# Patient Record
Sex: FEMALE | Race: WHITE | NOT HISPANIC OR LATINO | Employment: OTHER | URBAN - METROPOLITAN AREA
[De-identification: names, ages, dates, MRNs, and addresses within clinical notes are randomized per-mention and may not be internally consistent; named-entity substitution may affect disease eponyms.]

---

## 2023-05-16 ENCOUNTER — HOSPITAL ENCOUNTER (OUTPATIENT)
Dept: DATA CONVERSION | Facility: HOSPITAL | Age: 63
End: 2023-05-16
Attending: STUDENT IN AN ORGANIZED HEALTH CARE EDUCATION/TRAINING PROGRAM | Admitting: STUDENT IN AN ORGANIZED HEALTH CARE EDUCATION/TRAINING PROGRAM

## 2023-05-16 DIAGNOSIS — C50.919 MALIGNANT NEOPLASM OF UNSPECIFIED SITE OF UNSPECIFIED FEMALE BREAST (MULTI): ICD-10-CM

## 2023-05-16 DIAGNOSIS — I48.91 UNSPECIFIED ATRIAL FIBRILLATION (MULTI): ICD-10-CM

## 2023-05-16 DIAGNOSIS — C79.51 SECONDARY MALIGNANT NEOPLASM OF BONE (MULTI): ICD-10-CM

## 2023-05-17 LAB
ATRIAL RATE: 78 BPM
P AXIS: 42 DEGREES
P OFFSET: 200 MS
P ONSET: 143 MS
PR INTERVAL: 142 MS
Q ONSET: 214 MS
QRS COUNT: 13 BEATS
QRS DURATION: 96 MS
QT INTERVAL: 380 MS
QTC CALCULATION(BAZETT): 433 MS
QTC FREDERICIA: 414 MS
R AXIS: -25 DEGREES
T AXIS: 5 DEGREES
T OFFSET: 404 MS
VENTRICULAR RATE: 78 BPM

## 2023-09-05 VITALS — WEIGHT: 136.91 LBS | HEIGHT: 61 IN | BODY MASS INDEX: 25.85 KG/M2

## 2023-09-05 DIAGNOSIS — C41.9 MALIGNANT NEOPLASM OF BONE WITH METASTASES (MULTI): ICD-10-CM

## 2023-09-05 DIAGNOSIS — C50.911 CARCINOMA OF RIGHT BREAST METASTATIC TO BONE (MULTI): Primary | ICD-10-CM

## 2023-09-05 DIAGNOSIS — C79.51 CARCINOMA OF RIGHT BREAST METASTATIC TO BONE (MULTI): Primary | ICD-10-CM

## 2023-09-05 PROBLEM — M89.9 LESION OF BONE OF THORACIC SPINE: Status: ACTIVE | Noted: 2023-09-05

## 2023-09-05 PROBLEM — G95.20 CORD COMPRESSION (MULTI): Status: ACTIVE | Noted: 2023-09-05

## 2023-09-05 PROBLEM — Z85.3 HISTORY OF BREAST CANCER IN FEMALE: Status: ACTIVE | Noted: 2023-09-05

## 2023-09-05 PROBLEM — G89.3 NEOPLASM RELATED PAIN: Status: ACTIVE | Noted: 2023-09-05

## 2023-09-05 PROBLEM — G89.3 PAIN DUE TO MALIGNANT NEOPLASM METASTATIC TO BONE (MULTI): Status: ACTIVE | Noted: 2023-09-05

## 2023-09-05 RX ORDER — METHYLPREDNISOLONE SODIUM SUCCINATE 40 MG/ML
40 INJECTION INTRAMUSCULAR; INTRAVENOUS AS NEEDED
Status: CANCELLED | OUTPATIENT
Start: 2023-10-02

## 2023-09-05 RX ORDER — EPINEPHRINE 0.3 MG/.3ML
0.3 INJECTION SUBCUTANEOUS EVERY 5 MIN PRN
Status: CANCELLED | OUTPATIENT
Start: 2023-10-02

## 2023-09-05 RX ORDER — EPINEPHRINE 0.3 MG/.3ML
0.3 INJECTION SUBCUTANEOUS EVERY 5 MIN PRN
Status: CANCELLED | OUTPATIENT
Start: 2023-10-09

## 2023-09-05 RX ORDER — METHYLPREDNISOLONE SODIUM SUCCINATE 40 MG/ML
40 INJECTION INTRAMUSCULAR; INTRAVENOUS AS NEEDED
Status: CANCELLED | OUTPATIENT
Start: 2023-10-09

## 2023-09-05 RX ORDER — ALBUTEROL SULFATE 0.83 MG/ML
3 SOLUTION RESPIRATORY (INHALATION) AS NEEDED
Status: CANCELLED | OUTPATIENT
Start: 2023-10-09

## 2023-09-05 RX ORDER — ONDANSETRON HYDROCHLORIDE 2 MG/ML
8 INJECTION, SOLUTION INTRAVENOUS ONCE
Status: CANCELLED | OUTPATIENT
Start: 2023-10-09

## 2023-09-05 RX ORDER — HEPARIN SODIUM,PORCINE/PF 10 UNIT/ML
50 SYRINGE (ML) INTRAVENOUS AS NEEDED
Status: CANCELLED | OUTPATIENT
Start: 2023-09-05

## 2023-09-05 RX ORDER — FAMOTIDINE 10 MG/ML
20 INJECTION INTRAVENOUS ONCE AS NEEDED
Status: CANCELLED | OUTPATIENT
Start: 2023-10-09

## 2023-09-05 RX ORDER — FAMOTIDINE 10 MG/ML
20 INJECTION INTRAVENOUS ONCE AS NEEDED
Status: CANCELLED | OUTPATIENT
Start: 2023-10-02

## 2023-09-05 RX ORDER — HEPARIN 100 UNIT/ML
500 SYRINGE INTRAVENOUS AS NEEDED
Status: CANCELLED | OUTPATIENT
Start: 2023-09-05

## 2023-09-05 RX ORDER — DIPHENHYDRAMINE HYDROCHLORIDE 50 MG/ML
50 INJECTION INTRAMUSCULAR; INTRAVENOUS AS NEEDED
Status: CANCELLED | OUTPATIENT
Start: 2023-10-09

## 2023-09-05 RX ORDER — DIPHENHYDRAMINE HYDROCHLORIDE 50 MG/ML
50 INJECTION INTRAMUSCULAR; INTRAVENOUS AS NEEDED
Status: CANCELLED | OUTPATIENT
Start: 2023-10-02

## 2023-09-05 RX ORDER — ALBUTEROL SULFATE 0.83 MG/ML
3 SOLUTION RESPIRATORY (INHALATION) AS NEEDED
Status: CANCELLED | OUTPATIENT
Start: 2023-10-02

## 2023-09-05 RX ORDER — ONDANSETRON HYDROCHLORIDE 2 MG/ML
8 INJECTION, SOLUTION INTRAVENOUS ONCE
Status: CANCELLED | OUTPATIENT
Start: 2023-10-02

## 2023-09-26 PROBLEM — K59.03 CONSTIPATION DUE TO OPIOID THERAPY: Status: ACTIVE | Noted: 2023-09-26

## 2023-09-26 PROBLEM — M79.2 NEUROPATHIC PAIN: Status: ACTIVE | Noted: 2023-09-26

## 2023-09-26 PROBLEM — T40.2X5A CONSTIPATION DUE TO OPIOID THERAPY: Status: ACTIVE | Noted: 2023-09-26

## 2023-09-26 PROBLEM — C79.51 METASTATIC CANCER TO SPINE (MULTI): Status: ACTIVE | Noted: 2023-09-26

## 2023-09-26 PROBLEM — E78.5 HYPERLIPIDEMIA: Status: ACTIVE | Noted: 2023-09-26

## 2023-09-26 PROBLEM — S22.000A THORACIC COMPRESSION FRACTURE (MULTI): Status: ACTIVE | Noted: 2023-09-26

## 2023-09-26 PROBLEM — I48.91 TRANSIENT ATRIAL FIBRILLATION (MULTI): Status: ACTIVE | Noted: 2023-09-26

## 2023-09-26 RX ORDER — ONDANSETRON 4 MG/1
4 TABLET, ORALLY DISINTEGRATING ORAL EVERY 8 HOURS PRN
COMMUNITY
Start: 2023-05-09

## 2023-09-26 RX ORDER — RIZATRIPTAN BENZOATE 5 MG/1
TABLET, ORALLY DISINTEGRATING ORAL
COMMUNITY
Start: 2023-03-12

## 2023-09-26 RX ORDER — DEXAMETHASONE 1 MG/1
2 TABLET ORAL DAILY
COMMUNITY
Start: 2023-05-04 | End: 2024-01-31 | Stop reason: WASHOUT

## 2023-09-26 RX ORDER — LIDOCAINE AND PRILOCAINE 25; 25 MG/G; MG/G
CREAM TOPICAL
COMMUNITY
Start: 2023-07-19

## 2023-09-26 RX ORDER — MORPHINE SULFATE 15 MG/1
15 TABLET, FILM COATED, EXTENDED RELEASE ORAL 2 TIMES DAILY
COMMUNITY
Start: 2023-08-27 | End: 2023-10-12 | Stop reason: SDUPTHER

## 2023-09-26 RX ORDER — MELOXICAM 15 MG/1
15 TABLET ORAL DAILY
COMMUNITY
Start: 2023-03-27 | End: 2024-01-31 | Stop reason: WASHOUT

## 2023-09-26 RX ORDER — GABAPENTIN 300 MG/1
300 CAPSULE ORAL 2 TIMES DAILY
COMMUNITY
Start: 2023-08-23 | End: 2023-12-06 | Stop reason: SDUPTHER

## 2023-09-26 RX ORDER — ATORVASTATIN CALCIUM 10 MG/1
10 TABLET, FILM COATED ORAL DAILY
COMMUNITY
Start: 2023-08-20

## 2023-09-26 RX ORDER — PROPRANOLOL HYDROCHLORIDE 40 MG/1
40 TABLET ORAL DAILY
COMMUNITY
Start: 2023-08-20

## 2023-09-26 RX ORDER — CYCLOBENZAPRINE HCL 10 MG
10 TABLET ORAL NIGHTLY
COMMUNITY
Start: 2023-03-20 | End: 2024-01-31 | Stop reason: WASHOUT

## 2023-09-26 RX ORDER — OXYCODONE HYDROCHLORIDE 10 MG/1
10 TABLET ORAL EVERY 4 HOURS PRN
COMMUNITY
Start: 2023-06-09

## 2023-09-30 NOTE — H&P
History & Physical Reviewed:   I have reviewed the History and Physical dated:  09-May-2023   History and Physical reviewed and relevant findings noted. Patient examined to review pertinent physical  findings.: No significant changes   Home Medications Reviewed: no changes noted   Allergies Reviewed: no changes noted       Airway/Sedation Assessment:  ·  Emotional Status calm   ·  Neurologic alert & oriented x 3   ·  Respiratory clear to auscultation   ·  Cardiovascular rhythm & rate regular   ·  GI/ soft, nontender     · Pulses present: Pedal Left, Pedal Right, Radial Left, Radial Right     ·  Mouth Opening OK yes   ·  Neck Flexibility OK yes   ·  Loose Teeth no   ·  Oropharyngeal Classification Class III   ·  ASA PS Classification ASA III   ·  Sedation Plan moderate sedation       ERAS (Enhanced Recovery After Surgery):  ·  ERAS Patient: no     Consent:   COVID-19 Consent:  ·  COVID-19 Risk Consent Surgeon has reviewed key risks related to the risk of nikita COVID-19 and if they contract COVID-19 what the risks are.     Assessment/Plan:   ·  Assessment and Plan    62 year old female with Breast Ca presents for Mediport with Dr Jimenez      Electronic Signatures:  Summer Pacheco (PAC)  (Signed 16-May-2023 14:13)   Authored: History & Physical Reviewed, Airway/Sedation,  ERAS, Consent, Assessment/Plan, Note Completion      Last Updated: 16-May-2023 14:13 by Summer Pacheco (PAC)

## 2023-10-02 ENCOUNTER — INFUSION (OUTPATIENT)
Dept: HEMATOLOGY/ONCOLOGY | Facility: CLINIC | Age: 63
End: 2023-10-02
Payer: COMMERCIAL

## 2023-10-02 VITALS
OXYGEN SATURATION: 96 % | SYSTOLIC BLOOD PRESSURE: 114 MMHG | TEMPERATURE: 98.2 F | HEIGHT: 62 IN | RESPIRATION RATE: 16 BRPM | BODY MASS INDEX: 24.83 KG/M2 | DIASTOLIC BLOOD PRESSURE: 75 MMHG | HEART RATE: 61 BPM | WEIGHT: 134.92 LBS

## 2023-10-02 DIAGNOSIS — C41.9 MALIGNANT NEOPLASM OF BONE WITH METASTASES (MULTI): ICD-10-CM

## 2023-10-02 DIAGNOSIS — C79.51 CARCINOMA OF RIGHT BREAST METASTATIC TO BONE (MULTI): ICD-10-CM

## 2023-10-02 DIAGNOSIS — C50.911 CARCINOMA OF RIGHT BREAST METASTATIC TO BONE (MULTI): ICD-10-CM

## 2023-10-02 LAB
ALBUMIN SERPL BCP-MCNC: 3.9 G/DL (ref 3.4–5)
ALP SERPL-CCNC: 44 U/L (ref 33–136)
ALT SERPL W P-5'-P-CCNC: 9 U/L (ref 7–45)
ANION GAP SERPL CALC-SCNC: 11 MMOL/L (ref 10–20)
AST SERPL W P-5'-P-CCNC: 13 U/L (ref 9–39)
BASOPHILS # BLD AUTO: 0.03 X10*3/UL (ref 0–0.1)
BASOPHILS NFR BLD AUTO: 0.7 %
BILIRUB SERPL-MCNC: 0.5 MG/DL (ref 0–1.2)
BUN SERPL-MCNC: 15 MG/DL (ref 6–23)
CALCIUM SERPL-MCNC: 8.3 MG/DL (ref 8.6–10.3)
CHLORIDE SERPL-SCNC: 107 MMOL/L (ref 98–107)
CO2 SERPL-SCNC: 26 MMOL/L (ref 21–32)
CREAT SERPL-MCNC: 0.52 MG/DL (ref 0.5–1.05)
EOSINOPHIL # BLD AUTO: 0.11 X10*3/UL (ref 0–0.7)
EOSINOPHIL NFR BLD AUTO: 2.5 %
ERYTHROCYTE [DISTWIDTH] IN BLOOD BY AUTOMATED COUNT: 14.5 % (ref 11.5–14.5)
GFR SERPL CREATININE-BSD FRML MDRD: >90 ML/MIN/1.73M*2
GLUCOSE SERPL-MCNC: 93 MG/DL (ref 74–99)
HCT VFR BLD AUTO: 37.7 % (ref 36–46)
HGB BLD-MCNC: 12.8 G/DL (ref 12–16)
IMM GRANULOCYTES # BLD AUTO: 0.01 X10*3/UL (ref 0–0.7)
IMM GRANULOCYTES NFR BLD AUTO: 0.2 % (ref 0–0.9)
LYMPHOCYTES # BLD AUTO: 1.52 X10*3/UL (ref 1.2–4.8)
LYMPHOCYTES NFR BLD AUTO: 34 %
MCH RBC QN AUTO: 30.4 PG (ref 26–34)
MCHC RBC AUTO-ENTMCNC: 34 G/DL (ref 32–36)
MCV RBC AUTO: 90 FL (ref 80–100)
MONOCYTES # BLD AUTO: 0.49 X10*3/UL (ref 0.1–1)
MONOCYTES NFR BLD AUTO: 11 %
NEUTROPHILS # BLD AUTO: 2.31 X10*3/UL (ref 1.2–7.7)
NEUTROPHILS NFR BLD AUTO: 51.6 %
NRBC BLD-RTO: NORMAL /100{WBCS}
PLATELET # BLD AUTO: 201 X10*3/UL (ref 150–450)
PMV BLD AUTO: 11.5 FL (ref 7.5–11.5)
POTASSIUM SERPL-SCNC: 3.7 MMOL/L (ref 3.5–5.3)
PROT SERPL-MCNC: 5.7 G/DL (ref 6.4–8.2)
RBC # BLD AUTO: 4.21 X10*6/UL (ref 4–5.2)
SODIUM SERPL-SCNC: 140 MMOL/L (ref 136–145)
WBC # BLD AUTO: 4.5 X10*3/UL (ref 4.4–11.3)

## 2023-10-02 PROCEDURE — 80053 COMPREHEN METABOLIC PANEL: CPT

## 2023-10-02 PROCEDURE — 2500000004 HC RX 250 GENERAL PHARMACY W/ HCPCS (ALT 636 FOR OP/ED): Mod: JW | Performed by: INTERNAL MEDICINE

## 2023-10-02 PROCEDURE — 96375 TX/PRO/DX INJ NEW DRUG ADDON: CPT

## 2023-10-02 PROCEDURE — 85025 COMPLETE CBC W/AUTO DIFF WBC: CPT

## 2023-10-02 PROCEDURE — 96413 CHEMO IV INFUSION 1 HR: CPT

## 2023-10-02 PROCEDURE — 96523 IRRIG DRUG DELIVERY DEVICE: CPT | Mod: CCI

## 2023-10-02 PROCEDURE — 36415 COLL VENOUS BLD VENIPUNCTURE: CPT

## 2023-10-02 RX ORDER — ONDANSETRON HYDROCHLORIDE 2 MG/ML
8 INJECTION, SOLUTION INTRAVENOUS ONCE
Status: COMPLETED | OUTPATIENT
Start: 2023-10-02 | End: 2023-10-02

## 2023-10-02 RX ORDER — ALBUTEROL SULFATE 0.83 MG/ML
3 SOLUTION RESPIRATORY (INHALATION) AS NEEDED
Status: DISCONTINUED | OUTPATIENT
Start: 2023-10-02 | End: 2023-10-02 | Stop reason: HOSPADM

## 2023-10-02 RX ORDER — EPINEPHRINE 0.3 MG/.3ML
0.3 INJECTION SUBCUTANEOUS EVERY 5 MIN PRN
Status: DISCONTINUED | OUTPATIENT
Start: 2023-10-02 | End: 2023-10-02 | Stop reason: HOSPADM

## 2023-10-02 RX ORDER — HEPARIN 100 UNIT/ML
SYRINGE INTRAVENOUS
Status: DISPENSED
Start: 2023-10-02 | End: 2023-10-03

## 2023-10-02 RX ORDER — DIPHENHYDRAMINE HYDROCHLORIDE 50 MG/ML
50 INJECTION INTRAMUSCULAR; INTRAVENOUS AS NEEDED
Status: DISCONTINUED | OUTPATIENT
Start: 2023-10-02 | End: 2023-10-02 | Stop reason: HOSPADM

## 2023-10-02 RX ORDER — FAMOTIDINE 10 MG/ML
20 INJECTION INTRAVENOUS ONCE AS NEEDED
Status: DISCONTINUED | OUTPATIENT
Start: 2023-10-02 | End: 2023-10-02 | Stop reason: HOSPADM

## 2023-10-02 RX ADMIN — PACLITAXEL 131.2 MG: 100 INJECTION, POWDER, LYOPHILIZED, FOR SUSPENSION INTRAVENOUS at 12:28

## 2023-10-02 RX ADMIN — ONDANSETRON 8 MG: 2 INJECTION, SOLUTION INTRAMUSCULAR; INTRAVENOUS at 10:30

## 2023-10-02 ASSESSMENT — PAIN SCALES - GENERAL: PAINLEVEL: 3

## 2023-10-02 NOTE — PROGRESS NOTES
Infusion tolerated without incident. Cooling cap pre/ during and post treatment was tolerated as well. Schedule reviewed then Dc'd via independent / ambulatory

## 2023-10-02 NOTE — PROGRESS NOTES
Patient ID: Trang Jones is a 63 y.o. female.  Referring Physician: No referring provider defined for this encounter.  Primary Care Provider: Karl Granger DO    CANCER HISTORY:    Chief Complaint    62 yo woman with newly dx Tneg breast ca to bone, cord compression  2010 - L mtx - ER+ stage II breast ca   AC x 4  Keith x 7 yrs    3/23 - back pain - MRI with cord compression treated with radiation 5/4/23 5/22/23 - Taxol (weekly) - had reaction - held last week and only partial this week  Changing to abraxane  Not planning pembrolizumab as PDL1 neg per pt     Now on abraxane and tolerating well         History of Present IllnessConsulted by: Dr. Tolbert  For: breast cancer    Chief complaints and symptoms:  Seen in supp oncology  1. Pain - on ms contin, nsaids, gabapentin. THC twice/week  mostly in back and now in ribs. Improved overall though some pain from walking this last weekend  Now worse since [place] trip and walking - pelvic pain, L hip and pain down L leg    2. Fatigue - related to chemotherapy - improved overall. Had abraxane 2 days ago - stable but tired from weekend trip - same now    3. Appetite suppressed - improved off chemo last week  Poor taste - improved off chemo last week  Doing well still today    4. Neuropathy - mild developing tingling in fingers - numbness and tingling briefly in fingertips - brief numbness in fingers  Improved    mild ha    Anxiety about results    Integrative History:  Diet: mostly fruits (taste good), protein smoothies (Vital proteins, collagen powder, almond milk)  Lean proteins, chicken/salmon, o/w plant based    PA: walking some, hiking some now, continued  Doing some Pilates    Sleep: well improved with THC - 8-9 hrs/night    Stress: overwhelmed by dx.  Management: Meditating daily almost, various times, has some experience - Intermittent  Family support  Reiki helping  Focusing more on spirituality and Religion, Women's groups.    Natural Products:  Medical  cannabis - one gummy/night  magnesium  Red yeast rice - for cholesterol  Vit D  Flax seed oil   American Ginseng  Host Defense STAMETS 7  Zinc    ROS:  no ha, visual symptoms, hearing loss  no sob, chest pain, palp  ROS o/w non contributory, please see HPI    Objective    BSA: There is no height or weight on file to calculate BSA.  There were no vitals taken for this visit.    PHYSICAL EXAM:  NAD, awake/alert  HEENT, NCAT, OP clear, no oral lesions  CTA bilat  RRR no mgr  Abd soft/nt/nd+bs  No c/c/e/ttp  Motor/sensory intact, CN 2-12 intact     RESULTS:  Lab Results   Component Value Date    WBC 4.5 10/02/2023    HGB 12.8 10/02/2023    HCT 37.7 10/02/2023     10/02/2023    CREATININE 0.52 10/02/2023    AST 13 10/02/2023       Assessment/Plan   Cancer Staging   Malignant neoplasm of bone with metastases (CMS/HCC)  Staging form: Bone - Appendicular Skeleton, Trunk, Skull, and Facial Bones, AJCC 8th Edition  - Clinical stage from 9/5/2023: pM1, G3 - Signed by Bebeto Tolbert MD on 9/5/2023    Provider Impressions    62 yo woman with recurrent breast ca, Tneg now with bone mets and s/p cord compression  S/p radiation  5/22/23 started taxol (weekly) - now changed to abraxane  No neuropathy now    Breast cancer:   7/17/23 CT c/a/p - will review with Dr. Tolbert:  sclerotic L 3rd rib, increase osseous mets R iliac, 1.1 cm liver hypodensities, can discuss further with Dr. Tolbert  focusing plant based is fine  limit sugar including in smoothies, consider Orgain (using Vital protein)  Would avoid coffee on taxol  Zinc for taste  Flax is fine  THC is good as well  Vitamin D3 5000 IU 3 days/week is good  Consider omega 3   Stop b12, also coq10 - done  on Host Defense STAMETS 7    Try to walk 15-30 min/day    Pain - taking motrin prn, steroid weaning  MS contin and gabapentin  Acupuncture will start in Symptom Management Clinic - has started  Feels better when having AM pain meds and supplements  Some  improvements  Gentle Yoga with assistance, same with Pilates    Fatigue: Definitely improved  Acupuncture, consider massage  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 Uses to manage scoliosis.  Would sugg pt see Dr. Cantu (rehab) and/or PT prior to engaging in yoga    Follow up in Symptom Management Clinic       Integrative Oncology Symptom Management:    The integrative oncology symptom management clinic offers supervised care of cancer patients using Integrative Modalities billed to insurance using NCCN and SIO/ASCO guideline-driven practices.  ESAS is obtained prior to and after each treatment by practitioner    Symptoms Managed:  Pain - Increased in L leg and hip and pelvis since coming back from trip    Fatigue - still there but improved, same, travelled this last weekend with lots of walking - same  Poor Appetite - doing well  Neuropathy - improved now, one brief episode of numbness in fingers - none now    Natural Products utilized:  Medical cannabis - one gummy/night  magnesium  Red yeast rice - for cholesterol  B12  COq10  Vit D  Flax seed oil     Integrative Treatment: Acupuncture    Session #: 11  Frequency: Weekly    Referrals: Supportive Oncology (Has seen)    Recommendations: I would avoid coffee, soy, green tea while on Taxol (can reduce efficacy)  Weekly acupuncture  Has obtained med THC card as well   Ok to take calcium, mildly low and albumin wnl  seen by Dr. Cantu and going to PT    Follow Up:  Symptom Management: weekly  Integrative Oncology: 3 months - could see 9/23    I have personally seen the patient and supervised the treatment by the integrative practitioner during this visit.  Pt had symptoms discussed and I was present for the patient's 45 minutes of direct patient care.     Bo Sandoval MD

## 2023-10-04 ENCOUNTER — ALLIED HEALTH (OUTPATIENT)
Dept: INTEGRATIVE MEDICINE | Facility: CLINIC | Age: 63
End: 2023-10-04
Payer: COMMERCIAL

## 2023-10-04 ENCOUNTER — OFFICE VISIT (OUTPATIENT)
Dept: HEMATOLOGY/ONCOLOGY | Facility: CLINIC | Age: 63
End: 2023-10-04
Payer: COMMERCIAL

## 2023-10-04 VITALS
HEART RATE: 59 BPM | WEIGHT: 134.04 LBS | TEMPERATURE: 98.1 F | BODY MASS INDEX: 24.82 KG/M2 | SYSTOLIC BLOOD PRESSURE: 107 MMHG | DIASTOLIC BLOOD PRESSURE: 66 MMHG

## 2023-10-04 DIAGNOSIS — C79.51 CARCINOMA OF RIGHT BREAST METASTATIC TO BONE (MULTI): Primary | ICD-10-CM

## 2023-10-04 DIAGNOSIS — Z85.3 HISTORY OF BREAST CANCER IN FEMALE: ICD-10-CM

## 2023-10-04 DIAGNOSIS — G89.3 NEOPLASM RELATED PAIN: ICD-10-CM

## 2023-10-04 DIAGNOSIS — C41.9 MALIGNANT NEOPLASM OF BONE WITH METASTASES (MULTI): ICD-10-CM

## 2023-10-04 DIAGNOSIS — C79.51 METASTATIC CANCER TO SPINE (MULTI): ICD-10-CM

## 2023-10-04 DIAGNOSIS — C50.911 CARCINOMA OF RIGHT BREAST METASTATIC TO BONE (MULTI): Primary | ICD-10-CM

## 2023-10-04 PROCEDURE — 99215 OFFICE O/P EST HI 40 MIN: CPT | Performed by: INTERNAL MEDICINE

## 2023-10-04 RX ORDER — METHYLPREDNISOLONE SODIUM SUCCINATE 40 MG/ML
40 INJECTION INTRAMUSCULAR; INTRAVENOUS AS NEEDED
Status: CANCELLED | OUTPATIENT
Start: 2023-11-14

## 2023-10-04 RX ORDER — ALBUTEROL SULFATE 0.83 MG/ML
3 SOLUTION RESPIRATORY (INHALATION) AS NEEDED
Status: CANCELLED | OUTPATIENT
Start: 2023-10-23

## 2023-10-04 RX ORDER — ALBUTEROL SULFATE 0.83 MG/ML
3 SOLUTION RESPIRATORY (INHALATION) AS NEEDED
Status: CANCELLED | OUTPATIENT
Start: 2023-11-20

## 2023-10-04 RX ORDER — FAMOTIDINE 10 MG/ML
20 INJECTION INTRAVENOUS ONCE AS NEEDED
Status: CANCELLED | OUTPATIENT
Start: 2023-10-23

## 2023-10-04 RX ORDER — ONDANSETRON HYDROCHLORIDE 2 MG/ML
8 INJECTION, SOLUTION INTRAVENOUS ONCE
Status: CANCELLED | OUTPATIENT
Start: 2023-11-20

## 2023-10-04 RX ORDER — METHYLPREDNISOLONE SODIUM SUCCINATE 40 MG/ML
40 INJECTION INTRAMUSCULAR; INTRAVENOUS AS NEEDED
Status: CANCELLED | OUTPATIENT
Start: 2023-11-13

## 2023-10-04 RX ORDER — METHYLPREDNISOLONE SODIUM SUCCINATE 40 MG/ML
40 INJECTION INTRAMUSCULAR; INTRAVENOUS AS NEEDED
Status: CANCELLED | OUTPATIENT
Start: 2023-10-30

## 2023-10-04 RX ORDER — DIPHENHYDRAMINE HYDROCHLORIDE 50 MG/ML
50 INJECTION INTRAMUSCULAR; INTRAVENOUS AS NEEDED
Status: CANCELLED | OUTPATIENT
Start: 2023-10-23

## 2023-10-04 RX ORDER — FAMOTIDINE 10 MG/ML
20 INJECTION INTRAVENOUS ONCE AS NEEDED
Status: CANCELLED | OUTPATIENT
Start: 2023-11-20

## 2023-10-04 RX ORDER — ONDANSETRON HYDROCHLORIDE 2 MG/ML
8 INJECTION, SOLUTION INTRAVENOUS ONCE
Status: CANCELLED | OUTPATIENT
Start: 2023-10-30

## 2023-10-04 RX ORDER — DIPHENHYDRAMINE HYDROCHLORIDE 50 MG/ML
50 INJECTION INTRAMUSCULAR; INTRAVENOUS AS NEEDED
Status: CANCELLED | OUTPATIENT
Start: 2023-10-30

## 2023-10-04 RX ORDER — EPINEPHRINE 0.3 MG/.3ML
0.3 INJECTION SUBCUTANEOUS EVERY 5 MIN PRN
Status: CANCELLED | OUTPATIENT
Start: 2023-10-30

## 2023-10-04 RX ORDER — FAMOTIDINE 10 MG/ML
20 INJECTION INTRAVENOUS ONCE AS NEEDED
Status: CANCELLED | OUTPATIENT
Start: 2023-11-14

## 2023-10-04 RX ORDER — ONDANSETRON HYDROCHLORIDE 2 MG/ML
8 INJECTION, SOLUTION INTRAVENOUS ONCE
Status: CANCELLED | OUTPATIENT
Start: 2023-10-23

## 2023-10-04 RX ORDER — DIPHENHYDRAMINE HYDROCHLORIDE 50 MG/ML
50 INJECTION INTRAMUSCULAR; INTRAVENOUS AS NEEDED
Status: CANCELLED | OUTPATIENT
Start: 2023-11-20

## 2023-10-04 RX ORDER — ONDANSETRON HYDROCHLORIDE 2 MG/ML
8 INJECTION, SOLUTION INTRAVENOUS ONCE
Status: CANCELLED | OUTPATIENT
Start: 2023-11-13

## 2023-10-04 RX ORDER — FAMOTIDINE 10 MG/ML
20 INJECTION INTRAVENOUS ONCE AS NEEDED
Status: CANCELLED | OUTPATIENT
Start: 2023-11-13

## 2023-10-04 RX ORDER — ALBUTEROL SULFATE 0.83 MG/ML
3 SOLUTION RESPIRATORY (INHALATION) AS NEEDED
Status: CANCELLED | OUTPATIENT
Start: 2023-11-13

## 2023-10-04 RX ORDER — METHYLPREDNISOLONE SODIUM SUCCINATE 40 MG/ML
40 INJECTION INTRAMUSCULAR; INTRAVENOUS AS NEEDED
Status: CANCELLED | OUTPATIENT
Start: 2023-11-20

## 2023-10-04 RX ORDER — ZOLEDRONIC ACID 0.04 MG/ML
4 INJECTION, SOLUTION INTRAVENOUS ONCE
Status: CANCELLED | OUTPATIENT
Start: 2023-11-14

## 2023-10-04 RX ORDER — EPINEPHRINE 0.3 MG/.3ML
0.3 INJECTION SUBCUTANEOUS EVERY 5 MIN PRN
Status: CANCELLED | OUTPATIENT
Start: 2023-11-13

## 2023-10-04 RX ORDER — DIPHENHYDRAMINE HYDROCHLORIDE 50 MG/ML
50 INJECTION INTRAMUSCULAR; INTRAVENOUS AS NEEDED
Status: CANCELLED | OUTPATIENT
Start: 2023-11-13

## 2023-10-04 RX ORDER — EPINEPHRINE 0.3 MG/.3ML
0.3 INJECTION SUBCUTANEOUS EVERY 5 MIN PRN
Status: CANCELLED | OUTPATIENT
Start: 2023-10-23

## 2023-10-04 RX ORDER — FAMOTIDINE 10 MG/ML
20 INJECTION INTRAVENOUS ONCE AS NEEDED
Status: CANCELLED | OUTPATIENT
Start: 2023-10-30

## 2023-10-04 RX ORDER — EPINEPHRINE 0.3 MG/.3ML
0.3 INJECTION SUBCUTANEOUS EVERY 5 MIN PRN
Status: CANCELLED | OUTPATIENT
Start: 2023-11-20

## 2023-10-04 RX ORDER — DIPHENHYDRAMINE HYDROCHLORIDE 50 MG/ML
50 INJECTION INTRAMUSCULAR; INTRAVENOUS AS NEEDED
Status: CANCELLED | OUTPATIENT
Start: 2023-11-14

## 2023-10-04 RX ORDER — METHYLPREDNISOLONE SODIUM SUCCINATE 40 MG/ML
40 INJECTION INTRAMUSCULAR; INTRAVENOUS AS NEEDED
Status: CANCELLED | OUTPATIENT
Start: 2023-10-23

## 2023-10-04 RX ORDER — ALBUTEROL SULFATE 0.83 MG/ML
3 SOLUTION RESPIRATORY (INHALATION) AS NEEDED
Status: CANCELLED | OUTPATIENT
Start: 2023-10-30

## 2023-10-04 RX ORDER — ALBUTEROL SULFATE 0.83 MG/ML
3 SOLUTION RESPIRATORY (INHALATION) AS NEEDED
Status: CANCELLED | OUTPATIENT
Start: 2023-11-14

## 2023-10-04 RX ORDER — EPINEPHRINE 0.3 MG/.3ML
0.3 INJECTION SUBCUTANEOUS EVERY 5 MIN PRN
Status: CANCELLED | OUTPATIENT
Start: 2023-11-14

## 2023-10-04 ASSESSMENT — ENCOUNTER SYMPTOMS
CHEST TIGHTNESS: 0
CONFUSION: 0
BLOOD IN STOOL: 0
EYES NEGATIVE: 1
SHORTNESS OF BREATH: 0
HEADACHES: 0
FATIGUE: 1
NAUSEA: 0
BRUISES/BLEEDS EASILY: 0
DIZZINESS: 0
WHEEZING: 0
NUMBNESS: 0
ARTHRALGIAS: 1
BACK PAIN: 0
DYSURIA: 0
EXTREMITY WEAKNESS: 0
CHILLS: 0
DIFFICULTY URINATING: 0
CARDIOVASCULAR NEGATIVE: 1
SEIZURES: 0
HEMOPTYSIS: 0
CONSTIPATION: 0
APPETITE CHANGE: 0
DIARRHEA: 0
DEPRESSION: 0
EYE PROBLEMS: 0
LEG SWELLING: 0
FEVER: 0
RESPIRATORY NEGATIVE: 1
SLEEP DISTURBANCE: 0
HEMATURIA: 0
DIAPHORESIS: 0
HOT FLASHES: 0
ABDOMINAL PAIN: 0
COUGH: 0
NERVOUS/ANXIOUS: 0
ADENOPATHY: 0
FREQUENCY: 0
MYALGIAS: 0
PALPITATIONS: 0
SCLERAL ICTERUS: 0
ABDOMINAL DISTENTION: 0

## 2023-10-04 ASSESSMENT — PAIN SCALES - GENERAL: PAINLEVEL: 0-NO PAIN

## 2023-10-04 NOTE — PROGRESS NOTES
Bo Sandoval MD  Physician  Internal Medicine          Sign when Signing Visit       Expand All Collapse All    Patient ID: Trang Jones is a 63 y.o. female.  Referring Physician: No referring provider defined for this encounter.  Primary Care Provider: Karl Granger DO     CANCER HISTORY:    Chief Complaint     62 yo woman with newly dx Tneg breast ca to bone, cord compression  2010 - L mtx - ER+ stage II breast ca   AC x 4  Keith x 7 yrs     3/23 - back pain - MRI with cord compression treated with radiation 5/4/23 5/22/23 - Taxol (weekly) - had reaction - held last week and only partial this week  Changing to abraxane  Not planning pembrolizumab as PDL1 neg per pt      Now on abraxane and tolerating well         History of Present IllnessConsulted by: Dr. Tolbert  For: breast cancer     Chief complaints and symptoms:  Seen in supp oncology  1. Pain - on ms contin, nsaids, gabapentin. THC twice/week  mostly in back and now in ribs. Improved overall though some pain from walking this last weekend  Now worse since [place] trip and walking - pelvic pain, L hip and pain down L leg     2. Fatigue - related to chemotherapy - improved overall. Worse on chemo weeks    3. Appetite suppressed  Poor taste - improved  Doing well still today     4. Neuropathy - mild developing tingling in fingers - numbness and tingling briefly in fingertips - brief numbness in fingers  Two more in fingertips, tingling/numbness     mild ha     Anxiety about results     Integrative History:  Diet: mostly fruits (taste good), protein smoothies (Vital proteins, collagen powder, almond milk)  Lean proteins, chicken/salmon, o/w plant based     PA: walking some, hiking some now, continued  Doing some Pilates     Sleep: well improved with THC - 8-9 hrs/night     Stress: overwhelmed by dx.  Management: Meditating daily almost, various times, has some experience - Intermittent  Family support  Reiki helping  Focusing more on  spirituality and Yazidi, Women's groups.     Natural Products:  Medical cannabis - one gummy/night  magnesium  Red yeast rice - for cholesterol  Vit D  Flax seed oil   American Ginseng  Host Defense STAMETS 7  Zinc     ROS:  no ha, visual symptoms, hearing loss  no sob, chest pain, palp  ROS o/w non contributory, please see HPI        Objective   BSA: There is no height or weight on file to calculate BSA.  There were no vitals taken for this visit.     PHYSICAL EXAM:  NAD, awake/alert  HEENT, NCAT, OP clear, no oral lesions  CTA bilat  RRR no mgr  Abd soft/nt/nd+bs  No c/c/e/ttp  Motor/sensory intact, CN 2-12 intact      RESULTS:        Lab Results   Component Value Date     WBC 4.5 10/02/2023     HGB 12.8 10/02/2023     HCT 37.7 10/02/2023      10/02/2023     CREATININE 0.52 10/02/2023     AST 13 10/02/2023               Assessment/Plan   Cancer Staging   Malignant neoplasm of bone with metastases (CMS/HCC)  Staging form: Bone - Appendicular Skeleton, Trunk, Skull, and Facial Bones, AJCC 8th Edition  - Clinical stage from 9/5/2023: pM1, G3 - Signed by Bebeto Tolbert MD on 9/5/2023     Provider Impressions     64 yo woman with recurrent breast ca, Tneg now with bone mets and s/p cord compression  S/p radiation  5/22/23 started taxol (weekly) - now changed to abraxane  No neuropathy now     Breast cancer:   7/17/23 CT c/a/p - will review with Dr. Tolbert:  sclerotic L 3rd rib, increase osseous mets R iliac, 1.1 cm liver hypodensities, can discuss further with Dr. Tolbert  focusing plant based is fine  limit sugar including in smoothies, consider Orgain (using Vital protein)  Would avoid coffee on taxol  Zinc for taste  Flax is fine  THC is good as well  Vitamin D3 5000 IU 3 days/week is good  Consider omega 3   Stop b12, also coq10 - done  on Host Defense STAMETS 7     Try to walk 15-30 min/day     Pain - taking motrin prn, steroid weaning  MS contin and gabapentin  Acupuncture will start in Symptom  Management Clinic - has started  Feels better when having AM pain meds and supplements  Some improvements  Gentle Yoga with assistance, same with Pilates     Fatigue: Definitely improved  Acupuncture, consider massage  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 Uses to manage scoliosis.  Would sugg pt see Dr. Cantu (rehab) and/or PT prior to engaging in yoga     Follow up in Symptom Management Clinic      Integrative Oncology Symptom Management:     The integrative oncology symptom management clinic offers supervised care of cancer patients using Integrative Modalities billed to insurance using NCCN and SIO/ASCO guideline-driven practices.  ESAS is obtained prior to and after each treatment by practitioner     Symptoms Managed:  Pain - Increased in L leg and hip and pelvis since coming back from trip - subsided now     Fatigue - still there but improved, same, travelled this last weekend with lots of walking - same overall during chemo weeks  Poor Appetite - doing well  Neuropathy - improved now, one brief episode of numbness in fingers - none now  Gabapentin increased to 300 am and 600 pm     Natural Products utilized:  Medical cannabis - one gummy/night  magnesium  Red yeast rice - for cholesterol  B12  COq10  Vit D  Flax seed oil      Integrative Treatment: Acupuncture     Session #: 12  Frequency: Weekly     Referrals: Supportive Oncology (Has seen)     Recommendations: I would avoid coffee, soy, green tea while on Taxol (can reduce efficacy)  Weekly acupuncture  Has obtained med THC card as well   Ok to take calcium, mildly low and albumin wnl  seen by Dr. Cantu and going to PT - working with  on core strength     Follow Up:  Symptom Management: weekly  Integrative Oncology: 3 months - could see 9/23     I have  personally seen the patient and supervised the treatment by the integrative practitioner during this visit.  Pt had symptoms discussed and I was present for the patient's 45 minutes of direct patient care.      Bo Sandoval MD

## 2023-10-04 NOTE — PROGRESS NOTES
Acupuncture Visit:     Subjective   Patient ID: Trang Jones is a 63 y.o. female who presents for No chief complaint on file.  HPI    Inceased pain left leg, from hip. Not sure if it is from her scoliosis   Has felt more limited overall  Energy lower  Sleep is good  No neurpathy.         initial visit:  Fatigue, back pain  had recent break from chemo  fatigue is better since holding  typically wiped out after chemo and radiation for 2-3 days   low appetite and difficulty staying hydrated  denies xerostomia    sleep is good- uses cbd product, has [employer] card, but has not pursued yet    pain this week mostly from scoliosis  lumbar into thoracic, has since her 30s, stiffness, constant pain   had has some radiation down legs. foraminal stenosis  no neuropathy symptoms, hands or feet  denies other pain areas.   was having severe throacic pain, from tumor compression, now off of most pain meds except ER morphine.     planned 2 more rounds of chemo then a scan to evaluate next steps     BM- once a day, no blood mucus undigested food,        Diet: no alcohol or sugars. consistently tries to eat healthy  Dr: water, dislikes cold water, drinks warm lemon water, bone broth, hot teas.    General: denies- fever, chills, night sweats  Skin: denies- rash, abnormal lesions  Eyes: occ floaters, last exam 1 year, ,   HENT: denies- headache, sore throat, ear pain, tinnitus, congestion, runny nose,   Cardio: denies- chest pain, palps  Resp: denies- SOB, cough, wheezing  GI: some reflux- every few days,   Uro: occasional urgency, no pain or burning.   Neuro: see HPI  Musc: see HPI                     Procedure  Points: Scalp thoracic, BFA 1-3, LI10 LI4, Si3, HT7, SP6, KI7, KI3, UB62, LR3            Review of Systems         Provider reviewed plan for the acupuncture session, precautions and contraindications. Patient/guardian/hospital staff has given consent to treat with full understanding of what to expect during the session.  Before acupuncture began, provider explained to the patient to communicate at any time if the procedure was causing discomfort past their tolerance level. Patient agreed to advise acupuncturist. The acupuncturist counseled the patient on the risks of acupuncture treatment including pain, infection, bleeding, and no relief of pain. The patient was positioned comfortably. There was no evidence of infection at the site of needle insertions.    Objective   Physical Exam                             Assessment/Plan

## 2023-10-04 NOTE — PROGRESS NOTES
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     HISTORY OF PRESENT ILLNESS    Trang Jones is a 63 y.o. woman who presents today for metastatic breast cancer treatment follow-up. Still no neuropathy. Tolerating chemo well. No alopecia-- cold cap is working well. Back pain is improved. Actually has little or  no back pain. Left hip pain improving after spending a weekend in [place] for daughter's wedding and walked at least 18-20K steps per day. CT scans and bone scan show essentially stable disease.          Review of Systems   Constitutional:  Positive for fatigue. Negative for appetite change, chills, diaphoresis and fever.   HENT:  Negative.  Negative for hearing loss and lump/mass.    Eyes: Negative.  Negative for eye problems and icterus.   Respiratory: Negative.  Negative for chest tightness, cough, hemoptysis, shortness of breath and wheezing.    Cardiovascular: Negative.  Negative for chest pain, leg swelling and palpitations.   Gastrointestinal:  Negative for abdominal distention, abdominal pain, blood in stool, constipation, diarrhea and nausea.   Endocrine: Negative for hot flashes.   Genitourinary:  Negative for bladder incontinence, difficulty urinating, dyspareunia, dysuria, frequency and hematuria.    Musculoskeletal:  Positive for arthralgias. Negative for back pain, gait problem and myalgias.        Left hip pain improving   Neurological:  Negative for dizziness, extremity weakness, gait problem, headaches, numbness and seizures.   Hematological:  Negative for adenopathy. Does not bruise/bleed easily.    Psychiatric/Behavioral:  Negative for confusion, depression and sleep disturbance. The patient is not nervous/anxious.    All other systems reviewed and are negative.        Past Medical History:  has no past medical history on file.  Surgical History:   has a past surgical history that includes CT guided percutaneous biopsy bone deep (4/13/2023).  Social History:   reports that she quit smoking about 20 years ago. Her smoking use included cigarettes. She does not have any smokeless tobacco history on file. She reports that she does not drink alcohol.  Family History:  No family history on file.  Family Oncology History:  Cancer-related family history is not on file.      OBJECTIVE    VS / Pain:  /66 (BP Location: Right arm, Patient Position: Sitting, BP Cuff Size: Adult)   Pulse 59   Temp 36.7 °C (98.1 °F)   Wt 60.8 kg (134 lb 0.6 oz)   BMI 24.82 kg/m²   BSA: 1.63 meters squared   Pain Scale: 3    Performance Status:  The ECOG performance scale today is Symptomatic; in bed <50% of the day    Physical Exam  Constitutional:       General: She is not in acute distress.     Appearance: She is not ill-appearing, toxic-appearing or diaphoretic.   HENT:      Nose: No congestion or rhinorrhea.      Mouth/Throat:      Pharynx: No posterior oropharyngeal erythema.   Cardiovascular:      Rate and Rhythm: Normal rate and regular rhythm.      Pulses: Normal pulses.      Heart sounds: Normal heart sounds. No murmur heard.     No gallop.   Pulmonary:      Breath sounds: Normal breath sounds.   Abdominal:      General: There is no distension.      Palpations: There is no mass.      Tenderness: There is no abdominal tenderness.   Musculoskeletal:         General: No swelling.      Cervical back: No rigidity.      Right lower leg: No edema.      Left lower leg: No edema.   Lymphadenopathy:      Cervical: No cervical adenopathy.   Skin:     General: Skin is warm.      Coloration: Skin is not cyanotic.      Findings: No  bruising, ecchymosis or erythema.   Neurological:      General: No focal deficit present.      Mental Status: She is oriented to person, place, and time.      Cranial Nerves: Cranial nerves 2-12 are intact.      Motor: Motor function is intact.   Psychiatric:         Attention and Perception: Attention and perception normal.         Mood and Affect: Mood and affect normal.         Behavior: Behavior normal.         Thought Content: Thought content normal.         Judgment: Judgment normal.           Diagnostic Results   Bone scan 9/2023  IMPRESSION:   1. Diffuse osseous metastatic disease of the axial and appendicular   skeleton as described above including the right humerus, sternum,   thoracic spine, pelvis, and the left femur as described above.   2. Increased radiotracer activity of the left 9th rib and right 2nd   and 3rd ribs compatible with a traumatic etiology.     CT 9/2023  IMPRESSION:   CHEST ABDOMEN AND PELVIS:     Slight increased size and/or density of multiple sclerotic osseous   lesions which could reflect worsening or healing osseous metastatic   disease.     Decreased size of 1 of several ill-defined possibly metastatic liver   lesions since 07/17/2023. The remaining lesions are similar to the   prior study.     Small amount of free fluid in the pelvis.     Additional findings as above similar to the previous exam.     We discussed the clinical significance of diagnosis, goals of care and treatment plan in detail.     LABORATORY DATA  Lab Results   Component Value Date    WBC 4.5 10/02/2023    HGB 12.8 10/02/2023    HCT 37.7 10/02/2023     10/02/2023    ALT 9 10/02/2023    AST 13 10/02/2023     10/02/2023    K 3.7 10/02/2023     10/02/2023    CREATININE 0.52 10/02/2023    BUN 15 10/02/2023    CO2 26 10/02/2023    TSH 1.68 05/03/2023    INR 0.9 05/03/2023    HGBA1C 5.5 04/29/2022     Component  Ref Range & Units 3 wk ago  (9/12/23) 1 mo ago  (8/14/23) 2 mo ago  (7/24/23) 3 mo  ago  (7/3/23) 3 mo ago  (6/12/23) 5 mo ago  (5/3/23)   CANCER AG 27-29 (U/ML) IN SER/PLAS  0.0 - 38.6 U/mL 88.5 High  85.8 High  CM 97.4 High  .7 High  .5 High  .1 High  CM         IMPRESSION/PLAN    1. 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   Due to reaction to paclitaxel on 6/12/23 was transitioned to Abraxane 80 mg/m2. Restaging scans stable. Tumor markers downward trend and pain greatly improved. RTC in 6 wks and repeat CT c/a/p in 3 months     continue nab-paclitaxel weekly days 1 and 8     Continue Q3 month zometa (next due 11/14/23)     Due to mutation found on tempus testing, potential future treatment with transition her to trastuzumab deruxtecan (TDxD) which has shown activity in cancers with HER2 kinase domain mutations.      I personally spent over half of a total 45 minutes face to face with the patient in counseling and discussion and/or coordination of care as described above.          -------------------------------------------------------------------------------------------------------------------------------  Bebeto Tolbert MD  Director of Breast Cancer Medical Oncology Research Program   [facility]  Professor of Medicine  [address], [address]  [place] [address]  Phone: [phone]  [email or web address]

## 2023-10-05 ENCOUNTER — EVALUATION (OUTPATIENT)
Dept: PHYSICAL THERAPY | Facility: CLINIC | Age: 63
End: 2023-10-05
Payer: COMMERCIAL

## 2023-10-05 DIAGNOSIS — C50.919 MALIGNANT NEOPLASM OF UNSPECIFIED SITE OF UNSPECIFIED FEMALE BREAST (MULTI): ICD-10-CM

## 2023-10-05 DIAGNOSIS — C79.51 SECONDARY MALIGNANT NEOPLASM OF BONE (MULTI): ICD-10-CM

## 2023-10-05 DIAGNOSIS — C79.51 METASTATIC CANCER TO SPINE (MULTI): Primary | ICD-10-CM

## 2023-10-05 DIAGNOSIS — S22.000A: ICD-10-CM

## 2023-10-05 PROCEDURE — 97161 PT EVAL LOW COMPLEX 20 MIN: CPT | Mod: GP | Performed by: PHYSICAL THERAPIST

## 2023-10-05 PROCEDURE — 97110 THERAPEUTIC EXERCISES: CPT | Mod: GP | Performed by: PHYSICAL THERAPIST

## 2023-10-05 ASSESSMENT — ENCOUNTER SYMPTOMS
OCCASIONAL FEELINGS OF UNSTEADINESS: 0
DEPRESSION: 0
LOSS OF SENSATION IN FEET: 0

## 2023-10-05 ASSESSMENT — PAIN - FUNCTIONAL ASSESSMENT: PAIN_FUNCTIONAL_ASSESSMENT: 0-10

## 2023-10-05 ASSESSMENT — PAIN SCALES - GENERAL: PAINLEVEL_OUTOF10: 3

## 2023-10-05 NOTE — Clinical Note
October 5, 2023     Patient: Trang Jones   YOB: 1960   Date of Visit: 10/5/2023       To Whom It May Concern:    It is my medical opinion that Trang Jones {Work release (duty restriction):18849}.    If you have any questions or concerns, please don't hesitate to call.         Sincerely,        Donna Douglass, PT    CC: No Recipients

## 2023-10-05 NOTE — Clinical Note
October 5, 2023     Patient: Trang Jones   YOB: 1960   Date of Visit: 10/5/2023       To Whom it May Concern:    Trang Jones was seen in my clinic on 10/5/2023. She {Return to school/sport:13614}.    If you have any questions or concerns, please don't hesitate to call.         Sincerely,          Donna Douglass, PT        CC: No Recipients

## 2023-10-05 NOTE — PROGRESS NOTES
Physical Therapy    Physical Therapy Evaluation    Patient Name: Trang Jones  MRN: [id number]  Today's Date: 10/5/2023  Time Calculation  Start Time: 1245  Stop Time: 1325  Time Calculation (min): 40 min    Assessment   Patient is a 63 year old who presents to Physical therapy secondary to back pain associated with metastatic breast cancer. Upon PT evaluation the patient is presenting with the following deficits: weakness in core and B LE, abnormal posture and malalignment of hips associated with scoliosis, and abnormal gait.   The above deficits are limiting patient's ability to hike and be able to stand up form floor when playing with her grandkids.  Secondary to the above deficits the patient will benefit from skilled PT intervention to allow the patient to progress to the goal of being able to hike x 3 miles and bend down to floor to play with grandkids.  PT will monitor progress towards goals and adjust intervention as appropriate.       Plan  Treatment/Interventions: Gait training, Therapeutic activities, Therapeutic exercises  PT Plan: Skilled PT  PT Frequency: 2 times per week  Duration: 3-4 weeks        Subjective   General:  General  Reason for Referral: Metastatic breast CA to spine and thoacic compression fracture  Referred By: Dr. CantuPatient reported she has a history of scoliosis which she managed with exercise and chiropractor.  She reported she has a leg length discrepancy.   Had not exercised in a few months.  Patient found compression fracture in May/June and currently is also receiving chemo.  Patient would like to get on a good exercise program to improve LE strength.   Patient reported she is currently limited to walking on flat surfaces and would like to return to hiking on uneven ground.  Patient reported aching in lower back and when she stands she has pain in L hip/groin.   Patient no longer has nerve pain secondary to gabapentin.   Patient also reported rib fractures.      Precautions:  Precautions  STEADI Fall Risk Score (The score of 4 or more indicates an increased risk of falling): 0Per physicians order: please work on core strenthening, rom, scoliosis  spine precautions as well as avoid high impact exercise     Pain:  Pain Assessment: 0-10  Pain Score: 3  Pain Type: Acute pain  Pain Location: Back  Pain Orientation: Lower  Pain Frequency: Constant/continuous     Prior Function Per Pt/Caregiver Report:  Level of [place]: Independent with ADLs and functional transfers (Patient has always been very active)  Vocational: On disability    Objective        Gait:  Gait Comment: Patient ambulated on even surfaces with uneven pelvic aliagnment and hips shifted L    Stairs:  Stairs Comment: Reciprical with UE support           Lumbar AROM  Lumbar AROM WFL: yes  Hip AROM  Hip AROM WFL: yes        Hip Palpation/Joint Mobility Assessment  Palpation / Joint Mobility Comment: Supine: L ASIS inferior to R, L malleoli distal to R.  Lumbar AROM WFL unless documented below  Lumbar AROM WFL: yes  Hip AROM WFL unless documented below  Hip AROM WFL: yes  Hip PROM WFL unless documented below     Specific Lower Extremity MMT WFL unless documented below  R Iliopsoas: (5/5): 3+/5  L Iliopsoas: (5/5): 3+/5  R Gluteals (prone): (5/5): 3+/5  L Gluteals (prone): (5/5): 3+/5  R Gluteals (sidelying): (5/5): 4/5  L Gluteals (sidelying): (5/5): 4/5  DTR WFL unless documented below         Flexibility  R hamstrings: tight  L hamstrings: tight  R hip flexors: fair  L hip flexors: fair               Knee AROM WFL unless documented below  Knee AROM WFL: yes  Knee PROM WFL unless documented below     Knee MMT WFL unless documented below  R knee flexion: (5/5): 4+/5  L knee flexion: (5/5): 4+/5  R knee extension: (5/5): 4+/5  L knee extension: (5/5): 4+/5  DTR WFL unless documented below     Special Tests Negative unless documented below        Ankle AROM WFL unless documented below  Ankle AROM WFL: yes  Ankle  PROM WFL unless documented below     Ankle MMT WFL unless documented below  R ankle dorsiflexion: (5/5): 5/5  L ankle dorsiflexion: (5/5): 5/5  R ankle plantarflexion: (5/5): 4/5  L ankle plantarflexion: (5/5): 4/5  Special Tests Negative unless documented below     Joint Mobility Testing WFL unless documented below     Outcome Measures:  Other Measures  5x Sit to Stand: 16.12 sec  Oswestry Disablity Index (ZAINAB): 22%     OP EDUCATION:  Education  Individual(s) Educated: Patient  Education Provided: Home Exercise Program, POC  Patient/Caregiver Demonstrated Understanding: yes  Plan of Care Discussed and Agreed Upon: yes  Patient Response to Education: Patient/Caregiver Verbalized Understanding of Information, Patient/Caregiver Performed Return Demonstration of Exercises/ActivitiesPatient was educated on HEP and performed the following exercises during session today:    Access Code: [email or web address]  URL: [email or web address]  Date: 10/05/2023  Prepared by: Donna Douglass    Exercises  - Supine Transversus Abdominis Bracing - Hands on Stomach  - 1 x daily - 7 x weekly - 2 sets - 10 reps - 5 second hold  - Supine Hip Adduction Isometric with Ball  - 1 x daily - 7 x weekly - 2 sets - 10 reps - 5 seccond hold  - Hooklying Clamshell with Resistance  - 1 x daily - 7 x weekly - 2 sets - 10 reps - 5 second hold  - Beginner Bridge  - 1 x daily - 7 x weekly - 2 sets - 10 reps  Educated to avoid extreme positions and maintain neutral spine.   Goals:  Encounter Problems       Encounter Problems (Active)       PT Problem       PT Goal 1: Patient to be able to walk x 3 miles on uneven surfaces without having to stop secondary to pain/fatigue to allow for patient to return to hiking       Start:  10/05/23    Expected End:  11/02/23            PT Goal 2: Patient to demonstrate improved B LE strength to 4+/5 to allow for ability to bend down to floor to play with gradchild       Start:  10/05/23    Expected End:   11/02/23            Patient to demonstrate independence with HEP        Start:  10/05/23    Expected End:  11/02/23            Patient to demonstrate improved functional mobility as evidenced by improved ZAINAB by 12 points        Start:  10/05/23    Expected End:  11/02/23            PT Goal 5: Patient to demonstrate improved B LE strength as evidenced by improved 5 x sit to stand test to 11-12 seconds       Start:  10/05/23    Expected End:  11/02/23

## 2023-10-09 ENCOUNTER — INFUSION (OUTPATIENT)
Dept: HEMATOLOGY/ONCOLOGY | Facility: CLINIC | Age: 63
End: 2023-10-09
Payer: COMMERCIAL

## 2023-10-09 VITALS
HEART RATE: 64 BPM | OXYGEN SATURATION: 97 % | HEIGHT: 62 IN | DIASTOLIC BLOOD PRESSURE: 74 MMHG | BODY MASS INDEX: 25.21 KG/M2 | WEIGHT: 137.02 LBS | SYSTOLIC BLOOD PRESSURE: 107 MMHG | TEMPERATURE: 97.3 F | RESPIRATION RATE: 16 BRPM

## 2023-10-09 DIAGNOSIS — C50.911 CARCINOMA OF RIGHT BREAST METASTATIC TO BONE (MULTI): ICD-10-CM

## 2023-10-09 DIAGNOSIS — C41.9 MALIGNANT NEOPLASM OF BONE WITH METASTASES (MULTI): ICD-10-CM

## 2023-10-09 DIAGNOSIS — C79.51 CARCINOMA OF RIGHT BREAST METASTATIC TO BONE (MULTI): ICD-10-CM

## 2023-10-09 LAB
BASOPHILS # BLD AUTO: 0.02 X10*3/UL (ref 0–0.1)
BASOPHILS NFR BLD AUTO: 0.5 %
EOSINOPHIL # BLD AUTO: 0.08 X10*3/UL (ref 0–0.7)
EOSINOPHIL NFR BLD AUTO: 2.2 %
ERYTHROCYTE [DISTWIDTH] IN BLOOD BY AUTOMATED COUNT: 13.9 % (ref 11.5–14.5)
HCT VFR BLD AUTO: 35.2 % (ref 36–46)
HGB BLD-MCNC: 11.8 G/DL (ref 12–16)
IMM GRANULOCYTES # BLD AUTO: 0.01 X10*3/UL (ref 0–0.7)
IMM GRANULOCYTES NFR BLD AUTO: 0.3 % (ref 0–0.9)
LYMPHOCYTES # BLD AUTO: 1.31 X10*3/UL (ref 1.2–4.8)
LYMPHOCYTES NFR BLD AUTO: 36 %
MCH RBC QN AUTO: 30.5 PG (ref 26–34)
MCHC RBC AUTO-ENTMCNC: 33.5 G/DL (ref 32–36)
MCV RBC AUTO: 91 FL (ref 80–100)
MONOCYTES # BLD AUTO: 0.2 X10*3/UL (ref 0.1–1)
MONOCYTES NFR BLD AUTO: 5.5 %
NEUTROPHILS # BLD AUTO: 2.02 X10*3/UL (ref 1.2–7.7)
NEUTROPHILS NFR BLD AUTO: 55.5 %
PLATELET # BLD AUTO: 175 X10*3/UL (ref 150–450)
PMV BLD AUTO: 10.9 FL (ref 7.5–11.5)
RBC # BLD AUTO: 3.87 X10*6/UL (ref 4–5.2)
WBC # BLD AUTO: 3.6 X10*3/UL (ref 4.4–11.3)

## 2023-10-09 PROCEDURE — 96523 IRRIG DRUG DELIVERY DEVICE: CPT

## 2023-10-09 PROCEDURE — 2500000004 HC RX 250 GENERAL PHARMACY W/ HCPCS (ALT 636 FOR OP/ED): Performed by: INTERNAL MEDICINE

## 2023-10-09 PROCEDURE — 96413 CHEMO IV INFUSION 1 HR: CPT

## 2023-10-09 PROCEDURE — 85025 COMPLETE CBC W/AUTO DIFF WBC: CPT

## 2023-10-09 PROCEDURE — 2500000004 HC RX 250 GENERAL PHARMACY W/ HCPCS (ALT 636 FOR OP/ED)

## 2023-10-09 PROCEDURE — 2500000004 HC RX 250 GENERAL PHARMACY W/ HCPCS (ALT 636 FOR OP/ED): Mod: JZ | Performed by: INTERNAL MEDICINE

## 2023-10-09 PROCEDURE — 96375 TX/PRO/DX INJ NEW DRUG ADDON: CPT

## 2023-10-09 RX ORDER — ONDANSETRON HYDROCHLORIDE 2 MG/ML
8 INJECTION, SOLUTION INTRAVENOUS ONCE
Status: COMPLETED | OUTPATIENT
Start: 2023-10-09 | End: 2023-10-09

## 2023-10-09 RX ORDER — HEPARIN 100 UNIT/ML
SYRINGE INTRAVENOUS
Status: COMPLETED
Start: 2023-10-09 | End: 2023-10-09

## 2023-10-09 RX ORDER — EPINEPHRINE 0.3 MG/.3ML
0.3 INJECTION SUBCUTANEOUS EVERY 5 MIN PRN
Status: DISCONTINUED | OUTPATIENT
Start: 2023-10-09 | End: 2023-10-09 | Stop reason: HOSPADM

## 2023-10-09 RX ORDER — FAMOTIDINE 10 MG/ML
20 INJECTION INTRAVENOUS ONCE AS NEEDED
Status: DISCONTINUED | OUTPATIENT
Start: 2023-10-09 | End: 2023-10-09 | Stop reason: HOSPADM

## 2023-10-09 RX ORDER — DIPHENHYDRAMINE HYDROCHLORIDE 50 MG/ML
50 INJECTION INTRAMUSCULAR; INTRAVENOUS AS NEEDED
Status: DISCONTINUED | OUTPATIENT
Start: 2023-10-09 | End: 2023-10-09 | Stop reason: HOSPADM

## 2023-10-09 RX ORDER — ALBUTEROL SULFATE 0.83 MG/ML
3 SOLUTION RESPIRATORY (INHALATION) AS NEEDED
Status: DISCONTINUED | OUTPATIENT
Start: 2023-10-09 | End: 2023-10-09 | Stop reason: HOSPADM

## 2023-10-09 RX ADMIN — ONDANSETRON 8 MG: 2 INJECTION, SOLUTION INTRAMUSCULAR; INTRAVENOUS at 09:16

## 2023-10-09 RX ADMIN — HEPARIN 500 UNITS: 100 SYRINGE at 12:06

## 2023-10-09 RX ADMIN — PACLITAXEL 130 MG: 100 INJECTION, POWDER, LYOPHILIZED, FOR SUSPENSION INTRAVENOUS at 10:23

## 2023-10-09 ASSESSMENT — PAIN SCALES - GENERAL: PAINLEVEL: 0-NO PAIN

## 2023-10-09 ASSESSMENT — ENCOUNTER SYMPTOMS
DEPRESSION: 0
OCCASIONAL FEELINGS OF UNSTEADINESS: 0
LOSS OF SENSATION IN FEET: 0

## 2023-10-09 NOTE — SIGNIFICANT EVENT
10/09/23 0853   Prechemo Checklist   Has the patient been in the hospital, ED, or urgent care since last date of service No   Chemo/Immuno Consent Signed Yes   Protocol/Indications Verified Yes   Confirmed to previous date/time of medication Yes   Compared to previous dose Yes   All medications are dated accurately Yes   Pregnancy Test Negative Not applicable   Parameters Met Yes   BSA/Weight-Height Verified Yes   Dose Calculations Verified Yes

## 2023-10-10 ENCOUNTER — TREATMENT (OUTPATIENT)
Dept: PHYSICAL THERAPY | Facility: CLINIC | Age: 63
End: 2023-10-10
Payer: COMMERCIAL

## 2023-10-10 DIAGNOSIS — C41.9 MALIGNANT NEOPLASM OF BONE WITH METASTASES (MULTI): ICD-10-CM

## 2023-10-10 DIAGNOSIS — S22.000A: ICD-10-CM

## 2023-10-10 DIAGNOSIS — C79.51 METASTATIC CANCER TO SPINE (MULTI): Primary | ICD-10-CM

## 2023-10-10 PROCEDURE — 97110 THERAPEUTIC EXERCISES: CPT | Mod: GP

## 2023-10-10 NOTE — PROGRESS NOTES
"Physical Therapy    Physical Therapy Treatment    Patient Name: Trang Jones  MRN: [id number]  Today's Date: 10/10/2023  Time Calculation  Start Time: 1600  Stop Time: 1640  Time Calculation (min): 40 min      Assessment:  Patient tolerated treatment well without increased complaints of pain during or after session., but did report muscle fatigue.  Patient will continue to benefit from skilled PT services to address deficits contributing to impairments in order to decrease pain and improve functional mobility.        Plan:  Continue per POC, progressing as tolerated.  Will provide written instructions for upgraded HEP if no adverse reactions or  increased pain reported.        Current Problem  1. Metastatic cancer to spine (CMS/HCC)        2. Thoracic compression fracture (CMS/HCC)            Subjective   General  Reports she had a private pilates class today. Usually only has the class once a week.   Presently reports 1-2/10 pain in R SI/ buttock area.      Precautions  STEADI Fall Risk Score (The score of 4 or more indicates an increased risk of falling): 0Per physicians order: please work on core strenthening, rom, scoliosis  spine precautions as well as avoid high impact exercise      Pain  1-2/10 R SI/buttock area       Treatments:  Therapeutic Exercise  Therapeutic Exercise Activity 1: Hooklying TA bracing 5\" hold 2 x 10  Therapeutic Exercise Activity 2: Hooklying TA bracing with B hip ADD ball squeeze 5\" hold 2 x 10  Therapeutic Exercise Activity 3: Hooklying B scapular retraction/shoulder press 5\" hold 2 x 10  Therapeutic Exercise Activity 4: Hooklying R/L isometric GS/QS with knee/hip extended  5\" hold 1 x 10 reps  Therapeutic Exercise Activity 5: Hook lying B shoulder horizontal ABD with palms up/palms down with yellow theraband 1 x 15 reps each  Therapeutic Exercise Activity 6: Hooklying R/L UE D2 flex with yellow theraband 1 x 10 reps  Therapeutic Exercise Activity 7: Hook lying B shoulder ER with " yellow theraband 2 x 10 reps  Therapeutic Exercise Activity 8: Hook lying B hip ABD bent knee fall outs with yellow theraband 2 x 10  reps  Therapeutic Exercise Activity 9: Hooklying mini bridge 1 x 10 reps  Therapeutic Exercise Activity 10: Side lying R/L clamshells 2 x 10 reps  Therapeutic Exercise Activity 11: Standing B mid rows with yellow theraband 2 x 10 reps  Therapeutic Exercise Activity 12: Standing B shoulder extension with yellow theraband 1 x 10 reps  Therapeutic Exercise Activity 13: Standing R/L hip ABD 2 x 10 reps  OP EDUCATION:  Continue with current HEP as tolerated

## 2023-10-11 ENCOUNTER — ALLIED HEALTH (OUTPATIENT)
Dept: INTEGRATIVE MEDICINE | Facility: CLINIC | Age: 63
End: 2023-10-11
Payer: COMMERCIAL

## 2023-10-11 DIAGNOSIS — C50.911 CARCINOMA OF RIGHT BREAST METASTATIC TO BONE (MULTI): Primary | ICD-10-CM

## 2023-10-11 DIAGNOSIS — C79.51 CARCINOMA OF RIGHT BREAST METASTATIC TO BONE (MULTI): Primary | ICD-10-CM

## 2023-10-11 PROCEDURE — 99215 OFFICE O/P EST HI 40 MIN: CPT | Performed by: HOSPITALIST

## 2023-10-11 ASSESSMENT — PAIN SCALES - GENERAL: PAINLEVEL_OUTOF10: 1

## 2023-10-11 NOTE — PROGRESS NOTES
Acupuncture Visit:     Subjective   Patient ID: Trang Jones is a 63 y.o. female who presents for No chief complaint on file.  HPI    2 more episodes of neuropathy Sunday and Monday  Back to exercising in PT and pilates  Similar leg pain, increased gabapentin  BM- OK    initial visit:  Fatigue, back pain  had recent break from chemo  fatigue is better since holding  typically wiped out after chemo and radiation for 2-3 days   low appetite and difficulty staying hydrated  denies xerostomia    sleep is good- uses cbd product, has [employer] card, but has not pursued yet    pain this week mostly from scoliosis  lumbar into thoracic, has since her 30s, stiffness, constant pain   had has some radiation down legs. foraminal stenosis  no neuropathy symptoms, hands or feet  denies other pain areas.   was having severe throacic pain, from tumor compression, now off of most pain meds except ER morphine.     planned 2 more rounds of chemo then a scan to evaluate next steps     BM- once a day, no blood mucus undigested food,        Diet: no alcohol or sugars. consistently tries to eat healthy  Dr: water, dislikes cold water, drinks warm lemon water, bone broth, hot teas.    General: denies- fever, chills, night sweats  Skin: denies- rash, abnormal lesions  Eyes: occ floaters, last exam 1 year, ,   HENT: denies- headache, sore throat, ear pain, tinnitus, congestion, runny nose,   Cardio: denies- chest pain, palps  Resp: denies- SOB, cough, wheezing  GI: some reflux- every few days,   Uro: occasional urgency, no pain or burning.   Neuro: see HPI  Musc: see HPI                      Pre-treatment Assessment  Treatment Precautions: None  Pain Score: 2  Anxiety Level (0-10): 1  Stress Level (0-10): 4  Coping Level (0-10): 8  Depression Level (0-10): 1  Fatigue Level (0-10): 1  Nausea Level (0-10): 0  Wellbeing Level (0-10): 3    Review of Systems         Provider reviewed plan for the acupuncture session, precautions and  contraindications. Patient/guardian/hospital staff has given consent to treat with full understanding of what to expect during the session. Before acupuncture began, provider explained to the patient to communicate at any time if the procedure was causing discomfort past their tolerance level. Patient agreed to advise acupuncturist. The acupuncturist counseled the patient on the risks of acupuncture treatment including pain, infection, bleeding, and no relief of pain. The patient was positioned comfortably. There was no evidence of infection at the site of needle insertions.    Objective   Physical Exam              Acupuncture Treatment  Needle Guage: 36 guage /.20/ Blue seirin  Body Points: With retention  Body Points - Bilateral: Scalp thoracic, LI10, LI4, Si3, Ht7, SP6, KI7, KI3, UB62, LR3  Auricular Points: Yes  Auricular Points - Bilateral: BFA 1-3  Needle Count In: 25  Needle Count Out: 25         Post-treatment Assessment  Pain Score: 1  Anxiety Level (0-10): 1  Stress Level (0-10): 4  Coping Level (0-10): 8  Depression Level (0-10): 1  Fatigue Level (0-10): 5  Nausea Level (0-10): 0  Wellbeing Level (0-10): 3    Assessment/Plan

## 2023-10-12 ENCOUNTER — TELEPHONE (OUTPATIENT)
Dept: HEMATOLOGY/ONCOLOGY | Facility: HOSPITAL | Age: 63
End: 2023-10-12
Payer: COMMERCIAL

## 2023-10-12 DIAGNOSIS — G89.3 CANCER RELATED PAIN: Primary | ICD-10-CM

## 2023-10-12 RX ORDER — MORPHINE SULFATE 15 MG/1
15 TABLET, FILM COATED, EXTENDED RELEASE ORAL 2 TIMES DAILY
Qty: 60 TABLET | Refills: 0 | Status: SHIPPED | OUTPATIENT
Start: 2023-10-12 | End: 2023-11-13 | Stop reason: SDUPTHER

## 2023-10-12 RX ORDER — MORPHINE SULFATE 15 MG/1
15 TABLET, FILM COATED, EXTENDED RELEASE ORAL 2 TIMES DAILY
Qty: 60 TABLET | Refills: 0 | Status: CANCELLED | OUTPATIENT
Start: 2023-10-12

## 2023-10-12 NOTE — TELEPHONE ENCOUNTER
PA has been sent. Patient states she will also need a refill. We will send this to [facility] in [place].

## 2023-10-16 ENCOUNTER — TREATMENT (OUTPATIENT)
Dept: PHYSICAL THERAPY | Facility: CLINIC | Age: 63
End: 2023-10-16
Payer: COMMERCIAL

## 2023-10-16 DIAGNOSIS — S22.000A: ICD-10-CM

## 2023-10-16 DIAGNOSIS — C79.51 METASTATIC CANCER TO SPINE (MULTI): ICD-10-CM

## 2023-10-16 PROCEDURE — 97110 THERAPEUTIC EXERCISES: CPT | Mod: GP

## 2023-10-16 NOTE — PROGRESS NOTES
"Physical Therapy    Physical Therapy Treatment    Patient Name: Trang Jones  MRN: [id number]  Today's Date: 10/16/2023  Time Calculation  Start Time: 1425  Stop Time: 1505  Time Calculation (min): 40 min      Assessment:Patient tolerated treatment well without increased complaints of pain during or after session. Left clinic in no apparent distress. Patient will continue to benefit from skilled PT services to address deficits contributing to impairments in order to decrease pain and improve functional mobility.          Plan:  Assess response to today's exercises.  Continue per POC, progressing as tolerated.        Current Problem  1. Metastatic cancer to spine (CMS/HCC)  Follow Up In Physical Therapy      2. Thoracic compression fracture (CMS/HCC)  Follow Up In Physical Therapy          Subjective   General  Patient reports no pain this date.  No increased pain following last session.      Precautions  STEADI Fall Risk Score (The score of 4 or more indicates an increased risk of falling): 0Per physicians order: please work on core strenthening, rom, scoliosis  spine precautions as well as avoid high impact exercise     Treatments:  Therapeutic Exercise  Therapeutic Exercise Activity 1: Hooklying TA bracing 5\" hold 2 x 10  Therapeutic Exercise Activity 2: Hooklying TA bracing with B hip ADD ball squeeze 5\" hold 2 x 10  Therapeutic Exercise Activity 3: Hooklying B scapular retraction/shoulder press 5\" hold 2 x 10  Therapeutic Exercise Activity 4: Hooklying R/L isometric GS/QS with knee/hip extended  5\" hold 1 x 10 reps  Therapeutic Exercise Activity 5: Hook lying B shoulder horizontal ABD with palms up/palms down with yellow theraband 2 x 10  reps each  Therapeutic Exercise Activity 6: Hooklying R/L UE D2 flex with yellow theraband 2 x 10 reps  Therapeutic Exercise Activity 7: Hook lying B shoulder ER with yellow theraband 2 x 10 reps  Therapeutic Exercise Activity 8: Hook lying B hip ABD bent knee fall outs " "with yellow theraband 2 x 10  reps  Therapeutic Exercise Activity 9: Hooklying mini bridge 2 x 10 reps  Therapeutic Exercise Activity 10: Side lying R/L clamshells with yellow theraband  2 x 10 reps  Therapeutic Exercise Activity 11: Standing B mid rows with yellow theraband 2 x 10 reps  Therapeutic Exercise Activity 12: Standing B shoulder extension with yellow theraband 2 x 10 reps  Therapeutic Exercise Activity 13: Standing R/L hip ABD 2 x 10 reps    OP EDUCATION:  Access Code: [email or web address]  URL: [email or web address]  Date: 10/16/2023  Prepared by: Polly Mccabe    Exercises  - Supine Transversus Abdominis Bracing - Hands on Stomach  - 1 x daily - 7 x weekly - 2 sets - 10 reps - 5\" hold  - Supine Hip Adduction Isometric with Ball  - 1 x daily - 7 x weekly - 2 sets - 10 reps - 5\" hold  - Supine Scapular Retraction  - 1 x daily - 7 x weekly - 2 sets - 10 reps - 5\" hold  - Supine Gluteal Sets  - 1 x daily - 7 x weekly - 2 sets - 10 reps - 5\" hold  - Hooklying Clamshell with Resistance  - 1 x daily - 7 x weekly - 2 sets - 10 reps - 5\" hold  - Supine Shoulder Horizontal Abduction with Resistance  - 1 x daily - 7 x weekly - 2 sets - 10 reps - 5\" hold  - Supine PNF D2 Flexion with Resistance  - 1 x daily - 7 x weekly - 2 sets - 10 reps - 5\" hold       Goals:  Active       PT Problem       PT Goal 1: Patient to be able to walk x 3 miles on uneven surfaces without having to stop secondary to pain/fatigue to allow for patient to return to hiking       Start:  10/05/23    Expected End:  11/02/23            PT Goal 2: Patient to demonstrate improved B LE strength to 4+/5 to allow for ability to bend down to floor to play with gradchild       Start:  10/05/23    Expected End:  11/02/23            Patient to demonstrate independence with HEP        Start:  10/05/23    Expected End:  11/02/23            Patient to demonstrate improved functional mobility as evidenced by improved ZAINAB by 12 points        " Start:  10/05/23    Expected End:  11/02/23            PT Goal 5: Patient to demonstrate improved B LE strength as evidenced by improved 5 x sit to stand test to 11-12 seconds       Start:  10/05/23    Expected End:  11/02/23

## 2023-10-17 NOTE — TELEPHONE ENCOUNTER
Coverage has been approved for this patient's medication-MS Contin from 10/12/23 until 10/11/24.  Pharmacy called to update.  Informed patient on medication approval.  Patient stated she picked up medication at [facility] pharmacy in [place].  Pharmacy contact info updated in [facility].

## 2023-10-18 ENCOUNTER — ALLIED HEALTH (OUTPATIENT)
Dept: INTEGRATIVE MEDICINE | Facility: CLINIC | Age: 63
End: 2023-10-18
Payer: COMMERCIAL

## 2023-10-18 DIAGNOSIS — C79.51 CARCINOMA OF RIGHT BREAST METASTATIC TO BONE (MULTI): Primary | ICD-10-CM

## 2023-10-18 DIAGNOSIS — C50.911 CARCINOMA OF RIGHT BREAST METASTATIC TO BONE (MULTI): Primary | ICD-10-CM

## 2023-10-18 PROCEDURE — 99213 OFFICE O/P EST LOW 20 MIN: CPT | Performed by: HOSPITALIST

## 2023-10-18 ASSESSMENT — PAIN SCALES - GENERAL: PAINLEVEL_OUTOF10: 3

## 2023-10-18 NOTE — PROGRESS NOTES
Acupuncture Visit:     Subjective   Patient ID: Trang Jones is a 63 y.o. female who presents for No chief complaint on file.  HPI    Has had a cold since last week  Covid negative  Sinus issues  Working with PT  Pain low  Energy OK   Sleep no issues.   1 brief episode of neuropathy    initial visit:  Fatigue, back pain  had recent break from chemo  fatigue is better since holding  typically wiped out after chemo and radiation for 2-3 days   low appetite and difficulty staying hydrated  denies xerostomia    sleep is good- uses cbd product, has [employer] card, but has not pursued yet    pain this week mostly from scoliosis  lumbar into thoracic, has since her 30s, stiffness, constant pain   had has some radiation down legs. foraminal stenosis  no neuropathy symptoms, hands or feet  denies other pain areas.   was having severe throacic pain, from tumor compression, now off of most pain meds except ER morphine.     planned 2 more rounds of chemo then a scan to evaluate next steps     BM- once a day, no blood mucus undigested food,        Diet: no alcohol or sugars. consistently tries to eat healthy  Dr: water, dislikes cold water, drinks warm lemon water, bone broth, hot teas.    General: denies- fever, chills, night sweats  Skin: denies- rash, abnormal lesions  Eyes: occ floaters, last exam 1 year, ,   HENT: denies- headache, sore throat, ear pain, tinnitus, congestion, runny nose,   Cardio: denies- chest pain, palps  Resp: denies- SOB, cough, wheezing  GI: some reflux- every few days,   Uro: occasional urgency, no pain or burning.   Neuro: see HPI  Musc: see HPI                           Review of Systems         Provider reviewed plan for the acupuncture session, precautions and contraindications. Patient/guardian/hospital staff has given consent to treat with full understanding of what to expect during the session. Before acupuncture began, provider explained to the patient to communicate at any time if the  procedure was causing discomfort past their tolerance level. Patient agreed to advise acupuncturist. The acupuncturist counseled the patient on the risks of acupuncture treatment including pain, infection, bleeding, and no relief of pain. The patient was positioned comfortably. There was no evidence of infection at the site of needle insertions.    Objective   Physical Exam          Acupuncture Treatment  Needle Guage: 36 guage /.20/ Blue seirin  Body Points: With retention  Body Points - Bilateral: Scalp thoracic, LI10, LI4, Si3, Ht7, SP6, KI7, KI3, UB62, LR3  Auricular Points: Yes  Auricular Points - Bilateral: BFA 1-3  Needle Count In: 25  Needle Count Out: 25                   Assessment/Plan

## 2023-10-18 NOTE — PROGRESS NOTES
Signed               Bo Sandoval MD  Physician  Internal Medicine            Sign when Signing Visit        Expand All Collapse All    Patient ID: Trang Jones is a 63 y.o. female.  Referring Physician: No referring provider defined for this encounter.  Primary Care Provider: Karl Granger DO     CANCER HISTORY:    Chief Complaint     64 yo woman with newly dx Tneg breast ca to bone, cord compression  2010 - L mtx - ER+ stage II breast ca   AC x 4  Keith x 7 yrs     3/23 - back pain - MRI with cord compression treated with radiation 5/4/23 5/22/23 - Taxol (weekly) - had reaction - held last week and only partial this week  Changing to abraxane  Not planning pembrolizumab as PDL1 neg per pt      Now on abraxane and tolerating well         History of Present IllnessConsulted by: Dr. Tolbert  For: breast cancer     Chief complaints and symptoms:  Seen in supp oncology  1. Pain - on ms contin, nsaids, gabapentin. THC twice/week  mostly in back and now in ribs. Improved overall though some pain from walking this last weekend  Now worse since [place] trip and walking - pelvic pain, L hip and pain down L leg     2. Fatigue - related to chemotherapy - improved overall. Worse on chemo weeks     3. Appetite suppressed  Poor taste - improved  Doing well still today     4. Neuropathy - mild developing tingling in fingers - numbness and tingling briefly in fingertips - brief numbness in fingers  Two more in fingertips, tingling/numbness     mild ha     Anxiety about results     Integrative History:  Diet: mostly fruits (taste good), protein smoothies (Vital proteins, collagen powder, almond milk)  Lean proteins, chicken/salmon, o/w plant based     PA: walking some, hiking some now, continued  Doing some Pilates     Sleep: well improved with THC - 8-9 hrs/night     Stress: overwhelmed by dx.  Management: Meditating daily almost, various times, has some experience - Intermittent  Family support  Ana  helping  Focusing more on spirituality and [employer], Women's groups.     Natural Products:  Medical cannabis - one gummy/night  magnesium  Red yeast rice - for cholesterol  Vit D  Flax seed oil   American Ginseng  [employer] STAMETS 7  Zinc     ROS:  no ha, visual symptoms, hearing loss  no sob, chest pain, palp  ROS o/w non contributory, please see HPI        Objective   BSA: There is no height or weight on file to calculate BSA.  There were no vitals taken for this visit.     PHYSICAL EXAM:  NAD, awake/alert  HEENT, NCAT, OP clear, no oral lesions  CTA bilat  RRR no mgr  Abd soft/nt/nd+bs  No c/c/e/ttp  Motor/sensory intact, CN 2-12 intact      RESULTS:            Lab Results   Component Value Date     WBC 4.5 10/02/2023     HGB 12.8 10/02/2023     HCT 37.7 10/02/2023      10/02/2023     CREATININE 0.52 10/02/2023     AST 13 10/02/2023               Assessment/Plan   Cancer Staging   Malignant neoplasm of bone with metastases (CMS/HCC)  Staging form: Bone - Appendicular Skeleton, Trunk, Skull, and Facial Bones, AJCC 8th Edition  - Clinical stage from 9/5/2023: pM1, G3 - Signed by Bebeto Tolbert MD on 9/5/2023     Provider Impressions     62 yo woman with recurrent breast ca, Tneg now with bone mets and s/p cord compression  S/p radiation  5/22/23 started taxol (weekly) - now changed to abraxane  No neuropathy now     Breast cancer:   7/17/23 CT c/a/p - will review with Dr. Tolbert:  sclerotic L 3rd rib, increase osseous mets R iliac, 1.1 cm liver hypodensities, can discuss further with Dr. Tolbert  focusing plant based is fine  limit sugar including in smoothies, consider Orgain (using Vital protein)  Would avoid coffee on taxol  Zinc for taste  Flax is fine  THC is good as well  Vitamin D3 5000 IU 3 days/week is good  Consider omega 3   Stop b12, also coq10 - done  on [employer] STAMETS 7     Try to walk 15-30 min/day     Pain - taking motrin prn, steroid weaning  MS contin and  gabapentin  Acupuncture will start in [facility] Management Clinic - has started  Feels better when having AM pain meds and supplements  Some improvements  Gentle Yoga with assistance, same with Pilates     Fatigue: Definitely improved  Acupuncture, consider massage  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 Uses to manage scoliosis.  Would sugg pt see Dr. Cantu (rehab) and/or PT prior to engaging in yoga     Follow up in Symptom Management Clinic      Integrative Oncology Symptom Management:     The integrative oncology symptom management clinic offers supervised care of cancer patients using Integrative Modalities billed to insurance using NCCN and SIO/ASCO guideline-driven practices.  ESAS is obtained prior to and after each treatment by practitioner     Symptoms Managed:  Pain - Increased in L leg and hip and pelvis since coming back from trip - subsided now     Fatigue - still there but improved, same, travelled this last weekend with lots of walking - same overall during chemo weeks  Poor Appetite - doing well  Neuropathy - improved now, one brief episode of numbness in fingers - none now, brief episode over weekend  Gabapentin increased to 300 am and 600 pm     Natural Products utilized:  Medical cannabis - one gummy/night  magnesium  Red yeast rice - for cholesterol  B12  COq10  Vit D  Flax seed oil      Integrative Treatment: Acupuncture     Session #: 13  Frequency: Weekly     Referrals: Supportive Oncology (Has seen)     Recommendations: I would avoid coffee, soy, green tea while on Taxol (can reduce efficacy)  Weekly acupuncture  Has obtained med THC card as well   Ok to take calcium, mildly low and albumin wnl  seen by Dr. Cantu and going to PT - working with  on core strength     Follow Up:  Symptom Management:  weekly  Integrative Oncology: 3 months - could see 9/23     I have personally seen the patient and supervised the treatment by the integrative practitioner during this visit.  Pt had symptoms discussed and I was present for the patient's 45 minutes of direct patient care.      Bo Sandoval MD

## 2023-10-20 ENCOUNTER — TREATMENT (OUTPATIENT)
Dept: PHYSICAL THERAPY | Facility: CLINIC | Age: 63
End: 2023-10-20
Payer: COMMERCIAL

## 2023-10-20 DIAGNOSIS — S22.000A: ICD-10-CM

## 2023-10-20 DIAGNOSIS — C79.51 METASTATIC CANCER TO SPINE (MULTI): ICD-10-CM

## 2023-10-20 PROCEDURE — 97110 THERAPEUTIC EXERCISES: CPT | Mod: GP

## 2023-10-20 NOTE — PROGRESS NOTES
"Physical Therapy    Physical Therapy Treatment    Patient Name: Trang Jones  MRN: [id number]  Today's Date: 10/20/2023  Time Calculation  Start Time: 1302  Stop Time: 1345  Time Calculation (min): 43 min      Assessment:  Discussed importance of performing exercises within painfree ROM and without lasting increase in pain.  Patient verbalizes agreement and understanding.  Patient did not experience any pain while performing mat exercises, however states she began to feel some discomfort when standing on LLE in // bars for R hip ABD EXT.  Patient will continue to benefit from skilled PT services  within patient tolerance to address deficits contributing to impairments in order to decrease pain and improve functional mobility.      Plan:  Continue per POC progressing as tolerated.        Current Problem  1. Metastatic cancer to spine (CMS/HCC)  Follow Up In Physical Therapy      2. Thoracic compression fracture (CMS/HCC)  Follow Up In Physical Therapy          Subjective   General  Reports having some pain in the front and the side of the left hip and knee. Reports pain rated 2-3/10. Notices it the first few steps after sitting for a while.  Pain is intermittent.   Has had it for couple weeks.      Precautions  STEADI Fall Risk Score (The score of 4 or more indicates an increased risk of falling):   Per physicians order: please work on core strenthening, rom, scoliosis  spine precautions as well as avoid high impact exercise     History of Left breast CA 13 yrs ago.  Presently, metastatic cancer to spine       Treatments:  Therapeutic Exercise  Therapeutic Exercise Activity 1: Hooklying TA brace 5\" hold 2 x 10 reps; Supine B glut sets 5\" hold 2 x 10 reps  Therapeutic Exercise Activity 2: Hooklying TA bracing with B hip ADD ball squeeze 5\" hold 2 x 10  Therapeutic Exercise Activity 3: Hooklying B scapular retraction/shoulder press 5\" hold 2 x 10  Therapeutic Exercise Activity 4: Hooklying R/L isometric leg " "press/leg lengthener  with knee/hip extended  5\" hold 2 x 10 reps  Therapeutic Exercise Activity 5: Hook lying B shoulder horizontal ABD with palms up/palms down with yellow theraband 2 x 10  reps each  Therapeutic Exercise Activity 6: Hooklying R/L UE D2 flex with yellow theraband 2 x 10 reps  Therapeutic Exercise Activity 7: Hook lying B shoulder ER with yellow theraband 2 x 10 reps  Therapeutic Exercise Activity 8: Hook lying B hip ABD bent knee fall outs  2 x 10  reps (No yellow band)  Therapeutic Exercise Activity 9: Hooklying mini bridge 2 x 10 reps  Therapeutic Exercise Activity 10: Prone B scapular retraction with slight shoulder extension 3\" hold 2 x 10 reps;  Prone B glut sets 3\" hold 2 x 10 reps  Therapeutic Exercise Activity 11: Prone GS with alt R/L single leg lift (small ROM) 1 x 10 reps  Therapeutic Exercise Activity 12: Sidelying R/L clamshells 2 x 10 reps (No theraband)  Therapeutic Exercise Activity 13: Sidelying R/L hip abduction 2 x 10 reps  Therapeutic Exercise Activity 14: Standing R/L hip ABD EXT 1 x 10 reps in // bars with BUE support  Therapeutic Exercise Activity 15: Standing B mid rows with yellow theraband 2 x 10 reps (Deferred 10/19/23)  Therapeutic Exercise Activity 16: Standing B shoulder extension with yellow theraband 2 x 10 reps (Deferred 10/19/23)    OP EDUCATION:  Continue with current HEP as tolerated.  Re-educated patient to complete exercises in painfree ROM and within tolerance.        Goals:  Active       PT Problem       PT Goal 1: Patient to be able to walk x 3 miles on uneven surfaces without having to stop secondary to pain/fatigue to allow for patient to return to hiking       Start:  10/05/23    Expected End:  11/02/23            PT Goal 2: Patient to demonstrate improved B LE strength to 4+/5 to allow for ability to bend down to floor to play with gradchild       Start:  10/05/23    Expected End:  11/02/23            Patient to demonstrate independence with HEP        " Start:  10/05/23    Expected End:  11/02/23            Patient to demonstrate improved functional mobility as evidenced by improved ZAINAB by 12 points        Start:  10/05/23    Expected End:  11/02/23            PT Goal 5: Patient to demonstrate improved B LE strength as evidenced by improved 5 x sit to stand test to 11-12 seconds       Start:  10/05/23    Expected End:  11/02/23

## 2023-10-23 ENCOUNTER — INFUSION (OUTPATIENT)
Dept: HEMATOLOGY/ONCOLOGY | Facility: CLINIC | Age: 63
End: 2023-10-23
Payer: COMMERCIAL

## 2023-10-23 VITALS
OXYGEN SATURATION: 97 % | SYSTOLIC BLOOD PRESSURE: 108 MMHG | DIASTOLIC BLOOD PRESSURE: 72 MMHG | BODY MASS INDEX: 25.29 KG/M2 | TEMPERATURE: 97 F | HEART RATE: 53 BPM | RESPIRATION RATE: 16 BRPM | WEIGHT: 137.24 LBS

## 2023-10-23 DIAGNOSIS — C79.51 CARCINOMA OF RIGHT BREAST METASTATIC TO BONE (MULTI): ICD-10-CM

## 2023-10-23 DIAGNOSIS — C50.911 CARCINOMA OF RIGHT BREAST METASTATIC TO BONE (MULTI): ICD-10-CM

## 2023-10-23 DIAGNOSIS — C41.9 MALIGNANT NEOPLASM OF BONE WITH METASTASES (MULTI): ICD-10-CM

## 2023-10-23 LAB
ALBUMIN SERPL BCP-MCNC: 3.8 G/DL (ref 3.4–5)
ALP SERPL-CCNC: 51 U/L (ref 33–136)
ALT SERPL W P-5'-P-CCNC: 10 U/L (ref 7–45)
ANION GAP SERPL CALC-SCNC: 11 MMOL/L (ref 10–20)
AST SERPL W P-5'-P-CCNC: 13 U/L (ref 9–39)
BASOPHILS # BLD AUTO: 0.03 X10*3/UL (ref 0–0.1)
BASOPHILS NFR BLD AUTO: 0.7 %
BILIRUB SERPL-MCNC: 0.3 MG/DL (ref 0–1.2)
BUN SERPL-MCNC: 11 MG/DL (ref 6–23)
CALCIUM SERPL-MCNC: 8.4 MG/DL (ref 8.6–10.3)
CHLORIDE SERPL-SCNC: 105 MMOL/L (ref 98–107)
CO2 SERPL-SCNC: 29 MMOL/L (ref 21–32)
CREAT SERPL-MCNC: 0.55 MG/DL (ref 0.5–1.05)
EOSINOPHIL # BLD AUTO: 0.14 X10*3/UL (ref 0–0.7)
EOSINOPHIL NFR BLD AUTO: 3.2 %
ERYTHROCYTE [DISTWIDTH] IN BLOOD BY AUTOMATED COUNT: 14.1 % (ref 11.5–14.5)
GFR SERPL CREATININE-BSD FRML MDRD: >90 ML/MIN/1.73M*2
GLUCOSE SERPL-MCNC: 93 MG/DL (ref 74–99)
HCT VFR BLD AUTO: 42.6 % (ref 36–46)
HGB BLD-MCNC: 14.1 G/DL (ref 12–16)
IMM GRANULOCYTES # BLD AUTO: 0.02 X10*3/UL (ref 0–0.7)
IMM GRANULOCYTES NFR BLD AUTO: 0.5 % (ref 0–0.9)
LYMPHOCYTES # BLD AUTO: 1.31 X10*3/UL (ref 1.2–4.8)
LYMPHOCYTES NFR BLD AUTO: 29.9 %
MCH RBC QN AUTO: 29.9 PG (ref 26–34)
MCHC RBC AUTO-ENTMCNC: 33.1 G/DL (ref 32–36)
MCV RBC AUTO: 90 FL (ref 80–100)
MONOCYTES # BLD AUTO: 0.4 X10*3/UL (ref 0.1–1)
MONOCYTES NFR BLD AUTO: 9.1 %
NEUTROPHILS # BLD AUTO: 2.48 X10*3/UL (ref 1.2–7.7)
NEUTROPHILS NFR BLD AUTO: 56.6 %
PLATELET # BLD AUTO: 183 X10*3/UL (ref 150–450)
PMV BLD AUTO: 10.2 FL (ref 7.5–11.5)
POTASSIUM SERPL-SCNC: 4 MMOL/L (ref 3.5–5.3)
PROT SERPL-MCNC: 5.4 G/DL (ref 6.4–8.2)
RBC # BLD AUTO: 4.71 X10*6/UL (ref 4–5.2)
SODIUM SERPL-SCNC: 141 MMOL/L (ref 136–145)
WBC # BLD AUTO: 4.4 X10*3/UL (ref 4.4–11.3)

## 2023-10-23 PROCEDURE — 80053 COMPREHEN METABOLIC PANEL: CPT

## 2023-10-23 PROCEDURE — 2500000004 HC RX 250 GENERAL PHARMACY W/ HCPCS (ALT 636 FOR OP/ED): Performed by: INTERNAL MEDICINE

## 2023-10-23 PROCEDURE — 96413 CHEMO IV INFUSION 1 HR: CPT

## 2023-10-23 PROCEDURE — 85025 COMPLETE CBC W/AUTO DIFF WBC: CPT

## 2023-10-23 PROCEDURE — 96375 TX/PRO/DX INJ NEW DRUG ADDON: CPT | Mod: INF

## 2023-10-23 PROCEDURE — 2500000004 HC RX 250 GENERAL PHARMACY W/ HCPCS (ALT 636 FOR OP/ED)

## 2023-10-23 RX ORDER — HEPARIN SODIUM,PORCINE/PF 10 UNIT/ML
50 SYRINGE (ML) INTRAVENOUS AS NEEDED
Status: CANCELLED | OUTPATIENT
Start: 2023-10-23

## 2023-10-23 RX ORDER — ONDANSETRON HYDROCHLORIDE 2 MG/ML
8 INJECTION, SOLUTION INTRAVENOUS ONCE
Status: COMPLETED | OUTPATIENT
Start: 2023-10-23 | End: 2023-10-23

## 2023-10-23 RX ORDER — FAMOTIDINE 10 MG/ML
20 INJECTION INTRAVENOUS ONCE AS NEEDED
Status: DISCONTINUED | OUTPATIENT
Start: 2023-10-23 | End: 2023-10-23 | Stop reason: HOSPADM

## 2023-10-23 RX ORDER — HEPARIN 100 UNIT/ML
500 SYRINGE INTRAVENOUS AS NEEDED
Status: CANCELLED | OUTPATIENT
Start: 2023-10-23

## 2023-10-23 RX ORDER — DIPHENHYDRAMINE HYDROCHLORIDE 50 MG/ML
50 INJECTION INTRAMUSCULAR; INTRAVENOUS AS NEEDED
Status: DISCONTINUED | OUTPATIENT
Start: 2023-10-23 | End: 2023-10-23 | Stop reason: HOSPADM

## 2023-10-23 RX ORDER — ALBUTEROL SULFATE 0.83 MG/ML
3 SOLUTION RESPIRATORY (INHALATION) AS NEEDED
Status: DISCONTINUED | OUTPATIENT
Start: 2023-10-23 | End: 2023-10-23 | Stop reason: HOSPADM

## 2023-10-23 RX ORDER — EPINEPHRINE 0.3 MG/.3ML
0.3 INJECTION SUBCUTANEOUS EVERY 5 MIN PRN
Status: DISCONTINUED | OUTPATIENT
Start: 2023-10-23 | End: 2023-10-23 | Stop reason: HOSPADM

## 2023-10-23 RX ORDER — HEPARIN 100 UNIT/ML
SYRINGE INTRAVENOUS
Status: COMPLETED
Start: 2023-10-23 | End: 2023-10-23

## 2023-10-23 RX ADMIN — ONDANSETRON 8 MG: 2 INJECTION, SOLUTION INTRAMUSCULAR; INTRAVENOUS at 09:54

## 2023-10-23 RX ADMIN — PACLITAXEL 131.2 MG: 100 INJECTION, POWDER, LYOPHILIZED, FOR SUSPENSION INTRAVENOUS at 10:42

## 2023-10-23 RX ADMIN — SODIUM CHLORIDE, PRESERVATIVE FREE: 5 INJECTION INTRAVENOUS at 11:30

## 2023-10-23 ASSESSMENT — PAIN SCALES - GENERAL: PAINLEVEL: 0-NO PAIN

## 2023-10-23 NOTE — SIGNIFICANT EVENT
10/23/23 0935   Prechemo Checklist   Has the patient been in the hospital, ED, or urgent care since last date of service No   Chemo/Immuno Consent Signed Yes   Protocol/Indications Verified Yes   Confirmed to previous date/time of medication Yes   Compared to previous dose Yes   All medications are dated accurately Yes   Pregnancy Test Negative Not applicable   Parameters Met Yes   BSA/Weight-Height Verified Yes   Dose Calculations Verified Yes

## 2023-10-24 ENCOUNTER — APPOINTMENT (OUTPATIENT)
Dept: PHYSICAL THERAPY | Facility: CLINIC | Age: 63
End: 2023-10-24
Payer: COMMERCIAL

## 2023-10-24 ENCOUNTER — TREATMENT (OUTPATIENT)
Dept: PHYSICAL THERAPY | Facility: CLINIC | Age: 63
End: 2023-10-24
Payer: COMMERCIAL

## 2023-10-24 DIAGNOSIS — C79.51 CARCINOMA OF RIGHT BREAST METASTATIC TO BONE (MULTI): Primary | ICD-10-CM

## 2023-10-24 DIAGNOSIS — C79.51 METASTATIC CANCER TO SPINE (MULTI): ICD-10-CM

## 2023-10-24 DIAGNOSIS — C50.911 CARCINOMA OF RIGHT BREAST METASTATIC TO BONE (MULTI): Primary | ICD-10-CM

## 2023-10-24 DIAGNOSIS — S22.000A: ICD-10-CM

## 2023-10-24 PROCEDURE — 97110 THERAPEUTIC EXERCISES: CPT | Mod: GP | Performed by: PHYSICAL THERAPIST

## 2023-10-24 NOTE — PROGRESS NOTES
"Physical Therapy    Physical Therapy Treatment    Patient Name: Trang Jones  MRN: [id number]  Today's Date: 10/24/2023  Time Calculation  Start Time: 0829  Stop Time: 0909  Time Calculation (min): 40 min      Assessment:   No increase in pain today with addition of exercises (patient was reporting prior exercises were starting to become minimally challenging).  Cues to contract abs with LE exercises to decrease pressure on spine.  Patient does well with technique with exercises and maintaining a neutral spine>      Plan:   Continue per POC.  Monitor response to treatment and adjust plan as needed.        Current Problem  1. 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 (CMS/HCC)        2. Thoracic compression fracture (CMS/HCC)  Follow Up In Physical Therapy      3. Metastatic cancer to spine (CMS/HCC)  Follow Up In Physical Therapy          Subjective   General   Patient reported stiffness but no pain today.  Patient did a hike this weekend and she was achy but otherwise she did well.    Precautions   STEADI Fall Risk Score (The score of 4 or more indicates an increased risk of falling):   Per physicians order: please work on core strenthening, rom, scoliosis  spine precautions as well as avoid high impact exercise      History of Left breast CA 13 yrs ago.  Presently, metastatic cancer to spine       Pain    No pain        Objective       Treatments:  Therapeutic Exercise  Therapeutic Exercise Activity 1: Hooklying hip adduction with bridge 2  x 10  Therapeutic Exercise Activity 2: Hooklying TA bracing with B hip ADD ball squeeze 5\" hold 2 x 10  Therapeutic Exercise Activity 3: seated low rows 2 x 10 orange  Therapeutic Exercise Activity 4: Hooklying R/L isometric leg press/leg lengthener  with knee/hip extended  5\" hold 2 x 10 reps  Therapeutic Exercise Activity 5: Hook lying B shoulder horizontal ABD with palms up/palms down with yellow theraband 2 x 10  reps each  Therapeutic Exercise Activity 6: Hooklying " "R/L UE D2 flex with yellow theraband 2 x 10 reps  Therapeutic Exercise Activity 7: Hook lying B shoulder ER with yellow theraband 2 x 10 reps  Therapeutic Exercise Activity 8: hooklying alternating toe taps  Therapeutic Exercise Activity 9: Hooklying mini bridge 2 x 10 reps  Therapeutic Exercise Activity 10: Prone B scapular retraction with slight shoulder extension 3\" hold 2 x 10 reps;  Therapeutic Exercise Activity 11: Prone GS with alt R/L single leg lift (small ROM) 1 x 10 reps  Therapeutic Exercise Activity 12: Sidelying R/L clamshells 2 x 10 reps  Therapeutic Exercise Activity 13: Sidelying R/L hip abduction 2 x 10 reps  Therapeutic Exercise Activity 14: Standing R/L hip ABD EXT 1 x 10 reps in // bars with BUE support      OP EDUCATION:   Educated to add new exercises to HEP in pain free range:      Goals:  Active       PT Problem       PT Goal 1: Patient to be able to walk x 3 miles on uneven surfaces without having to stop secondary to pain/fatigue to allow for patient to return to hiking       Start:  10/05/23    Expected End:  11/02/23            PT Goal 2: Patient to demonstrate improved B LE strength to 4+/5 to allow for ability to bend down to floor to play with gradchild       Start:  10/05/23    Expected End:  11/02/23            Patient to demonstrate independence with HEP        Start:  10/05/23    Expected End:  11/02/23            Patient to demonstrate improved functional mobility as evidenced by improved ZAINAB by 12 points        Start:  10/05/23    Expected End:  11/02/23            PT Goal 5: Patient to demonstrate improved B LE strength as evidenced by improved 5 x sit to stand test to 11-12 seconds       Start:  10/05/23    Expected End:  11/02/23               "

## 2023-10-25 ENCOUNTER — ALLIED HEALTH (OUTPATIENT)
Dept: INTEGRATIVE MEDICINE | Facility: CLINIC | Age: 63
End: 2023-10-25
Payer: COMMERCIAL

## 2023-10-25 DIAGNOSIS — C79.51 CARCINOMA OF RIGHT BREAST METASTATIC TO BONE (MULTI): Primary | ICD-10-CM

## 2023-10-25 DIAGNOSIS — C50.911 CARCINOMA OF RIGHT BREAST METASTATIC TO BONE (MULTI): Primary | ICD-10-CM

## 2023-10-25 DIAGNOSIS — M79.2 NEUROPATHIC PAIN: ICD-10-CM

## 2023-10-25 DIAGNOSIS — G89.3 NEOPLASM RELATED PAIN: ICD-10-CM

## 2023-10-25 DIAGNOSIS — C79.51 METASTATIC CANCER TO SPINE (MULTI): ICD-10-CM

## 2023-10-25 PROCEDURE — 99213 OFFICE O/P EST LOW 20 MIN: CPT | Performed by: HOSPITALIST

## 2023-10-25 ASSESSMENT — PAIN SCALES - GENERAL: PAINLEVEL_OUTOF10: 3

## 2023-10-25 NOTE — PROGRESS NOTES
Patient ID: Trang Jones is a 63 y.o. female.  Referring Physician: No referring provider defined for this encounter.  Primary Care Provider: Karl Granger DO    CANCER HISTORY:   62 yo woman with newly dx Tneg breast ca to bone, cord compression  2010 - L mtx - ER+ stage II breast ca   AC x 4  Keith x 7 yrs     3/23 - back pain - MRI with cord compression treated with radiation 5/4/23 5/22/23 - Taxol (weekly) - had reaction - held last week and only partial this week  Changing to abraxane  Not planning pembrolizumab as PDL1 neg per pt      Now on abraxane and tolerating well         History of Present IllnessConsulted by: Dr. Tolbert  For: breast cancer     Chief complaints and symptoms:  Seen in supp oncology  1. Pain - on ms contin, nsaids, gabapentin. THC twice/week  mostly in back and now in ribs. Improved overall though some pain from walking this last weekend  Now worse since [place] trip and walking - pelvic pain, L hip and pain down L leg     2. Fatigue - related to chemotherapy - improved overall. Worse on chemo weeks     3. Appetite suppressed  Poor taste - improved  Doing well still today     4. Neuropathy - mild developing tingling in fingers - numbness and tingling briefly in fingertips - brief numbness in fingers  Two more in fingertips, tingling/numbness     mild ha     Anxiety about results     Integrative History:  Diet: mostly fruits (taste good), protein smoothies (Vital proteins, collagen powder, almond milk)  Lean proteins, chicken/salmon, o/w plant based     PA: walking some, hiking some now, continued  Doing some Pilates     Sleep: well improved with THC - 8-9 hrs/night     Stress: overwhelmed by dx.  Management: Meditating daily almost, various times, has some experience - Intermittent  Family support  Reiki helping  Focusing more on spirituality and [employer], Women's groups.     Natural Products:  Medical cannabis - one gummy/night  magnesium  Red yeast rice - for  cholesterol  Vit D  Flax seed oil   American Ginseng  [employer] STAMETS 7  Zinc     ROS:  no ha, visual symptoms, hearing loss  no sob, chest pain, palp  ROS o/w non contributory, please see HPI    Objective    BSA: There is no height or weight on file to calculate BSA.  There were no vitals taken for this visit.    PHYSICAL EXAM:  NAD, awake/alert  HEENT, NCAT, OP clear, no oral lesions  CTA bilat  RRR no mgr  Abd soft/nt/nd+bs  No c/c/e/ttp  Motor/sensory intact, CN 2-12 intact     RESULTS:  Lab Results   Component Value Date    WBC 4.4 10/23/2023    HGB 14.1 10/23/2023    HCT 42.6 10/23/2023     10/23/2023    CREATININE 0.55 10/23/2023    AST 13 10/23/2023       Assessment/Plan   Cancer Staging   Malignant neoplasm of bone with metastases (CMS/HCC)  Staging form: Bone - Appendicular Skeleton, Trunk, Skull, and Facial Bones, AJCC 8th Edition  - Clinical stage from 9/5/2023: pM1, G3 - Signed by Bebeto Tolbert MD on 9/5/2023      CANCER SPECIFIC RECCS:  64 yo woman with recurrent breast ca, Tneg now with bone mets and s/p cord compression  S/p radiation  5/22/23 started taxol (weekly) - now changed to abraxane  No neuropathy now     Breast cancer:   7/17/23 CT c/a/p - will review with Dr. Tolbert:  sclerotic L 3rd rib, increase osseous mets R iliac, 1.1 cm liver hypodensities, can discuss further with Dr. Tolbert  focusing plant based is fine  limit sugar including in smoothies, consider Orgain (using Vital protein)  Would avoid coffee on taxol  Zinc for taste  Flax is fine  THC is good as well  Vitamin D3 5000 IU 3 days/week is good  Consider omega 3   Stop b12, also coq10 - done  on [employer] STAMETS 7     Try to walk 15-30 min/day     Pain - taking motrin prn, steroid weaning  MS contin and gabapentin  Acupuncture will start in Symptom Management Clinic - has started  Feels better when having AM pain meds and supplements  Some improvements  Gentle Yoga with assistance, same with [place]      Fatigue: Definitely improved  Acupuncture, consider massage  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 Uses to manage scoliosis.  Would sugg pt see Dr. Cantu (rehab) and/or PT prior to engaging in yoga     Follow up in Symptom Management Clinic      Integrative Oncology Symptom Management:     The integrative oncology symptom management clinic offers supervised care of cancer patients using Integrative Modalities billed to insurance using NCCN and SIO/ASCO guideline-driven practices.  ESAS is obtained prior to and after each treatment by practitioner     Symptoms Managed:  Pain - Increased in L leg and hip and pelvis since coming back from trip - increased in L femur and hip now, worse with hiking recently     Fatigue - still there but improved, same, travelled this last weekend with lots of walking - same overall during chemo weeks, same  Poor Appetite - doing well    Neuropathy - improved now, one brief episode of numbness in fingers - none now, brief episode over weekend, one episode last week  Gabapentin increased to 300 am and 600 pm     Natural Products utilized:  Medical cannabis - one gummy/night  magnesium  Red yeast rice - for cholesterol  B12  COq10  Vit D  Flax seed oil      Integrative Treatment: Acupuncture     Session #: 14  Frequency: Weekly     Referrals: Supportive Oncology (Has seen)     Recommendations: I would avoid coffee, soy, green tea while on Taxol (can reduce efficacy)  Weekly acupuncture  Has obtained med THC card as well   Ok to take calcium, mildly low and albumin wnl  seen by Dr. Cantu and going to PT - working with  on core strength     Follow Up:  Symptom Management: weekly  Integrative Oncology: 3 months - could see 9/23     I have personally seen the patient and supervised the treatment  by the integrative practitioner during this visit.  Pt had symptoms discussed and I was present for the patient's 45 minutes of direct patient care.     Bo Sandoval MD

## 2023-10-25 NOTE — PROGRESS NOTES
Acupuncture Visit:     Subjective   Patient ID: Trang Jones is a 63 y.o. female who presents for No chief complaint on file.  HPI    Left hip/femur pain   4/10 today  Cold improved.   Sleep-  no issues  No neuropathy today, slight episode last week for an hour           initial visit:  Fatigue, back pain  had recent break from chemo  fatigue is better since holding  typically wiped out after chemo and radiation for 2-3 days   low appetite and difficulty staying hydrated  denies xerostomia    sleep is good- uses cbd product, has [employer] card, but has not pursued yet    pain this week mostly from scoliosis  lumbar into thoracic, has since her 30s, stiffness, constant pain   had has some radiation down legs. foraminal stenosis  no neuropathy symptoms, hands or feet  denies other pain areas.   was having severe throacic pain, from tumor compression, now off of most pain meds except ER morphine.     planned 2 more rounds of chemo then a scan to evaluate next steps     BM- once a day, no blood mucus undigested food,        Diet: no alcohol or sugars. consistently tries to eat healthy  Dr: water, dislikes cold water, drinks warm lemon water, bone broth, hot teas.    General: denies- fever, chills, night sweats  Skin: denies- rash, abnormal lesions  Eyes: occ floaters, last exam 1 year, ,   HENT: denies- headache, sore throat, ear pain, tinnitus, congestion, runny nose,   Cardio: denies- chest pain, palps  Resp: denies- SOB, cough, wheezing  GI: some reflux- every few days,   Uro: occasional urgency, no pain or burning.   Neuro: see HPI  Musc: see HPI                           Review of Systems         Provider reviewed plan for the acupuncture session, precautions and contraindications. Patient/guardian/hospital staff has given consent to treat with full understanding of what to expect during the session. Before acupuncture began, provider explained to the patient to communicate at any time if the procedure was causing  discomfort past their tolerance level. Patient agreed to advise acupuncturist. The acupuncturist counseled the patient on the risks of acupuncture treatment including pain, infection, bleeding, and no relief of pain. The patient was positioned comfortably. There was no evidence of infection at the site of needle insertions.    Objective   Physical Exam          Acupuncture Treatment  Needle Guage: 36 guage /.20/ Blue seirin  Body Points: With retention  Body Points - Bilateral: Scalp thoracic, LI10, LI4, Si3, Ht7, SP6, KI7, KI3, UB62, LR3  Auricular Points: Yes  Auricular Points - Bilateral: BFA 1-3  Needle Count In: 25  Needle Count Out: 25                   Assessment/Plan

## 2023-10-27 ENCOUNTER — APPOINTMENT (OUTPATIENT)
Dept: PHYSICAL THERAPY | Facility: CLINIC | Age: 63
End: 2023-10-27
Payer: COMMERCIAL

## 2023-10-27 ENCOUNTER — TREATMENT (OUTPATIENT)
Dept: PHYSICAL THERAPY | Facility: CLINIC | Age: 63
End: 2023-10-27
Payer: COMMERCIAL

## 2023-10-27 DIAGNOSIS — S22.000A: ICD-10-CM

## 2023-10-27 DIAGNOSIS — C79.51 METASTATIC CANCER TO SPINE (MULTI): Primary | ICD-10-CM

## 2023-10-27 PROCEDURE — 97110 THERAPEUTIC EXERCISES: CPT | Mod: GP | Performed by: PHYSICAL THERAPIST

## 2023-10-27 NOTE — PROGRESS NOTES
Physical Therapy    Physical Therapy Treatment    Patient Name: Trang Jones  MRN: [id number]  Today's Date: 10/27/2023  Time Calculation  Start Time: 1415  Stop Time: 1454  Time Calculation (min): 39 min      Assessment:   Patient was able to increase reps and add new exercises while maintaining good form.  She parra shave occasional discomfort in L hip limiting ability to add weight to sidelying activities.   Plan to continue to progress exercises with neutral spine as able.      Plan:   Continue per POC.  Monitor response to treatment and adjust plan as needed.         Current Problem  1. Metastatic cancer to spine (CMS/HCC)  Follow Up In Physical Therapy      2. Thoracic compression fracture (CMS/HCC)  Follow Up In Physical Therapy          Subjective   General   Patient reported she is doing well, no pain.    Precautions  Precautions  Precautions Comment: STEADI Fall Risk Score (The score of 4 or more indicates an increased risk of falling):   Per physicians order: please work on core strenthening, rom, scoliosis  spine precautions as well as avoid high impact exercise      History of Left breast CA 13 yrs ago.  Presently, metastatic cancer to spine    Pain   No pain today     Objective     Treatments:  Therapeutic Exercise  Therapeutic Exercise Activity 1: Hooklying hip adduction with bridge 2  x 15  Therapeutic Exercise Activity 2: hooklying alternating toe taps x 20  Therapeutic Exercise Activity 3: Hook lying B shoulder horizontal ABD with palms up/palms down with green theraband 2 x 10 reps each  Therapeutic Exercise Activity 4: Hooklying R/L UE D2 flex with green theraband 2 x 10 reps  Therapeutic Exercise Activity 5: Hook lying B shoulder ER with yellow theraband 2 x 10 reps  Therapeutic Exercise Activity 6: quadruped shoulder flexion 2 x 10  Therapeutic Exercise Activity 7: quadruped hip extension 2 x 10  Therapeutic Exercise Activity 8: sidestepping with blue band x 4 times  Therapeutic Exercise  "Activity 10: Prone B scapular retraction with slight shoulder extension 3\" hold 2 x 10 reps;  Therapeutic Exercise Activity 11: Prone GS with alt R/L single leg lift (small ROM) 1 x 10 reps  Therapeutic Exercise Activity 12: Sidelying R/L clamshells 2 x 10 reps  Therapeutic Exercise Activity 13: Sidelying R/L hip abduction 2 x 10 reps  Therapeutic Exercise Activity 14: standing rows green t tube x 20  Therapeutic Exercise Activity 15: standing shoulder extension 2 x 10      OP EDUCATION:   Educated to continue with HEP and progress reps if able     Goals:  Active       PT Problem       PT Goal 1: Patient to be able to walk x 3 miles on uneven surfaces without having to stop secondary to pain/fatigue to allow for patient to return to hiking       Start:  10/05/23    Expected End:  11/02/23            PT Goal 2: Patient to demonstrate improved B LE strength to 4+/5 to allow for ability to bend down to floor to play with gradchild       Start:  10/05/23    Expected End:  11/02/23            Patient to demonstrate independence with HEP        Start:  10/05/23    Expected End:  11/02/23            Patient to demonstrate improved functional mobility as evidenced by improved ZAINAB by 12 points        Start:  10/05/23    Expected End:  11/02/23            PT Goal 5: Patient to demonstrate improved B LE strength as evidenced by improved 5 x sit to stand test to 11-12 seconds       Start:  10/05/23    Expected End:  11/02/23               "

## 2023-10-30 ENCOUNTER — INFUSION (OUTPATIENT)
Dept: HEMATOLOGY/ONCOLOGY | Facility: CLINIC | Age: 63
End: 2023-10-30
Payer: COMMERCIAL

## 2023-10-30 VITALS
BODY MASS INDEX: 25.57 KG/M2 | HEART RATE: 64 BPM | DIASTOLIC BLOOD PRESSURE: 72 MMHG | WEIGHT: 138.78 LBS | SYSTOLIC BLOOD PRESSURE: 109 MMHG | RESPIRATION RATE: 16 BRPM | TEMPERATURE: 97.2 F | OXYGEN SATURATION: 96 %

## 2023-10-30 DIAGNOSIS — C79.51 CARCINOMA OF RIGHT BREAST METASTATIC TO BONE (MULTI): ICD-10-CM

## 2023-10-30 DIAGNOSIS — C41.9 MALIGNANT NEOPLASM OF BONE WITH METASTASES (MULTI): ICD-10-CM

## 2023-10-30 DIAGNOSIS — C50.911 CARCINOMA OF RIGHT BREAST METASTATIC TO BONE (MULTI): ICD-10-CM

## 2023-10-30 LAB
BASOPHILS # BLD AUTO: 0.04 X10*3/UL (ref 0–0.1)
BASOPHILS NFR BLD AUTO: 0.8 %
EOSINOPHIL # BLD AUTO: 0.06 X10*3/UL (ref 0–0.7)
EOSINOPHIL NFR BLD AUTO: 1.1 %
ERYTHROCYTE [DISTWIDTH] IN BLOOD BY AUTOMATED COUNT: 13.6 % (ref 11.5–14.5)
HCT VFR BLD AUTO: 35.4 % (ref 36–46)
HGB BLD-MCNC: 11.8 G/DL (ref 12–16)
IMM GRANULOCYTES # BLD AUTO: 0.04 X10*3/UL (ref 0–0.7)
IMM GRANULOCYTES NFR BLD AUTO: 0.8 % (ref 0–0.9)
LYMPHOCYTES # BLD AUTO: 1.43 X10*3/UL (ref 1.2–4.8)
LYMPHOCYTES NFR BLD AUTO: 27 %
MCH RBC QN AUTO: 30 PG (ref 26–34)
MCHC RBC AUTO-ENTMCNC: 33.3 G/DL (ref 32–36)
MCV RBC AUTO: 90 FL (ref 80–100)
MONOCYTES # BLD AUTO: 0.33 X10*3/UL (ref 0.1–1)
MONOCYTES NFR BLD AUTO: 6.2 %
NEUTROPHILS # BLD AUTO: 3.4 X10*3/UL (ref 1.2–7.7)
NEUTROPHILS NFR BLD AUTO: 64.1 %
PLATELET # BLD AUTO: 216 X10*3/UL (ref 150–450)
PMV BLD AUTO: 10.5 FL (ref 7.5–11.5)
RBC # BLD AUTO: 3.93 X10*6/UL (ref 4–5.2)
WBC # BLD AUTO: 5.3 X10*3/UL (ref 4.4–11.3)

## 2023-10-30 PROCEDURE — 2500000004 HC RX 250 GENERAL PHARMACY W/ HCPCS (ALT 636 FOR OP/ED): Performed by: INTERNAL MEDICINE

## 2023-10-30 PROCEDURE — 85025 COMPLETE CBC W/AUTO DIFF WBC: CPT

## 2023-10-30 PROCEDURE — 96375 TX/PRO/DX INJ NEW DRUG ADDON: CPT | Mod: INF

## 2023-10-30 PROCEDURE — 96413 CHEMO IV INFUSION 1 HR: CPT

## 2023-10-30 RX ORDER — EPINEPHRINE 0.3 MG/.3ML
0.3 INJECTION SUBCUTANEOUS EVERY 5 MIN PRN
Status: DISCONTINUED | OUTPATIENT
Start: 2023-10-30 | End: 2023-10-30 | Stop reason: HOSPADM

## 2023-10-30 RX ORDER — HEPARIN SODIUM,PORCINE/PF 10 UNIT/ML
50 SYRINGE (ML) INTRAVENOUS AS NEEDED
Status: DISCONTINUED | OUTPATIENT
Start: 2023-10-30 | End: 2023-10-30 | Stop reason: HOSPADM

## 2023-10-30 RX ORDER — HEPARIN 100 UNIT/ML
500 SYRINGE INTRAVENOUS AS NEEDED
Status: CANCELLED | OUTPATIENT
Start: 2023-10-30

## 2023-10-30 RX ORDER — FAMOTIDINE 10 MG/ML
20 INJECTION INTRAVENOUS ONCE AS NEEDED
Status: DISCONTINUED | OUTPATIENT
Start: 2023-10-30 | End: 2023-10-30 | Stop reason: HOSPADM

## 2023-10-30 RX ORDER — ALBUTEROL SULFATE 0.83 MG/ML
3 SOLUTION RESPIRATORY (INHALATION) AS NEEDED
Status: DISCONTINUED | OUTPATIENT
Start: 2023-10-30 | End: 2023-10-30 | Stop reason: HOSPADM

## 2023-10-30 RX ORDER — HEPARIN SODIUM,PORCINE/PF 10 UNIT/ML
50 SYRINGE (ML) INTRAVENOUS AS NEEDED
Status: CANCELLED | OUTPATIENT
Start: 2023-10-30

## 2023-10-30 RX ORDER — DIPHENHYDRAMINE HYDROCHLORIDE 50 MG/ML
50 INJECTION INTRAMUSCULAR; INTRAVENOUS AS NEEDED
Status: DISCONTINUED | OUTPATIENT
Start: 2023-10-30 | End: 2023-10-30 | Stop reason: HOSPADM

## 2023-10-30 RX ORDER — HEPARIN 100 UNIT/ML
500 SYRINGE INTRAVENOUS AS NEEDED
Status: DISCONTINUED | OUTPATIENT
Start: 2023-10-30 | End: 2023-10-30 | Stop reason: HOSPADM

## 2023-10-30 RX ORDER — ONDANSETRON HYDROCHLORIDE 2 MG/ML
8 INJECTION, SOLUTION INTRAVENOUS ONCE
Status: COMPLETED | OUTPATIENT
Start: 2023-10-30 | End: 2023-10-30

## 2023-10-30 RX ADMIN — ONDANSETRON 8 MG: 2 INJECTION, SOLUTION INTRAMUSCULAR; INTRAVENOUS at 09:49

## 2023-10-30 RX ADMIN — PACLITAXEL 131.2 MG: 100 INJECTION, POWDER, LYOPHILIZED, FOR SUSPENSION INTRAVENOUS at 10:33

## 2023-10-30 ASSESSMENT — PAIN SCALES - GENERAL: PAINLEVEL: 0-NO PAIN

## 2023-10-30 NOTE — SIGNIFICANT EVENT
10/30/23 0900   Prechemo Checklist   Has the patient been in the hospital, ED, or urgent care since last date of service No   Chemo/Immuno Consent Signed Yes   Protocol/Indications Verified Yes   Confirmed to previous date/time of medication Yes   Compared to previous dose Yes   All medications are dated accurately Yes   Pregnancy Test Negative Not applicable   Parameters Met Yes   BSA/Weight-Height Verified Yes   Dose Calculations Verified Yes

## 2023-10-31 ENCOUNTER — TREATMENT (OUTPATIENT)
Dept: PHYSICAL THERAPY | Facility: CLINIC | Age: 63
End: 2023-10-31
Payer: COMMERCIAL

## 2023-10-31 ENCOUNTER — TELEPHONE (OUTPATIENT)
Dept: PALLIATIVE MEDICINE | Facility: HOSPITAL | Age: 63
End: 2023-10-31

## 2023-10-31 ENCOUNTER — APPOINTMENT (OUTPATIENT)
Dept: PHYSICAL THERAPY | Facility: CLINIC | Age: 63
End: 2023-10-31
Payer: COMMERCIAL

## 2023-10-31 DIAGNOSIS — S22.000A: ICD-10-CM

## 2023-10-31 DIAGNOSIS — C79.51 METASTATIC CANCER TO SPINE (MULTI): Primary | ICD-10-CM

## 2023-10-31 PROCEDURE — 97110 THERAPEUTIC EXERCISES: CPT | Mod: GP | Performed by: PHYSICAL THERAPIST

## 2023-10-31 NOTE — TELEPHONE ENCOUNTER
Phone call to patient to inform her that Jennifer Duarte CNP clinic on 11/9 will be closed and appointment will need rescheduled. Patient and provider agreeable to Tues. 11/7 at 11:30 telehealth visit.

## 2023-10-31 NOTE — PROGRESS NOTES
Physical Therapy    Physical Therapy Treatment    Patient Name: Trang Jones  MRN: [id number]  Today's Date: 10/31/2023  Time Calculation  Start Time: 1017  Stop Time: 1056  Time Calculation (min): 39 min      Assessment:   Patient tolerated addiction of standing exercises well today and maintains a neutral spine.   Patient does not report pain throughout session.  Patient has very good postural awareness and does well with form.    Plan:  Patient is still challenged with quadruped exercises.     Continue to progress strengthening with spine in neutral     Current Problem  1. Metastatic cancer to spine (CMS/HCC)  Follow Up In Physical Therapy      2. Thoracic compression fracture (CMS/HCC)  Follow Up In Physical Therapy          Subjective   General   Patient reported feeling good after exercises last session  Precautions  Precautions  Precautions Comment: STEADI Fall Risk Score (The score of 4 or more indicates an increased risk of falling):   Per physicians order: please work on core strenthening, rom, scoliosis  spine precautions as well as avoid high impact exercise      History of Left breast CA 13 yrs ago.  Presently, metastatic cancer to spine    Pain   No pain today     Objective     Treatments:  Therapeutic Exercise  Therapeutic Exercise Activity 1: Hooklying hip adduction with bridge 2  x 15  Therapeutic Exercise Activity 2: hooklying alternating toe taps x 20  Therapeutic Exercise Activity 3: Hook lying B shoulder horizontal ABD with palms up/palms down with green theraband 2 x 10 reps each  Therapeutic Exercise Activity 4: Hooklying R/L UE D2 flex with green theraband 2 x 10 reps  Therapeutic Exercise Activity 5: minisquats with a neutral spine 2 x 10  Therapeutic Exercise Activity 6: quadruped shoulder flexion 2 x 10  Therapeutic Exercise Activity 7: quadruped hip extension 2 x 10  Therapeutic Exercise Activity 8: sidestepping with blue band x 4 times  Therapeutic Exercise Activity 9: step ups onto  6 inch step without UE support x 15 R/L  Therapeutic Exercise Activity 10: standing hip abduction 2 x 15  Therapeutic Exercise Activity 11: standing SLR with abdominal contraction 2 x 10  Therapeutic Exercise Activity 12: Sidelying R/L clamshells 2 x 15 reps  Therapeutic Exercise Activity 13: Sidelying R/L hip abduction 2 x 10 reps  Therapeutic Exercise Activity 14: standing rows green t tube x 20  Therapeutic Exercise Activity 15: standing shoulder extension 2 x 10      OP EDUCATION:   Continue with HEP in neutral spine    Goals:  Active       PT Problem       PT Goal 1: Patient to be able to walk x 3 miles on uneven surfaces without having to stop secondary to pain/fatigue to allow for patient to return to hiking       Start:  10/05/23    Expected End:  11/02/23            PT Goal 2: Patient to demonstrate improved B LE strength to 4+/5 to allow for ability to bend down to floor to play with gradchild       Start:  10/05/23    Expected End:  11/02/23            Patient to demonstrate independence with HEP        Start:  10/05/23    Expected End:  11/02/23            Patient to demonstrate improved functional mobility as evidenced by improved ZAINAB by 12 points        Start:  10/05/23    Expected End:  11/02/23            PT Goal 5: Patient to demonstrate improved B LE strength as evidenced by improved 5 x sit to stand test to 11-12 seconds       Start:  10/05/23    Expected End:  11/02/23

## 2023-11-01 ENCOUNTER — HOSPITAL ENCOUNTER (OUTPATIENT)
Dept: RADIOLOGY | Facility: HOSPITAL | Age: 63
Discharge: HOME | End: 2023-11-01
Payer: COMMERCIAL

## 2023-11-01 DIAGNOSIS — G95.20 UNSPECIFIED CORD COMPRESSION (MULTI): ICD-10-CM

## 2023-11-01 DIAGNOSIS — C79.51 SECONDARY MALIGNANT NEOPLASM OF BONE (MULTI): ICD-10-CM

## 2023-11-01 DIAGNOSIS — G89.3 NEOPLASM RELATED PAIN (ACUTE) (CHRONIC): ICD-10-CM

## 2023-11-01 DIAGNOSIS — C41.9 MALIGNANT NEOPLASM OF BONE AND ARTICULAR CARTILAGE, UNSPECIFIED (MULTI): ICD-10-CM

## 2023-11-01 PROCEDURE — 72157 MRI CHEST SPINE W/O & W/DYE: CPT

## 2023-11-01 PROCEDURE — 72157 MRI CHEST SPINE W/O & W/DYE: CPT | Performed by: RADIOLOGY

## 2023-11-01 PROCEDURE — 2550000001 HC RX 255 CONTRASTS: Performed by: NEUROLOGICAL SURGERY

## 2023-11-01 PROCEDURE — A9575 INJ GADOTERATE MEGLUMI 0.1ML: HCPCS | Performed by: NEUROLOGICAL SURGERY

## 2023-11-01 RX ORDER — GADOTERATE MEGLUMINE 376.9 MG/ML
12 INJECTION INTRAVENOUS
Status: COMPLETED | OUTPATIENT
Start: 2023-11-01 | End: 2023-11-01

## 2023-11-01 RX ADMIN — GADOTERATE MEGLUMINE 12 ML: 376.9 INJECTION INTRAVENOUS at 09:16

## 2023-11-02 ENCOUNTER — TELEMEDICINE (OUTPATIENT)
Dept: PHYSICAL MEDICINE AND REHAB | Facility: CLINIC | Age: 63
End: 2023-11-02
Payer: COMMERCIAL

## 2023-11-02 DIAGNOSIS — C41.9 MALIGNANT NEOPLASM OF BONE WITH METASTASES (MULTI): ICD-10-CM

## 2023-11-02 DIAGNOSIS — C79.51 CARCINOMA OF RIGHT BREAST METASTATIC TO BONE (MULTI): Primary | ICD-10-CM

## 2023-11-02 DIAGNOSIS — C50.911 CARCINOMA OF RIGHT BREAST METASTATIC TO BONE (MULTI): Primary | ICD-10-CM

## 2023-11-02 PROCEDURE — 99202 OFFICE O/P NEW SF 15 MIN: CPT | Performed by: PHYSICAL MEDICINE & REHABILITATION

## 2023-11-02 NOTE — PROGRESS NOTES
Chief Complaint     ref by Dr Sandoval for spine pain S/P breast CA. thoracis spine pain.      History of Present IllnessPhysical Medicine and Rehabilitation Cancer Rehab Consult        Chief Complaint:  Low back pain     HPI:  Ms. Jones is a 64 yo F w pmh of metastatic triple negative breast cancer metastatic PDL1 negative. She was orinally dx in 2010 w stage II breast cancer sp lumpectomy, 4 cycles of docetaxel/cyclophosphamide chemo followed by tamoxifen x 7 yrs, completed in 0770-0837. In spring presents w mid thoraicc back pain and dx w metastatic breast cancer. She completed radiation therapy to T spine. Currently she is on Abraxane chemotherapy, did not tolerate paclitaxel in 6/2023. On zometa q 3.               States prior to mid thoracic back pain from Feb 2023. Up until then vey activey and doing polites, yoga and weight lifting.   She completed radiation in may 2023.  Location: has discomfort in lumbar spine from ho of spinal stenosis and scoliosis  L spine has not changed. states that she used to do pilates and yoga was difficult     Lower back and hip pain is chronic. Has had sciatica back pain as well in the past. Takes gabapentin 300 mg bid.   Mid thoracic pain is not much of an issue.   Radiation: lower back, ,mostly left sometimes R  Quality: throbbing and aching from knee to left calf, daily  Duration: worse at night  back pain is achy and stiffness worse after prolonged sitting  worse w prolonged activites  More difficulty managing it since not doing pilates and yoga since helped maintain range of motion.  sciatica pain is worse w less activity     Aggravating factors:   thoracic; less activity  lumbar ; less acitivity  Alleviating factors:   Severity: [2/10] today, [7/10] most severe  Numbness/Tingling: legs   Bowel or bladder incontinence  Pt's current medication regimen includes: gabapentin, advil prn, ms contin     Treatment to date:  Physical therapy: in the past for lower back, but wo much  relief     Medications taken to date for this complaint include the following:   on MS contin bid and oxycodone prn for pain for general achiness and had not needed it for a while  takes motrin prn for activity  Prior injections: none                             Acupuncture and Reiki.    She was last seen in sept 2023. At that time we discussed:  1. discussed main precautions; maintain neutral spine when exercising, avoid excessive flexion/extension and twisting and avoid high impact exercises  2. otherwise she is ok to resume some pilates/yoga w above restrictions and listen to her body pain vs soreness  3. discussed reaching [facility] to [facility] for more exercises options  3. pt for core strengthening w spine precautions w focus on learning the exercises properly so can do on her own  4. pain meds as per palliative care, did suggest to increase the night time gabapentin since radicular pain is worse at night.  5. if above wo relief would repeat L sine mri and possibly discuss injections. L spine symptoms are chronic and likely related to neuroforaminal stenosis vs mets.  6. trial lidocaine patch prn  7. will send out exercise program info for [place] exercise program between [facility]  8. continue fu w symptom management clinic  9. ok to continue postural brace as tolerated    Has been doing well w therapy. Lower back is better. Has some pain in left hip.  Lower back is better as well. Wants to hold off on injections or further L spine imaging.        [SOCHX]  disabiity  cpa  lives w   denies smoking alcohol  medical thc     Review of Systems:  Constitutional: Denies fever or chills, malaise, weight changes.   Eyes: Denies change in visual acuity   HENT: Denies nasal congestion or sore throat   Respiratory: Denies cough or shortness of breath   Cardiovascular: Denies chest pain or edema   GI: Denies abdominal pain, nausea, vomiting, bloody stools or diarrhea   : Denies dysuria   Integument:  Denies rash   Neurologic: As per HPI  MSK: Per above HPI  Endocrine: Denies polyuria or polydipsia   Lymphatic: Denies swollen glands   Psychiatric: Denies depression or anxiety            PHYSICAL EXAM:    none televisit  General: NAD, well developed,   Psychiatric: appropriate mood & affect.   Cardiovascular: Normal pedal pulses; no LE edema.  Respiratory: Normal rate; unlabored breathing.  Skin: Intact; no erythema; no ecchymosis or rash.  Lymphatic: No lymphadenopathy or lymphedema.  NEURO: Alert and appropriate. Speech fluent, conversing appropriately.   Motor:   Rt: HF 5/5, KE 5/5, KF 5/5, DF 5/5, EHL 5/5, PF 5/5.   Lt: HF 5/5, KE 5/5, KF 5/5, DF 5/5, EHL 5/5, PF 5/5.  Sensation:    Light touch: intact in the b/l L3-S1 dermatomes.   PP: intact in the b/l L3-S1 dermatomes  Reflexes:    Right: patellar 2+, achilles 2+,    Left: patellar 2+, achilles 2+,    Babinski's downgoing b/l; no clonus  Gait: Normal, narrow based gait.   MSK:  Inspection reveals scoliosis and kyphosis  Spinal range of motion: Flexion to 70Â° degrees, Extension of 10 degrees.  There is tenderness over the T and L spine paraspinals   Special tests:   Straight leg raise: - bl   Slump test: - bl     promis screen 9/2023  PF: 27/30  Fatigue: 6/15  social: 14/30  pain 3, distress 3        Impression: Ms. Jones is a 62 yo F w pmh of lumbar spondylosis and chronic radiculopathy, scoliosis and metastic triple negative breast cancer metastatic PDL1 negative. She was originally dx in 2010 w stage II breast cancer sp lumpectomy, 4 cycles of docetaxel/cyclophosphamide chemo followed by tamoxifen x 7 yrs, completed in [id number]. In spring presents w mid thoraicc back pain and dx w metastatic breast cancer. She completed radiation therapy to T spine. Currently she is on Abraxane chemotherapy, did not tolerate paclitaxel in 6/2023. On zometa q 3.   She presents re question for exercise safety in setting of bone mets. currently pain overall controlled in  T spine w Ms contin. She has more symptoms in L spine and lumbar radiculopathy that is chronic and predates met cancer dx.    No neurologic deficits on exam noted.      Plan:     1. discussed main precautions; maintain neutral spine when exercising, avoid excessive flexion/extension and twisting and avoid high impact exercises  2. otherwise she is ok to resume some pilates/yoga w above restrictions and listen to her body pain vs soreness  3. discussed [facility] to [facility] for more exercises options  3. Continue pt for core strengthening w spine precautions w focus on learning the exercises properly so can do on her own  4. pain meds as per palliative care, did suggest to increase the night time gabapentin since radicular pain is worse at night.  5. if above wo relief would repeat L sine mri and possibly discuss injections. L spine symptoms are chronic and likely related to neuroforaminal stenosis vs mets. Wants to hold off since doing better.  6. trial lidocaine patch prn  7. will send out exercise program info for [place] exercise program [facility]  8. continue fu w symptom management clinic  9. ok to continue postural brace as tolerated  fu  3 months     Thank you for the consultation.     Leah Sidhu MD  Physical Medicine and Rehabilitation

## 2023-11-06 ENCOUNTER — APPOINTMENT (OUTPATIENT)
Dept: RADIATION ONCOLOGY | Facility: HOSPITAL | Age: 63
End: 2023-11-06
Payer: COMMERCIAL

## 2023-11-07 ENCOUNTER — TELEPHONE (OUTPATIENT)
Dept: ADMISSION | Facility: HOSPITAL | Age: 63
End: 2023-11-07
Payer: COMMERCIAL

## 2023-11-07 ENCOUNTER — TELEPHONE (OUTPATIENT)
Dept: RADIATION ONCOLOGY | Facility: HOSPITAL | Age: 63
End: 2023-11-07
Payer: COMMERCIAL

## 2023-11-07 ENCOUNTER — OFFICE VISIT (OUTPATIENT)
Dept: HEMATOLOGY/ONCOLOGY | Facility: CLINIC | Age: 63
End: 2023-11-07
Payer: COMMERCIAL

## 2023-11-07 ENCOUNTER — TREATMENT (OUTPATIENT)
Dept: PHYSICAL THERAPY | Facility: CLINIC | Age: 63
End: 2023-11-07
Payer: COMMERCIAL

## 2023-11-07 DIAGNOSIS — S22.000A: ICD-10-CM

## 2023-11-07 DIAGNOSIS — C79.51 METASTATIC CANCER TO SPINE (MULTI): Primary | ICD-10-CM

## 2023-11-07 DIAGNOSIS — G89.3 CANCER RELATED PAIN: ICD-10-CM

## 2023-11-07 DIAGNOSIS — Z51.5 PALLIATIVE CARE ENCOUNTER: Primary | ICD-10-CM

## 2023-11-07 DIAGNOSIS — C41.9 MALIGNANT NEOPLASM OF BONE WITH METASTASES (MULTI): ICD-10-CM

## 2023-11-07 DIAGNOSIS — M79.2 NEUROPATHIC PAIN: ICD-10-CM

## 2023-11-07 PROCEDURE — 97530 THERAPEUTIC ACTIVITIES: CPT | Mod: GP | Performed by: PHYSICAL THERAPIST

## 2023-11-07 PROCEDURE — 1036F TOBACCO NON-USER: CPT | Performed by: NURSE PRACTITIONER

## 2023-11-07 PROCEDURE — 97110 THERAPEUTIC EXERCISES: CPT | Mod: GP | Performed by: PHYSICAL THERAPIST

## 2023-11-07 PROCEDURE — 99213 OFFICE O/P EST LOW 20 MIN: CPT | Performed by: NURSE PRACTITIONER

## 2023-11-07 ASSESSMENT — PATIENT HEALTH QUESTIONNAIRE - PHQ9
2. FEELING DOWN, DEPRESSED OR HOPELESS: NOT AT ALL
SUM OF ALL RESPONSES TO PHQ9 QUESTIONS 1 AND 2: 0
1. LITTLE INTEREST OR PLEASURE IN DOING THINGS: NOT AT ALL

## 2023-11-07 ASSESSMENT — COLUMBIA-SUICIDE SEVERITY RATING SCALE - C-SSRS
2. HAVE YOU ACTUALLY HAD ANY THOUGHTS OF KILLING YOURSELF?: NO
6. HAVE YOU EVER DONE ANYTHING, STARTED TO DO ANYTHING, OR PREPARED TO DO ANYTHING TO END YOUR LIFE?: NO
1. IN THE PAST MONTH, HAVE YOU WISHED YOU WERE DEAD OR WISHED YOU COULD GO TO SLEEP AND NOT WAKE UP?: NO

## 2023-11-07 ASSESSMENT — PAIN SCALES - GENERAL: PAINLEVEL: 0-NO PAIN

## 2023-11-07 ASSESSMENT — ENCOUNTER SYMPTOMS
OCCASIONAL FEELINGS OF UNSTEADINESS: 0
DEPRESSION: 0
LOSS OF SENSATION IN FEET: 0

## 2023-11-07 NOTE — TELEPHONE ENCOUNTER
Pt notified of Dr. Tolbert's response - both infusion and zometa will be on 11/13 and then FUV with KHALIF Spears will be on 11/14.

## 2023-11-07 NOTE — TELEPHONE ENCOUNTER
Called pt. to remind of appointment on 11/8/2023 at 3:30 pm with Dr. Zuñiga. Pt's phone went to voicemail left number if needs to reschedule.

## 2023-11-07 NOTE — PROGRESS NOTES
Physical Therapy    Physical Therapy Treatment/recheck    Patient Name: Trang Jones  MRN: [id number]  Today's Date: 11/7/2023  Time Calculation  Start Time: 1345  Stop Time: 1425  Time Calculation (min): 40 min      Assessment:   Patient is a 63 year old who has had 8 sessions of Physical Therapy with focus on addressing core strengthening.  Currently patient has demonstrated progress with the following: improved LE strength and core strength, improved ability to walk further distances without fatigued with improved endurance and improved knowledge of safe HEP.  Patient continues to have difficulty with the following LE weakness.  Patient is very safe with HEP and agrees she can continue on own.       Plan:   Discharge from PT today    Current Problem  1. Metastatic cancer to spine (CMS/HCC)  Follow Up In Physical Therapy      2. Thoracic compression fracture (CMS/HCC)  Follow Up In Physical Therapy          Subjective   General   Patient reported she feels more confident in what she should be doing.    Precautions  Precautions  Precautions Comment: STEADI Fall Risk Score (The score of 4 or more indicates an increased risk of falling):   Per physicians order: please work on core strenthening, rom, scoliosis  spine precautions as well as avoid high impact exercise      History of Left breast CA 13 yrs ago.  Presently, metastatic cancer to spine  Fall risk     Pain   No Pain    Objective      Gait:  Gait Comment: Patient ambulated on even surfaces with uneven pelvic aliagnment and hips shifted L     Stairs:  Stairs Comment: Reciprical with UE support        Posture   Mild rounded shoulders        Lumbar AROM WFL unless documented below  Lumbar AROM WFL: yes  Hip AROM WFL unless documented below  Hip AROM WFL: yes  Hip PROM WFL unless documented below     Specific Lower Extremity MMT WFL unless documented below  R Iliopsoas: (5/5): 4-/5  L Iliopsoas: (5/5): 4-/5  R Gluteals (prone): (5/5): 4-/5  L Gluteals  (prone): (5/5): 4-/5  R Gluteals (sidelying): (5/5): 4/5  L Gluteals (sidelying): (5/5): 4/5         Flexibility  R hamstrings: WNL  L hamstrings: WNL  R hip flexors: fair  L hip flexors: fair and KNEE                 Knee AROM WFL unless documented below  Knee AROM WFL: yes  Knee PROM WFL unless documented below     Knee MMT WFL unless documented below  R knee flexion: (5/5): 5  L knee flexion: (5/5): 5  R knee extension: (5/5): 5  L knee extension: (5/5): 5           Outcome Measures:  Other Measures  5x Sit to Stand: 9.29 sec  Oswestry Disablity Index (ZAINAB): 11%    Treatments:  Therapeutic Exercise  Therapeutic Exercise Activity 1: Hooklying hip adduction with bridge 2  x 15  Therapeutic Exercise Activity 2: hooklying alternating toe taps x 20  Therapeutic Exercise Activity 3: Hook lying B shoulder horizontal ABD with palms up/palms down with green theraband 2 x 10 reps each  Therapeutic Exercise Activity 4: Hooklying R/L UE D2 flex with green theraband 2 x 10 reps  Therapeutic Exercise Activity 5: minisquats with a neutral spine 2 x 10  Therapeutic Exercise Activity 6: quadruped shoulder flexion 2 x 10  Therapeutic Exercise Activity 7: quadruped hip extension 2 x 10  Therapeutic Exercise Activity 8: sidestepping with blue band x 4 times  Therapeutic Exercise Activity 9: step ups onto 6 inch step without UE support x 15 R/L  Therapeutic Exercise Activity 10: standing hip abduction 2 x 15  Therapeutic Exercise Activity 11: standing SLR with abdominal contraction 2 x 10  Therapeutic Exercise Activity 12: Sidelying R/L clamshells 2 x 15 reps  Therapeutic Exercise Activity 13: Sidelying R/L hip abduction 2 x 10 reps  Therapeutic Exercise Activity 14: standing rows green t tube x 20  Therapeutic Exercise Activity 15: standing shoulder extension 2 x 10  Therapeutic Exercise Activity 16: figure four stretch 3 x 30 seconds      OP EDUCATION:   Educated to continue with HEP  - provided patient with comprehensive HEP -  educated to stop if any pain    Goals:  Resolved       PT Problem       PT Goal 1: Patient to be able to walk x 3 miles on uneven surfaces without having to stop secondary to pain/fatigue to allow for patient to return to hiking (Met)       Start:  10/05/23    Expected End:  11/02/23    Resolved:  11/07/23         PT Goal 2: Patient to demonstrate improved B LE strength to 4+/5 to allow for ability to bend down to floor to play with gradchild (Adequate for Discharge)       Start:  10/05/23    Expected End:  11/02/23            Patient to demonstrate independence with HEP  (Met)       Start:  10/05/23    Expected End:  11/02/23    Resolved:  11/07/23         Patient to demonstrate improved functional mobility as evidenced by improved ZAINAB by 12 points  (Adequate for Discharge)       Start:  10/05/23    Expected End:  11/02/23            PT Goal 5: Patient to demonstrate improved B LE strength as evidenced by improved 5 x sit to stand test to 11-12 seconds (Met)       Start:  10/05/23    Expected End:  11/02/23    Resolved:  11/07/23

## 2023-11-08 ENCOUNTER — HOSPITAL ENCOUNTER (OUTPATIENT)
Dept: RADIATION ONCOLOGY | Facility: HOSPITAL | Age: 63
Setting detail: RADIATION/ONCOLOGY SERIES
Discharge: HOME | End: 2023-11-08
Payer: COMMERCIAL

## 2023-11-08 VITALS
HEART RATE: 55 BPM | HEIGHT: 61 IN | WEIGHT: 140.21 LBS | BODY MASS INDEX: 26.47 KG/M2 | SYSTOLIC BLOOD PRESSURE: 103 MMHG | OXYGEN SATURATION: 98 % | TEMPERATURE: 97.3 F | DIASTOLIC BLOOD PRESSURE: 67 MMHG | RESPIRATION RATE: 18 BRPM

## 2023-11-08 DIAGNOSIS — C79.51 METASTATIC CANCER TO SPINE (MULTI): Primary | ICD-10-CM

## 2023-11-08 NOTE — PROGRESS NOTES
Follow-Up    Patient: Trang Jones  MRN: [id number]  : 64  Date of visit: 23  Referred by: Jero Guzman MD  Medical Oncology: Bebeto Tolbert MD  Neurosurgery: Brennan Martinez MD    Diagnosis:  Metastatic breast cancer to the thoracic spine  Diagnosis Code: C79.51  KPS: 80    Brief Clinical History:  63-year-old woman w/ h/o breast cancer - s/p L mastectomy, chemoRT  (treated by Dr. Dolly Flores at [facility]) - in remission, p/w progressive back pain.  MRI shows multifocal osseous metastasis of the thoracic spine most significantly involving T4 through T6 with loss of height of T5. Osseous expansion and epidural extension of neoplasm noted at T5 and to a lesser extent at T4. She was seen by Radiation Oncology on 23 and elected to undergo radiation treatment of her disease.     Most recent radiation therapy:  The patient received palliative EBRT (8 Gy x 1) to her T4-T6 disease on 23, which she tolerated well    Interval since radiation therapy: 6 months    Interval History: Since completing EBRT, she was seen by Medical Oncology on 23; due to a reaction to paclitaxel on 23 she was transitioned to Abraxane, and her 23 restaging scans revealed no disease progression.  During her 23 visit with us she had no acute neurologic complaints but did have a new T10 compression fracture on her 23 for which she was recommended to see Neurosurgery for evaluation of her increased kyphosis.  She was most recently seen by Medical Oncology on 10/4/23 where her back pain had improved (had “little or no back pain”) and her imaging (23 CT CAP and bone scan) revealed stable disease.  Her 23 MRI revealed no evidence of disease progression or increased kyphosis.  Today she has no acute issues.     Review of Systems: A 12-point review of systems was conducted and is as per interval history    Imaging Studies:   Interpreted By:  Cher Che,   STUDY:  MR THORACIC SPINE  W AND WO IV CONTRAST;  11/1/2023 9:13 am      INDICATION:  Signs/Symptoms: S/P RT therapy to spine  G95.20: Cord compression  C79.51: Metastatic cancer to spine G89.3: Neoplasm related pain  C41.9: Malignant neoplasm of bone with metastases G89.3: Pain due to  malignant neoplasm metastatic to bone.      COMPARISON:  MRI of thoracic spine from 08/01/2023      ACCESSION NUMBER(S):  [id number]      ORDERING CLINICIAN:  OLMAN MANN      TECHNIQUE:  Sagittal T1 and T2 and axial T2 and T1 weighted MR images of the  thoracic spine were obtained. Postcontrast T1 weighted imaging was  performed after administration of gadolinium based contrast agent.      FINDINGS:  Numerous areas of abnormal marrow signal are again identified  throughout the thoracic spine. Particularly there is abnormal marrow  signal within T2 vertebral body with more prominent areas of  hypointense T1 signal which may reflect areas of new sclerosis. The  STIR hyperintensity has improved in this region.      The STIR hyperintense signal within T3 has mildly improved as  compared to prior study. The STIR hyperintense signal within T4 has  mildly improved with new areas of sclerosis. Note is again made of  posterior wedging of T4. There is anterior wedge compression fracture  of T5 as before, again the STIR hyperintense signal within has  improved as compared to prior study. Right transverse process focal  marrow signal abnormality is again noted within the of T7 as well as  within the right pedicle and transverse process of T5 and T6,  slightly improved as compared to prior study.      The marrow signal abnormalities within T6, T9, T10, T11 and T12 are  again identified. The marrow signal abnormality within all lesions  appears improved except for within anterior aspect of T11 on image  15/21 on current exam which appears slightly more pronounced as  compared to prior study.      Note is again made of a compression fracture of T10  with  approximately 80% loss of central vertebral height.      There is again retropulsion of posterior margin of T5 with moderate  central canal stenosis, unchanged as compared to prior study. There  is no deformity of spinal cord contour. There is minimal  intramedullary signal abnormality at this location on STIR imaging,  although this is not confirmed on T2 weighted images.      There is no other focus of significant central canal stenosis.      Note is again made of desiccation of several intervertebral discs  with multilevel anterior and posterior osteophytic spurring.      IMPRESSION:  Redemonstration of heterogenous marrow signal throughout the thoracic  vertebrae. The STIR hyperintense signal associated with majority of  the lesions has mildly improved as compared to prior study except for  lesion within anterior aspect of T11 which appears slightly more  pronounced as compared to prior study.      Moderate central canal stenosis at T5 secondary to retropulsion of  posterior margin.      Signed by: Cher Che 11/1/2023 1:27 PM  Dictation workstation:   [id number]      MRN: [id number]   Patient Name: WENDI BARNETT     STUDY:   CT CHEST ABDOMEN PELVIS W IV CONTRAST;  9/26/2023 10:54 am     INDICATION:   stage iv breast cancer evaluate for response to chemo  C41.9:   Malignant neoplasm of bone with metastases Z51.11: Encounter for   antineoplastic chemotherapy.     COMPARISON:   07/17/2023     ACCESSION NUMBER(S):   [id number]     ORDERING CLINICIAN:   SIDDHARTHA GROSSMAN     TECHNIQUE:   CT of the chest, abdomen, and pelvis was performed. Sagittal and   coronal reconstructions were generated.  75 milliliterOMNIPAQUE 350   intravenous contrast given for the examination.     FINDINGS:   CHEST:         CHEST WALL AND LOWER NECK: Right anterior chest wall port remains in   place with catheter extending to the right atrium. Left breast   implant with scattered tiny capsular calcifications again seen. No    significant axillary lymphadenopathy.     MEDIASTINUM AND NISREEN:  No significant mediastinal or hilar   lymphadenopathy. Subcentimeter probable enhancing lymph node in the   subcarinal space image 37/196.     HEART AND VESSELS:  The heart is normal in size. No significant   pericardial effusion. Atherosclerotic calcifications including the   coronary arteries. Aberrant right subclavian artery coursing   posterior to the proximal esophagus.     LUNGS, PLEURA, LARGE AIRWAYS:  Dependent densities in both lung   bases. No confluent airspace opacity or sizable pulmonary nodule. No   significant pleural effusion. The central airways are patent.     BONES:  Multiple mixed density mostly sclerotic lesions again seen,   several are slightly larger or more dense than previously.   Compression deformities of T4 and T5 and endplate depressions at T 10   similar to the prior exam.       ABDOMEN/PELVIS:         ABDOMINAL ORGANS:     LIVER: Decreased size and increased definition of 5 mm hypodense   lesion anterior to the gallbladder image 86/196 from 11 x 8 mm   previously. Grossly stable ill-defined hypodense lesion in the dome   of the left lobe measuring 10 mm image 69/196 and 4 mm subcapsular   lesion anteriorly in the right lobe image 82/196. Additional   well-defined hypodense lesions are also unchanged.     GALL BLADDER AND BILIARY TREE: No calcified gallstone. No significant   biliary dilatation.     SPLEEN: No focal lesion     PANCREAS: Loosely clustered calcifications in the head region similar   to the prior exam. No new focal lesion or ductal dilatation.     ADRENALS: No adrenal mass     KIDNEYS AND URETERS: Small hypodense lesions in each kidney similar   to the prior exam. No new focal renal mass or hydronephrosis within   limits of early excretory phase images.     BOWEL: No abnormally dilated large or small bowel loops. Nondilated   retrocecal appendix. Moderate fecal residue in the proximal colon.      PERITONEUM, RETROPERITONEUM, NODES: Trace free fluid in the pelvis.   No free air. No significant retroperitoneal lymphadenopathy.     VESSELS:  Scattered atherosclerotic calcifications. No abdominal   aortic aneurysm.     PELVIS: Urinary bladder is moderately distended without focal wall   thickening. Slightly bulky uterus tipped to the right of midline.     ABDOMINAL WALL: No sizable abdominal wall hernia.     BONES: Multiple sclerotic lesions again seen, many are slightly more   dense than on the previous exam for example in the posterosuperior   right iliac bone image 132/196     IMPRESSION:   CHEST ABDOMEN AND PELVIS:     Slight increased size and/or density of multiple sclerotic osseous   lesions which could reflect worsening or healing osseous metastatic   disease.     Decreased size of 1 of several ill-defined possibly metastatic liver   lesions since 07/17/2023. The remaining lesions are similar to the   prior study.     Small amount of free fluid in the pelvis.     Additional findings as above similar to the previous exam.     MACRO:   None.   Electronically signed by: HCRIS MILLER MD     Interpreted By:  ROCIO PANIAGUA MD and MELQUIADES NOLASCO MD  MRN: [id number]  Patient Name: WENDI BARNETT     STUDY:  BONE SCAN/ WHOLE BODY; WHOLE BODY SCAN INJECTION;  9/26/2023 1:46 pm     INDICATION:  stage iv breast cancer evaluate for response to chemo  C41.9:  Malignant neoplasm of bone with metastases Z51.11: Encounter for  antineoplastic chemotherapy.     Per clinical notes: 63-year-old female with history triple negative  breast cancer with bone metastasis. Currently on Abraxane. Prior  radiation for thoracic metastatic disease.     COMPARISON:  CT CAP 09/26/2023, 07/17/2023     ACCESSION NUMBER(S):  [id number]; [id number]     ORDERING CLINICIAN:  SIDDHARTHA GROSSMAN     TECHNIQUE:  DIVISION OF NUCLEAR MEDICINE  BONE SCAN, WHOLE BODY plus REGIONAL VIEWS     The patient received an intravenous dose of  27.4 mCi  of Tc-99m MDP.  Anterior and posterior images of the skeleton from skull vertex to  feet were then acquired.  Additional regional skeletal images were  also obtained.     FINDINGS:  There is diffuse radiotracer uptake throughout the axial and  appendicular skeleton. For example there is increased uptake of the  right humeral head which corresponds to a lesion seen on same day CT  dated 09/26/2023 compatible with metastatic disease. There are lytic  and sclerotic lesions of the sternum and thoracic spine which  correspond to increased radiotracer uptake.     There is increased radiotracer activity of the sacrum and bilateral  iliac wings corresponding to lesion seen on same day CT. Increased  radiotracer activity of the right inferior acetabulum, left ischium,  and left femur compatible with lesions seen on same day CT.     There is increased radiotracer uptake of the left 9th posterior rib  which corresponds to a subacute fracture. There are increased  radiotracer activity of the right anterior 2nd and 3rd ribs  corresponding to subacute fractures.     Expected radiotracer distribution and excretion pattern is seen in  the bilateral kidneys and urinary bladder.     IMPRESSION:  1. Diffuse osseous metastatic disease of the axial and appendicular  skeleton as described above including the right humerus, sternum,  thoracic spine, pelvis, and the left femur as described above.  2. Increased radiotracer activity of the left 9th rib and right 2nd  and 3rd ribs compatible with a traumatic etiology.     I personally reviewed the images/study and I agree with the findings  as stated.  This study was interpreted at [facility], [place], [place].     MACRO:  None      FINAL REPORT  Interpreted by: SHEMAR PONCE DAVID, MD  08/02/23 06:14  Facility: [facility] [place]    MRN: [id number]  Patient Name: WENDI BARNETT     STUDY:  MR T-SPINE WO/W;  8/1/2023 3:40 pm      INDICATION:  evaluation of disease effect to treatment  C79.51: Metastatic cancer  to spine.     COMPARISON:  Outside MRI of the thoracic spine dated 03/09/2023     ACCESSION NUMBER(S):  [id number]     ORDERING CLINICIAN:  OLMAN MANN     TECHNIQUE:  Sagittal STIR, sagittal T2, sagittal T1, axial T2, and axial T1  weighted MRI images through the thoracic spine were obtained.     FINDINGS:  There is a component of new  bright bone marrow signal on the T1  weighted images extending from the T3 through T7 levels likely  representing sequelae of previous radiation therapy.     There is abnormal diminished bone marrow signal on the T1 weighted  images scattered throughout the thoracic and visualized upper lumbar  vertebrae most compatible with osseous metastatic disease more  pronounced when compared with the prior outside MRI of the thoracic  spine dated 03/09/2023.     There is interval increased pathologic collapse and anterior wedging  of the T5 vertebral body, interval increased collapse of the T4  vertebral body, as well as new pathologic collapse of the T10  vertebrae when compared with 03/29/2023.     At the T5 level, there interval increased pathologic collapse and  anterior wedging with approximately 50% loss of height of the T5  vertebral body anteriorly. There is convexity and mild retropulsion  of the posterior margin of the T5 vertebral body which along with  asymmetric expansion of the left T5 pedicle and facet contributes to  effacement of the ventral and left lateral subarachnoid space. There  is flattening of the ventral thoracic spinal cord which is draped  over the mild retropulsion of the posterior margin of the T5  vertebral body. There is residual CSF signal within the subarachnoid  space posteriorly and to the right of the thoracic spinal cord at the  T5 level.     At the T4 level, there is mild interval increased pathologic collapse  of the T4 vertebral body most pronounced posteriorly  into the left of  midline. There is mild encroachment upon the left ventral spinal  canal at the T4 level.     At the T10 level, there is new pathologic collapse of the T10  vertebral body more pronounced to the right of midline with  approximately 45% loss of height of the T10 vertebral body to the  right of midline there is mild retropulsion of the right posterior  margin of the collapsed T10 vertebral body contributing to mild bony  encroachment upon the spinal canal without spinal cord deformity.     The coronal  images demonstrate a dextrocurvature of the lumbar  spine and levocurvature of the thoracic spine.     The visualized spinal cord demonstrates no signal abnormality within  it.     There is multilevel spondylosis.     T1-2: There is a minimal posterior disc osteophyte complex without  significant spinal canal narrowing.     T2-3:  There is no significant spinal canal narrowing.     T3-4: There is a mild bilobed disc osteophyte complex contributing to  mild encroachment upon the spinal canal.     T4: There are findings as described above.     T4-5: There is mild encroachment upon the spinal canal without spinal  cord deformity.     T5: There are findings as described above.     T5-6: There is mild spinal canal narrowing.     T6-7: There is no significant spinal canal narrowing.     T7-8: There is a posterior disc osteophyte complex more pronounced to  the right of midline contributing to mild spinal canal narrowing.     T8-9: There is no significant spinal canal narrowing.     T9-10: There is no significant spinal canal narrowing.     T10: There are findings as described above.     T10-11: There is a mild posterior disc osteophyte complex more  pronounced to the right of midline along with degenerative facet  changes contribute to mild encroachment upon the spinal canal without  spinal cord deformity. There is neural foraminal narrowing right  greater than left.     T11-12: There is a posterior disc  osteophyte complex along with  degenerative facet changes contributing to partial effacement of  ventral and dorsolateral subarachnoid space. There is  mild-to-moderate overall spinal canal narrowing.     T12-L1: There is a posterior disc osteophyte complex more pronounced  left of midline along with degenerative facet changes contributing to  mild-to-moderate encroachment upon the spinal canal.     There is a 11 mm cystic focus noted within the interpolar region of  the left kidney.           IMPRESSION:  There is a component of new  bright bone marrow signal on the T1  weighted images extending from the T3 through T7 levels likely  representing sequelae of previous radiation therapy.     There is abnormal diminished bone marrow signal on the T1 weighted  images scattered throughout the thoracic and visualized upper lumbar  vertebrae most compatible with osseous metastatic disease more  pronounced when compared with the prior outside MRI of the thoracic  spine dated 03/09/2023.     There is interval increased pathologic collapse and anterior wedging  of the T5 vertebral body, interval increased collapse of the T4  vertebral body, as well as new pathologic collapse of the T10  vertebrae when compared with 03/29/2023.     At the T5 level, there interval increased pathologic collapse and  anterior wedging with approximately 50% loss of height of the T5  vertebral body anteriorly. There is convexity and mild retropulsion  of the posterior margin of the T5 vertebral body which along with  asymmetric expansion of the left T5 pedicle and facet contributes to  effacement of the ventral and left lateral subarachnoid space. There  is flattening of the ventral thoracic spinal cord which is draped  over the mild retropulsion of the posterior margin of the T5  vertebral body. There is residual CSF signal within the subarachnoid  space posteriorly and to the right of the thoracic spinal cord at the  T5 level.     At the T4  level, there is mild interval increased pathologic collapse  of the T4 vertebral body most pronounced posteriorly into the left of  midline. There is mild encroachment upon the left ventral spinal  canal at the T4 level.     At the T10 level, there is new pathologic collapse of the T10  vertebral body more pronounced to the right of midline with  approximately 45% loss of height of the T10 vertebral body to the  right of midline there is mild retropulsion of the right posterior  margin of the collapsed T10 vertebral body contributing to mild bony  encroachment upon the spinal canal without spinal cord deformity.     The coronal  images demonstrate a dextrocurvature of the lumbar  spine and levocurvature of the thoracic spine.     There is multilevel spondylosis.     MACRO:    Transcribed By: Interface, User  Electronically Signed By: SHEMAR PONCE DAVID   08/02/23 06:14      Physical Examination:  Vital Signs: T = 36.3, BP = 103/67, P = 55, RR = 18, O2 sat = 98% on RA  General: WD/WN  Neurologic: Alert and oriented. GCS 15.  Eyes: Anicteric sclera.  EOMI  HENT: Normocephalic; atraumatic.  Cardiovascular: No cyanosis. Normal point of maximum impulse location.   Respiratory: Non-labored respirations.  Symmetrical chest wall expansion.  No audible wheezes.  Gastrointestinal: No abdominal pain or distention on inspection.  No splenomegaly on inspection.  Psychiatry: Appropriate mood and affect.  Alert and oriented.   Lymphatics: No palpable cervical or axillary lymph nodes apparent  Skin: No rashes or masses visible  Extremities: CORREA x 4.  No c/c/e.   HF KE DF PF  R 5/5 5/5 5/5 5/5  L 5/5 5/5 5/5 5/5    Assessment:  63-year-old woman w/ h/o breast cancer - s/p L mastectomy, chemoRT 2010 (treated by Dr. Dolly Flores at [facility]) - in remission, p/w progressive back pain.  MRI shows multifocal osseous metastasis of the thoracic spine most significantly involving T4 through T6 with loss of height of T5.  Osseous expansion and epidural extension of neoplasm are noted at T5 and to a lesser extent at T4. Now 6 months s/p palliative EBRT (8 Gy x 1) for T4-T6 disease on 5/4/23. 11/1/23 thoracic spine MRI (c/w 8/1/23 MRI) = No evidence of worsening kyphosis or disease progression; no cord compression.     Plan:  We would like to see the patient in 3 months with a thoracic spine MRI without and with contrast. The patient should continue to see Medical Oncology and her PCP as scheduled.      The patient was in agreement with the plan, and her questions were answered to her satisfaction.  She knows to contact us at any time with any further questions or concerns.     Mustapha Zuñiga III, M.D.  Director of Spine Oncology   of Radiation Oncology and Neurological Surgery  [facility] [employer] [facility]

## 2023-11-09 ENCOUNTER — APPOINTMENT (OUTPATIENT)
Dept: HEMATOLOGY/ONCOLOGY | Facility: CLINIC | Age: 63
End: 2023-11-09
Payer: COMMERCIAL

## 2023-11-10 ENCOUNTER — TELEPHONE (OUTPATIENT)
Dept: HEMATOLOGY/ONCOLOGY | Facility: HOSPITAL | Age: 63
End: 2023-11-10

## 2023-11-13 ENCOUNTER — APPOINTMENT (OUTPATIENT)
Dept: HEMATOLOGY/ONCOLOGY | Facility: CLINIC | Age: 63
End: 2023-11-13
Payer: COMMERCIAL

## 2023-11-13 ENCOUNTER — TELEPHONE (OUTPATIENT)
Dept: PALLIATIVE MEDICINE | Facility: HOSPITAL | Age: 63
End: 2023-11-13
Payer: COMMERCIAL

## 2023-11-13 ENCOUNTER — INFUSION (OUTPATIENT)
Dept: HEMATOLOGY/ONCOLOGY | Facility: CLINIC | Age: 63
End: 2023-11-13
Payer: COMMERCIAL

## 2023-11-13 VITALS
HEART RATE: 61 BPM | TEMPERATURE: 97.5 F | SYSTOLIC BLOOD PRESSURE: 117 MMHG | RESPIRATION RATE: 17 BRPM | DIASTOLIC BLOOD PRESSURE: 78 MMHG | OXYGEN SATURATION: 98 % | WEIGHT: 137.68 LBS | BODY MASS INDEX: 26.01 KG/M2

## 2023-11-13 DIAGNOSIS — C50.911 CARCINOMA OF RIGHT BREAST METASTATIC TO BONE (MULTI): ICD-10-CM

## 2023-11-13 DIAGNOSIS — G89.3 CANCER RELATED PAIN: ICD-10-CM

## 2023-11-13 DIAGNOSIS — C41.9 MALIGNANT NEOPLASM OF BONE WITH METASTASES (MULTI): ICD-10-CM

## 2023-11-13 DIAGNOSIS — C79.51 CARCINOMA OF RIGHT BREAST METASTATIC TO BONE (MULTI): ICD-10-CM

## 2023-11-13 PROBLEM — Z51.11 ENCOUNTER FOR ANTINEOPLASTIC CHEMOTHERAPY: Status: ACTIVE | Noted: 2023-11-13

## 2023-11-13 LAB
ALBUMIN SERPL BCP-MCNC: 4.1 G/DL (ref 3.4–5)
ALP SERPL-CCNC: 47 U/L (ref 33–136)
ALT SERPL W P-5'-P-CCNC: 9 U/L (ref 7–45)
ANION GAP SERPL CALC-SCNC: 11 MMOL/L (ref 10–20)
AST SERPL W P-5'-P-CCNC: 14 U/L (ref 9–39)
BASOPHILS # BLD AUTO: 0.02 X10*3/UL (ref 0–0.1)
BASOPHILS NFR BLD AUTO: 0.5 %
BILIRUB SERPL-MCNC: 0.4 MG/DL (ref 0–1.2)
BUN SERPL-MCNC: 19 MG/DL (ref 6–23)
CALCIUM SERPL-MCNC: 8.3 MG/DL (ref 8.6–10.3)
CHLORIDE SERPL-SCNC: 110 MMOL/L (ref 98–107)
CO2 SERPL-SCNC: 24 MMOL/L (ref 21–32)
CREAT SERPL-MCNC: 0.56 MG/DL (ref 0.5–1.05)
EOSINOPHIL # BLD AUTO: 0.17 X10*3/UL (ref 0–0.7)
EOSINOPHIL NFR BLD AUTO: 4.2 %
ERYTHROCYTE [DISTWIDTH] IN BLOOD BY AUTOMATED COUNT: 14.5 % (ref 11.5–14.5)
GFR SERPL CREATININE-BSD FRML MDRD: >90 ML/MIN/1.73M*2
GLUCOSE SERPL-MCNC: 105 MG/DL (ref 74–99)
HCT VFR BLD AUTO: 37.7 % (ref 36–46)
HGB BLD-MCNC: 12.4 G/DL (ref 12–16)
IMM GRANULOCYTES # BLD AUTO: 0.01 X10*3/UL (ref 0–0.7)
IMM GRANULOCYTES NFR BLD AUTO: 0.2 % (ref 0–0.9)
LYMPHOCYTES # BLD AUTO: 1.25 X10*3/UL (ref 1.2–4.8)
LYMPHOCYTES NFR BLD AUTO: 31.1 %
MCH RBC QN AUTO: 30.2 PG (ref 26–34)
MCHC RBC AUTO-ENTMCNC: 32.9 G/DL (ref 32–36)
MCV RBC AUTO: 92 FL (ref 80–100)
MONOCYTES # BLD AUTO: 0.44 X10*3/UL (ref 0.1–1)
MONOCYTES NFR BLD AUTO: 10.9 %
NEUTROPHILS # BLD AUTO: 2.13 X10*3/UL (ref 1.2–7.7)
NEUTROPHILS NFR BLD AUTO: 53.1 %
PLATELET # BLD AUTO: 223 X10*3/UL (ref 150–450)
POTASSIUM SERPL-SCNC: 4.1 MMOL/L (ref 3.5–5.3)
PROT SERPL-MCNC: 5.8 G/DL (ref 6.4–8.2)
RBC # BLD AUTO: 4.11 X10*6/UL (ref 4–5.2)
SODIUM SERPL-SCNC: 141 MMOL/L (ref 136–145)
WBC # BLD AUTO: 4 X10*3/UL (ref 4.4–11.3)

## 2023-11-13 PROCEDURE — 2500000004 HC RX 250 GENERAL PHARMACY W/ HCPCS (ALT 636 FOR OP/ED)

## 2023-11-13 PROCEDURE — 96375 TX/PRO/DX INJ NEW DRUG ADDON: CPT | Mod: INF

## 2023-11-13 PROCEDURE — 80053 COMPREHEN METABOLIC PANEL: CPT

## 2023-11-13 PROCEDURE — 2500000004 HC RX 250 GENERAL PHARMACY W/ HCPCS (ALT 636 FOR OP/ED): Performed by: INTERNAL MEDICINE

## 2023-11-13 PROCEDURE — 96413 CHEMO IV INFUSION 1 HR: CPT

## 2023-11-13 PROCEDURE — 85025 COMPLETE CBC W/AUTO DIFF WBC: CPT

## 2023-11-13 PROCEDURE — 96367 TX/PROPH/DG ADDL SEQ IV INF: CPT

## 2023-11-13 RX ORDER — ONDANSETRON HYDROCHLORIDE 2 MG/ML
8 INJECTION, SOLUTION INTRAVENOUS ONCE
Status: COMPLETED | OUTPATIENT
Start: 2023-11-13 | End: 2023-11-13

## 2023-11-13 RX ORDER — ZOLEDRONIC ACID 0.04 MG/ML
4 INJECTION, SOLUTION INTRAVENOUS ONCE
Status: COMPLETED | OUTPATIENT
Start: 2023-11-13 | End: 2023-11-13

## 2023-11-13 RX ORDER — ALBUTEROL SULFATE 0.83 MG/ML
3 SOLUTION RESPIRATORY (INHALATION) AS NEEDED
Status: CANCELLED | OUTPATIENT
Start: 2024-02-04

## 2023-11-13 RX ORDER — ALBUTEROL SULFATE 0.83 MG/ML
3 SOLUTION RESPIRATORY (INHALATION) AS NEEDED
Status: DISCONTINUED | OUTPATIENT
Start: 2023-11-13 | End: 2023-11-13 | Stop reason: HOSPADM

## 2023-11-13 RX ORDER — ZOLEDRONIC ACID 0.04 MG/ML
4 INJECTION, SOLUTION INTRAVENOUS ONCE
Status: CANCELLED | OUTPATIENT
Start: 2024-02-04

## 2023-11-13 RX ORDER — EPINEPHRINE 0.3 MG/.3ML
0.3 INJECTION SUBCUTANEOUS EVERY 5 MIN PRN
Status: CANCELLED | OUTPATIENT
Start: 2024-02-04

## 2023-11-13 RX ORDER — HEPARIN 100 UNIT/ML
SYRINGE INTRAVENOUS
Status: COMPLETED
Start: 2023-11-13 | End: 2023-11-13

## 2023-11-13 RX ORDER — DIPHENHYDRAMINE HYDROCHLORIDE 50 MG/ML
50 INJECTION INTRAMUSCULAR; INTRAVENOUS AS NEEDED
Status: DISCONTINUED | OUTPATIENT
Start: 2023-11-13 | End: 2023-11-13 | Stop reason: HOSPADM

## 2023-11-13 RX ORDER — DIPHENHYDRAMINE HYDROCHLORIDE 50 MG/ML
50 INJECTION INTRAMUSCULAR; INTRAVENOUS AS NEEDED
Status: CANCELLED | OUTPATIENT
Start: 2024-02-04

## 2023-11-13 RX ORDER — EPINEPHRINE 0.3 MG/.3ML
0.3 INJECTION SUBCUTANEOUS EVERY 5 MIN PRN
Status: DISCONTINUED | OUTPATIENT
Start: 2023-11-13 | End: 2023-11-13 | Stop reason: HOSPADM

## 2023-11-13 RX ORDER — MORPHINE SULFATE 15 MG/1
15 TABLET, FILM COATED, EXTENDED RELEASE ORAL 2 TIMES DAILY
Qty: 60 TABLET | Refills: 0 | Status: SHIPPED | OUTPATIENT
Start: 2023-11-13 | End: 2023-12-06 | Stop reason: SDUPTHER

## 2023-11-13 RX ORDER — FAMOTIDINE 10 MG/ML
20 INJECTION INTRAVENOUS ONCE AS NEEDED
Status: DISCONTINUED | OUTPATIENT
Start: 2023-11-13 | End: 2023-11-13 | Stop reason: HOSPADM

## 2023-11-13 RX ORDER — FAMOTIDINE 10 MG/ML
20 INJECTION INTRAVENOUS ONCE AS NEEDED
Status: CANCELLED | OUTPATIENT
Start: 2024-02-04

## 2023-11-13 RX ADMIN — ONDANSETRON 8 MG: 2 INJECTION, SOLUTION INTRAMUSCULAR; INTRAVENOUS at 09:53

## 2023-11-13 RX ADMIN — ZOLEDRONIC ACID 4 MG: 0.04 INJECTION, SOLUTION INTRAVENOUS at 11:33

## 2023-11-13 RX ADMIN — HEPARIN 500 UNITS: 100 SYRINGE at 12:18

## 2023-11-13 RX ADMIN — PACLITAXEL 131.2 MG: 100 INJECTION, POWDER, LYOPHILIZED, FOR SUSPENSION INTRAVENOUS at 10:20

## 2023-11-13 RX ADMIN — SODIUM CHLORIDE 500 ML: 9 INJECTION, SOLUTION INTRAVENOUS at 11:00

## 2023-11-13 ASSESSMENT — PAIN SCALES - GENERAL: PAINLEVEL: 3

## 2023-11-13 NOTE — PROGRESS NOTES
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     HISTORY OF PRESENT ILLNESS    Trang Jones is a 63 y.o. woman who presents today for metastatic breast cancer treatment follow-up. She is doing well. She reports neuropathy has started - began 10/31 in toes of both feet and only lasted for 20-30 minutes. Additionally she experienced neuropathy in the right thumb that lasted for 1 hr. Did not effect her dexterity. She has had some intermittent episodes in her finger tips that lasts 20 minute increments and this occurs randomly.      She reports hip pain is stable - rotationally will have bad days that seem to be worse with lack of activity. Otherwise She denies any new or unexplained bone aches or pains.     She denies any chest pain or breathing issues.     She denies any vision changes or headache issues, dizziness or loss of balance, no falls    She denies any skin lesions or masses, oral sores lesions or infections    She reports a normal appetite and normal bowel movements, normal urination    She denies any issues with sleep or fatigue       REVIEW OF SYSTEMS   ROS 14 points performed, See HPI for exceptions       Past Medical History:  has no past medical history on file.  Surgical History:   has a past surgical history that includes CT guided percutaneous biopsy bone deep (4/13/2023).  Social History:   reports that she quit smoking about 20 years ago. Her smoking use included cigarettes. She has been exposed to tobacco smoke. She has never used smokeless tobacco. She reports that she does not currently use alcohol.  She reports current drug use. Drug: Marijuana.-lives at home with her , José Miguel, 1 dog and 1 cat. Has 2 grown daughters--Tonia and Rubi. One 2 year old grand daughter and one grand child on the way.-enjoys cooking, yoga, being outside, walking her dog. Interested in integrative medicine-- dietary changes, acupuncture.  Social Substance History:  ·  Smoking Status former smoker (1)   ·  Additional History       Family History:  No family history on file.  Family Oncology History:  Cancer-related family history is not on file.      OBJECTIVE    VS / Pain:  /62 (BP Location: Right arm, Patient Position: Sitting, BP Cuff Size: Adult)   Pulse 55   Temp 36.6 °C (97.9 °F) (Skin)   Wt 62.6 kg (138 lb 1.9 oz)   BMI 26.10 kg/m²   BSA: 1.64 meters squared   Pain Scale: 3    Performance Status:  The ECOG performance scale today is Symptomatic; in bed <50% of the day    Physical Exam   Constitutional: Well developed, awake/alert/oriented x4, no distress, alert and cooperative  EYES: Sclera clear  ENMT: mucous membranes moist, no apparent injury, no lesions seen  Head/Neck: Neck supple, no apparent injury, thyroid without mass or tenderness, No JVD, trachea midline, no bruits  Respiratory / Thoracic: Patent airways, clear to all lobes, normal breath sounds with good chest expansion, thorax symmetric.  Cardiovascular: Regular, rate and rhythm, no murmurs, 2+ equal pulses of the extremities, normal auscultated S 1and S 2  GI: Nondistended, soft, non-tender, no rebound tenderness or guarding, no masses palpable, no organomegaly, +BS, no bruits  Musculoskeletal: ROM intact, no joint swelling, normal strength, no spinal tenderness  Extremities: normal extremities, no cyanosis edema, contusions or wounds, no clubbing  Neurological: alert and oriented x4, intact senses, motor, response and reflexes, normal strength  Breast: No palpable masses or lesions in either breast   Lymphatic: No cervical, supraclavicular,  infraclavicular or axillary lymphadenopathy  Psychological: Appropriate and talkative mood and behavior  Skin: Warm and dry, no lesions, no rashes, no jaundice      Diagnostic Results   Bone scan 9/2023  IMPRESSION:   1. Diffuse osseous metastatic disease of the axial and appendicular   skeleton as described above including the right humerus, sternum,   thoracic spine, pelvis, and the left femur as described above.   2. Increased radiotracer activity of the left 9th rib and right 2nd   and 3rd ribs compatible with a traumatic etiology.     CT 9/2023  IMPRESSION:   CHEST ABDOMEN AND PELVIS:     Slight increased size and/or density of multiple sclerotic osseous   lesions which could reflect worsening or healing osseous metastatic   disease.     Decreased size of 1 of several ill-defined possibly metastatic liver   lesions since 07/17/2023. The remaining lesions are similar to the   prior study.     Small amount of free fluid in the pelvis.     Additional findings as above similar to the previous exam.     We discussed the clinical significance of diagnosis, goals of care and treatment plan in detail.     LABORATORY DATA  Lab Results   Component Value Date    WBC 4.0 (L) 11/13/2023    HGB 12.4 11/13/2023    HCT 37.7 11/13/2023    MCV 92 11/13/2023     11/13/2023        Chemistry    Lab Results   Component Value Date/Time     11/13/2023 0819    K 4.1 11/13/2023 0819     (H) 11/13/2023 0819    CO2 24 11/13/2023 0819    BUN 19 11/13/2023 0819    CREATININE 0.56 11/13/2023 0819    Lab Results   Component Value Date/Time    CALCIUM 8.3 (L) 11/13/2023 0819    ALKPHOS 47 11/13/2023 0819    AST 14 11/13/2023 0819    ALT 9 11/13/2023 0819    BILITOT 0.4 11/13/2023 0819               IMPRESSION/PLAN    1. 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   Due to reaction to paclitaxel on 6/12/23 was transitioned to Abraxane 80 mg/m2. Sept 2023 Restaging scans stable. Tumor markers downward trend  and pain greatly improved. RTC in 6 weeks with restaging scan review. Planned drug holiday in Jan 2024 as reviewed previously with Dr Bebeto Tolbert     continue nab-paclitaxel weekly days 1 and 8     Continue Q3 month zometa (due 2/5/24)     Due to mutation found on tempus testing, potential future treatment with transition her to trastuzumab deruxtecan (TDxD) which has shown activity in cancers with HER2 kinase domain mutations.      At least 25 minutes of direct consultation was spent with the patient today reviewing her cancer care plan, educating and answering questions regarding ongoing follow up, greater than 50% in counseling and coordination of care          -------------------------------------------------------------------------------------------------------------------------------  Julee Spears MSN, APRN, FNP-C  [facility]  Division of Medical Oncology- Breast   Collaborating Physician Dr. Bebeto Tolbert   [place] [address]  Phone: [phone]  Fax: [phone]  Available via Secure Chat    Confidential Peer Review Document-  Privilege  Privileged Pursuant to [place] Revised Code Section 2305.24, .25, .251 & .252

## 2023-11-13 NOTE — TELEPHONE ENCOUNTER
OARRS report reviewed and reflects  prescription history, no aberrancy noted. Per OARRS, patient last filled 10/11/23, 30 day supply. Per last visit with Jennifer Duarte on 11/9/23 patient to continue MS Contin 15mg bid. Patient has scheduled FUV. Patient updated that medication will be sent to [facility]. Refill request routed to provider.

## 2023-11-13 NOTE — TELEPHONE ENCOUNTER
Patient requesting a refill of MS Contin 15mg. Luh Delarosa CNP submitted and this RN to follow up on prior authorization. Patient aware

## 2023-11-14 ENCOUNTER — OFFICE VISIT (OUTPATIENT)
Dept: HEMATOLOGY/ONCOLOGY | Facility: CLINIC | Age: 63
End: 2023-11-14
Payer: COMMERCIAL

## 2023-11-14 ENCOUNTER — APPOINTMENT (OUTPATIENT)
Dept: HEMATOLOGY/ONCOLOGY | Facility: CLINIC | Age: 63
End: 2023-11-14
Payer: COMMERCIAL

## 2023-11-14 VITALS
BODY MASS INDEX: 26.1 KG/M2 | SYSTOLIC BLOOD PRESSURE: 101 MMHG | WEIGHT: 138.12 LBS | DIASTOLIC BLOOD PRESSURE: 62 MMHG | HEART RATE: 55 BPM | TEMPERATURE: 97.9 F

## 2023-11-14 DIAGNOSIS — G89.3 PAIN DUE TO MALIGNANT NEOPLASM METASTATIC TO BONE (MULTI): ICD-10-CM

## 2023-11-14 DIAGNOSIS — C50.911 CARCINOMA OF RIGHT BREAST METASTATIC TO BONE (MULTI): ICD-10-CM

## 2023-11-14 DIAGNOSIS — C79.51 METASTATIC CANCER TO SPINE (MULTI): ICD-10-CM

## 2023-11-14 DIAGNOSIS — Z85.3 HISTORY OF BREAST CANCER IN FEMALE: ICD-10-CM

## 2023-11-14 DIAGNOSIS — Z51.11 ENCOUNTER FOR ANTINEOPLASTIC CHEMOTHERAPY: ICD-10-CM

## 2023-11-14 DIAGNOSIS — C79.51 PAIN DUE TO MALIGNANT NEOPLASM METASTATIC TO BONE (MULTI): ICD-10-CM

## 2023-11-14 DIAGNOSIS — C41.9 MALIGNANT NEOPLASM OF BONE WITH METASTASES (MULTI): Primary | ICD-10-CM

## 2023-11-14 DIAGNOSIS — G89.3 NEOPLASM RELATED PAIN: ICD-10-CM

## 2023-11-14 DIAGNOSIS — C79.51 CARCINOMA OF RIGHT BREAST METASTATIC TO BONE (MULTI): ICD-10-CM

## 2023-11-14 PROCEDURE — 99215 OFFICE O/P EST HI 40 MIN: CPT | Performed by: NURSE PRACTITIONER

## 2023-11-14 PROCEDURE — 1036F TOBACCO NON-USER: CPT | Performed by: NURSE PRACTITIONER

## 2023-11-14 ASSESSMENT — PAIN SCALES - GENERAL: PAINLEVEL: 0-NO PAIN

## 2023-11-14 NOTE — PATIENT INSTRUCTIONS
Continue current regimen D1 and D8 Abraxane with cold cap on 12/4 and 12/11, 12/26 and 1/2/24    Planned restaging scan on 12/22.    Office visit with Julee ENAMORADO CNP on 12/27/23 at 9 am     Planned drug holiday through Jan and return to treatment on 1/29/24 and and 2/5/24- we will order this at our next visit.    Please call [phone] with any questions or concerns prior to your next office visit.

## 2023-11-17 NOTE — PROGRESS NOTES
SUPPORTIVE AND PALLIATIVE ONCOLOGY OUTPATIENT FOLLOW-UP      SERVICE DATE: 11/7/2023    Cancer History  Newly diagnosed triple negative breast CA (with bone mets and cord compression)  -s/p L mastectomy in 2010: stage II ER+/HER2- breast CA  -s/p 4 cycles docetaxel/cyclophosphamide chemo  -took tamoxifen x 7 yrs, completed in [id number]  -presented to ED on 3/30/23 with severe back pain  -MRI spine showed cord compression, admitted at that time   -s/p 1 fx RT to spine on 5/4/23  -plan to start treatment with weekly paclitaxel and q21day pembro  -started treatment 5/22/23    Med Onc: Dr. Tolbert  Integrative Medicine: Dr. Sandoval       Subjective   HISTORY OF PRESENT ILLNESS: Trang Jones is a 62 yo female with PMHx of HLD, scoliosis and newly diagnosed triple negative breast CA. She received XRT to the spine on 5/4 and will begin chemo treatment next week.     She presents to supportive oncology for follow up for pain and symptom management.     Spoke with pt on the phone for follow up. States pain is pretty good, no changes. Having more pain in hips/ lower back, likely r/t her scoliosis. She has taken 1 oxycodone since our last visit. Moving her bowels well. No N/V. Appetite is good. She is sleeping well, takes THC to sleep at night, able to achieve 8-9hrs. She is doing well on her treatment, and looking forward to traveling for a few weeks in January 2024.      Pain Assessment:  Pain Score: 0-No pain    Symptom Assessment:  Pain:none  Headache: none  Dizziness:none  Lack of energy: none  Difficulty sleeping: none  Worrying: none  Anxiety: none  Depression: none  Pain in mouth/swallowing: none  Dry mouth: none  Taste changes: none  Shortness of breath: none  Lack of appetite: none   Nausea: none  Vomiting: none  Constipation: none  Diarrhea: none  Sore muscles: a little  Numbness or tingling in hands/feet/other: none  Weight loss: none      Information obtained from: interview of  patient  ______________________________________________________________________        Objective                PHYSICAL EXAMINATION   Vital Signs:   Vital signs reviewed  There were no vitals filed for this visit.  Pain Score:        Physical Exam    ASSESSMENT/PLAN    Pain: upper back around shoulder blades, radiates to front of chest; dull ache, shooting with sudden movement    Pain is: cancer related pain and acute on chronic  Type: somatic and neuropathic  Pain control: well-controlled  Home regimen:   -continue Morphine Sulfate Contin 15mg  twice a day. Rx sent for fill on 11/9.   -continue oxycodone 10mg every 4hrs PRN. No Rx needed today.   -continue gabapentin 300mg in the morning and 600mg at night. Rx sent for fill on 11/9.    -continue ibuprofen PRN   -continue to follow with Dr. Sandoval/ Agustin Dietrich for acupuncture/ reiki   -continue medical THC PRN   Intolerances/previously tried:    Opioid Use  Medication Management:   - OARRS report reviewed with no aberrant behavior; consistent with  prescriptions/records and patient history  - MED 30.  Overdose Risk Score 440.   This has been discussed with patient.   - We will continue to closely monitor the patient for signs of prescription misuse including UDS, OARRS review and subjective reports at each visit.  - no concurrent benzodiazepine use   - I am a provider who either is or has consulted and collaborated with a provider certified in Hospice and Palliative Medicine and have conducted a face-face visit and examination for this patient.  - Routine Urine Drug Screen completed 5/9/23 appropriately positive for opioids and negative for illicit substances  - Controlled Substance Agreement completed 5/9/23  - Specifically discussed that controlled substance prescriptions will only be provided by our group as outlined in the completed agreement  - Prescribed naloxone previously prescribed  - Red Flags: none    Bowels: at risk for OIC  -educated pt on  importance of maintaining daily BM  -continue daily miralax   -continue senna/colace PRN     N/V: currently stable   -continue zofran 4mg ODT q8hrs PRN   -continue migraine medication PRN     Sleep: hx of insomnia prior to cancer dx   -continue THC PRN  -encouraged pt to maintain regular sleep/wake cycle  -plan for better pain control to aid in improved sleep, see pain section above     Mood: improving    -discussed adding medication to help with mood, pt would like to hold off for now    Medical Decision Making/ GOC:   -social work referral to assist with LW/ POA paperwork        Next Follow-Up Visit:  Return to clinic in 2 months    Signature and billing  Medical complexity was low level due to due to complexity of problems, extensive data review, and high risk of management/treatment.  Time was spent on the following: Prep Time, Time Directly with Patient/Family/Caregiver, Documentation Time. Total time spent: 25 mins             Some elements copied from my note on 9/28/21, the elements have been updated and all reflect current decision making from today, 11/7/2023.      Plan of Care discussed with: Patient    SIGNATURE: KELSEA Garg-CNP    Contact information:  Supportive and Palliative Oncology  Monday-Friday 8 AM-5 PM  Phone:  [phone], press option #5, then option #1.   Or Epic Secure Chat

## 2023-11-20 ENCOUNTER — INFUSION (OUTPATIENT)
Dept: HEMATOLOGY/ONCOLOGY | Facility: CLINIC | Age: 63
End: 2023-11-20
Payer: COMMERCIAL

## 2023-11-20 VITALS
OXYGEN SATURATION: 99 % | HEART RATE: 83 BPM | RESPIRATION RATE: 16 BRPM | TEMPERATURE: 97.5 F | BODY MASS INDEX: 26.2 KG/M2 | DIASTOLIC BLOOD PRESSURE: 68 MMHG | WEIGHT: 138.67 LBS | SYSTOLIC BLOOD PRESSURE: 106 MMHG

## 2023-11-20 DIAGNOSIS — C41.9 MALIGNANT NEOPLASM OF BONE WITH METASTASES (MULTI): ICD-10-CM

## 2023-11-20 DIAGNOSIS — C79.51 CARCINOMA OF RIGHT BREAST METASTATIC TO BONE (MULTI): ICD-10-CM

## 2023-11-20 DIAGNOSIS — C50.911 CARCINOMA OF RIGHT BREAST METASTATIC TO BONE (MULTI): ICD-10-CM

## 2023-11-20 LAB
BASOPHILS # BLD AUTO: 0.04 X10*3/UL (ref 0–0.1)
BASOPHILS NFR BLD AUTO: 0.9 %
EOSINOPHIL # BLD AUTO: 0.11 X10*3/UL (ref 0–0.7)
EOSINOPHIL NFR BLD AUTO: 2.5 %
ERYTHROCYTE [DISTWIDTH] IN BLOOD BY AUTOMATED COUNT: 13.9 % (ref 11.5–14.5)
HCT VFR BLD AUTO: 35.9 % (ref 36–46)
HGB BLD-MCNC: 11.9 G/DL (ref 12–16)
IMM GRANULOCYTES # BLD AUTO: 0.02 X10*3/UL (ref 0–0.7)
IMM GRANULOCYTES NFR BLD AUTO: 0.4 % (ref 0–0.9)
LYMPHOCYTES # BLD AUTO: 1.32 X10*3/UL (ref 1.2–4.8)
LYMPHOCYTES NFR BLD AUTO: 29.7 %
MCH RBC QN AUTO: 30.4 PG (ref 26–34)
MCHC RBC AUTO-ENTMCNC: 33.1 G/DL (ref 32–36)
MCV RBC AUTO: 92 FL (ref 80–100)
MONOCYTES # BLD AUTO: 0.28 X10*3/UL (ref 0.1–1)
MONOCYTES NFR BLD AUTO: 6.3 %
NEUTROPHILS # BLD AUTO: 2.68 X10*3/UL (ref 1.2–7.7)
NEUTROPHILS NFR BLD AUTO: 60.2 %
PLATELET # BLD AUTO: 192 X10*3/UL (ref 150–450)
RBC # BLD AUTO: 3.92 X10*6/UL (ref 4–5.2)
WBC # BLD AUTO: 4.5 X10*3/UL (ref 4.4–11.3)

## 2023-11-20 PROCEDURE — 96375 TX/PRO/DX INJ NEW DRUG ADDON: CPT | Mod: INF

## 2023-11-20 PROCEDURE — 2500000004 HC RX 250 GENERAL PHARMACY W/ HCPCS (ALT 636 FOR OP/ED): Performed by: INTERNAL MEDICINE

## 2023-11-20 PROCEDURE — 96413 CHEMO IV INFUSION 1 HR: CPT

## 2023-11-20 PROCEDURE — 85025 COMPLETE CBC W/AUTO DIFF WBC: CPT

## 2023-11-20 RX ORDER — DIPHENHYDRAMINE HYDROCHLORIDE 50 MG/ML
50 INJECTION INTRAMUSCULAR; INTRAVENOUS AS NEEDED
Status: DISCONTINUED | OUTPATIENT
Start: 2023-11-20 | End: 2023-11-20 | Stop reason: HOSPADM

## 2023-11-20 RX ORDER — ALBUTEROL SULFATE 0.83 MG/ML
3 SOLUTION RESPIRATORY (INHALATION) AS NEEDED
Status: DISCONTINUED | OUTPATIENT
Start: 2023-11-20 | End: 2023-11-20 | Stop reason: HOSPADM

## 2023-11-20 RX ORDER — ONDANSETRON HYDROCHLORIDE 2 MG/ML
8 INJECTION, SOLUTION INTRAVENOUS ONCE
Status: COMPLETED | OUTPATIENT
Start: 2023-11-20 | End: 2023-11-20

## 2023-11-20 RX ORDER — HEPARIN 100 UNIT/ML
500 SYRINGE INTRAVENOUS AS NEEDED
Status: CANCELLED | OUTPATIENT
Start: 2023-11-20

## 2023-11-20 RX ORDER — HEPARIN SODIUM,PORCINE/PF 10 UNIT/ML
50 SYRINGE (ML) INTRAVENOUS AS NEEDED
Status: DISCONTINUED | OUTPATIENT
Start: 2023-11-20 | End: 2023-11-20 | Stop reason: HOSPADM

## 2023-11-20 RX ORDER — HEPARIN 100 UNIT/ML
500 SYRINGE INTRAVENOUS AS NEEDED
Status: DISCONTINUED | OUTPATIENT
Start: 2023-11-20 | End: 2023-11-20 | Stop reason: HOSPADM

## 2023-11-20 RX ORDER — EPINEPHRINE 0.3 MG/.3ML
0.3 INJECTION SUBCUTANEOUS EVERY 5 MIN PRN
Status: DISCONTINUED | OUTPATIENT
Start: 2023-11-20 | End: 2023-11-20 | Stop reason: HOSPADM

## 2023-11-20 RX ORDER — HEPARIN SODIUM,PORCINE/PF 10 UNIT/ML
50 SYRINGE (ML) INTRAVENOUS AS NEEDED
Status: CANCELLED | OUTPATIENT
Start: 2023-11-20

## 2023-11-20 RX ORDER — FAMOTIDINE 10 MG/ML
20 INJECTION INTRAVENOUS ONCE AS NEEDED
Status: DISCONTINUED | OUTPATIENT
Start: 2023-11-20 | End: 2023-11-20 | Stop reason: HOSPADM

## 2023-11-20 RX ADMIN — ONDANSETRON 8 MG: 2 INJECTION INTRAMUSCULAR; INTRAVENOUS at 08:45

## 2023-11-20 RX ADMIN — PACLITAXEL 131.2 MG: 100 INJECTION, POWDER, LYOPHILIZED, FOR SUSPENSION INTRAVENOUS at 10:00

## 2023-11-20 RX ADMIN — HEPARIN 500 UNITS: 100 SYRINGE at 11:05

## 2023-11-20 ASSESSMENT — PAIN SCALES - GENERAL: PAINLEVEL: 0-NO PAIN

## 2023-11-20 NOTE — SIGNIFICANT EVENT
11/20/23 0837   Prechemo Checklist   Has the patient been in the hospital, ED, or urgent care since last date of service No   Chemo/Immuno Consent Signed Yes   Protocol/Indications Verified Yes   Confirmed to previous date/time of medication Yes   Compared to previous dose Yes   All medications are dated accurately Yes   Pregnancy Test Negative Not applicable   Parameters Met Yes   BSA/Weight-Height Verified Yes   Dose Calculations Verified Yes

## 2023-11-22 ENCOUNTER — ALLIED HEALTH (OUTPATIENT)
Dept: INTEGRATIVE MEDICINE | Facility: CLINIC | Age: 63
End: 2023-11-22
Payer: COMMERCIAL

## 2023-11-22 DIAGNOSIS — M79.2 NEUROPATHIC PAIN: ICD-10-CM

## 2023-11-22 DIAGNOSIS — C50.911 CARCINOMA OF RIGHT BREAST METASTATIC TO BONE (MULTI): ICD-10-CM

## 2023-11-22 DIAGNOSIS — C79.51 METASTATIC CANCER TO SPINE (MULTI): ICD-10-CM

## 2023-11-22 DIAGNOSIS — G89.3 NEOPLASM RELATED PAIN: Primary | ICD-10-CM

## 2023-11-22 DIAGNOSIS — C79.51 CARCINOMA OF RIGHT BREAST METASTATIC TO BONE (MULTI): ICD-10-CM

## 2023-11-22 PROCEDURE — 99213 OFFICE O/P EST LOW 20 MIN: CPT | Performed by: HOSPITALIST

## 2023-11-22 ASSESSMENT — PAIN SCALES - GENERAL: PAINLEVEL_OUTOF10: 2

## 2023-11-22 NOTE — PROGRESS NOTES
Patient ID: Trang Jones is a 63 y.o. female.  Referring Physician: No referring provider defined for this encounter.  Primary Care Provider: Karl Granger DO    CANCER HISTORY:   64 yo woman with newly dx Tneg breast ca to bone, cord compression  2010 - L mtx - ER+ stage II breast ca   AC x 4  Keith x 7 yrs     3/23 - back pain - MRI with cord compression treated with radiation 5/4/23 5/22/23 - Taxol (weekly) - had reaction - held last week and only partial this week  Changing to abraxane  Not planning pembrolizumab as PDL1 neg per pt      Now on abraxane and tolerating well         History of Present IllnessConsulted by: Dr. Tolbert  For: breast cancer     Chief complaints and symptoms:  Seen in supp oncology  1. Pain - on ms contin, nsaids, gabapentin. THC twice/week  mostly in back and now in ribs. Improved overall though some pain from walking this last weekend  Now worse since [place] trip and walking - pelvic pain, L hip and pain down L leg     2. Fatigue - related to chemotherapy - improved overall. Worse on chemo weeks     3. Appetite suppressed  Poor taste - improved  Doing well still today     4. Neuropathy - mild developing tingling in fingers - numbness and tingling briefly in fingertips - brief numbness in fingers  Two more in fingertips, tingling/numbness     mild ha     Anxiety about results     Integrative History:  Diet: mostly fruits (taste good), protein smoothies (Vital proteins, collagen powder, almond milk)  Lean proteins, chicken/salmon, o/w plant based     PA: walking some, hiking some now, continued  Doing some Pilates     Sleep: well improved with THC - 8-9 hrs/night     Stress: overwhelmed by dx.  Management: Meditating daily almost, various times, has some experience - Intermittent  Family support  Reiki helping  Focusing more on spirituality and [employer], Women's groups.     Natural Products:  Medical cannabis - one gummy/night  magnesium  Red yeast rice - for  cholesterol  Vit D  Flax seed oil   American Ginseng  Host Defense STAMETS 7  Zinc     ROS:  no ha, visual symptoms, hearing loss  no sob, chest pain, palp  ROS o/w non contributory, please see HPI    Objective    BSA: There is no height or weight on file to calculate BSA.  There were no vitals taken for this visit.    PHYSICAL EXAM:  NAD, awake/alert  HEENT, NCAT, OP clear, no oral lesions  CTA bilat  RRR no mgr  Abd soft/nt/nd+bs  No c/c/e/ttp  Motor/sensory intact, CN 2-12 intact     RESULTS:  Lab Results   Component Value Date    WBC 4.5 11/20/2023    HGB 11.9 (L) 11/20/2023    HCT 35.9 (L) 11/20/2023     11/20/2023    CREATININE 0.56 11/13/2023    AST 14 11/13/2023       Assessment/Plan   Cancer Staging   Malignant neoplasm of bone with metastases (CMS/HCC)  Staging form: Bone - Appendicular Skeleton, Trunk, Skull, and Facial Bones, AJCC 8th Edition  - Clinical stage from 9/5/2023: pM1, G3 - Signed by Bebeto Tolbert MD on 9/5/2023      CANCER SPECIFIC RECCS:  64 yo woman with recurrent breast ca, Tneg now with bone mets and s/p cord compression  S/p radiation  5/22/23 started taxol (weekly) - now changed to abraxane  No neuropathy now     Breast cancer:   7/17/23 CT c/a/p - will review with Dr. Tolbert:  sclerotic L 3rd rib, increase osseous mets R iliac, 1.1 cm liver hypodensities, can discuss further with Dr. Tolbert  focusing plant based is fine  limit sugar including in smoothies, consider Orgain (using Vital protein)  Would avoid coffee on taxol  Zinc for taste  Flax is fine  THC is good as well  Vitamin D3 5000 IU 3 days/week is good  Consider omega 3   Stop b12, also coq10 - done  on Host [employer] STAMETS 7     Try to walk 15-30 min/day     Pain - taking motrin prn, steroid weaning  MS contin and gabapentin  Acupuncture will start in Symptom Management Clinic - has started  Feels better when having AM pain meds and supplements  Some improvements  Gentle Yoga with assistance, same with Pildk      Fatigue: Definitely improved  Acupuncture, consider massage  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 Uses to manage scoliosis.  Would sugg pt see Dr. Cantu (rehab) and/or PT prior to engaging in yoga     Follow up in Symptom Management Clinic      Integrative Oncology Symptom Management:     The integrative oncology symptom management clinic offers supervised care of cancer patients using Integrative Modalities billed to insurance using NCCN and SIO/ASCO guideline-driven practices.  ESAS is obtained prior to and after each treatment by practitioner     Symptoms Managed:  Pain - Increased in L leg and hip and pelvis since coming back from trip - increased in L femur and hip now, worse with hiking recently - improved but now having pain back of L shoulder and upper back     Fatigue - still there but improved, same, travelled this last weekend with lots of walking - same overall during chemo weeks, same  Poor Appetite - doing well     Neuropathy - improved now, one brief episode of numbness in fingers - none now, brief episode over weekend, one episode last week, slightly more and lasting longer  Gabapentin increased to 300 am and 600 pm     Natural Products utilized:  Medical cannabis - one gummy/night  magnesium  Red yeast rice - for cholesterol  B12  COq10  Vit D  Flax seed oil      Integrative Treatment: Acupuncture     Session #: 15  Frequency: Weekly     Referrals: Supportive Oncology (Has seen)     Recommendations: I would avoid coffee, soy, green tea while on Taxol (can reduce efficacy)  Weekly acupuncture  Has obtained med THC card as well   Ok to take calcium, mildly low and albumin wnl  seen by Dr. Cantu and going to PT - working with  on core strength     Follow Up:  Symptom Management: weekly  Integrative  Oncology: 3 months - could see 9/23     I have personally seen the patient and supervised the treatment by the integrative practitioner during this visit.  Pt had symptoms discussed and I was present for the patient's 45 minutes of direct patient care.   Bo Sandoval MD

## 2023-11-22 NOTE — PROGRESS NOTES
Acupuncture Visit:     Subjective   Patient ID: Trang Jones is a 63 y.o. female who presents for No chief complaint on file.  Feeling well overall  Left shoulder pain shoulder blade, posterior shoulder  Hip and leg pain continues- no change  A little neuropathy recently. Lasting longer.   Energy good.             Left hip/femur pain   4/10 today  Cold improved.   Sleep-  no issues  No neuropathy today, slight episode last week for an hour           initial visit:  Fatigue, back pain  had recent break from chemo  fatigue is better since holding  typically wiped out after chemo and radiation for 2-3 days   low appetite and difficulty staying hydrated  denies xerostomia    sleep is good- uses cbd product, has [employer] card, but has not pursued yet    pain this week mostly from scoliosis  lumbar into thoracic, has since her 30s, stiffness, constant pain   had has some radiation down legs. foraminal stenosis  no neuropathy symptoms, hands or feet  denies other pain areas.   was having severe throacic pain, from tumor compression, now off of most pain meds except ER morphine.     planned 2 more rounds of chemo then a scan to evaluate next steps     BM- once a day, no blood mucus undigested food,        Diet: no alcohol or sugars. consistently tries to eat healthy  Dr: water, dislikes cold water, drinks warm lemon water, bone broth, hot teas.    General: denies- fever, chills, night sweats  Skin: denies- rash, abnormal lesions  Eyes: occ floaters, last exam 1 year, ,   HENT: denies- headache, sore throat, ear pain, tinnitus, congestion, runny nose,   Cardio: denies- chest pain, palps  Resp: denies- SOB, cough, wheezing  GI: some reflux- every few days,   Uro: occasional urgency, no pain or burning.   Neuro: see HPI  Musc: see HPI                      Pre-treatment Assessment  Pain Score: 3  Anxiety Level (0-10): 0  Stress Level (0-10): 1  Coping Level (0-10): 8  Depression Level (0-10): 0  Fatigue Level (0-10):  4  Nausea Level (0-10): 0  Wellbeing Level (0-10): 2    Review of Systems         Provider reviewed plan for the acupuncture session, precautions and contraindications. Patient/guardian/hospital staff has given consent to treat with full understanding of what to expect during the session. Before acupuncture began, provider explained to the patient to communicate at any time if the procedure was causing discomfort past their tolerance level. Patient agreed to advise acupuncturist. The acupuncturist counseled the patient on the risks of acupuncture treatment including pain, infection, bleeding, and no relief of pain. The patient was positioned comfortably. There was no evidence of infection at the site of needle insertions.    Objective   Physical Exam          Acupuncture Treatment  Needle Guage: 36 guage /.20/ Blue seirin  Body Points: With retention  Body Points - Bilateral: Scalp thoracic, LI10, LI4, Si3, Ht7, SP6, KI7, KI3, UB62, LR3  Auricular Points: Yes  Auricular Points - Bilateral: BFA 1-3  Needle Count In: 25  Needle Count Out: 25              Post-treatment Assessment  Pain Score: 2  Anxiety Level (0-10): 0  Stress Level (0-10): 2  Coping Level (0-10): 8  Depression Level (0-10): 0  Fatigue Level (0-10): 3  Nausea Level (0-10): 0  Wellbeing Level (0-10): 2    Assessment/Plan

## 2023-11-26 ENCOUNTER — NURSE TRIAGE (OUTPATIENT)
Dept: ADMISSION | Facility: HOSPITAL | Age: 63
End: 2023-11-26
Payer: COMMERCIAL

## 2023-11-26 DIAGNOSIS — C79.51 CARCINOMA OF RIGHT BREAST METASTATIC TO BONE (MULTI): Primary | ICD-10-CM

## 2023-11-26 DIAGNOSIS — U07.1 COVID-19: Primary | ICD-10-CM

## 2023-11-26 DIAGNOSIS — C50.911 CARCINOMA OF RIGHT BREAST METASTATIC TO BONE (MULTI): Primary | ICD-10-CM

## 2023-11-26 DIAGNOSIS — C41.9 MALIGNANT NEOPLASM OF BONE WITH METASTASES (MULTI): ICD-10-CM

## 2023-11-26 RX ORDER — NIRMATRELVIR AND RITONAVIR 300-100 MG
3 KIT ORAL 2 TIMES DAILY
Qty: 30 TABLET | Refills: 0 | Status: SHIPPED | OUTPATIENT
Start: 2023-11-26 | End: 2023-12-01

## 2023-11-26 NOTE — TELEPHONE ENCOUNTER
Pt called in on Sunday to report a cough and congestion. This RN returned the patients call, she states that she tested + for COVID this morning and would like a prescription for Paxlovid. Symptoms started on Friday evening and have worsened. Mainly just congestion and cough. Coughing up green phlegm. Denies SOB or chest pain. Does report a headache in which yesterday she took her migraine medication and later in the day an Ibuprofen. Has not taken anything this morning. Pt currently on chemotherapy and had her last treatment on 11/20. Please advise.       Additional Information   Commented on: Review EMR for h/o immunotherapy. If positive, ask if patient is having new or worsening problems breathing - symptoms may be related to immune-related pneumonitis     No difficulty with breathing or shortness of breath   Commented on: What else do you want to tell me about this problem?     Tested + for COVID this morning, symptoms started on Friday evening (early Saturday morning)    Protocols used: Cough

## 2023-11-26 NOTE — TELEPHONE ENCOUNTER
The on-call Fellow, Tiffanie Julio did speak with Dr. Tolbert and prescribed Paxlovid (sent to her local pharmacy), patient aware. Encouraged to start today, she is agreeable, will call back with any issues, aware that I have reached out to the team regarding his 12/4 apt and if this will need adjusted, I will discuss with team and reach back out with what I find out. She was appreciative. Encouraged to call our office back if symptoms worsen or go to ER for fevers or SOB or chest pain associated with the COVID diagnosis. She was agreeable, Was appreciative of all the follow up

## 2023-11-27 RX ORDER — ALBUTEROL SULFATE 0.83 MG/ML
3 SOLUTION RESPIRATORY (INHALATION) AS NEEDED
Status: CANCELLED | OUTPATIENT
Start: 2023-12-11

## 2023-11-27 RX ORDER — EPINEPHRINE 0.3 MG/.3ML
0.3 INJECTION SUBCUTANEOUS EVERY 5 MIN PRN
Status: CANCELLED | OUTPATIENT
Start: 2023-12-04

## 2023-11-27 RX ORDER — DIPHENHYDRAMINE HYDROCHLORIDE 50 MG/ML
50 INJECTION INTRAMUSCULAR; INTRAVENOUS AS NEEDED
Status: CANCELLED | OUTPATIENT
Start: 2023-12-04

## 2023-11-27 RX ORDER — ALBUTEROL SULFATE 0.83 MG/ML
3 SOLUTION RESPIRATORY (INHALATION) AS NEEDED
Status: CANCELLED | OUTPATIENT
Start: 2023-12-04

## 2023-11-27 RX ORDER — FAMOTIDINE 10 MG/ML
20 INJECTION INTRAVENOUS ONCE AS NEEDED
Status: CANCELLED | OUTPATIENT
Start: 2023-12-11

## 2023-11-27 RX ORDER — ONDANSETRON HYDROCHLORIDE 2 MG/ML
8 INJECTION, SOLUTION INTRAVENOUS ONCE
Status: CANCELLED | OUTPATIENT
Start: 2023-12-11

## 2023-11-27 RX ORDER — ONDANSETRON HYDROCHLORIDE 2 MG/ML
8 INJECTION, SOLUTION INTRAVENOUS ONCE
Status: CANCELLED | OUTPATIENT
Start: 2023-12-04

## 2023-11-27 RX ORDER — FAMOTIDINE 10 MG/ML
20 INJECTION INTRAVENOUS ONCE AS NEEDED
Status: CANCELLED | OUTPATIENT
Start: 2023-12-04

## 2023-11-27 RX ORDER — EPINEPHRINE 0.3 MG/.3ML
0.3 INJECTION SUBCUTANEOUS EVERY 5 MIN PRN
Status: CANCELLED | OUTPATIENT
Start: 2023-12-11

## 2023-11-27 RX ORDER — DIPHENHYDRAMINE HYDROCHLORIDE 50 MG/ML
50 INJECTION INTRAMUSCULAR; INTRAVENOUS AS NEEDED
Status: CANCELLED | OUTPATIENT
Start: 2023-12-11

## 2023-11-29 ENCOUNTER — APPOINTMENT (OUTPATIENT)
Dept: INTEGRATIVE MEDICINE | Facility: CLINIC | Age: 63
End: 2023-11-29
Payer: COMMERCIAL

## 2023-12-04 ENCOUNTER — APPOINTMENT (OUTPATIENT)
Dept: HEMATOLOGY/ONCOLOGY | Facility: CLINIC | Age: 63
End: 2023-12-04
Payer: COMMERCIAL

## 2023-12-04 ENCOUNTER — TELEPHONE (OUTPATIENT)
Dept: HEMATOLOGY/ONCOLOGY | Facility: CLINIC | Age: 63
End: 2023-12-04
Payer: COMMERCIAL

## 2023-12-04 DIAGNOSIS — C79.51 CARCINOMA OF RIGHT BREAST METASTATIC TO BONE (MULTI): ICD-10-CM

## 2023-12-04 DIAGNOSIS — C50.911 CARCINOMA OF RIGHT BREAST METASTATIC TO BONE (MULTI): ICD-10-CM

## 2023-12-04 NOTE — TELEPHONE ENCOUNTER
Called and spoke to patient, let her know that Dr. Tolbert would like her to be treated but pt was not willing to come back and stated that she had another appt this afternoon, offered an appt tomorrow at 0900 and pt was scheduled for that. Pt agreed to plan and verbalized understanding using teach back method.

## 2023-12-05 ENCOUNTER — INFUSION (OUTPATIENT)
Dept: HEMATOLOGY/ONCOLOGY | Facility: CLINIC | Age: 63
End: 2023-12-05
Payer: COMMERCIAL

## 2023-12-05 VITALS
DIASTOLIC BLOOD PRESSURE: 70 MMHG | TEMPERATURE: 98.2 F | WEIGHT: 137.79 LBS | BODY MASS INDEX: 26.03 KG/M2 | RESPIRATION RATE: 14 BRPM | SYSTOLIC BLOOD PRESSURE: 108 MMHG | OXYGEN SATURATION: 99 % | HEART RATE: 58 BPM

## 2023-12-05 DIAGNOSIS — C50.911 CARCINOMA OF RIGHT BREAST METASTATIC TO BONE (MULTI): ICD-10-CM

## 2023-12-05 DIAGNOSIS — C41.9 MALIGNANT NEOPLASM OF BONE WITH METASTASES (MULTI): ICD-10-CM

## 2023-12-05 DIAGNOSIS — C79.51 CARCINOMA OF RIGHT BREAST METASTATIC TO BONE (MULTI): ICD-10-CM

## 2023-12-05 LAB
ALBUMIN SERPL BCP-MCNC: 4 G/DL (ref 3.4–5)
ALP SERPL-CCNC: 58 U/L (ref 33–136)
ALT SERPL W P-5'-P-CCNC: 9 U/L (ref 7–45)
ANION GAP SERPL CALC-SCNC: 11 MMOL/L (ref 10–20)
AST SERPL W P-5'-P-CCNC: 13 U/L (ref 9–39)
BASOPHILS # BLD AUTO: 0.02 X10*3/UL (ref 0–0.1)
BASOPHILS NFR BLD AUTO: 0.3 %
BILIRUB SERPL-MCNC: 0.4 MG/DL (ref 0–1.2)
BUN SERPL-MCNC: 14 MG/DL (ref 6–23)
CALCIUM SERPL-MCNC: 8.5 MG/DL (ref 8.6–10.3)
CHLORIDE SERPL-SCNC: 107 MMOL/L (ref 98–107)
CO2 SERPL-SCNC: 27 MMOL/L (ref 21–32)
CREAT SERPL-MCNC: 0.6 MG/DL (ref 0.5–1.05)
EOSINOPHIL # BLD AUTO: 0.13 X10*3/UL (ref 0–0.7)
EOSINOPHIL NFR BLD AUTO: 1.9 %
ERYTHROCYTE [DISTWIDTH] IN BLOOD BY AUTOMATED COUNT: 14.1 % (ref 11.5–14.5)
GFR SERPL CREATININE-BSD FRML MDRD: >90 ML/MIN/1.73M*2
GLUCOSE SERPL-MCNC: 102 MG/DL (ref 74–99)
HCT VFR BLD AUTO: 35.8 % (ref 36–46)
HGB BLD-MCNC: 11.8 G/DL (ref 12–16)
IMM GRANULOCYTES # BLD AUTO: 0.06 X10*3/UL (ref 0–0.7)
IMM GRANULOCYTES NFR BLD AUTO: 0.9 % (ref 0–0.9)
LYMPHOCYTES # BLD AUTO: 1.41 X10*3/UL (ref 1.2–4.8)
LYMPHOCYTES NFR BLD AUTO: 20.1 %
MCH RBC QN AUTO: 29.8 PG (ref 26–34)
MCHC RBC AUTO-ENTMCNC: 33 G/DL (ref 32–36)
MCV RBC AUTO: 90 FL (ref 80–100)
MONOCYTES # BLD AUTO: 0.7 X10*3/UL (ref 0.1–1)
MONOCYTES NFR BLD AUTO: 10 %
NEUTROPHILS # BLD AUTO: 4.69 X10*3/UL (ref 1.2–7.7)
NEUTROPHILS NFR BLD AUTO: 66.8 %
PLATELET # BLD AUTO: 277 X10*3/UL (ref 150–450)
POTASSIUM SERPL-SCNC: 4.2 MMOL/L (ref 3.5–5.3)
PROT SERPL-MCNC: 5.3 G/DL (ref 6.4–8.2)
RBC # BLD AUTO: 3.96 X10*6/UL (ref 4–5.2)
SODIUM SERPL-SCNC: 141 MMOL/L (ref 136–145)
WBC # BLD AUTO: 7 X10*3/UL (ref 4.4–11.3)

## 2023-12-05 PROCEDURE — 85025 COMPLETE CBC W/AUTO DIFF WBC: CPT

## 2023-12-05 PROCEDURE — 84075 ASSAY ALKALINE PHOSPHATASE: CPT

## 2023-12-05 PROCEDURE — 96375 TX/PRO/DX INJ NEW DRUG ADDON: CPT | Mod: INF

## 2023-12-05 PROCEDURE — 2500000004 HC RX 250 GENERAL PHARMACY W/ HCPCS (ALT 636 FOR OP/ED): Performed by: INTERNAL MEDICINE

## 2023-12-05 PROCEDURE — 2500000004 HC RX 250 GENERAL PHARMACY W/ HCPCS (ALT 636 FOR OP/ED): Mod: JW | Performed by: INTERNAL MEDICINE

## 2023-12-05 PROCEDURE — 96413 CHEMO IV INFUSION 1 HR: CPT

## 2023-12-05 PROCEDURE — 82040 ASSAY OF SERUM ALBUMIN: CPT

## 2023-12-05 RX ORDER — ALBUTEROL SULFATE 0.83 MG/ML
3 SOLUTION RESPIRATORY (INHALATION) AS NEEDED
Status: DISCONTINUED | OUTPATIENT
Start: 2023-12-05 | End: 2023-12-05 | Stop reason: HOSPADM

## 2023-12-05 RX ORDER — FAMOTIDINE 10 MG/ML
20 INJECTION INTRAVENOUS ONCE AS NEEDED
Status: DISCONTINUED | OUTPATIENT
Start: 2023-12-05 | End: 2023-12-05 | Stop reason: HOSPADM

## 2023-12-05 RX ORDER — ONDANSETRON HYDROCHLORIDE 2 MG/ML
8 INJECTION, SOLUTION INTRAVENOUS ONCE
Status: COMPLETED | OUTPATIENT
Start: 2023-12-05 | End: 2023-12-05

## 2023-12-05 RX ORDER — HEPARIN 100 UNIT/ML
500 SYRINGE INTRAVENOUS AS NEEDED
Status: CANCELLED | OUTPATIENT
Start: 2023-12-05

## 2023-12-05 RX ORDER — DIPHENHYDRAMINE HYDROCHLORIDE 50 MG/ML
50 INJECTION INTRAMUSCULAR; INTRAVENOUS AS NEEDED
Status: DISCONTINUED | OUTPATIENT
Start: 2023-12-05 | End: 2023-12-05 | Stop reason: HOSPADM

## 2023-12-05 RX ORDER — HEPARIN 100 UNIT/ML
500 SYRINGE INTRAVENOUS AS NEEDED
Status: DISCONTINUED | OUTPATIENT
Start: 2023-12-05 | End: 2023-12-05 | Stop reason: HOSPADM

## 2023-12-05 RX ORDER — HEPARIN SODIUM,PORCINE/PF 10 UNIT/ML
50 SYRINGE (ML) INTRAVENOUS AS NEEDED
Status: CANCELLED | OUTPATIENT
Start: 2023-12-05

## 2023-12-05 RX ORDER — EPINEPHRINE 0.3 MG/.3ML
0.3 INJECTION SUBCUTANEOUS EVERY 5 MIN PRN
Status: DISCONTINUED | OUTPATIENT
Start: 2023-12-05 | End: 2023-12-05 | Stop reason: HOSPADM

## 2023-12-05 RX ORDER — HEPARIN SODIUM,PORCINE/PF 10 UNIT/ML
50 SYRINGE (ML) INTRAVENOUS AS NEEDED
Status: DISCONTINUED | OUTPATIENT
Start: 2023-12-05 | End: 2023-12-05 | Stop reason: HOSPADM

## 2023-12-05 RX ADMIN — ONDANSETRON 8 MG: 2 INJECTION INTRAMUSCULAR; INTRAVENOUS at 10:41

## 2023-12-05 RX ADMIN — HEPARIN 500 UNITS: 100 SYRINGE at 12:47

## 2023-12-05 RX ADMIN — PACLITAXEL 131.2 MG: 100 INJECTION, POWDER, LYOPHILIZED, FOR SUSPENSION INTRAVENOUS at 11:34

## 2023-12-05 ASSESSMENT — PAIN SCALES - GENERAL: PAINLEVEL: 0-NO PAIN

## 2023-12-05 NOTE — SIGNIFICANT EVENT
12/05/23 1021   Prechemo Checklist   Has the patient been in the hospital, ED, or urgent care since last date of service No   Chemo/Immuno Consent Signed Yes   Protocol/Indications Verified Yes   Confirmed to previous date/time of medication Yes   Compared to previous dose Yes   All medications are dated accurately Yes   Pregnancy Test Negative Not applicable   Parameters Met Yes   BSA/Weight-Height Verified Yes   Dose Calculations Verified Yes

## 2023-12-06 ENCOUNTER — ALLIED HEALTH (OUTPATIENT)
Dept: INTEGRATIVE MEDICINE | Facility: CLINIC | Age: 63
End: 2023-12-06
Payer: COMMERCIAL

## 2023-12-06 ENCOUNTER — TELEPHONE (OUTPATIENT)
Dept: PALLIATIVE MEDICINE | Facility: HOSPITAL | Age: 63
End: 2023-12-06

## 2023-12-06 ENCOUNTER — TELEPHONE (OUTPATIENT)
Dept: HEMATOLOGY/ONCOLOGY | Facility: HOSPITAL | Age: 63
End: 2023-12-06

## 2023-12-06 DIAGNOSIS — C79.51 METASTATIC CANCER TO SPINE (MULTI): Primary | ICD-10-CM

## 2023-12-06 DIAGNOSIS — C50.911 CARCINOMA OF RIGHT BREAST METASTATIC TO BONE (MULTI): ICD-10-CM

## 2023-12-06 DIAGNOSIS — M79.2 NEUROPATHIC PAIN: ICD-10-CM

## 2023-12-06 DIAGNOSIS — G89.3 CANCER RELATED PAIN: ICD-10-CM

## 2023-12-06 DIAGNOSIS — C79.51 CARCINOMA OF RIGHT BREAST METASTATIC TO BONE (MULTI): ICD-10-CM

## 2023-12-06 DIAGNOSIS — G89.3 NEOPLASM RELATED PAIN: ICD-10-CM

## 2023-12-06 DIAGNOSIS — G89.3 CANCER RELATED PAIN: Primary | ICD-10-CM

## 2023-12-06 PROCEDURE — 99213 OFFICE O/P EST LOW 20 MIN: CPT | Performed by: HOSPITALIST

## 2023-12-06 RX ORDER — GABAPENTIN 300 MG/1
CAPSULE ORAL
Qty: 54 CAPSULE | Refills: 0 | Status: SHIPPED | OUTPATIENT
Start: 2023-12-06 | End: 2023-12-28 | Stop reason: SDUPTHER

## 2023-12-06 RX ORDER — MORPHINE SULFATE 15 MG/1
15 TABLET, FILM COATED, EXTENDED RELEASE ORAL 2 TIMES DAILY
Qty: 36 TABLET | Refills: 0 | Status: SHIPPED | OUTPATIENT
Start: 2023-12-06 | End: 2023-12-28 | Stop reason: SDUPTHER

## 2023-12-06 ASSESSMENT — PAIN SCALES - GENERAL: PAINLEVEL_OUTOF10: 3

## 2023-12-06 NOTE — PROGRESS NOTES
Patient ID: Trang Jones is a 63 y.o. female.  Referring Physician: No referring provider defined for this encounter.  Primary Care Provider: Karl Granger DO    CANCER HISTORY:   64 yo woman with newly dx Tneg breast ca to bone, cord compression  2010 - L mtx - ER+ stage II breast ca   AC x 4  Keith x 7 yrs     3/23 - back pain - MRI with cord compression treated with radiation 5/4/23 5/22/23 - Taxol (weekly) - had reaction - held last week and only partial this week  Changing to abraxane  Not planning pembrolizumab as PDL1 neg per pt      Now on abraxane and tolerating well         History of Present IllnessConsulted by: Dr. Tolbert  For: breast cancer     Chief complaints and symptoms:  Seen in supp oncology  1. Pain - on ms contin, nsaids, gabapentin. THC twice/week  mostly in back and now in ribs. Improved overall though some pain from walking this last weekend  Now worse since [place] trip and walking - pelvic pain, L hip and pain down L leg     2. Fatigue - related to chemotherapy - improved overall. Worse on chemo weeks     3. Appetite suppressed  Poor taste - improved  Doing well still today     4. Neuropathy - mild developing tingling in fingers - numbness and tingling briefly in fingertips - brief numbness in fingers  Two more in fingertips, tingling/numbness     mild ha     Anxiety about results     Integrative History:  Diet: mostly fruits (taste good), protein smoothies (Vital proteins, collagen powder, almond milk)  Lean proteins, chicken/salmon, o/w plant based     PA: walking some, hiking some now, continued  Doing some Pilates     Sleep: well improved with THC - 8-9 hrs/night     Stress: overwhelmed by dx.  Management: Meditating daily almost, various times, has some experience - Intermittent  Family support  Reiki helping  Focusing more on spirituality and Sabianism, Women's groups.     Natural Products:  Medical cannabis - one gummy/night  magnesium  Red yeast rice - for  cholesterol  Vit D  Flax seed oil   American Ginseng  [employer] STAMETS 7  Zinc     ROS:  no ha, visual symptoms, hearing loss  no sob, chest pain, palp  ROS o/w non contributory, please see HPI    Objective    BSA: There is no height or weight on file to calculate BSA.  There were no vitals taken for this visit.    PHYSICAL EXAM:  NAD, awake/alert  HEENT, NCAT, OP clear, no oral lesions  CTA bilat  RRR no mgr  Abd soft/nt/nd+bs  No c/c/e/ttp  Motor/sensory intact, CN 2-12 intact     RESULTS:  Lab Results   Component Value Date    WBC 7.0 12/05/2023    HGB 11.8 (L) 12/05/2023    HCT 35.8 (L) 12/05/2023     12/05/2023    CREATININE 0.60 12/05/2023    AST 13 12/05/2023       Assessment/Plan   Cancer Staging   Malignant neoplasm of bone with metastases (CMS/HCC)  Staging form: Bone - Appendicular Skeleton, Trunk, Skull, and Facial Bones, AJCC 8th Edition  - Clinical stage from 9/5/2023: pM1, G3 - Signed by Bebeto Tolbert MD on 9/5/2023      CANCER SPECIFIC RECCS:  64 yo woman with recurrent breast ca, Tneg now with bone mets and s/p cord compression  S/p radiation  5/22/23 started taxol (weekly) - now changed to abraxane  No neuropathy now     Breast cancer:   7/17/23 CT c/a/p - will review with Dr. Tolbert:  sclerotic L 3rd rib, increase osseous mets R iliac, 1.1 cm liver hypodensities, can discuss further with Dr. Tolbert  focusing plant based is fine  limit sugar including in smoothies, consider Orgain (using Vital protein)  Would avoid coffee on taxol  Zinc for taste  Flax is fine  THC is good as well  Vitamin D3 5000 IU 3 days/week is good  Consider omega 3   Stop b12, also coq10 - done  on [employer] STAMETS 7     Try to walk 15-30 min/day     Pain - taking motrin prn, steroid weaning  MS contin and gabapentin  Acupuncture will start in [facility] - has started  Feels better when having AM pain meds and supplements  Some improvements  Gentle Yoga with assistance, same with Pildk      Fatigue: Definitely improved  Acupuncture, consider massage  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 Uses to manage scoliosis.  Would sugg pt see Dr. Cantu (rehab) and/or PT prior to engaging in yoga     Follow up in Symptom Management Clinic      Integrative Oncology Symptom Management:     The integrative oncology symptom management clinic offers supervised care of cancer patients using Integrative Modalities billed to insurance using NCCN and SIO/ASCO guideline-driven practices.  ESAS is obtained prior to and after each treatment by practitioner     Symptoms Managed:  Pain - Increased in L leg and hip and pelvis since coming back from trip - increased in L femur and hip now, worse with hiking recently - improved but now having pain back of L shoulder and upper back, same today     Fatigue - still there but improved, same, travelled this last weekend with lots of walking - same overall during chemo weeks, mildly increased  Poor Appetite - doing well     Neuropathy - improved now, one brief episode of numbness in fingers - none now, brief episode over weekend, one episode last week, slightly more and lasting longer, no changes  Gabapentin increased to 300 am and 600 pm     Natural Products utilized:  Medical cannabis - one gummy/night  magnesium  Red yeast rice - for cholesterol  B12  COq10  Vit D  Flax seed oil      Integrative Treatment: Acupuncture     Session #: 16  Frequency: Weekly     Referrals: Supportive Oncology (Has seen)     Recommendations: I would avoid coffee, soy, green tea while on Taxol (can reduce efficacy)  Weekly acupuncture  Has obtained med THC card as well   Ok to take calcium, mildly low and albumin wnl  seen by Dr. Cantu and going to PT - working with  on core strength     Follow Up:  Symptom  Management: weekly  Integrative Oncology: 3 months - could see 9/23     I have personally seen the patient and supervised the treatment by the integrative practitioner during this visit.  Pt had symptoms discussed and I was present for the patient's 45 minutes of direct patient care.   Bo Sandoval MD

## 2023-12-06 NOTE — TELEPHONE ENCOUNTER
Returned patient's phone call.  Patient rescheduled for phone visit with Jennifer Duarte Jan 2.  Patient will need refills for vacation for gabapentin and MS Contin.  Called patient back to decide which dates to refill medications.  Currently patient due to fill Gabapentin 12/10 and MS Contin 12/12.  Patient will be gone for 3 weeks for vacation.  Awaiting return phone call from patient.

## 2023-12-06 NOTE — PROGRESS NOTES
Acupuncture Visit:     Subjective   Patient ID: Trang Jones is a 63 y.o. female who presents for No chief complaint on file.  Covid after thankgiving  Stilll congesteted  Some cough  Pain has been low  Slight neuropathy, not bad  Energy bot good.   Sleep- good.   Digestion OK  Shoulder pain has laergly been cleared for past few weeks.           Feeling well overall  Left shoulder pain shoulder blade, posterior shoulder  Hip and leg pain continues- no change  A little neuropathy recently. Lasting longer.   Energy good.             Left hip/femur pain   4/10 today  Cold improved.   Sleep-  no issues  No neuropathy today, slight episode last week for an hour           initial visit:  Fatigue, back pain  had recent break from chemo  fatigue is better since holding  typically wiped out after chemo and radiation for 2-3 days   low appetite and difficulty staying hydrated  denies xerostomia    sleep is good- uses cbd product, has [employer] card, but has not pursued yet    pain this week mostly from scoliosis  lumbar into thoracic, has since her 30s, stiffness, constant pain   had has some radiation down legs. foraminal stenosis  no neuropathy symptoms, hands or feet  denies other pain areas.   was having severe throacic pain, from tumor compression, now off of most pain meds except ER morphine.     planned 2 more rounds of chemo then a scan to evaluate next steps     BM- once a day, no blood mucus undigested food,        Diet: no alcohol or sugars. consistently tries to eat healthy  Dr: water, dislikes cold water, drinks warm lemon water, bone broth, hot teas.    General: denies- fever, chills, night sweats  Skin: denies- rash, abnormal lesions  Eyes: occ floaters, last exam 1 year, ,   HENT: denies- headache, sore throat, ear pain, tinnitus, congestion, runny nose,   Cardio: denies- chest pain, palps  Resp: denies- SOB, cough, wheezing  GI: some reflux- every few days,   Uro: occasional urgency, no pain or burning.    Neuro: see HPI  Musc: see HPI                      Pre-treatment Assessment  Pain Score: 3  Anxiety Level (0-10): 0  Stress Level (0-10): 2  Coping Level (0-10): 8  Depression Level (0-10): 0  Fatigue Level (0-10): 6  Nausea Level (0-10): 0  Wellbeing Level (0-10): 3    Review of Systems         Provider reviewed plan for the acupuncture session, precautions and contraindications. Patient/guardian/hospital staff has given consent to treat with full understanding of what to expect during the session. Before acupuncture began, provider explained to the patient to communicate at any time if the procedure was causing discomfort past their tolerance level. Patient agreed to advise acupuncturist. The acupuncturist counseled the patient on the risks of acupuncture treatment including pain, infection, bleeding, and no relief of pain. The patient was positioned comfortably. There was no evidence of infection at the site of needle insertions.    Objective   Physical Exam          Acupuncture Treatment  Needle Guage: 36 guage /.20/ Blue seirin  Body Points: With retention  Body Points - Bilateral: Scalp thoracic, LI10, LI4, Si3, Ht7, SP6, KI7, KI3, UB62, LR3  Auricular Points: Yes  Auricular Points - Bilateral: BFA 1-3  Needle Count In: 25  Needle Count Out: 25              Post-treatment Assessment  Pain Score: 3  Anxiety Level (0-10): 0  Stress Level (0-10): 2  Coping Level (0-10): 8  Depression Level (0-10): 0  Fatigue Level (0-10): 5  Nausea Level (0-10): 0  Wellbeing Level (0-10): 2    Assessment/Plan        normal...

## 2023-12-06 NOTE — TELEPHONE ENCOUNTER
Patient returned phone call to have medications refilled for Gabapentin 300mg am, 600mg pm 54 tabs/18 day supply and MS Contin 15mg BID 36/18 day supply on 12/12.  Patient will need new refills on 12/29.  Patient instructed to call on 12/28 for refills for 30 day supplies prior to her vacation [place].  Patient will be gone for 3 weeks.  Patient will have a phone appt with Jennifer Duarte on Jan 2.  Meds pended to Marie Carl NP for refills.

## 2023-12-11 ENCOUNTER — APPOINTMENT (OUTPATIENT)
Dept: HEMATOLOGY/ONCOLOGY | Facility: CLINIC | Age: 63
End: 2023-12-11
Payer: COMMERCIAL

## 2023-12-11 ENCOUNTER — TELEPHONE (OUTPATIENT)
Dept: HEMATOLOGY/ONCOLOGY | Facility: HOSPITAL | Age: 63
End: 2023-12-11
Payer: COMMERCIAL

## 2023-12-11 NOTE — TELEPHONE ENCOUNTER
Patient calls today requesting to speak to Kristi Olivier RN regarding [employer] disability paperwork.  Transferred call to Kristi.

## 2023-12-12 ENCOUNTER — INFUSION (OUTPATIENT)
Dept: HEMATOLOGY/ONCOLOGY | Facility: CLINIC | Age: 63
End: 2023-12-12
Payer: COMMERCIAL

## 2023-12-12 VITALS
DIASTOLIC BLOOD PRESSURE: 74 MMHG | OXYGEN SATURATION: 98 % | SYSTOLIC BLOOD PRESSURE: 110 MMHG | RESPIRATION RATE: 17 BRPM | BODY MASS INDEX: 26.14 KG/M2 | WEIGHT: 138.34 LBS | HEART RATE: 66 BPM | TEMPERATURE: 97.3 F

## 2023-12-12 DIAGNOSIS — C41.9 MALIGNANT NEOPLASM OF BONE WITH METASTASES (MULTI): ICD-10-CM

## 2023-12-12 DIAGNOSIS — C79.51 CARCINOMA OF RIGHT BREAST METASTATIC TO BONE (MULTI): ICD-10-CM

## 2023-12-12 DIAGNOSIS — C50.911 CARCINOMA OF RIGHT BREAST METASTATIC TO BONE (MULTI): ICD-10-CM

## 2023-12-12 LAB
BASOPHILS # BLD AUTO: 0.02 X10*3/UL (ref 0–0.1)
BASOPHILS NFR BLD AUTO: 0.4 %
EOSINOPHIL # BLD AUTO: 0.07 X10*3/UL (ref 0–0.7)
EOSINOPHIL NFR BLD AUTO: 1.3 %
ERYTHROCYTE [DISTWIDTH] IN BLOOD BY AUTOMATED COUNT: 14.2 % (ref 11.5–14.5)
HCT VFR BLD AUTO: 34.3 % (ref 36–46)
HGB BLD-MCNC: 11.4 G/DL (ref 12–16)
IMM GRANULOCYTES # BLD AUTO: 0.04 X10*3/UL (ref 0–0.7)
IMM GRANULOCYTES NFR BLD AUTO: 0.8 % (ref 0–0.9)
LYMPHOCYTES # BLD AUTO: 1.14 X10*3/UL (ref 1.2–4.8)
LYMPHOCYTES NFR BLD AUTO: 22 %
MCH RBC QN AUTO: 30.2 PG (ref 26–34)
MCHC RBC AUTO-ENTMCNC: 33.2 G/DL (ref 32–36)
MCV RBC AUTO: 91 FL (ref 80–100)
MONOCYTES # BLD AUTO: 0.28 X10*3/UL (ref 0.1–1)
MONOCYTES NFR BLD AUTO: 5.4 %
NEUTROPHILS # BLD AUTO: 3.64 X10*3/UL (ref 1.2–7.7)
NEUTROPHILS NFR BLD AUTO: 70.1 %
PLATELET # BLD AUTO: 255 X10*3/UL (ref 150–450)
RBC # BLD AUTO: 3.78 X10*6/UL (ref 4–5.2)
WBC # BLD AUTO: 5.2 X10*3/UL (ref 4.4–11.3)

## 2023-12-12 PROCEDURE — 2500000004 HC RX 250 GENERAL PHARMACY W/ HCPCS (ALT 636 FOR OP/ED): Performed by: INTERNAL MEDICINE

## 2023-12-12 PROCEDURE — 96413 CHEMO IV INFUSION 1 HR: CPT

## 2023-12-12 PROCEDURE — 85025 COMPLETE CBC W/AUTO DIFF WBC: CPT

## 2023-12-12 PROCEDURE — 96375 TX/PRO/DX INJ NEW DRUG ADDON: CPT | Mod: INF

## 2023-12-12 PROCEDURE — 2500000004 HC RX 250 GENERAL PHARMACY W/ HCPCS (ALT 636 FOR OP/ED)

## 2023-12-12 RX ORDER — HEPARIN 100 UNIT/ML
SYRINGE INTRAVENOUS
Status: COMPLETED
Start: 2023-12-12 | End: 2023-12-12

## 2023-12-12 RX ORDER — EPINEPHRINE 0.3 MG/.3ML
0.3 INJECTION SUBCUTANEOUS EVERY 5 MIN PRN
Status: DISCONTINUED | OUTPATIENT
Start: 2023-12-12 | End: 2023-12-12 | Stop reason: HOSPADM

## 2023-12-12 RX ORDER — ALBUTEROL SULFATE 0.83 MG/ML
3 SOLUTION RESPIRATORY (INHALATION) AS NEEDED
Status: DISCONTINUED | OUTPATIENT
Start: 2023-12-12 | End: 2023-12-12 | Stop reason: HOSPADM

## 2023-12-12 RX ORDER — HEPARIN 100 UNIT/ML
500 SYRINGE INTRAVENOUS AS NEEDED
Status: DISCONTINUED | OUTPATIENT
Start: 2023-12-12 | End: 2023-12-12 | Stop reason: HOSPADM

## 2023-12-12 RX ORDER — DIPHENHYDRAMINE HYDROCHLORIDE 50 MG/ML
50 INJECTION INTRAMUSCULAR; INTRAVENOUS AS NEEDED
Status: DISCONTINUED | OUTPATIENT
Start: 2023-12-12 | End: 2023-12-12 | Stop reason: HOSPADM

## 2023-12-12 RX ORDER — HEPARIN 100 UNIT/ML
500 SYRINGE INTRAVENOUS AS NEEDED
Status: CANCELLED | OUTPATIENT
Start: 2023-12-12

## 2023-12-12 RX ORDER — HEPARIN SODIUM,PORCINE/PF 10 UNIT/ML
50 SYRINGE (ML) INTRAVENOUS AS NEEDED
Status: CANCELLED | OUTPATIENT
Start: 2023-12-12

## 2023-12-12 RX ORDER — ONDANSETRON HYDROCHLORIDE 2 MG/ML
8 INJECTION, SOLUTION INTRAVENOUS ONCE
Status: COMPLETED | OUTPATIENT
Start: 2023-12-12 | End: 2023-12-12

## 2023-12-12 RX ORDER — FAMOTIDINE 10 MG/ML
20 INJECTION INTRAVENOUS ONCE AS NEEDED
Status: DISCONTINUED | OUTPATIENT
Start: 2023-12-12 | End: 2023-12-12 | Stop reason: HOSPADM

## 2023-12-12 RX ADMIN — ONDANSETRON 8 MG: 2 INJECTION INTRAMUSCULAR; INTRAVENOUS at 09:10

## 2023-12-12 RX ADMIN — HEPARIN 500 UNITS: 100 SYRINGE at 12:09

## 2023-12-12 RX ADMIN — PACLITAXEL 130 MG: 100 INJECTION, POWDER, LYOPHILIZED, FOR SUSPENSION INTRAVENOUS at 10:01

## 2023-12-12 ASSESSMENT — PAIN SCALES - GENERAL: PAINLEVEL: 0-NO PAIN

## 2023-12-12 NOTE — SIGNIFICANT EVENT
12/12/23 9330   Prechemo Checklist   Has the patient been in the hospital, ED, or urgent care since last date of service No   Chemo/Immuno Consent Signed Yes   Protocol/Indications Verified Yes   Confirmed to previous date/time of medication Yes   Compared to previous dose Yes   All medications are dated accurately Yes   Pregnancy Test Negative Not applicable   Parameters Met Yes   BSA/Weight-Height Verified Yes   Dose Calculations Verified Yes

## 2023-12-13 ENCOUNTER — DOCUMENTATION (OUTPATIENT)
Dept: PHYSICAL THERAPY | Facility: CLINIC | Age: 63
End: 2023-12-13

## 2023-12-13 ENCOUNTER — ALLIED HEALTH (OUTPATIENT)
Dept: INTEGRATIVE MEDICINE | Facility: CLINIC | Age: 63
End: 2023-12-13
Payer: COMMERCIAL

## 2023-12-13 DIAGNOSIS — C50.911 CARCINOMA OF RIGHT BREAST METASTATIC TO BONE (MULTI): ICD-10-CM

## 2023-12-13 DIAGNOSIS — M79.2 NEUROPATHIC PAIN: ICD-10-CM

## 2023-12-13 DIAGNOSIS — G89.3 NEOPLASM RELATED PAIN: ICD-10-CM

## 2023-12-13 DIAGNOSIS — C79.51 CARCINOMA OF RIGHT BREAST METASTATIC TO BONE (MULTI): ICD-10-CM

## 2023-12-13 DIAGNOSIS — C79.51 METASTATIC CANCER TO SPINE (MULTI): Primary | ICD-10-CM

## 2023-12-13 NOTE — PROGRESS NOTES
Patient ID: Trang Jones is a 63 y.o. female.  Referring Physician: No referring provider defined for this encounter.  Primary Care Provider: Karl Granger DO    CANCER HISTORY:   62 yo woman with newly dx Tneg breast ca to bone, cord compression  2010 - L mtx - ER+ stage II breast ca   AC x 4  Keith x 7 yrs     3/23 - back pain - MRI with cord compression treated with radiation 5/4/23 5/22/23 - Taxol (weekly) - had reaction - held last week and only partial this week  Changing to abraxane  Not planning pembrolizumab as PDL1 neg per pt      Now on abraxane and tolerating well         History of Present IllnessConsulted by: Dr. Tolbert  For: breast cancer     Chief complaints and symptoms:  Seen in supp oncology  1. Pain - on ms contin, nsaids, gabapentin. THC twice/week  mostly in back and now in ribs. Improved overall though some pain from walking this last weekend  Now worse since [place] trip and walking - pelvic pain, L hip and pain down L leg     2. Fatigue - related to chemotherapy - improved overall. Worse on chemo weeks     3. Appetite suppressed  Poor taste - improved  Doing well still today     4. Neuropathy - mild developing tingling in fingers - numbness and tingling briefly in fingertips - brief numbness in fingers  Two more in fingertips, tingling/numbness     mild ha     Anxiety about results     Integrative History:  Diet: mostly fruits (taste good), protein smoothies (Vital proteins, collagen powder, almond milk)  Lean proteins, chicken/salmon, o/w plant based     PA: walking some, hiking some now, continued  Doing some Pilates     Sleep: well improved with THC - 8-9 hrs/night     Stress: overwhelmed by dx.  Management: Meditating daily almost, various times, has some experience - Intermittent  Family support  Reiki helping  Focusing more on spirituality and Tenriism, Women's groups.     Natural Products:  Medical cannabis - one gummy/night  magnesium  Red yeast rice - for  cholesterol  Vit D  Flax seed oil   American Ginseng  [employer] STAMETS 7  Zinc     ROS:  no ha, visual symptoms, hearing loss  no sob, chest pain, palp  ROS o/w non contributory, please see HPI    Objective    BSA: There is no height or weight on file to calculate BSA.  There were no vitals taken for this visit.    PHYSICAL EXAM:  NAD, awake/alert  HEENT, NCAT, OP clear, no oral lesions  CTA bilat  RRR no mgr  Abd soft/nt/nd+bs  No c/c/e/ttp  Motor/sensory intact, CN 2-12 intact     RESULTS:  Lab Results   Component Value Date    WBC 5.2 12/12/2023    HGB 11.4 (L) 12/12/2023    HCT 34.3 (L) 12/12/2023     12/12/2023    CREATININE 0.60 12/05/2023    AST 13 12/05/2023       Assessment/Plan   Cancer Staging   Malignant neoplasm of bone with metastases (CMS/HCC)  Staging form: Bone - Appendicular Skeleton, Trunk, Skull, and Facial Bones, AJCC 8th Edition  - Clinical stage from 9/5/2023: pM1, G3 - Signed by Bebeto Tolbert MD on 9/5/2023      CANCER SPECIFIC RECCS:  64 yo woman with recurrent breast ca, Tneg now with bone mets and s/p cord compression  S/p radiation  5/22/23 started taxol (weekly) - now changed to abraxane  No neuropathy now     Breast cancer:   7/17/23 CT c/a/p - will review with Dr. Tolbert:  sclerotic L 3rd rib, increase osseous mets R iliac, 1.1 cm liver hypodensities, can discuss further with Dr. Tolbert  focusing plant based is fine  limit sugar including in smoothies, consider Orgain (using Vital protein)  Would avoid coffee on taxol  Zinc for taste  Flax is fine  THC is good as well  Vitamin D3 5000 IU 3 days/week is good  Consider omega 3   Stop b12, also coq10 - done  on [employer] STAMETS 7     Try to walk 15-30 min/day     Pain - taking motrin prn, steroid weaning  MS contin and gabapentin  Acupuncture will start in Symptom Management Clinic - has started  Feels better when having AM pain meds and supplements  Some improvements  Gentle Yoga with assistance, same with Pildk      Fatigue: Definitely improved  Acupuncture, consider massage  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 Uses to manage scoliosis.  Would sugg pt see Dr. Cantu (rehab) and/or PT prior to engaging in yoga     Follow up in Symptom Management Clinic      Integrative Oncology Symptom Management:     The integrative oncology symptom management clinic offers supervised care of cancer patients using Integrative Modalities billed to insurance using NCCN and SIO/ASCO guideline-driven practices.  ESAS is obtained prior to and after each treatment by practitioner     Symptoms Managed:  Pain - Increased in L leg and hip and pelvis since coming back from trip - increased in L femur and hip now, worse with hiking recently - improved but now having pain back of L shoulder and upper back, same today     Fatigue - still there but improved, same, travelled this last weekend with lots of walking - same overall during chemo weeks, mildly increased  Poor Appetite - doing well     Neuropathy - improved now, one brief episode of numbness in fingers - none now, brief episode over weekend, one episode last week, slightly more and lasting longer, no changes  Gabapentin increased to 300 am and 600 pm     Natural Products utilized:  Medical cannabis - one gummy/night  magnesium  Red yeast rice - for cholesterol  B12  COq10  Vit D  Flax seed oil      Integrative Treatment: Acupuncture     Session #: 17  Frequency: Weekly     Referrals: Supportive Oncology (Has seen)     Recommendations: I would avoid coffee, soy, green tea while on Taxol (can reduce efficacy)  Weekly acupuncture  Has obtained med THC card as well   Ok to take calcium, mildly low and albumin wnl  seen by Dr. Cantu and going to PT - working with  on core strength     Follow Up:  Symptom  Management: weekly  Integrative Oncology: 3 months - could see 9/23     I have personally seen the patient and supervised the treatment by the integrative practitioner during this visit.  Pt had symptoms discussed and I was present for the patient's 45 minutes of direct patient care.   Bo Sandoval MD

## 2023-12-15 ENCOUNTER — SOCIAL WORK (OUTPATIENT)
Dept: CASE MANAGEMENT | Facility: HOSPITAL | Age: 63
End: 2023-12-15
Payer: COMMERCIAL

## 2023-12-15 NOTE — PROGRESS NOTES
MOR was asked to assist patient with filing for disability benefits with Social [employer]. MOR completed forms. Reviewed and signed by CNP. MOR spoke with patient on the phone. Patient requested form be emailed to her. MOR emailed to patient along with my contact information. Patient appreciative.

## 2023-12-19 DIAGNOSIS — C50.911 CARCINOMA OF RIGHT BREAST METASTATIC TO BONE (MULTI): Primary | ICD-10-CM

## 2023-12-19 DIAGNOSIS — C41.9 MALIGNANT NEOPLASM OF BONE WITH METASTASES (MULTI): ICD-10-CM

## 2023-12-19 DIAGNOSIS — C79.51 CARCINOMA OF RIGHT BREAST METASTATIC TO BONE (MULTI): Primary | ICD-10-CM

## 2023-12-19 RX ORDER — DIPHENHYDRAMINE HYDROCHLORIDE 50 MG/ML
50 INJECTION INTRAMUSCULAR; INTRAVENOUS AS NEEDED
Status: CANCELLED | OUTPATIENT
Start: 2024-01-02

## 2023-12-19 RX ORDER — EPINEPHRINE 0.3 MG/.3ML
0.3 INJECTION SUBCUTANEOUS EVERY 5 MIN PRN
Status: CANCELLED | OUTPATIENT
Start: 2023-12-26

## 2023-12-19 RX ORDER — ONDANSETRON HYDROCHLORIDE 2 MG/ML
8 INJECTION, SOLUTION INTRAVENOUS ONCE
Status: CANCELLED | OUTPATIENT
Start: 2023-12-26

## 2023-12-19 RX ORDER — DIPHENHYDRAMINE HYDROCHLORIDE 50 MG/ML
50 INJECTION INTRAMUSCULAR; INTRAVENOUS AS NEEDED
Status: CANCELLED | OUTPATIENT
Start: 2023-12-26

## 2023-12-19 RX ORDER — ALBUTEROL SULFATE 0.83 MG/ML
3 SOLUTION RESPIRATORY (INHALATION) AS NEEDED
Status: CANCELLED | OUTPATIENT
Start: 2023-12-26

## 2023-12-19 RX ORDER — ALBUTEROL SULFATE 0.83 MG/ML
3 SOLUTION RESPIRATORY (INHALATION) AS NEEDED
Status: CANCELLED | OUTPATIENT
Start: 2024-01-02

## 2023-12-19 RX ORDER — EPINEPHRINE 0.3 MG/.3ML
0.3 INJECTION SUBCUTANEOUS EVERY 5 MIN PRN
Status: CANCELLED | OUTPATIENT
Start: 2024-01-02

## 2023-12-19 RX ORDER — FAMOTIDINE 10 MG/ML
20 INJECTION INTRAVENOUS ONCE AS NEEDED
Status: CANCELLED | OUTPATIENT
Start: 2024-01-02

## 2023-12-19 RX ORDER — ONDANSETRON HYDROCHLORIDE 2 MG/ML
8 INJECTION, SOLUTION INTRAVENOUS ONCE
Status: CANCELLED | OUTPATIENT
Start: 2024-01-02

## 2023-12-19 RX ORDER — FAMOTIDINE 10 MG/ML
20 INJECTION INTRAVENOUS ONCE AS NEEDED
Status: CANCELLED | OUTPATIENT
Start: 2023-12-26

## 2023-12-22 ENCOUNTER — HOSPITAL ENCOUNTER (OUTPATIENT)
Dept: RADIOLOGY | Facility: HOSPITAL | Age: 63
Discharge: HOME | End: 2023-12-22
Payer: COMMERCIAL

## 2023-12-22 DIAGNOSIS — C79.51 METASTATIC CANCER TO SPINE (MULTI): ICD-10-CM

## 2023-12-22 PROCEDURE — 74177 CT ABD & PELVIS W/CONTRAST: CPT

## 2023-12-22 PROCEDURE — 2550000001 HC RX 255 CONTRASTS: Performed by: NURSE PRACTITIONER

## 2023-12-22 PROCEDURE — 2500000004 HC RX 250 GENERAL PHARMACY W/ HCPCS (ALT 636 FOR OP/ED)

## 2023-12-22 PROCEDURE — 74177 CT ABD & PELVIS W/CONTRAST: CPT | Performed by: RADIOLOGY

## 2023-12-22 PROCEDURE — 71260 CT THORAX DX C+: CPT | Performed by: RADIOLOGY

## 2023-12-22 RX ORDER — HEPARIN SODIUM,PORCINE/PF 10 UNIT/ML
50 SYRINGE (ML) INTRAVENOUS AS NEEDED
Status: DISCONTINUED | OUTPATIENT
Start: 2023-12-22 | End: 2023-12-22

## 2023-12-22 RX ORDER — HEPARIN SODIUM,PORCINE/PF 10 UNIT/ML
50 SYRINGE (ML) INTRAVENOUS EVERY 12 HOURS
Status: DISCONTINUED | OUTPATIENT
Start: 2023-12-22 | End: 2023-12-23 | Stop reason: HOSPADM

## 2023-12-22 RX ORDER — HEPARIN SODIUM,PORCINE/PF 10 UNIT/ML
50 SYRINGE (ML) INTRAVENOUS EVERY 12 HOURS
Status: DISCONTINUED | OUTPATIENT
Start: 2023-12-22 | End: 2023-12-22

## 2023-12-22 RX ORDER — HEPARIN 100 UNIT/ML
SYRINGE INTRAVENOUS
Status: COMPLETED
Start: 2023-12-22 | End: 2023-12-22

## 2023-12-22 RX ORDER — HEPARIN 100 UNIT/ML
500 SYRINGE INTRAVENOUS ONCE AS NEEDED
Status: COMPLETED | OUTPATIENT
Start: 2023-12-22 | End: 2023-12-22

## 2023-12-22 RX ADMIN — SODIUM CHLORIDE, PRESERVATIVE FREE 500 UNITS: 5 INJECTION INTRAVENOUS at 10:39

## 2023-12-22 RX ADMIN — HEPARIN 500 UNITS: 100 SYRINGE at 10:39

## 2023-12-22 RX ADMIN — IOHEXOL 75 ML: 350 INJECTION, SOLUTION INTRAVENOUS at 10:44

## 2023-12-26 ENCOUNTER — INFUSION (OUTPATIENT)
Dept: HEMATOLOGY/ONCOLOGY | Facility: CLINIC | Age: 63
End: 2023-12-26
Payer: COMMERCIAL

## 2023-12-26 VITALS
HEART RATE: 78 BPM | RESPIRATION RATE: 16 BRPM | WEIGHT: 137.02 LBS | DIASTOLIC BLOOD PRESSURE: 74 MMHG | SYSTOLIC BLOOD PRESSURE: 120 MMHG | BODY MASS INDEX: 25.89 KG/M2 | TEMPERATURE: 97 F | OXYGEN SATURATION: 97 %

## 2023-12-26 DIAGNOSIS — C79.51 CARCINOMA OF RIGHT BREAST METASTATIC TO BONE (MULTI): ICD-10-CM

## 2023-12-26 DIAGNOSIS — C41.9 MALIGNANT NEOPLASM OF BONE WITH METASTASES (MULTI): ICD-10-CM

## 2023-12-26 DIAGNOSIS — C50.911 CARCINOMA OF RIGHT BREAST METASTATIC TO BONE (MULTI): ICD-10-CM

## 2023-12-26 DIAGNOSIS — C41.9 MALIGNANT NEOPLASM OF BONE WITH METASTASES (MULTI): Primary | ICD-10-CM

## 2023-12-26 LAB
ALBUMIN SERPL BCP-MCNC: 4 G/DL (ref 3.4–5)
ALP SERPL-CCNC: 50 U/L (ref 33–136)
ALT SERPL W P-5'-P-CCNC: 13 U/L (ref 7–45)
ANION GAP SERPL CALC-SCNC: 11 MMOL/L (ref 10–20)
AST SERPL W P-5'-P-CCNC: 19 U/L (ref 9–39)
BASOPHILS # BLD AUTO: 0.02 X10*3/UL (ref 0–0.1)
BASOPHILS NFR BLD AUTO: 0.4 %
BILIRUB SERPL-MCNC: 0.5 MG/DL (ref 0–1.2)
BUN SERPL-MCNC: 19 MG/DL (ref 6–23)
CALCIUM SERPL-MCNC: 8.7 MG/DL (ref 8.6–10.3)
CHLORIDE SERPL-SCNC: 107 MMOL/L (ref 98–107)
CO2 SERPL-SCNC: 27 MMOL/L (ref 21–32)
CREAT SERPL-MCNC: 0.68 MG/DL (ref 0.5–1.05)
EOSINOPHIL # BLD AUTO: 0.21 X10*3/UL (ref 0–0.7)
EOSINOPHIL NFR BLD AUTO: 4.3 %
ERYTHROCYTE [DISTWIDTH] IN BLOOD BY AUTOMATED COUNT: 15 % (ref 11.5–14.5)
GFR SERPL CREATININE-BSD FRML MDRD: >90 ML/MIN/1.73M*2
GLUCOSE SERPL-MCNC: 99 MG/DL (ref 74–99)
HCT VFR BLD AUTO: 35.3 % (ref 36–46)
HGB BLD-MCNC: 11.7 G/DL (ref 12–16)
IMM GRANULOCYTES # BLD AUTO: 0.01 X10*3/UL (ref 0–0.7)
IMM GRANULOCYTES NFR BLD AUTO: 0.2 % (ref 0–0.9)
LYMPHOCYTES # BLD AUTO: 1.54 X10*3/UL (ref 1.2–4.8)
LYMPHOCYTES NFR BLD AUTO: 31.7 %
MCH RBC QN AUTO: 29.9 PG (ref 26–34)
MCHC RBC AUTO-ENTMCNC: 33.1 G/DL (ref 32–36)
MCV RBC AUTO: 90 FL (ref 80–100)
MONOCYTES # BLD AUTO: 0.53 X10*3/UL (ref 0.1–1)
MONOCYTES NFR BLD AUTO: 10.9 %
NEUTROPHILS # BLD AUTO: 2.55 X10*3/UL (ref 1.2–7.7)
NEUTROPHILS NFR BLD AUTO: 52.5 %
PLATELET # BLD AUTO: 205 X10*3/UL (ref 150–450)
POTASSIUM SERPL-SCNC: 3.9 MMOL/L (ref 3.5–5.3)
PROT SERPL-MCNC: 5.8 G/DL (ref 6.4–8.2)
RBC # BLD AUTO: 3.91 X10*6/UL (ref 4–5.2)
SODIUM SERPL-SCNC: 141 MMOL/L (ref 136–145)
WBC # BLD AUTO: 4.9 X10*3/UL (ref 4.4–11.3)

## 2023-12-26 PROCEDURE — 80053 COMPREHEN METABOLIC PANEL: CPT

## 2023-12-26 PROCEDURE — 85025 COMPLETE CBC W/AUTO DIFF WBC: CPT

## 2023-12-26 PROCEDURE — 96375 TX/PRO/DX INJ NEW DRUG ADDON: CPT | Mod: INF

## 2023-12-26 PROCEDURE — 96413 CHEMO IV INFUSION 1 HR: CPT

## 2023-12-26 PROCEDURE — 2500000004 HC RX 250 GENERAL PHARMACY W/ HCPCS (ALT 636 FOR OP/ED): Performed by: INTERNAL MEDICINE

## 2023-12-26 RX ORDER — ALBUTEROL SULFATE 0.83 MG/ML
3 SOLUTION RESPIRATORY (INHALATION) AS NEEDED
Status: DISCONTINUED | OUTPATIENT
Start: 2023-12-26 | End: 2023-12-26 | Stop reason: HOSPADM

## 2023-12-26 RX ORDER — HEPARIN 100 UNIT/ML
500 SYRINGE INTRAVENOUS AS NEEDED
Status: CANCELLED | OUTPATIENT
Start: 2023-12-26

## 2023-12-26 RX ORDER — ONDANSETRON HYDROCHLORIDE 2 MG/ML
8 INJECTION, SOLUTION INTRAVENOUS ONCE
Status: COMPLETED | OUTPATIENT
Start: 2023-12-26 | End: 2023-12-26

## 2023-12-26 RX ORDER — FAMOTIDINE 10 MG/ML
20 INJECTION INTRAVENOUS ONCE AS NEEDED
Status: DISCONTINUED | OUTPATIENT
Start: 2023-12-26 | End: 2023-12-26 | Stop reason: HOSPADM

## 2023-12-26 RX ORDER — HEPARIN 100 UNIT/ML
500 SYRINGE INTRAVENOUS AS NEEDED
Status: DISCONTINUED | OUTPATIENT
Start: 2023-12-26 | End: 2023-12-26 | Stop reason: HOSPADM

## 2023-12-26 RX ORDER — HEPARIN SODIUM,PORCINE/PF 10 UNIT/ML
50 SYRINGE (ML) INTRAVENOUS AS NEEDED
Status: CANCELLED | OUTPATIENT
Start: 2023-12-26

## 2023-12-26 RX ORDER — DIPHENHYDRAMINE HYDROCHLORIDE 50 MG/ML
50 INJECTION INTRAMUSCULAR; INTRAVENOUS AS NEEDED
Status: DISCONTINUED | OUTPATIENT
Start: 2023-12-26 | End: 2023-12-26 | Stop reason: HOSPADM

## 2023-12-26 RX ORDER — EPINEPHRINE 0.3 MG/.3ML
0.3 INJECTION SUBCUTANEOUS EVERY 5 MIN PRN
Status: DISCONTINUED | OUTPATIENT
Start: 2023-12-26 | End: 2023-12-26 | Stop reason: HOSPADM

## 2023-12-26 RX ADMIN — HEPARIN 500 UNITS: 100 SYRINGE at 11:35

## 2023-12-26 RX ADMIN — PACLITAXEL 130 MG: 100 INJECTION, POWDER, LYOPHILIZED, FOR SUSPENSION INTRAVENOUS at 09:45

## 2023-12-26 RX ADMIN — ONDANSETRON 8 MG: 2 INJECTION INTRAMUSCULAR; INTRAVENOUS at 09:16

## 2023-12-26 ASSESSMENT — PAIN SCALES - GENERAL: PAINLEVEL: 0-NO PAIN

## 2023-12-26 NOTE — SIGNIFICANT EVENT
12/26/23 0828   Prechemo Checklist   Has the patient been in the hospital, ED, or urgent care since last date of service No   Chemo/Immuno Consent Signed Yes   Protocol/Indications Verified Yes   Confirmed to previous date/time of medication Yes   Compared to previous dose Yes   All medications are dated accurately Yes   Pregnancy Test Negative Not applicable   Parameters Met Yes   BSA/Weight-Height Verified Yes   Dose Calculations Verified Yes

## 2023-12-27 ENCOUNTER — LAB (OUTPATIENT)
Dept: LAB | Facility: LAB | Age: 63
End: 2023-12-27
Payer: COMMERCIAL

## 2023-12-27 ENCOUNTER — TELEPHONE (OUTPATIENT)
Dept: HEMATOLOGY/ONCOLOGY | Facility: HOSPITAL | Age: 63
End: 2023-12-27

## 2023-12-27 ENCOUNTER — OFFICE VISIT (OUTPATIENT)
Dept: HEMATOLOGY/ONCOLOGY | Facility: CLINIC | Age: 63
End: 2023-12-27
Payer: COMMERCIAL

## 2023-12-27 VITALS
WEIGHT: 137.13 LBS | OXYGEN SATURATION: 96 % | HEART RATE: 66 BPM | DIASTOLIC BLOOD PRESSURE: 74 MMHG | SYSTOLIC BLOOD PRESSURE: 110 MMHG | TEMPERATURE: 97.5 F | BODY MASS INDEX: 25.91 KG/M2 | RESPIRATION RATE: 18 BRPM

## 2023-12-27 DIAGNOSIS — Z79.899 ENCOUNTER FOR MONITORING CARDIOTOXIC DRUG THERAPY: ICD-10-CM

## 2023-12-27 DIAGNOSIS — C50.911 CARCINOMA OF RIGHT BREAST METASTATIC TO BONE (MULTI): ICD-10-CM

## 2023-12-27 DIAGNOSIS — C41.9 MALIGNANT NEOPLASM OF BONE WITH METASTASES (MULTI): ICD-10-CM

## 2023-12-27 DIAGNOSIS — C79.51 CARCINOMA OF RIGHT BREAST METASTATIC TO BONE (MULTI): ICD-10-CM

## 2023-12-27 DIAGNOSIS — C79.51 CARCINOMA OF RIGHT BREAST METASTATIC TO BONE (MULTI): Primary | ICD-10-CM

## 2023-12-27 DIAGNOSIS — C50.911 CARCINOMA OF RIGHT BREAST METASTATIC TO BONE (MULTI): Primary | ICD-10-CM

## 2023-12-27 DIAGNOSIS — C79.51 METASTATIC CANCER TO SPINE (MULTI): ICD-10-CM

## 2023-12-27 DIAGNOSIS — Z51.81 ENCOUNTER FOR MONITORING CARDIOTOXIC DRUG THERAPY: ICD-10-CM

## 2023-12-27 DIAGNOSIS — Z51.11 ENCOUNTER FOR ANTINEOPLASTIC CHEMOTHERAPY: ICD-10-CM

## 2023-12-27 LAB — CANCER AG27-29 SERPL-ACNC: 276.1 U/ML (ref 0–38.6)

## 2023-12-27 PROCEDURE — 86300 IMMUNOASSAY TUMOR CA 15-3: CPT

## 2023-12-27 PROCEDURE — 99215 OFFICE O/P EST HI 40 MIN: CPT | Performed by: NURSE PRACTITIONER

## 2023-12-27 PROCEDURE — 1036F TOBACCO NON-USER: CPT | Performed by: NURSE PRACTITIONER

## 2023-12-27 ASSESSMENT — PAIN SCALES - GENERAL: PAINLEVEL: 0-NO PAIN

## 2023-12-27 ASSESSMENT — ENCOUNTER SYMPTOMS
DEPRESSION: 0
LOSS OF SENSATION IN FEET: 0
OCCASIONAL FEELINGS OF UNSTEADINESS: 0

## 2023-12-27 NOTE — PATIENT INSTRUCTIONS
Planned next Abraxane infusion Day 8 is scheduled for 1/2/24, then due to travel will return to treatment on 1/29/24 (previously scheduled for 2/5/24).     I will call you for the updated plan once Ca 27.29 is resulted     Office visit Dr Bebeto Tolbert on 1/31/24    Please call [phone] with any questions or concerns prior to your next office visit.

## 2023-12-27 NOTE — PROGRESS NOTES
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     HISTORY OF PRESENT ILLNESS    Trang Jones is a 63 y.o. woman who presents today for metastatic breast cancer treatment follow-up. She is doing well. She reports feeling an increase in achy joints and bones with stable left leg intermittent pains. She continues on dose of gabapentin (300mg QAM and 600mg QPM) used for scoliosis pain. She has stayed on the lower dose of morphine but is taking ibuprofen 400mg approximately 2 times per week. She reports generally feeling slower with her activity level, is walking less miles but also attributes this to cold weather.       She reports neuropathy has stayed rare intermittent and mild. She reports now only fingertips and lasts about 15-20 min.     She denies any chest pain or breathing issues, no cough or sob.      She denies any vision changes or headache issues, dizziness or loss of balance, no falls    She denies any skin lesions or masses, oral sores lesions or infections    She reports a lower appetite- has poor appetite in the evening. Is eating roughly 2 meals per day with rare snacks. She denies any weight loss.  She reports normal bowel movements, normal urination    She denies any issues with sleep, she reports lingering fatigue. She does not need naps, tries to keep moving most of the day.       REVIEW OF SYSTEMS   ROS 14 points performed, See HPI for exceptions       Past Medical History:  has no past medical history on file.  Surgical History:   has a past surgical history that includes CT guided percutaneous biopsy  bone deep (4/13/2023).  Social History:   reports that she quit smoking about 21 years ago. Her smoking use included cigarettes. She has been exposed to tobacco smoke. She has never used smokeless tobacco. She reports that she does not currently use alcohol. She reports current drug use. Drug: Marijuana.-lives at home with her , José Miguel, 1 dog and 1 cat. Has 2 grown daughters--Tonia and Rubi. One 2 year old grand daughter and one grand child on the way.-enjoys cooking, yoga, being outside, walking her dog. Interested in integrative medicine-- dietary changes, acupuncture.  Social Substance History:  ·  Smoking Status former smoker (1)   ·  Additional History       Family History:  No family history on file.  Family Oncology History:  Cancer-related family history is not on file.      OBJECTIVE    VS / Pain:  /74   Pulse 66   Temp 36.4 °C (97.5 °F)   Resp 18   Wt 62.2 kg (137 lb 2 oz)   SpO2 96%   BMI 25.91 kg/m²   BSA: 1.64 meters squared   Pain Scale: 3    Performance Status:  The ECOG performance scale today is Symptomatic; in bed <50% of the day    Physical Exam   Constitutional: Well developed, awake/alert/oriented x4, no distress, alert and cooperative  EYES: Sclera clear  ENMT: mucous membranes moist, no apparent injury, no lesions seen  Head/Neck: Neck supple, no apparent injury, thyroid without mass or tenderness, No JVD, trachea midline, no bruits  Respiratory / Thoracic: Patent airways, clear to all lobes, normal breath sounds with good chest expansion, thorax symmetric.  Cardiovascular: Regular, rate and rhythm, no murmurs, 2+ equal pulses of the extremities, normal auscultated S 1and S 2  GI: Nondistended, soft, non-tender, no rebound tenderness or guarding, no masses palpable, no organomegaly, +BS, no bruits  Musculoskeletal: ROM intact, no joint swelling, normal strength, no spinal tenderness  Extremities: normal extremities, no cyanosis edema, contusions or wounds, no  clubbing  Neurological: alert and oriented x4, intact senses, motor, response and reflexes, normal strength  Breast: No palpable masses or lesions in either breast   Lymphatic: No cervical, supraclavicular, infraclavicular or axillary lymphadenopathy  Psychological: Appropriate and talkative mood and behavior  Skin: Warm and dry, no lesions, no rashes, no jaundice      Diagnostic Results   Bone scan 9/2023  IMPRESSION:   1. Diffuse osseous metastatic disease of the axial and appendicular   skeleton as described above including the right humerus, sternum,   thoracic spine, pelvis, and the left femur as described above.   2. Increased radiotracer activity of the left 9th rib and right 2nd   and 3rd ribs compatible with a traumatic etiology.     Narrative & Impression   Interpreted By:  Jyotsna Benoit,   STUDY:  CT CHEST ABDOMEN PELVIS W IV CONTRAST;  12/22/2023 10:40 am      INDICATION:  Signs/Symptoms:Metastatic breast cancer restaging scans.      COMPARISON:  09/26/2023      ACCESSION NUMBER(S):  [id number]      ORDERING CLINICIAN:  YESSICA SHELTON      TECHNIQUE:  CT of the chest, abdomen, and pelvis was performed.  Contiguous axial  images were obtained at 3 mm slice thickness through the chest,  abdomen and pelvis. Coronal and sagittal reconstructions at 3 mm  slice thickness were performed.  75 ml of contrast Omnipaque 350 were administered intravenously  without immediate complication.      FINDINGS:  Lungs and Pleura: Streaky bibasilar atelectasis. No pulmonary  consolidation. No pleural effusion. No pneumothorax.      Mediastinum: No adenopathy by CT size criteria. Aberrant right  subclavian artery. No cardiomegaly or pericardial effusion. No  thoracic aortic aneurysm. Right-sided Port-A-Cath. Coronary artery  calcifications. Mitral calcifications.      Liver: Development of a small 9 mm hypodensity in the right hepatic  lobe near the dome. Otherwise stable hypodensities throughout the  liver.       Gallbladder and Biliary: Unremarkable.      Pancreas: Redemonstrated calcifications in the region of the  pancreatic head.      Spleen: No abnormality identified in the spleen.      Adrenals: No abnormality identified in either adrenal gland.      Urinary: Couple of small subcentimeter bilateral renal hypodensities  too small to characterize but statistically likely representing  cysts. No hydronephrosis.      Gastrointestinal/Peritoneum: No small or large bowel obstruction in  the visualized abdomen. In the abdomen, there is no extraluminal air.  No significant free fluid. No evidence of acute appendicitis.      Vascular: Abdominal aorta is normal in caliber.      Lymphatics: No enlarged lymph nodes by size criteria.      MSK/Body Wall/Chest Wall: Multifocal sclerotic metastasis are seen  throughout the axillary and appendicular skeleton, overall similar in  appearance. For example, a right proximal femoral lesion measures 13  mm, stable. A sacral lesion measures 11 mm, stable. Redemonstrated  moderate to severe compression fracture T5 vertebral body, mild at  T10 vertebral body. Degenerative changes in the spine with  dextroscoliosis at the thoracolumbar junction. Left-sided breast  implants.      IMPRESSION:  Overall similar appearance of diffuse osseous metastatic disease.      New subcentimeter lesion in the right hepatic lobe. Question  metastatic disease. Otherwise similar appearance of small  hypodensities throughout the liver.      Signed by: Jyotsna Benoit 12/23/2023 4:09 PM           LABORATORY DATA  Lab Results   Component Value Date    WBC 4.9 12/26/2023    HGB 11.7 (L) 12/26/2023    HCT 35.3 (L) 12/26/2023    MCV 90 12/26/2023     12/26/2023        Chemistry    Lab Results   Component Value Date/Time     12/26/2023 0811    K 3.9 12/26/2023 0811     12/26/2023 0811    CO2 27 12/26/2023 0811    BUN 19 12/26/2023 0811    CREATININE 0.68 12/26/2023 0811    Lab Results   Component  Value Date/Time    CALCIUM 8.7 12/26/2023 0811    ALKPHOS 50 12/26/2023 0811    AST 19 12/26/2023 0811    ALT 13 12/26/2023 0811    BILITOT 0.5 12/26/2023 0811               IMPRESSION/PLAN    1. 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   Due to reaction to paclitaxel on 6/12/23 was transitioned to Abraxane 80 mg/m2. Sept 2023 Restaging scans stable however updated scan reviewed today is showing new liver lesion. Pending Ca 27.29, Dr Tolbert would like to consider change of therapy to Trastuzumab Deruxtecan (Enhertu). Due to mutation found on May 2023 tempus testing, trastuzumab deruxtecan (TDxD) which has shown activity in cancers with HER2 kinase domain mutations. We have reviewed with this therapy change she would need a baseline Echo study of the heart.      Planned drug holiday in Jan 2024 as reviewed previously with Dr Bebeto Tolbert. Continue with nab-paclitaxel weekly days 1 and 8 until further determined with pending Ca 27.29.      Continue Q3 month zometa (due 2/5/24)           At least 25 minutes of direct consultation was spent with the patient today reviewing her cancer care plan, educating and answering questions regarding ongoing follow up, greater than 50% in counseling and coordination of care          -------------------------------------------------------------------------------------------------------------------------------  Julee Spears MSN, APRN, FNP-C  [facility]  Division of Medical Oncology- Breast   Collaborating Physician Dr. Bebeto Tolbert   [place] [address]  Phone: [phone]  Fax: [phone]  Available via Secure Chat    Confidential Peer Review Document-  Privilege  Privileged Pursuant to [place] Revised Code Section 2305.24, .25, .251 & .252

## 2023-12-28 PROBLEM — Z51.81 ENCOUNTER FOR MONITORING CARDIOTOXIC DRUG THERAPY: Status: ACTIVE | Noted: 2023-12-28

## 2023-12-28 PROBLEM — Z79.899 ENCOUNTER FOR MONITORING CARDIOTOXIC DRUG THERAPY: Status: ACTIVE | Noted: 2023-12-28

## 2023-12-28 RX ORDER — GABAPENTIN 300 MG/1
CAPSULE ORAL
Qty: 90 CAPSULE | Refills: 0 | Status: SHIPPED | OUTPATIENT
Start: 2023-12-28 | End: 2024-02-06 | Stop reason: SDUPTHER

## 2023-12-28 RX ORDER — MORPHINE SULFATE 15 MG/1
15 TABLET, FILM COATED, EXTENDED RELEASE ORAL 3 TIMES DAILY
Qty: 90 TABLET | Refills: 0 | Status: SHIPPED | OUTPATIENT
Start: 2023-12-28 | End: 2024-01-30 | Stop reason: SDUPTHER

## 2023-12-28 NOTE — TELEPHONE ENCOUNTER
OARRS report reviewed and reflects  prescription history, no aberrancy noted. Per OARRS, patient last filled MSER 15mg #36/18 days on 12/12/23 so due for refill on 12/29/23 as previously planned so patient can get full 30 day supply prior to a 3 week vacation on 1/3/24. Patient reports worsening bone pain to lumbar spine, pelvis, hips, and shoulders and is wondering if her MSER could be increased to TID (per phone note yesterday with onc, patient with disease progression with new liver lesion). Patient has Oxycodone at home but does not like to use so uses very sparingly (filled last #180 on 6/9/23 and patient states she has a lot left if needed). Per OARRS, patient last filled Gabapentin 300mg #90/30 on 12/7/23 (we planned for her to fill #54/18 also on 12/12/23 and sent script to pharmacy - it appears they filled an old refill instead so now patient will not be due for another refill until 1/5/24 after she will already be out of town). I confirmed with patient she did receive #90/30 at last fill. Patient will need this filled early due to vacation. Patient has a phone visit with Jennifer on 1/2/24 but states her infusion was cx at [facility] that day so if Radha needs to see her in person at [place] that day she could come in person. Per EMR, patient last seen in person 8/15/23 so is due for in person visit. I will discuss above with provider and call patient back with an update.

## 2023-12-28 NOTE — TELEPHONE ENCOUNTER
Call to pt to review progression of Ca .2729 is representing disease progression given updated scan is showing new liver lesion. I have reviewed with her Dr Tolbert would like to stop abraxane and plan to change therapy TDxD on 1/29/24. She is agreeable to this plan. Message will be sent to our clinical PharmD Kehinde Marshall clinical PharmD  To complete new drug education with the pt.

## 2023-12-28 NOTE — TELEPHONE ENCOUNTER
Per Jennifer Duarte, CNP okay to increase MSER 15mg TID for fill today and Gabapentin 300mg for early vacation fill 1/1/24. I will call pharmacy to see if PA needed for MSER and update them on Guilherme script. If patient has any problems filling Guilherme script on 1/1/24 (not due until 1/5/24), she can let us know at visit on 1/2/24 so we can assist prior to her leaving on vacation on 1/3/24. Jennifer mireles to keep appointment phone if patient prefers on 1/2/24. Patient updated and will keep appointment phone but aware the follow up will need to be in person. I will update patient once I speak to the pharmacy later.

## 2023-12-28 NOTE — TELEPHONE ENCOUNTER
Pharmacy notified of Guilherme early fill on 1/1/24 and says no PA needed on MSER. Patient updated.

## 2024-01-02 ENCOUNTER — INFUSION (OUTPATIENT)
Dept: HEMATOLOGY/ONCOLOGY | Facility: CLINIC | Age: 64
End: 2024-01-02
Payer: COMMERCIAL

## 2024-01-02 ENCOUNTER — TELEMEDICINE (OUTPATIENT)
Dept: PALLIATIVE MEDICINE | Facility: CLINIC | Age: 64
End: 2024-01-02
Payer: COMMERCIAL

## 2024-01-02 ENCOUNTER — HOSPITAL ENCOUNTER (OUTPATIENT)
Dept: CARDIOLOGY | Facility: HOSPITAL | Age: 64
Discharge: HOME | End: 2024-01-02
Payer: COMMERCIAL

## 2024-01-02 ENCOUNTER — APPOINTMENT (OUTPATIENT)
Dept: PALLIATIVE MEDICINE | Facility: CLINIC | Age: 64
End: 2024-01-02
Payer: COMMERCIAL

## 2024-01-02 DIAGNOSIS — C79.51 CARCINOMA OF RIGHT BREAST METASTATIC TO BONE (MULTI): ICD-10-CM

## 2024-01-02 DIAGNOSIS — M79.2 NEUROPATHIC PAIN: ICD-10-CM

## 2024-01-02 DIAGNOSIS — C50.911 CARCINOMA OF RIGHT BREAST METASTATIC TO BONE (MULTI): ICD-10-CM

## 2024-01-02 DIAGNOSIS — C50.911 CARCINOMA OF RIGHT BREAST METASTATIC TO BONE (MULTI): Primary | ICD-10-CM

## 2024-01-02 DIAGNOSIS — C79.51 CARCINOMA OF RIGHT BREAST METASTATIC TO BONE (MULTI): Primary | ICD-10-CM

## 2024-01-02 DIAGNOSIS — Z85.3 HISTORY OF BREAST CANCER IN FEMALE: ICD-10-CM

## 2024-01-02 DIAGNOSIS — C41.9 MALIGNANT NEOPLASM OF BONE WITH METASTASES (MULTI): ICD-10-CM

## 2024-01-02 DIAGNOSIS — G89.3 CANCER RELATED PAIN: ICD-10-CM

## 2024-01-02 DIAGNOSIS — Z51.81 ENCOUNTER FOR MONITORING CARDIOTOXIC DRUG THERAPY: ICD-10-CM

## 2024-01-02 DIAGNOSIS — Z79.899 ENCOUNTER FOR MONITORING CARDIOTOXIC DRUG THERAPY: ICD-10-CM

## 2024-01-02 DIAGNOSIS — Z51.5 PALLIATIVE CARE ENCOUNTER: Primary | ICD-10-CM

## 2024-01-02 LAB
ALBUMIN SERPL BCP-MCNC: 4.4 G/DL (ref 3.4–5)
ALP SERPL-CCNC: 51 U/L (ref 33–136)
ALT SERPL W P-5'-P-CCNC: 10 U/L (ref 7–45)
ANION GAP SERPL CALC-SCNC: 11 MMOL/L (ref 10–20)
AST SERPL W P-5'-P-CCNC: 17 U/L (ref 9–39)
BILIRUB SERPL-MCNC: 0.4 MG/DL (ref 0–1.2)
BUN SERPL-MCNC: 21 MG/DL (ref 6–23)
CALCIUM SERPL-MCNC: 9.3 MG/DL (ref 8.6–10.3)
CHLORIDE SERPL-SCNC: 105 MMOL/L (ref 98–107)
CO2 SERPL-SCNC: 28 MMOL/L (ref 21–32)
CREAT SERPL-MCNC: 0.64 MG/DL (ref 0.5–1.05)
GFR SERPL CREATININE-BSD FRML MDRD: >90 ML/MIN/1.73M*2
GLUCOSE SERPL-MCNC: 99 MG/DL (ref 74–99)
POTASSIUM SERPL-SCNC: 4.4 MMOL/L (ref 3.5–5.3)
PROT SERPL-MCNC: 6.1 G/DL (ref 6.4–8.2)
SODIUM SERPL-SCNC: 140 MMOL/L (ref 136–145)

## 2024-01-02 PROCEDURE — 80053 COMPREHEN METABOLIC PANEL: CPT

## 2024-01-02 PROCEDURE — 86300 IMMUNOASSAY TUMOR CA 15-3: CPT

## 2024-01-02 PROCEDURE — 93308 TTE F-UP OR LMTD: CPT

## 2024-01-02 PROCEDURE — 93308 TTE F-UP OR LMTD: CPT | Performed by: INTERNAL MEDICINE

## 2024-01-02 PROCEDURE — 36591 DRAW BLOOD OFF VENOUS DEVICE: CPT

## 2024-01-02 PROCEDURE — 99213 OFFICE O/P EST LOW 20 MIN: CPT | Performed by: NURSE PRACTITIONER

## 2024-01-02 RX ORDER — EPINEPHRINE 0.3 MG/.3ML
0.3 INJECTION SUBCUTANEOUS EVERY 5 MIN PRN
Status: CANCELLED | OUTPATIENT
Start: 2024-01-29

## 2024-01-02 RX ORDER — HEPARIN 100 UNIT/ML
500 SYRINGE INTRAVENOUS AS NEEDED
Status: CANCELLED | OUTPATIENT
Start: 2024-01-02

## 2024-01-02 RX ORDER — PALONOSETRON 0.05 MG/ML
0.25 INJECTION, SOLUTION INTRAVENOUS ONCE
Status: CANCELLED | OUTPATIENT
Start: 2024-01-29

## 2024-01-02 RX ORDER — PROCHLORPERAZINE EDISYLATE 5 MG/ML
10 INJECTION INTRAMUSCULAR; INTRAVENOUS EVERY 6 HOURS PRN
Status: CANCELLED | OUTPATIENT
Start: 2024-01-29

## 2024-01-02 RX ORDER — HEPARIN SODIUM,PORCINE/PF 10 UNIT/ML
50 SYRINGE (ML) INTRAVENOUS AS NEEDED
Status: CANCELLED | OUTPATIENT
Start: 2024-01-02

## 2024-01-02 RX ORDER — FAMOTIDINE 10 MG/ML
20 INJECTION INTRAVENOUS ONCE AS NEEDED
Status: CANCELLED | OUTPATIENT
Start: 2024-01-29

## 2024-01-02 RX ORDER — DIPHENHYDRAMINE HYDROCHLORIDE 50 MG/ML
50 INJECTION INTRAMUSCULAR; INTRAVENOUS AS NEEDED
Status: CANCELLED | OUTPATIENT
Start: 2024-01-29

## 2024-01-02 RX ORDER — HEPARIN 100 UNIT/ML
SYRINGE INTRAVENOUS
Status: DISCONTINUED
Start: 2024-01-02 | End: 2024-01-02 | Stop reason: HOSPADM

## 2024-01-02 RX ORDER — PROCHLORPERAZINE MALEATE 10 MG
10 TABLET ORAL EVERY 6 HOURS PRN
Status: CANCELLED | OUTPATIENT
Start: 2024-01-29

## 2024-01-02 RX ORDER — ALBUTEROL SULFATE 0.83 MG/ML
3 SOLUTION RESPIRATORY (INHALATION) AS NEEDED
Status: CANCELLED | OUTPATIENT
Start: 2024-01-29

## 2024-01-02 NOTE — PROGRESS NOTES
SUPPORTIVE AND PALLIATIVE ONCOLOGY OUTPATIENT FOLLOW-UP      SERVICE DATE: 1/2/2024    Cancer History  Newly diagnosed triple negative breast CA (with bone mets and cord compression)  -s/p L mastectomy in 2010: stage II ER+/HER2- breast CA  -s/p 4 cycles docetaxel/cyclophosphamide chemo  -took tamoxifen x 7 yrs, completed in [id number]  -presented to ED on 3/30/23 with severe back pain  -MRI spine showed cord compression, admitted at that time   -s/p 1 fx RT to spine on 5/4/23  -plan to start treatment with weekly paclitaxel and q21day pembro  -started treatment 5/22/23, changed to abraxane after C1 d/t anaphylactic type reaction  -CT CAP (11/14/23): Overall similar appearance of diffuse osseous metastatic disease. New subcentimeter lesion in the right hepatic lobe. Question  metastatic disease. Otherwise similar appearance of small  hypodensities throughout the liver.  -plan to stop abraxane and change therapy to TDxD, scheduled to start on 1/29/24    Med Onc: Dr. Tolbert  Integrative Medicine: Dr. Sandoval       Subjective   HISTORY OF PRESENT ILLNESS: Trang Jones is a 64 yo female with PMHx of HLD, scoliosis and newly diagnosed triple negative breast CA. She received XRT to the spine on 5/4 and will begin chemo treatment next week.     She presents to supportive oncology for follow up for pain and symptom management.     Spoke with pt on the phone for follow up today. States pain is pretty good, feels as though new midday dose of MSER has really helped. Moving her bowels well, no N/V. Still struggles with poor appetite, forces herself to eat most days. Mood is stable, keeping positive attitude despite news of disease progression. Planning to go to [facility] for second opinion, wants to explore if there are any additional treatment options. Looking forward to traveling for next 3 weeks, heading to [facility] and [place] to visit her brother.     Pain Assessment:  Pain Score:      Symptom Assessment:  Pain:a  little  Headache: none  Dizziness:none  Lack of energy: none  Difficulty sleeping: none  Worrying: none  Anxiety: none  Depression: none  Pain in mouth/swallowing: none  Dry mouth: none  Taste changes: none  Shortness of breath: none  Lack of appetite: very much   Nausea: none  Vomiting: none  Constipation: none  Diarrhea: none  Sore muscles: a little  Numbness or tingling in hands/feet/other: none  Weight loss: none      Information obtained from: interview of patient  ______________________________________________________________________        Objective                PHYSICAL EXAMINATION   Vital Signs:   Vital signs reviewed  There were no vitals filed for this visit.  Pain Score:        Physical Exam    ASSESSMENT/PLAN    Pain: upper back around shoulder blades, radiates to front of chest; dull ache, shooting with sudden movement    Pain is: cancer related pain and acute on chronic  Type: somatic and neuropathic  Pain control: well-controlled  Home regimen:   -continue Morphine Sulfate Contin 15mg tid. No Rx needed today.   -continue oxycodone 10mg every 4hrs PRN. No Rx needed today.   -continue gabapentin 300mg in the morning and 600mg at night. No Rx needed today.  -continue ibuprofen PRN   -continue to follow with Dr. Sandoval/ Agustin Dietrich for acupuncture/ reiki   -continue medical THC PRN   Intolerances/previously tried:    Opioid Use  Medication Management:   - OARRS report reviewed with no aberrant behavior; consistent with  prescriptions/records and patient history  - MED 45.  Overdose Risk Score 230.   This has been discussed with patient.   - We will continue to closely monitor the patient for signs of prescription misuse including UDS, OARRS review and subjective reports at each visit.  - no concurrent benzodiazepine use   - I am a provider who either is or has consulted and collaborated with a provider certified in Hospice and Palliative Medicine and have conducted a face-face visit and  examination for this patient.  - Routine Urine Drug Screen completed 5/9/23 appropriately positive for opioids and negative for illicit substances  - Controlled Substance Agreement completed 5/9/23  - Specifically discussed that controlled substance prescriptions will only be provided by our group as outlined in the completed agreement  - Prescribed naloxone previously prescribed  - Red Flags: none    Bowels: at risk for OIC  -educated pt on importance of maintaining daily BM  -continue daily miralax   -continue senna/colace PRN     N/V: currently stable   -continue zofran 4mg ODT q8hrs PRN   -continue migraine medication PRN     Sleep: hx of insomnia prior to cancer dx   -continue THC PRN  -encouraged pt to maintain regular sleep/wake cycle  -plan for better pain control to aid in improved sleep, see pain section above     Mood: improving    -discussed adding medication to help with mood, pt would like to hold off for now    Medical Decision Making/ GOC:   -social work referral to assist with LW/ POA paperwork        Next Follow-Up Visit:  Return to clinic in 6 weeks     Signature and billing  Medical complexity was low level due to due to complexity of problems, extensive data review, and high risk of management/treatment.  Time was spent on the following: Prep Time, Time Directly with Patient/Family/Caregiver, Documentation Time. Total time spent: 20 mins             Some elements copied from my note on 11/7/23, the elements have been updated and all reflect current decision making from today, 1/2/2024.      Plan of Care discussed with: Patient    SIGNATURE: KELSEA Garg-CNP    Contact information:  Supportive and Palliative Oncology  Monday-Friday 8 AM-5 PM  Phone:  [phone], press option #5, then option #1.   Or Epic Secure Chat

## 2024-01-03 LAB — CANCER AG27-29 SERPL-ACNC: 277.5 U/ML (ref 0–38.6)

## 2024-01-04 LAB
EJECTION FRACTION APICAL 4 CHAMBER: 62.7
EJECTION FRACTION: 57
LEFT ATRIUM VOLUME AREA LENGTH INDEX BSA: 38.9
LEFT VENTRICLE INTERNAL DIMENSION DIASTOLE: 3.92 (ref 3.5–6)
MITRAL VALVE E/A RATIO: 0.73
MITRAL VALVE E/E' RATIO: 10.3
RIGHT VENTRICLE FREE WALL PEAK S': 11.3
TRICUSPID ANNULAR PLANE SYSTOLIC EXCURSION: 1.7

## 2024-01-23 DIAGNOSIS — C50.911 CARCINOMA OF RIGHT BREAST METASTATIC TO BONE (MULTI): Primary | ICD-10-CM

## 2024-01-23 DIAGNOSIS — C41.9 MALIGNANT NEOPLASM OF BONE WITH METASTASES (MULTI): ICD-10-CM

## 2024-01-23 DIAGNOSIS — C79.51 CARCINOMA OF RIGHT BREAST METASTATIC TO BONE (MULTI): Primary | ICD-10-CM

## 2024-01-23 RX ORDER — FAMOTIDINE 10 MG/ML
20 INJECTION INTRAVENOUS ONCE AS NEEDED
Status: CANCELLED | OUTPATIENT
Start: 2024-02-19

## 2024-01-23 RX ORDER — PROCHLORPERAZINE MALEATE 10 MG
10 TABLET ORAL EVERY 6 HOURS PRN
Status: CANCELLED | OUTPATIENT
Start: 2024-02-19

## 2024-01-23 RX ORDER — PALONOSETRON 0.05 MG/ML
0.25 INJECTION, SOLUTION INTRAVENOUS ONCE
Status: CANCELLED | OUTPATIENT
Start: 2024-02-19

## 2024-01-23 RX ORDER — EPINEPHRINE 0.3 MG/.3ML
0.3 INJECTION SUBCUTANEOUS EVERY 5 MIN PRN
Status: CANCELLED | OUTPATIENT
Start: 2024-02-19

## 2024-01-23 RX ORDER — PROCHLORPERAZINE EDISYLATE 5 MG/ML
10 INJECTION INTRAMUSCULAR; INTRAVENOUS EVERY 6 HOURS PRN
Status: CANCELLED | OUTPATIENT
Start: 2024-02-19

## 2024-01-23 RX ORDER — DIPHENHYDRAMINE HYDROCHLORIDE 50 MG/ML
50 INJECTION INTRAMUSCULAR; INTRAVENOUS AS NEEDED
Status: CANCELLED | OUTPATIENT
Start: 2024-02-19

## 2024-01-23 RX ORDER — ALBUTEROL SULFATE 0.83 MG/ML
3 SOLUTION RESPIRATORY (INHALATION) AS NEEDED
Status: CANCELLED | OUTPATIENT
Start: 2024-02-19

## 2024-01-29 ENCOUNTER — INFUSION (OUTPATIENT)
Dept: HEMATOLOGY/ONCOLOGY | Facility: CLINIC | Age: 64
End: 2024-01-29
Payer: COMMERCIAL

## 2024-01-29 VITALS
HEART RATE: 61 BPM | OXYGEN SATURATION: 98 % | SYSTOLIC BLOOD PRESSURE: 136 MMHG | BODY MASS INDEX: 25.51 KG/M2 | DIASTOLIC BLOOD PRESSURE: 74 MMHG | WEIGHT: 135.03 LBS | TEMPERATURE: 96.8 F

## 2024-01-29 DIAGNOSIS — C79.51 CARCINOMA OF RIGHT BREAST METASTATIC TO BONE (MULTI): ICD-10-CM

## 2024-01-29 DIAGNOSIS — C50.911 CARCINOMA OF RIGHT BREAST METASTATIC TO BONE (MULTI): ICD-10-CM

## 2024-01-29 DIAGNOSIS — C41.9 MALIGNANT NEOPLASM OF BONE WITH METASTASES (MULTI): ICD-10-CM

## 2024-01-29 LAB
ALBUMIN SERPL BCP-MCNC: 4 G/DL (ref 3.4–5)
ALP SERPL-CCNC: 56 U/L (ref 33–136)
ALT SERPL W P-5'-P-CCNC: 11 U/L (ref 7–45)
ANION GAP SERPL CALC-SCNC: 13 MMOL/L (ref 10–20)
AST SERPL W P-5'-P-CCNC: 20 U/L (ref 9–39)
BASOPHILS # BLD AUTO: 0.02 X10*3/UL (ref 0–0.1)
BASOPHILS NFR BLD AUTO: 0.4 %
BILIRUB SERPL-MCNC: 0.5 MG/DL (ref 0–1.2)
BUN SERPL-MCNC: 15 MG/DL (ref 6–23)
CALCIUM SERPL-MCNC: 9.3 MG/DL (ref 8.6–10.3)
CANCER AG27-29 SERPL-ACNC: 350 U/ML (ref 0–38.6)
CHLORIDE SERPL-SCNC: 103 MMOL/L (ref 98–107)
CO2 SERPL-SCNC: 28 MMOL/L (ref 21–32)
CREAT SERPL-MCNC: 0.63 MG/DL (ref 0.5–1.05)
EGFRCR SERPLBLD CKD-EPI 2021: >90 ML/MIN/1.73M*2
EOSINOPHIL # BLD AUTO: 0.15 X10*3/UL (ref 0–0.7)
EOSINOPHIL NFR BLD AUTO: 2.9 %
ERYTHROCYTE [DISTWIDTH] IN BLOOD BY AUTOMATED COUNT: 14.2 % (ref 11.5–14.5)
GLUCOSE SERPL-MCNC: 100 MG/DL (ref 74–99)
HCT VFR BLD AUTO: 36.8 % (ref 36–46)
HGB BLD-MCNC: 12.3 G/DL (ref 12–16)
IMM GRANULOCYTES # BLD AUTO: 0.01 X10*3/UL (ref 0–0.7)
IMM GRANULOCYTES NFR BLD AUTO: 0.2 % (ref 0–0.9)
LYMPHOCYTES # BLD AUTO: 1.63 X10*3/UL (ref 1.2–4.8)
LYMPHOCYTES NFR BLD AUTO: 31 %
MCH RBC QN AUTO: 29.3 PG (ref 26–34)
MCHC RBC AUTO-ENTMCNC: 33.4 G/DL (ref 32–36)
MCV RBC AUTO: 88 FL (ref 80–100)
MONOCYTES # BLD AUTO: 0.48 X10*3/UL (ref 0.1–1)
MONOCYTES NFR BLD AUTO: 9.1 %
NEUTROPHILS # BLD AUTO: 2.97 X10*3/UL (ref 1.2–7.7)
NEUTROPHILS NFR BLD AUTO: 56.4 %
PLATELET # BLD AUTO: 206 X10*3/UL (ref 150–450)
POTASSIUM SERPL-SCNC: 3.8 MMOL/L (ref 3.5–5.3)
PROT SERPL-MCNC: 6 G/DL (ref 6.4–8.2)
RBC # BLD AUTO: 4.2 X10*6/UL (ref 4–5.2)
SODIUM SERPL-SCNC: 140 MMOL/L (ref 136–145)
WBC # BLD AUTO: 5.3 X10*3/UL (ref 4.4–11.3)

## 2024-01-29 PROCEDURE — 80053 COMPREHEN METABOLIC PANEL: CPT

## 2024-01-29 PROCEDURE — 2500000004 HC RX 250 GENERAL PHARMACY W/ HCPCS (ALT 636 FOR OP/ED): Performed by: INTERNAL MEDICINE

## 2024-01-29 PROCEDURE — 85025 COMPLETE CBC W/AUTO DIFF WBC: CPT

## 2024-01-29 PROCEDURE — 86300 IMMUNOASSAY TUMOR CA 15-3: CPT | Mod: GEALAB

## 2024-01-29 PROCEDURE — 2500000004 HC RX 250 GENERAL PHARMACY W/ HCPCS (ALT 636 FOR OP/ED): Mod: JZ | Performed by: INTERNAL MEDICINE

## 2024-01-29 PROCEDURE — 96413 CHEMO IV INFUSION 1 HR: CPT

## 2024-01-29 PROCEDURE — 96367 TX/PROPH/DG ADDL SEQ IV INF: CPT

## 2024-01-29 PROCEDURE — 96375 TX/PRO/DX INJ NEW DRUG ADDON: CPT | Mod: INF

## 2024-01-29 RX ORDER — DIPHENHYDRAMINE HYDROCHLORIDE 50 MG/ML
50 INJECTION INTRAMUSCULAR; INTRAVENOUS AS NEEDED
Status: DISCONTINUED | OUTPATIENT
Start: 2024-01-29 | End: 2024-01-29 | Stop reason: HOSPADM

## 2024-01-29 RX ORDER — HEPARIN SODIUM,PORCINE/PF 10 UNIT/ML
50 SYRINGE (ML) INTRAVENOUS AS NEEDED
Status: CANCELLED | OUTPATIENT
Start: 2024-01-29

## 2024-01-29 RX ORDER — PALONOSETRON 0.05 MG/ML
0.25 INJECTION, SOLUTION INTRAVENOUS ONCE
Status: COMPLETED | OUTPATIENT
Start: 2024-01-29 | End: 2024-01-29

## 2024-01-29 RX ORDER — PROCHLORPERAZINE EDISYLATE 5 MG/ML
10 INJECTION INTRAMUSCULAR; INTRAVENOUS EVERY 6 HOURS PRN
Status: DISCONTINUED | OUTPATIENT
Start: 2024-01-29 | End: 2024-01-29 | Stop reason: HOSPADM

## 2024-01-29 RX ORDER — PROCHLORPERAZINE MALEATE 10 MG
10 TABLET ORAL EVERY 6 HOURS PRN
Status: DISCONTINUED | OUTPATIENT
Start: 2024-01-29 | End: 2024-01-29 | Stop reason: HOSPADM

## 2024-01-29 RX ORDER — HEPARIN 100 UNIT/ML
500 SYRINGE INTRAVENOUS AS NEEDED
Status: DISCONTINUED | OUTPATIENT
Start: 2024-01-29 | End: 2024-01-29 | Stop reason: HOSPADM

## 2024-01-29 RX ORDER — HEPARIN 100 UNIT/ML
500 SYRINGE INTRAVENOUS AS NEEDED
Status: CANCELLED | OUTPATIENT
Start: 2024-01-29

## 2024-01-29 RX ORDER — ALBUTEROL SULFATE 0.83 MG/ML
3 SOLUTION RESPIRATORY (INHALATION) AS NEEDED
Status: DISCONTINUED | OUTPATIENT
Start: 2024-01-29 | End: 2024-01-29 | Stop reason: HOSPADM

## 2024-01-29 RX ORDER — FAMOTIDINE 10 MG/ML
20 INJECTION INTRAVENOUS ONCE AS NEEDED
Status: DISCONTINUED | OUTPATIENT
Start: 2024-01-29 | End: 2024-01-29 | Stop reason: HOSPADM

## 2024-01-29 RX ORDER — EPINEPHRINE 0.3 MG/.3ML
0.3 INJECTION SUBCUTANEOUS EVERY 5 MIN PRN
Status: DISCONTINUED | OUTPATIENT
Start: 2024-01-29 | End: 2024-01-29 | Stop reason: HOSPADM

## 2024-01-29 RX ADMIN — PALONOSETRON HYDROCHLORIDE 250 MCG: 0.25 INJECTION INTRAVENOUS at 10:00

## 2024-01-29 RX ADMIN — FOSAPREPITANT 150 MG: 150 INJECTION, POWDER, LYOPHILIZED, FOR SOLUTION INTRAVENOUS at 10:31

## 2024-01-29 RX ADMIN — HEPARIN 500 UNITS: 100 SYRINGE at 13:36

## 2024-01-29 RX ADMIN — FAM-TRASTUZUMAB DERUXTECAN-NXKI 270 MG: 100 INJECTION, POWDER, LYOPHILIZED, FOR SOLUTION INTRAVENOUS at 11:13

## 2024-01-29 RX ADMIN — DEXAMETHASONE SODIUM PHOSPHATE 12 MG: 10 INJECTION, SOLUTION INTRAMUSCULAR; INTRAVENOUS at 10:05

## 2024-01-29 ASSESSMENT — PAIN SCALES - GENERAL: PAINLEVEL: 4

## 2024-01-29 NOTE — PROGRESS NOTES
Patient received first dose of Inhertu today, tolerated well, left ambulatory in stable condition.

## 2024-01-29 NOTE — SIGNIFICANT EVENT
01/29/24 0932   Prechemo Checklist   Has the patient been in the hospital, ED, or urgent care since last date of service No   Chemo/Immuno Consent Signed Yes   Protocol/Indications Verified Yes   Confirmed to previous date/time of medication Yes   Compared to previous dose Yes   All medications are dated accurately Yes   Pregnancy Test Negative Not applicable   Parameters Met Yes   BSA/Weight-Height Verified Yes   Dose Calculations Verified Yes

## 2024-01-30 ENCOUNTER — TELEPHONE (OUTPATIENT)
Dept: HEMATOLOGY/ONCOLOGY | Facility: CLINIC | Age: 64
End: 2024-01-30
Payer: COMMERCIAL

## 2024-01-30 DIAGNOSIS — G89.3 CANCER RELATED PAIN: ICD-10-CM

## 2024-01-30 PROBLEM — Z79.83 LONG-TERM CURRENT USE OF BISPHOSPHONATE: Status: ACTIVE | Noted: 2024-01-30

## 2024-01-30 RX ORDER — MORPHINE SULFATE 15 MG/1
15 TABLET, FILM COATED, EXTENDED RELEASE ORAL 3 TIMES DAILY
Qty: 90 TABLET | Refills: 0 | Status: SHIPPED | OUTPATIENT
Start: 2024-01-30 | End: 2024-02-06 | Stop reason: SDUPTHER

## 2024-01-30 NOTE — TELEPHONE ENCOUNTER
OARRS reviewed and no aberrancy noted. Prescription pended to provider to approve. FUV 2/6 with Radha Duarte.

## 2024-01-31 ENCOUNTER — OFFICE VISIT (OUTPATIENT)
Dept: HEMATOLOGY/ONCOLOGY | Facility: CLINIC | Age: 64
End: 2024-01-31
Payer: COMMERCIAL

## 2024-01-31 ENCOUNTER — APPOINTMENT (OUTPATIENT)
Dept: HEMATOLOGY/ONCOLOGY | Facility: CLINIC | Age: 64
End: 2024-01-31
Payer: COMMERCIAL

## 2024-01-31 VITALS
BODY MASS INDEX: 25.7 KG/M2 | OXYGEN SATURATION: 99 % | DIASTOLIC BLOOD PRESSURE: 75 MMHG | RESPIRATION RATE: 18 BRPM | SYSTOLIC BLOOD PRESSURE: 122 MMHG | TEMPERATURE: 98.1 F | HEART RATE: 53 BPM | WEIGHT: 136.02 LBS

## 2024-01-31 DIAGNOSIS — Z51.81 ENCOUNTER FOR MONITORING CARDIOTOXIC DRUG THERAPY: ICD-10-CM

## 2024-01-31 DIAGNOSIS — C41.9 MALIGNANT NEOPLASM OF BONE WITH METASTASES (MULTI): Primary | ICD-10-CM

## 2024-01-31 DIAGNOSIS — Z79.899 ENCOUNTER FOR MONITORING CARDIOTOXIC DRUG THERAPY: ICD-10-CM

## 2024-01-31 DIAGNOSIS — M25.552 LEFT HIP PAIN: ICD-10-CM

## 2024-01-31 DIAGNOSIS — Z51.11 ENCOUNTER FOR ANTINEOPLASTIC CHEMOTHERAPY: ICD-10-CM

## 2024-01-31 DIAGNOSIS — C50.911 CARCINOMA OF RIGHT BREAST METASTATIC TO BONE (MULTI): ICD-10-CM

## 2024-01-31 DIAGNOSIS — Z79.83 LONG-TERM CURRENT USE OF BISPHOSPHONATE: ICD-10-CM

## 2024-01-31 DIAGNOSIS — M54.50 ACUTE LEFT-SIDED LOW BACK PAIN WITHOUT SCIATICA: ICD-10-CM

## 2024-01-31 DIAGNOSIS — C79.51 CARCINOMA OF RIGHT BREAST METASTATIC TO BONE (MULTI): ICD-10-CM

## 2024-01-31 PROCEDURE — 99215 OFFICE O/P EST HI 40 MIN: CPT | Performed by: NURSE PRACTITIONER

## 2024-01-31 PROCEDURE — 1036F TOBACCO NON-USER: CPT | Performed by: NURSE PRACTITIONER

## 2024-01-31 RX ORDER — PROCHLORPERAZINE MALEATE 10 MG
10 TABLET ORAL EVERY 6 HOURS PRN
Qty: 30 TABLET | Refills: 3 | Status: SHIPPED | OUTPATIENT
Start: 2024-01-31 | End: 2024-03-31

## 2024-01-31 ASSESSMENT — PAIN SCALES - GENERAL: PAINLEVEL: 2

## 2024-01-31 NOTE — PROGRESS NOTES
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     Current Therapy   1/29/24 Enhertu Q3 week 5.4mg/kg       HISTORY OF PRESENT ILLNESS    Trang Jones is a 63 y.o. woman who presents today for metastatic breast cancer treatment follow-up. Due to liver progression in Dec 2023 imaging initiated new therapy with Enhertu on 1/29/24. She is accompanied today by her  and daughter Tonia. She has questions today regarding Ca 27.29 test and ups and downs. She is asking about at what point a liver bx would be warranted.     She reports increased pain in her lower legs, left specifically. She continue on gabapentin, is worried about cancer progression. She reports pain in left femur and hip was at its worst with hiking on recent family trip but continues to linger today. Has felt the gabapentin is not giving her much relief as she once had She is asking about adding MRI lumbar in addition to thoracic next week.     She reports first treatment went well on Monday. She denies any side effects, maybe some mild nausea that resolved with eating. She reports a mild increase in constipation and has started back up on stool softeners. She reports a lower appetite. She continues to work to force fluids and food. She denies any weight loss.  She reports normal urination    She reports neuropathy has essentially resolved, only felt this once while hiking last month.     She denies any chest pain or breathing issues, no cough or sob.      She denies any headache issues, dizziness or loss of balance, no falls. She reports rare intermittent  blurred vision that last seconds.     She denies any skin lesions or masses, oral sores lesions or infections    She denies any issues with sleep, she reports lingering fatigue. She does not need naps, tries to keep moving most of the day.       REVIEW OF SYSTEMS   ROS 14 points performed, See HPI for exceptions       Past Medical History:  has no past medical history on file.  Surgical History:   has a past surgical history that includes CT guided percutaneous biopsy bone deep (4/13/2023).  Social History:   reports that she quit smoking about 21 years ago. Her smoking use included cigarettes. She has been exposed to tobacco smoke. She has never used smokeless tobacco. She reports that she does not currently use alcohol. She reports current drug use. Drug: Marijuana.-lives at home with her , José Miguel, 1 dog and 1 cat. Has 2 grown daughters--Tonia and Rubi. One 2 year old grand daughter and one grand child on the way.-enjoys cooking, yoga, being outside, walking her dog. Interested in integrative medicine-- dietary changes, acupuncture.  Social Substance History:  ·  Smoking Status former smoker (1)   ·  Additional History       Family History:  No family history on file.  Family Oncology History:  Cancer-related family history is not on file.      OBJECTIVE    VS / Pain:  /75 (BP Location: Right arm, Patient Position: Sitting)   Pulse 53   Temp 36.7 °C (98.1 °F) (Core)   Resp 18   Wt 61.7 kg (136 lb 0.4 oz)   SpO2 99%   BMI 25.70 kg/m²   BSA: 1.63 meters squared   Pain Scale: 3    Performance Status:  The ECOG performance scale today is Symptomatic; in bed <50% of the day    Physical Exam   Constitutional: Well developed, awake/alert/oriented x4, no distress, alert and cooperative  EYES: Sclera clear  ENMT: mucous membranes moist, no apparent injury, no lesions seen  Head/Neck: Neck supple, no apparent injury, thyroid without mass or tenderness, No JVD, trachea midline, no bruits  Respiratory /  Thoracic: Patent airways, clear to all lobes, normal breath sounds with good chest expansion, thorax symmetric.  Cardiovascular: Regular, rate and rhythm, no murmurs, 2+ equal pulses of the extremities, normal auscultated S 1and S 2  GI: Nondistended, soft, non-tender, no rebound tenderness or guarding, no masses palpable, no organomegaly, +BS, no bruits  Musculoskeletal: ROM intact, no joint swelling, normal strength, no spinal tenderness  Extremities: normal extremities, no cyanosis edema, contusions or wounds, no clubbing  Neurological: alert and oriented x4, intact senses, motor, response and reflexes, normal strength  Breast: No palpable masses or lesions in right breast or left chest wall   Lymphatic: No cervical, supraclavicular, infraclavicular or axillary lymphadenopathy  Psychological: Appropriate and talkative mood and behavior  Skin: Warm and dry, no lesions, no rashes, no jaundice      Diagnostic Results     ECHO 1/2/2024  PHYSICIAN INTERPRETATION:  Left Ventricle: The left ventricular systolic function was not assessed, with an estimated ejection fraction of 55-60%. There are no regional wall motion abnormalities. The left ventricular cavity size was not assessed. Spectral Doppler shows an impaired relaxation pattern of left ventricular diastolic filling.  Left Atrium: The left atrium is mildly dilated.  Right Ventricle: The right ventricle is normal in size. There is normal right ventricular global systolic function.  Right Atrium: The right atrium is normal in size.  Aortic Valve: The aortic valve is trileaflet. There is mild aortic valve regurgitation.  Mitral Valve: The mitral valve is normal in structure. There is mild mitral annular calcification. There is no evidence of mitral valve regurgitation.  Tricuspid Valve: The tricuspid valve was not assessed. Tricuspid regurgitation was not assessed. The right ventricular systolic pressure is unable to be estimated.  Pulmonic Valve: The pulmonic valve  was not assessed. Pulmonic valve regurgitation was not assessed.  Pericardium: There is no pericardial effusion noted.  Aorta: The aortic root is normal.  Pulmonary Artery: The pulmonary artery is not well visualized.  Systemic Veins: The inferior vena cava was not well visualized.        CONCLUSIONS:   1. Left ventricular systolic function was not assessed with a 55-60% estimated ejection fraction.   2. Spectral Doppler shows an impaired relaxation pattern of left ventricular diastolic filling.   3. Mild aortic valve regurgitation.        Bone scan 9/2023  IMPRESSION:   1. Diffuse osseous metastatic disease of the axial and appendicular   skeleton as described above including the right humerus, sternum,   thoracic spine, pelvis, and the left femur as described above.   2. Increased radiotracer activity of the left 9th rib and right 2nd   and 3rd ribs compatible with a traumatic etiology.     Narrative & Impression   Interpreted By:  Jyotsna Benoit,   STUDY:  CT CHEST ABDOMEN PELVIS W IV CONTRAST;  12/22/2023 10:40 am      INDICATION:  Signs/Symptoms:Metastatic breast cancer restaging scans.      COMPARISON:  09/26/2023      ACCESSION NUMBER(S):  [id number]      ORDERING CLINICIAN:  YESSICA SHELTON      TECHNIQUE:  CT of the chest, abdomen, and pelvis was performed.  Contiguous axial  images were obtained at 3 mm slice thickness through the chest,  abdomen and pelvis. Coronal and sagittal reconstructions at 3 mm  slice thickness were performed.  75 ml of contrast Omnipaque 350 were administered intravenously  without immediate complication.      FINDINGS:  Lungs and Pleura: Streaky bibasilar atelectasis. No pulmonary  consolidation. No pleural effusion. No pneumothorax.      Mediastinum: No adenopathy by CT size criteria. Aberrant right  subclavian artery. No cardiomegaly or pericardial effusion. No  thoracic aortic aneurysm. Right-sided Port-A-Cath. Coronary artery  calcifications. Mitral calcifications.       Liver: Development of a small 9 mm hypodensity in the right hepatic  lobe near the dome. Otherwise stable hypodensities throughout the  liver.      Gallbladder and Biliary: Unremarkable.      Pancreas: Redemonstrated calcifications in the region of the  pancreatic head.      Spleen: No abnormality identified in the spleen.      Adrenals: No abnormality identified in either adrenal gland.      Urinary: Couple of small subcentimeter bilateral renal hypodensities  too small to characterize but statistically likely representing  cysts. No hydronephrosis.      Gastrointestinal/Peritoneum: No small or large bowel obstruction in  the visualized abdomen. In the abdomen, there is no extraluminal air.  No significant free fluid. No evidence of acute appendicitis.      Vascular: Abdominal aorta is normal in caliber.      Lymphatics: No enlarged lymph nodes by size criteria.      MSK/Body Wall/Chest Wall: Multifocal sclerotic metastasis are seen  throughout the axillary and appendicular skeleton, overall similar in  appearance. For example, a right proximal femoral lesion measures 13  mm, stable. A sacral lesion measures 11 mm, stable. Redemonstrated  moderate to severe compression fracture T5 vertebral body, mild at  T10 vertebral body. Degenerative changes in the spine with  dextroscoliosis at the thoracolumbar junction. Left-sided breast  implants.      IMPRESSION:  Overall similar appearance of diffuse osseous metastatic disease.      New subcentimeter lesion in the right hepatic lobe. Question  metastatic disease. Otherwise similar appearance of small  hypodensities throughout the liver.      Signed by: Jyotsna Benoit 12/23/2023 4:09 PM           LABORATORY DATA  Lab Results   Component Value Date    WBC 5.3 01/29/2024    HGB 12.3 01/29/2024    HCT 36.8 01/29/2024    MCV 88 01/29/2024     01/29/2024        Chemistry    Lab Results   Component Value Date/Time     01/29/2024 0857    K 3.8 01/29/2024 0857      01/29/2024 0857    CO2 28 01/29/2024 0857    BUN 15 01/29/2024 0857    CREATININE 0.63 01/29/2024 0857    Lab Results   Component Value Date/Time    CALCIUM 9.3 01/29/2024 0857    ALKPHOS 56 01/29/2024 0857    AST 20 01/29/2024 0857    ALT 11 01/29/2024 0857    BILITOT 0.5 01/29/2024 0857               IMPRESSION/PLAN    1. 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   Due to reaction to paclitaxel on 6/12/23 was transitioned to Abraxane 80 mg/m2. Dec 2023 Restaging scans with new liver progression. Drug holiday due to family travels, initiated 1/29/24 to Trastuzumab Deruxtecan (Enhertu) 5.4mg/kg 21 day cycle. Due to mutation found on May 2023 tempus testing, trastuzumab deruxtecan (TDxD) which has shown activity in cancers with HER2 kinase domain mutations. Updated 1/2/24 echo WNL.     Has tolerated C1 well without any treatment related toxicities. Rx sent today for Compazine to be used within the first 72 hrs with any nausea.   Reviewed disease with collaborative appointment today with Dr Bebeto Tolbert- will plan to complete 3 doses and will rescan prior to 4th cycle. She is agreeable to this plan. We have discussed today given increase in lower back+ left hip and pelvis pain will proceed with lumbar MRI and x-ray studies of left hip and pelvis.      Continue Q3 month zometa (due 2/5/24- will complete on 2/19  Next ECHO will be due late March- will order at next follow up visit.            At least 35 minutes of direct consultation was spent with the patient today reviewing her cancer care plan, educating and answering questions regarding ongoing follow up, greater than 50% in counseling and coordination of care          -------------------------------------------------------------------------------------------------------------------------------  Julee Spears MSN, APRN, FNP-C  [facility]  Division of Medical Oncology- Breast   Collaborating Physician Dr. Bebeto ELIZABETH  Tomasz   [facility] Regine Butler, Alexus [place] [address]  Phone: [phone]  Fax: [phone]  Available via Secure Chat    Confidential Peer Review Document-  Privilege  Privileged Pursuant to [place] Revised Code Section 2305.24, .25, .251 & .252

## 2024-01-31 NOTE — PATIENT INSTRUCTIONS
Follow up with Dr Bebeto Tolbert / Julee ENAMORADO, CNP in 3- 4 weeks. Continue q 3 week enhertu we will continue. Next dose is 2/19/24. We will repeat restaging scan following the third dose of Chemo ( Scan 3/25 and Office visit prior to 4th dose with Dr Bebeto Tolbert on 3/27- we will order all of this at next apt).     I have sent in compazine for nausea to be used within the first 72 hrs following chemo.    I have added MRI of the lumbar spine for increased pain. I have added X-rays of Left hip and Pelvis.    Please complete Tempus blood test with next draw.     We will order next echo for late March at next Office visit     Please call [phone] with any questions or concerns prior to your next office visit.

## 2024-02-01 ENCOUNTER — SOCIAL WORK (OUTPATIENT)
Dept: CASE MANAGEMENT | Facility: HOSPITAL | Age: 64
End: 2024-02-01
Payer: COMMERCIAL

## 2024-02-02 NOTE — PROGRESS NOTES
Patient started on Enhertu therapy. SW made a referral to the financial navigator to enroll patient into co-pay assistance with . Patient was approved. SW available as needed.

## 2024-02-05 ENCOUNTER — APPOINTMENT (OUTPATIENT)
Dept: HEMATOLOGY/ONCOLOGY | Facility: CLINIC | Age: 64
End: 2024-02-05
Payer: COMMERCIAL

## 2024-02-05 ENCOUNTER — APPOINTMENT (OUTPATIENT)
Dept: PHYSICAL MEDICINE AND REHAB | Facility: CLINIC | Age: 64
End: 2024-02-05
Payer: COMMERCIAL

## 2024-02-06 ENCOUNTER — OFFICE VISIT (OUTPATIENT)
Dept: PHYSICAL MEDICINE AND REHAB | Facility: CLINIC | Age: 64
End: 2024-02-06
Payer: COMMERCIAL

## 2024-02-06 ENCOUNTER — OFFICE VISIT (OUTPATIENT)
Dept: PALLIATIVE MEDICINE | Facility: CLINIC | Age: 64
End: 2024-02-06
Payer: COMMERCIAL

## 2024-02-06 ENCOUNTER — HOSPITAL ENCOUNTER (OUTPATIENT)
Dept: RADIOLOGY | Facility: HOSPITAL | Age: 64
Discharge: HOME | End: 2024-02-06
Payer: COMMERCIAL

## 2024-02-06 VITALS
OXYGEN SATURATION: 93 % | HEART RATE: 70 BPM | TEMPERATURE: 95.5 F | DIASTOLIC BLOOD PRESSURE: 82 MMHG | WEIGHT: 134.48 LBS | RESPIRATION RATE: 18 BRPM | SYSTOLIC BLOOD PRESSURE: 132 MMHG | BODY MASS INDEX: 25.41 KG/M2

## 2024-02-06 VITALS
SYSTOLIC BLOOD PRESSURE: 127 MMHG | RESPIRATION RATE: 18 BRPM | HEART RATE: 86 BPM | TEMPERATURE: 98.8 F | DIASTOLIC BLOOD PRESSURE: 80 MMHG

## 2024-02-06 DIAGNOSIS — C79.51 METASTATIC CANCER TO SPINE (MULTI): ICD-10-CM

## 2024-02-06 DIAGNOSIS — C41.9 MALIGNANT NEOPLASM OF BONE WITH METASTASES (MULTI): ICD-10-CM

## 2024-02-06 DIAGNOSIS — G89.3 CANCER RELATED PAIN: ICD-10-CM

## 2024-02-06 DIAGNOSIS — M79.2 NEUROPATHIC PAIN: ICD-10-CM

## 2024-02-06 DIAGNOSIS — S22.000A: ICD-10-CM

## 2024-02-06 DIAGNOSIS — Z79.899 ENCOUNTER FOR MONITORING CARDIOTOXIC DRUG THERAPY: ICD-10-CM

## 2024-02-06 DIAGNOSIS — C50.911 CARCINOMA OF RIGHT BREAST METASTATIC TO BONE (MULTI): ICD-10-CM

## 2024-02-06 DIAGNOSIS — Z79.83 LONG-TERM CURRENT USE OF BISPHOSPHONATE: ICD-10-CM

## 2024-02-06 DIAGNOSIS — Z51.5 PALLIATIVE CARE ENCOUNTER: Primary | ICD-10-CM

## 2024-02-06 DIAGNOSIS — Z51.81 ENCOUNTER FOR MONITORING CARDIOTOXIC DRUG THERAPY: ICD-10-CM

## 2024-02-06 DIAGNOSIS — M25.552 LEFT HIP PAIN: Primary | ICD-10-CM

## 2024-02-06 DIAGNOSIS — M25.552 LEFT HIP PAIN: ICD-10-CM

## 2024-02-06 DIAGNOSIS — Z51.11 ENCOUNTER FOR ANTINEOPLASTIC CHEMOTHERAPY: ICD-10-CM

## 2024-02-06 DIAGNOSIS — C79.51 CARCINOMA OF RIGHT BREAST METASTATIC TO BONE (MULTI): ICD-10-CM

## 2024-02-06 PROCEDURE — 73502 X-RAY EXAM HIP UNI 2-3 VIEWS: CPT | Mod: LT

## 2024-02-06 PROCEDURE — 73502 X-RAY EXAM HIP UNI 2-3 VIEWS: CPT | Mod: LEFT SIDE | Performed by: RADIOLOGY

## 2024-02-06 PROCEDURE — 99215 OFFICE O/P EST HI 40 MIN: CPT | Performed by: PHYSICAL MEDICINE & REHABILITATION

## 2024-02-06 PROCEDURE — 99214 OFFICE O/P EST MOD 30 MIN: CPT | Performed by: NURSE PRACTITIONER

## 2024-02-06 PROCEDURE — 1036F TOBACCO NON-USER: CPT | Performed by: PHYSICAL MEDICINE & REHABILITATION

## 2024-02-06 PROCEDURE — 1036F TOBACCO NON-USER: CPT | Performed by: NURSE PRACTITIONER

## 2024-02-06 RX ORDER — MORPHINE SULFATE 15 MG/1
15 TABLET, FILM COATED, EXTENDED RELEASE ORAL 3 TIMES DAILY
Qty: 90 TABLET | Refills: 0 | Status: SHIPPED | OUTPATIENT
Start: 2024-02-06 | End: 2024-03-12 | Stop reason: SDUPTHER

## 2024-02-06 RX ORDER — GABAPENTIN 300 MG/1
CAPSULE ORAL
Qty: 270 CAPSULE | Refills: 3 | Status: SHIPPED | OUTPATIENT
Start: 2024-02-06 | End: 2024-05-06

## 2024-02-06 ASSESSMENT — ENCOUNTER SYMPTOMS
DEPRESSION: 0
LOSS OF SENSATION IN FEET: 0
OCCASIONAL FEELINGS OF UNSTEADINESS: 0

## 2024-02-06 ASSESSMENT — PAIN SCALES - GENERAL
PAINLEVEL: 2
PAINLEVEL: 0-NO PAIN

## 2024-02-06 ASSESSMENT — COLUMBIA-SUICIDE SEVERITY RATING SCALE - C-SSRS
1. IN THE PAST MONTH, HAVE YOU WISHED YOU WERE DEAD OR WISHED YOU COULD GO TO SLEEP AND NOT WAKE UP?: NO
6. HAVE YOU EVER DONE ANYTHING, STARTED TO DO ANYTHING, OR PREPARED TO DO ANYTHING TO END YOUR LIFE?: NO
2. HAVE YOU ACTUALLY HAD ANY THOUGHTS OF KILLING YOURSELF?: NO

## 2024-02-06 ASSESSMENT — PATIENT HEALTH QUESTIONNAIRE - PHQ9
1. LITTLE INTEREST OR PLEASURE IN DOING THINGS: NOT AT ALL
SUM OF ALL RESPONSES TO PHQ9 QUESTIONS 1 AND 2: 0
2. FEELING DOWN, DEPRESSED OR HOPELESS: NOT AT ALL

## 2024-02-06 NOTE — PROGRESS NOTES
Chief Complaint     ref by Dr Sandoval for spine pain S/P breast CA. thoracis spine pain.      History of Present IllnessPhysical Medicine and Rehabilitation Cancer Rehab Consult        Chief Complaint:  Low back pain     HPI:  Ms. Jones is a 62 yo F w pmh of metastatic triple negative breast cancer metastatic PDL1 negative. She was originally dx in 2010 w stage II breast cancer sp lumpectomy, 4 cycles of docetaxel/cyclophosphamide chemo followed by tamoxifen x 7 yrs, completed in 8920-2006. In spring presents w mid thoraicc back pain and dx w metastatic breast cancer. She completed radiation therapy to T spine. Currently she is on Abraxane chemotherapy, did not tolerate paclitaxel in 6/2023. On zometa q 3.               States prior to mid thoracic back pain from Feb 2023. Up until then vey activey and doing polites, yoga and weight lifting.   She completed radiation in may 2023.  Location: has discomfort in lumbar spine from ho of spinal stenosis and scoliosis  L spine has not changed. states that she used to do pilates and yoga was difficult     Lower back and hip pain is chronic. Has had sciatica back pain as well in the past. Takes gabapentin 300 mg bid.   Mid thoracic pain is not much of an issue.   Radiation: lower back, ,mostly left sometimes R  Quality: throbbing and aching from knee to left calf, daily  Duration: worse at night  back pain is achy and stiffness worse after prolonged sitting  worse w prolonged activites  More difficulty managing it since not doing pilates and yoga since helped maintain range of motion.  sciatica pain is worse w less activity     Aggravating factors:   thoracic; less activity  lumbar ; less acitivity  Alleviating factors:   Severity: [2/10] today, [7/10] most severe  Numbness/Tingling: legs   Bowel or bladder incontinence  Pt's current medication regimen includes: gabapentin, advil prn, ms contin     Treatment to date:  Physical therapy: in the past for lower back, but wo much  relief     Medications taken to date for this complaint include the following:   on MS contin bid and oxycodone prn for pain for general achiness and had not needed it for a while  takes motrin prn for activity  Prior injections: none                             Acupuncture and Reiki.      Has been doing well w therapy. Lower back is better. Has some pain in left hip.  Lower back is better as well. Wants to hold off on injections or further L spine imaging.         She was last seen in nov virtual visit. At that time we discussed:  1. discussed main precautions; maintain neutral spine when exercising, avoid excessive flexion/extension and twisting and avoid high impact exercises  2. otherwise she is ok to resume some pilates/yoga w above restrictions and listen to her body pain vs soreness  3. discussed reaching out to gathering place for more exercises options  3. Continue pt for core strengthening w spine precautions w focus on learning the exercises properly so can do on her own  4. pain meds as per palliative care, did suggest to increase the night time gabapentin since radicular pain is worse at night.  5. if above wo relief would repeat L sine mri and possibly discuss injections. L spine symptoms are chronic and likely related to neuroforaminal stenosis vs mets. Wants to hold off since doing better.  6. trial lidocaine patch prn  7. will send out exercise program info for [place] exercise program [facility]  8. continue fu w symptom management clinic  9. ok to continue postural brace as tolerated  fu  3 months    Due to liver progression in Dec 2023 imaging initiated new therapy with Enhertu on 1/29/24.    States overall morning are better, chemo causing fatigue. Now taking morphine tid. completed Pt end of Nov and it got colder. In December she did less, had more pain and more muscle.  Did hiking in [place]. Hiked 6 x, 3-4 miles.   During the trip left thigh, femur and pelvis. Thoracic  spine MRI pending for monitoring, Lumbar mri pending and xr pelvis, more nerve pain in left lower leg.    So when walking anterior proximal thigh and limping and then better. If walking a mile then limping. Deeper pain.  Lower leg does feel more nerve pain.   If takes gabapentin early enough. Goes to gym a fw times a week and walking on treadmill.       Imaging  Xr left hip 1/2024  FINDINGS:  No acute fracture or malalignment. There is left acetabular  dysplasia. Multiple sclerotic osseous metastasis of the sacrum, right  iliac bone, and right proximal femur noted and better evaluated on  the previous CT study from 12/22/2023.      Left acetabular dysplasia with lateral uncovering of the femoral head.  Bilateral hip joint spaces are well preserved.  Soft tissues are within normal limits.      IMPRESSION:  1. No acute fracture or malalignment.  2. Left acetabular dysplasia.  3. Multiple osseous metastasis of the pelvis better evaluated on the  previous CT study.    Ct chest 2023  ECHNIQUE:  CT of the chest, abdomen, and pelvis was performed.  Contiguous axial  images were obtained at 3 mm slice thickness through the chest,  abdomen and pelvis. Coronal and sagittal reconstructions at 3 mm  slice thickness were performed.  75 ml of contrast Omnipaque 350 were administered intravenously  without immediate complication.      FINDINGS:  Lungs and Pleura: Streaky bibasilar atelectasis. No pulmonary  consolidation. No pleural effusion. No pneumothorax.      Mediastinum: No adenopathy by CT size criteria. Aberrant right  subclavian artery. No cardiomegaly or pericardial effusion. No  thoracic aortic aneurysm. Right-sided Port-A-Cath. Coronary artery  calcifications. Mitral calcifications.      Liver: Development of a small 9 mm hypodensity in the right hepatic  lobe near the dome. Otherwise stable hypodensities throughout the  liver.      Gallbladder and Biliary: Unremarkable.      Pancreas: Redemonstrated calcifications in  the region of the  pancreatic head.      Spleen: No abnormality identified in the spleen.      Adrenals: No abnormality identified in either adrenal gland.      Urinary: Couple of small subcentimeter bilateral renal hypodensities  too small to characterize but statistically likely representing  cysts. No hydronephrosis.      Gastrointestinal/Peritoneum: No small or large bowel obstruction in  the visualized abdomen. In the abdomen, there is no extraluminal air.  No significant free fluid. No evidence of acute appendicitis.      Vascular: Abdominal aorta is normal in caliber.      Lymphatics: No enlarged lymph nodes by size criteria.      MSK/Body Wall/Chest Wall: Multifocal sclerotic metastasis are seen  throughout the axillary and appendicular skeleton, overall similar in  appearance. For example, a right proximal femoral lesion measures 13  mm, stable. A sacral lesion measures 11 mm, stable. Redemonstrated  moderate to severe compression fracture T5 vertebral body, mild at  T10 vertebral body. Degenerative changes in the spine with  dextroscoliosis at the thoracolumbar junction. Left-sided breast  implants.      IMPRESSION:  Overall similar appearance of diffuse osseous metastatic disease.      New subcentimeter lesion in the right hepatic lobe. Question  metastatic disease. Otherwise similar appearance of small  hypodensities throughout the liver.    MRI T spine 11/2023  TECHNIQUE:  Sagittal T1 and T2 and axial T2 and T1 weighted MR images of the  thoracic spine were obtained. Postcontrast T1 weighted imaging was  performed after administration of gadolinium based contrast agent.      FINDINGS:  Numerous areas of abnormal marrow signal are again identified  throughout the thoracic spine. Particularly there is abnormal marrow  signal within T2 vertebral body with more prominent areas of  hypointense T1 signal which may reflect areas of new sclerosis. The  STIR hyperintensity has improved in this region.      The  STIR hyperintense signal within T3 has mildly improved as  compared to prior study. The STIR hyperintense signal within T4 has  mildly improved with new areas of sclerosis. Note is again made of  posterior wedging of T4. There is anterior wedge compression fracture  of T5 as before, again the STIR hyperintense signal within has  improved as compared to prior study. Right transverse process focal  marrow signal abnormality is again noted within the of T7 as well as  within the right pedicle and transverse process of T5 and T6,  slightly improved as compared to prior study.      The marrow signal abnormalities within T6, T9, T10, T11 and T12 are  again identified. The marrow signal abnormality within all lesions  appears improved except for within anterior aspect of T11 on image  15/21 on current exam which appears slightly more pronounced as  compared to prior study.      Note is again made of a compression fracture of T10 with  approximately 80% loss of central vertebral height.      There is again retropulsion of posterior margin of T5 with moderate  central canal stenosis, unchanged as compared to prior study. There  is no deformity of spinal cord contour. There is minimal  intramedullary signal abnormality at this location on STIR imaging,  although this is not confirmed on T2 weighted images.      There is no other focus of significant central canal stenosis.      Note is again made of desiccation of several intervertebral discs  with multilevel anterior and posterior osteophytic spurring.      IMPRESSION:  Redemonstration of heterogenous marrow signal throughout the thoracic  vertebrae. The STIR hyperintense signal associated with majority of  the lesions has mildly improved as compared to prior study except for  lesion within anterior aspect of T11 which appears slightly more  pronounced as compared to prior study.      Moderate central canal stenosis at T5 secondary to retropulsion of  posterior  margin.    [SOCHX]  disabiity  cpa  lives w   denies smoking alcohol  medical thc     Review of Systems:  Constitutional: Denies fever or chills, malaise, weight changes.   Eyes: Denies change in visual acuity   HENT: Denies nasal congestion or sore throat   Respiratory: Denies cough or shortness of breath   Cardiovascular: Denies chest pain or edema   GI: Denies abdominal pain, nausea, vomiting, bloody stools or diarrhea   : Denies dysuria   Integument: Denies rash   Neurologic: As per HPI  MSK: Per above HPI  Endocrine: Denies polyuria or polydipsia   Lymphatic: Denies swollen glands   Psychiatric: Denies depression or anxiety            PHYSICAL EXAM:  /80 (BP Location: Right arm, Patient Position: Sitting)   Pulse 86   Temp 37.1 °C (98.8 °F)   Resp 18   LMP  (LMP Unknown)     General: NAD, well developed,   Psychiatric: appropriate mood & affect.   Cardiovascular: Normal pedal pulses; no LE edema.  Respiratory: Normal rate; unlabored breathing.  Skin: Intact; no erythema; no ecchymosis or rash.  Lymphatic: No lymphadenopathy or lymphedema.  NEURO: Alert and appropriate. Speech fluent, conversing appropriately.   Motor:   Rt: HF 5/5, KE 5/5, KF 5/5, DF 5/5, EHL 5/5, PF 5/5.   Lt: HF 5/5, KE 5/5, KF 5/5, DF 5/5, EHL 5/5, PF 5/5.  Sensation:    Light touch: intact in the b/l L3-S1 dermatomes.   PP: intact in the b/l L3-S1 dermatomes  Reflexes:    Right: patellar 2+, achilles 2+,    Left: patellar 2+, achilles 2+,    Babinski's downgoing b/l; no clonus  Gait: Normal, narrow based gait.   MSK:  Inspection reveals scoliosis and kyphosis  Spinal range of motion: Flexion to 70Â° degrees, Extension of 10 degrees.  There is tenderness over the T and L spine paraspinals   Special tests:   Straight leg raise: - bl   Slump test: - bl  Some pain w hip scour  Mild tenderness left greater troch and glut met tendon       promis screen 9/2023 2/2024  PF: 27/30                                 25/30  Fatigue: 6/15                            12/15  social: 14/30                              8/15  pain 3, distress 3                   3/1        Impression: Ms. Jones is a 64 yo F w pmh of lumbar spondylosis and chronic radiculopathy, scoliosis and metastic triple negative breast cancer metastatic PDL1 negative. She was originally dx in 2010 w stage II breast cancer sp lumpectomy, 4 cycles of docetaxel/cyclophosphamide chemo followed by tamoxifen x 7 yrs, completed in 9737-6425. In spring presents w mid thoraicc back pain and dx w metastatic breast cancer. She completed radiation therapy to T spine. She was on Abraxane chemotherapy but imaging in Dec 2023 showed mets to liver so no started on Enhertu on 1/29/24. She presents re question for exercise safety in setting of bone mets. currently pain overall controlled in T spine w Ms contin.    Plan:     1. discussed main precautions; maintain neutral spine when exercising, avoid excessive flexion/extension and twisting and avoid high impact exercises  2. otherwise she is ok to resume some pilates/yoga w above restrictions and listen to her body pain vs soreness  3. She had left hip xray done; no evidence of fx or mets/ however has mets in pelvis/sacrum and R femur.  4. Recommend Revive exercise program, anticipate she will be able to participate since already doing some baseline exercise routine however do think needs further left hip imaging so ordered mri left hip; ig negative can consider referral for hip injection.  3. Completed PT for core strengthening w spine precautions w focus on learning the exercises properly so can do on her own  4. pain meds as per palliative care did suggest to increase the night time gabapentin since radicular pain is worse at night.  5. Pending repeat mri L and T spine as well  6. trial lidocaine patch prn  8. continue fu w symptom management clinic  9. ok to continue postural brace as tolerated  fu 2-3 months and prn        Leah Sidhu MD  Physical Medicine and Rehabilitation

## 2024-02-06 NOTE — PROGRESS NOTES
SUPPORTIVE AND PALLIATIVE ONCOLOGY OUTPATIENT FOLLOW-UP      SERVICE DATE: 2/6/2024    Cancer History  Newly diagnosed triple negative breast CA (with bone mets and cord compression)  -s/p L mastectomy in 2010: stage II ER+/HER2- breast CA  -s/p 4 cycles docetaxel/cyclophosphamide chemo  -took tamoxifen x 7 yrs, completed in [id number]  -presented to ED on 3/30/23 with severe back pain  -MRI spine showed cord compression, admitted at that time   -s/p 1 fx RT to spine on 5/4/23  -plan to start treatment with weekly paclitaxel and q21day pembro  -started treatment 5/22/23, changed to abraxane after C1 d/t anaphylactic type reaction  -CT CAP (11/14/23): Overall similar appearance of diffuse osseous metastatic disease. New subcentimeter lesion in the right hepatic lobe. Question  metastatic disease. Otherwise similar appearance of small  hypodensities throughout the liver.  -plan to stop abraxane and change therapy to TDxD, scheduled to start on 1/29/24    Med Onc: Dr. Tolbert  Integrative Medicine: Dr. Sandoval       Subjective   HISTORY OF PRESENT ILLNESS: Trang Jones is a 62 yo female with PMHx of HLD, scoliosis and newly diagnosed triple negative breast CA. She received XRT to the spine on 5/4 and will begin chemo treatment next week.     She presents to supportive oncology for follow up for pain and symptom management.     Pt presents for follow up alone today. States she has been having increased pain in her L femur, hip, and pelvic area. Scheduled to get xray today. Taking MSER 15mg tid, has only taken oxycodone once recently, also using ibuprofen PRN. No N/V. Mood is stable, pt slightly tearful today in discussing disease progression. She is taking it day by day, keeping a positive attitude. Having increased fatigue with new treatment.     Pain Assessment:  Pain Score:   2    Symptom Assessment:  Pain:a little  Headache: none  Dizziness:none  Lack of energy: none  Difficulty sleeping: none  Worrying:  none  Anxiety: none  Depression: none  Pain in mouth/swallowing: none  Dry mouth: none  Taste changes: none  Shortness of breath: none  Lack of appetite: very much   Nausea: none  Vomiting: none  Constipation: none  Diarrhea: none  Sore muscles: a little  Numbness or tingling in hands/feet/other: none  Weight loss: none      Information obtained from: interview of patient  ______________________________________________________________________        Objective                PHYSICAL EXAMINATION   Vital Signs:   Vital signs reviewed  Vitals:    02/06/24 0957   BP: 132/82   Pulse: 70   Resp: 18   Temp: 35.3 °C (95.5 °F)   SpO2: 93%     Pain Score:        Physical Exam  Vitals reviewed.   Constitutional:       Appearance: Normal appearance.   HENT:      Head: Normocephalic.   Cardiovascular:      Rate and Rhythm: Normal rate.   Pulmonary:      Effort: Pulmonary effort is normal.   Abdominal:      Palpations: Abdomen is soft.   Musculoskeletal:         General: Normal range of motion.   Skin:     General: Skin is warm and dry.   Neurological:      General: No focal deficit present.      Mental Status: She is alert and oriented to person, place, and time.   Psychiatric:         Mood and Affect: Mood normal.         Judgment: Judgment normal.         ASSESSMENT/PLAN    Pain: upper back around shoulder blades, radiates to front of chest; dull ache, shooting with sudden movement    Pain is: cancer related pain and acute on chronic  Type: somatic and neuropathic  Pain control: well-controlled  Home regimen:   -continue Morphine Sulfate Contin 15mg tid. Rx sent today.   -continue oxycodone 10mg every 4hrs PRN. No Rx needed today.   -continue gabapentin 300mg in the morning and 600mg at night. Rx sent today.  -continue ibuprofen PRN   -continue to follow with Dr. Sandoval/ Agustin Dietrich for acupuncture/ reiki   -continue medical THC PRN   Intolerances/previously tried:    Opioid Use  Medication Management:   - OARRS report  reviewed with no aberrant behavior; consistent with  prescriptions/records and patient history  - MED 45.  Overdose Risk Score 230.   This has been discussed with patient.   - We will continue to closely monitor the patient for signs of prescription misuse including UDS, OARRS review and subjective reports at each visit.  - no concurrent benzodiazepine use   - I am a provider who either is or has consulted and collaborated with a provider certified in Hospice and Palliative Medicine and have conducted a face-face visit and examination for this patient.  - Routine Urine Drug Screen completed 5/9/23 appropriately positive for opioids and negative for illicit substances  - Controlled Substance Agreement completed 5/9/23  - Specifically discussed that controlled substance prescriptions will only be provided by our group as outlined in the completed agreement  - Prescribed naloxone previously prescribed  - Red Flags: none    Bowels: at risk for OIC  -educated pt on importance of maintaining daily BM  -continue daily miralax   -continue senna/colace PRN     N/V: currently stable   -continue zofran 4mg ODT q8hrs PRN   -continue migraine medication PRN     Sleep: hx of insomnia prior to cancer dx   -continue THC PRN  -encouraged pt to maintain regular sleep/wake cycle  -plan for better pain control to aid in improved sleep, see pain section above     Mood: improving    -discussed adding medication to help with mood, pt would like to hold off for now    Medical Decision Making/ GOC:   -social work referral to assist with LW/ POA paperwork        Next Follow-Up Visit:  Return to clinic in 6 weeks     Signature and billing  Medical complexity was moderate level due to due to complexity of problems, extensive data review, and high risk of management/treatment.  Time was spent on the following: Prep Time, Time Directly with Patient/Family/Caregiver, Documentation Time. Total time spent: 30 mins             Some elements copied  from my note on 1/2/24, the elements have been updated and all reflect current decision making from today, 2/6/2024.      Plan of Care discussed with: Patient    SIGNATURE: KELSEA Garg-CNP    Contact information:  [facility]ive [facility] Palliative Oncology  Monday-Friday 8 AM-5 PM  Phone:  [phone], press option #5, then option #1.   Or Epic Secure Chat

## 2024-02-07 ENCOUNTER — ALLIED HEALTH (OUTPATIENT)
Dept: INTEGRATIVE MEDICINE | Facility: CLINIC | Age: 64
End: 2024-02-07
Payer: COMMERCIAL

## 2024-02-07 DIAGNOSIS — C50.911 CARCINOMA OF RIGHT BREAST METASTATIC TO BONE (MULTI): Primary | ICD-10-CM

## 2024-02-07 DIAGNOSIS — C79.51 CARCINOMA OF RIGHT BREAST METASTATIC TO BONE (MULTI): Primary | ICD-10-CM

## 2024-02-07 DIAGNOSIS — M79.2 NEUROPATHIC PAIN: ICD-10-CM

## 2024-02-07 DIAGNOSIS — G89.3 NEOPLASM RELATED PAIN: ICD-10-CM

## 2024-02-07 DIAGNOSIS — C79.51 METASTATIC CANCER TO SPINE (MULTI): ICD-10-CM

## 2024-02-07 PROCEDURE — 99213 OFFICE O/P EST LOW 20 MIN: CPT | Performed by: HOSPITALIST

## 2024-02-07 ASSESSMENT — PAIN SCALES - GENERAL: PAINLEVEL_OUTOF10: 3

## 2024-02-07 NOTE — PROGRESS NOTES
Patient ID: Trang Jones is a 63 y.o. female.  Referring Physician: No referring provider defined for this encounter.  Primary Care Provider: Karl Granger DO    CANCER HISTORY:   62 yo woman with newly dx Tneg breast ca to bone, cord compression  2010 - L mtx - ER+ stage II breast ca   AC x 4  Keith x 7 yrs     3/23 - back pain - MRI with cord compression treated with radiation 5/4/23 5/22/23 - Taxol (weekly) - had reaction - held last week and only partial this week  Changing to abraxane  Not planning pembrolizumab as PDL1 neg per pt      Changed abraxane to enhertu  Some fatigue        History of Present IllnessConsulted by: Dr. Tolbert  For: breast cancer     Chief complaints and symptoms:  Seen in supp oncology  1. Pain - on ms contin, nsaids, gabapentin. THC twice/week  mostly in back and now in ribs. Improved overall though some pain from walking this last weekend  Now worse since [place] trip and walking - pelvic pain, L hip and pain down L leg     2. Fatigue - related to chemotherapy - improved overall. Worse on chemo weeks     3. Appetite suppressed  Poor taste - improved  Doing well still today     4. Neuropathy - mild developing tingling in fingers - numbness and tingling briefly in fingertips - brief numbness in fingers  Two more in fingertips, tingling/numbness     mild ha     Anxiety about results     Integrative History:  Diet: mostly fruits (taste good), protein smoothies (Vital proteins, collagen powder, almond milk)  Lean proteins, chicken/salmon, o/w plant based     PA: walking some, hiking some now, continued  Doing some Pilates     Sleep: well improved with THC - 8-9 hrs/night     Stress: overwhelmed by dx.  Management: Meditating daily almost, various times, has some experience - Intermittent  Family support  Reiki helping  Focusing more on spirituality and Oriental orthodox, Women's groups.     Natural Products:  Medical cannabis - one gummy/night  magnesium  Red yeast rice - for  cholesterol  Vit D  Flax seed oil   American Ginseng  Host [employer] STAMETS 7  Zinc     ROS:  no ha, visual symptoms, hearing loss  no sob, chest pain, palp  ROS o/w non contributory, please see HPI    Objective    BSA: There is no height or weight on file to calculate BSA.  LMP  (LMP Unknown)     PHYSICAL EXAM:  NAD, awake/alert  HEENT, NCAT, OP clear, no oral lesions  CTA bilat  RRR no mgr  Abd soft/nt/nd+bs  No c/c/e/ttp  Motor/sensory intact, CN 2-12 intact     RESULTS:  Lab Results   Component Value Date    WBC 5.3 01/29/2024    HGB 12.3 01/29/2024    HCT 36.8 01/29/2024     01/29/2024    CREATININE 0.63 01/29/2024    AST 20 01/29/2024       Assessment/Plan   Cancer Staging   Malignant neoplasm of bone with metastases (CMS/HCC)  Staging form: Bone - Appendicular Skeleton, Trunk, Skull, and Facial Bones, AJCC 8th Edition  - Clinical stage from 9/5/2023: pM1, G3 - Signed by Bebeto Tolbert MD on 9/5/2023      CANCER SPECIFIC RECCS:  64 yo woman with recurrent breast ca, Tneg now with bone mets and s/p cord compression  S/p radiation  5/22/23 started taxol (weekly) - now changed to abraxane  No neuropathy now     Breast cancer:   7/17/23 CT c/a/p - will review with Dr. Tolbert:  sclerotic L 3rd rib, increase osseous mets R iliac, 1.1 cm liver hypodensities, can discuss further with Dr. Tolbert  focusing plant based is fine  limit sugar including in smoothies, consider Orgain (using Vital protein)  Would avoid coffee on taxol  Zinc for taste  Flax is fine  THC is good as well  Vitamin D3 5000 IU 3 days/week is good  Consider omega 3   Stop b12, also coq10 - done  on [employer] STAMETS 7     Try to walk 15-30 min/day     Pain - taking motrin prn, steroid weaning  MS contin and gabapentin  Acupuncture will start in Symptom Management Clinic - has started  Feels better when having AM pain meds and supplements  Some improvements  Gentle Yoga with assistance, same with Pilates     Fatigue: Definitely  improved  Acupuncture, consider massage  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 Uses to manage scoliosis.  Would sugg pt see Dr. Cantu (rehab) and/or PT prior to engaging in yoga     Follow up in Symptom Management Clinic      Integrative Oncology Symptom Management:     The integrative oncology symptom management clinic offers supervised care of cancer patients using Integrative Modalities billed to insurance using NCCN and SIO/ASCO guideline-driven practices.  ESAS is obtained prior to and after each treatment by practitioner     Symptoms Managed:  Pain - Increased in L leg and hip and pelvis since coming back from trip - increased in L femur and hip now, worse with hiking recently - improved but now having pain back of L shoulder and upper back, worse pain L hip and leg - MRI pending along with back  Done some hiking     Fatigue - still there but improved, same, travelled this last weekend with lots of walking - same overall during chemo weeks, mildly increased  Poor Appetite - doing well     Neuropathy - improved now, one brief episode of numbness in fingers - none now, brief episode over weekend, one episode last week, slightly more and lasting longer, no changes - none now  Gabapentin increased to 300 am and 600 pm     Natural Products utilized:  Medical cannabis - one gummy/night  magnesium  Red yeast rice - for cholesterol  B12  COq10  Vit D  Flax seed oil      Integrative Treatment: Acupuncture     Session #: 18  Frequency: Weekly     Referrals: Supportive Oncology (Has seen)     Recommendations: I would avoid coffee, soy, green tea while on Taxol (can reduce efficacy)  Weekly acupuncture  Has obtained med THC card as well   Ok to take calcium, mildly low and albumin wnl  seen by Dr. Cantu and going to PT - working with Rosemarie  instructor on core strength     Follow Up:  Symptom Management: weekly  Integrative Oncology: 3 months - could see 9/23     I have personally seen the patient and supervised the treatment by the integrative practitioner during this visit.  Bo Sandoval MD

## 2024-02-07 NOTE — PROGRESS NOTES
Acupuncture Visit:     Subjective   Patient ID: Trang Jones is a 63 y.o. female who presents for No chief complaint on file.  Traveling  Is having more pain left hip and femur  Xray normal, planning MRI  More neuropathy left foot  Started new chemo meds- some fatigue.                 Left hip/femur pain   4/10 today  Cold improved.   Sleep-  no issues  No neuropathy today, slight episode last week for an hour           initial visit:  Fatigue, back pain  had recent break from chemo  fatigue is better since holding  typically wiped out after chemo and radiation for 2-3 days   low appetite and difficulty staying hydrated  denies xerostomia    sleep is good- uses cbd product, has [employer] card, but has not pursued yet    pain this week mostly from scoliosis  lumbar into thoracic, has since her 30s, stiffness, constant pain   had has some radiation down legs. foraminal stenosis  no neuropathy symptoms, hands or feet  denies other pain areas.   was having severe throacic pain, from tumor compression, now off of most pain meds except ER morphine.     planned 2 more rounds of chemo then a scan to evaluate next steps     BM- once a day, no blood mucus undigested food,        Diet: no alcohol or sugars. consistently tries to eat healthy  Dr: water, dislikes cold water, drinks warm lemon water, bone broth, hot teas.    General: denies- fever, chills, night sweats  Skin: denies- rash, abnormal lesions  Eyes: occ floaters, last exam 1 year, ,   HENT: denies- headache, sore throat, ear pain, tinnitus, congestion, runny nose,   Cardio: denies- chest pain, palps  Resp: denies- SOB, cough, wheezing  GI: some reflux- every few days,   Uro: occasional urgency, no pain or burning.   Neuro: see HPI  Musc: see HPI                      Pre-treatment Assessment  Pain Score: 3  Anxiety Level (0-10): 1  Stress Level (0-10): 1  Coping Level (0-10): 8  Depression Level (0-10): 1  Fatigue Level (0-10): 5  Nausea Level (0-10): 0  Wellbeing  Level (0-10): 3    Review of Systems         Provider reviewed plan for the acupuncture session, precautions and contraindications. Patient/guardian/hospital staff has given consent to treat with full understanding of what to expect during the session. Before acupuncture began, provider explained to the patient to communicate at any time if the procedure was causing discomfort past their tolerance level. Patient agreed to advise acupuncturist. The acupuncturist counseled the patient on the risks of acupuncture treatment including pain, infection, bleeding, and no relief of pain. The patient was positioned comfortably. There was no evidence of infection at the site of needle insertions.    Objective   Physical Exam          Acupuncture Treatment  Needle Guage: 36 guage /.20/ Blue seirin  Body Points: With retention  Body Points - Bilateral: Scalp thoracic, LI10, LI4, Si3, Ht7, SP6, KI7, KI3, UB62, LR3  Auricular Points: Yes  Auricular Points - Bilateral: BFA 1-3  Needle Count In: 25  Needle Count Out: 25              Post-treatment Assessment  Pain Score: 3  Anxiety Level (0-10): 1  Stress Level (0-10): 1  Coping Level (0-10): 8  Depression Level (0-10): 1  Fatigue Level (0-10): 4  Nausea Level (0-10): 0  Wellbeing Level (0-10): 2    Assessment/Plan

## 2024-02-08 ENCOUNTER — HOSPITAL ENCOUNTER (OUTPATIENT)
Dept: RADIOLOGY | Facility: HOSPITAL | Age: 64
Discharge: HOME | End: 2024-02-08
Payer: COMMERCIAL

## 2024-02-08 ENCOUNTER — INFUSION (OUTPATIENT)
Dept: HEMATOLOGY/ONCOLOGY | Facility: CLINIC | Age: 64
End: 2024-02-08
Payer: COMMERCIAL

## 2024-02-08 VITALS
SYSTOLIC BLOOD PRESSURE: 109 MMHG | DIASTOLIC BLOOD PRESSURE: 72 MMHG | HEART RATE: 80 BPM | TEMPERATURE: 97 F | OXYGEN SATURATION: 98 % | WEIGHT: 135.91 LBS | BODY MASS INDEX: 25.68 KG/M2 | RESPIRATION RATE: 16 BRPM

## 2024-02-08 DIAGNOSIS — C79.51 CARCINOMA OF RIGHT BREAST METASTATIC TO BONE (MULTI): ICD-10-CM

## 2024-02-08 DIAGNOSIS — C79.51 METASTATIC CANCER TO SPINE (MULTI): ICD-10-CM

## 2024-02-08 DIAGNOSIS — C50.911 CARCINOMA OF RIGHT BREAST METASTATIC TO BONE (MULTI): ICD-10-CM

## 2024-02-08 DIAGNOSIS — C41.9 MALIGNANT NEOPLASM OF BONE WITH METASTASES (MULTI): ICD-10-CM

## 2024-02-08 PROCEDURE — A9575 INJ GADOTERATE MEGLUMI 0.1ML: HCPCS | Performed by: NEUROLOGICAL SURGERY

## 2024-02-08 PROCEDURE — 2500000004 HC RX 250 GENERAL PHARMACY W/ HCPCS (ALT 636 FOR OP/ED): Performed by: NEUROLOGICAL SURGERY

## 2024-02-08 PROCEDURE — 72157 MRI CHEST SPINE W/O & W/DYE: CPT | Performed by: RADIOLOGY

## 2024-02-08 PROCEDURE — 2500000004 HC RX 250 GENERAL PHARMACY W/ HCPCS (ALT 636 FOR OP/ED): Performed by: INTERNAL MEDICINE

## 2024-02-08 PROCEDURE — 72157 MRI CHEST SPINE W/O & W/DYE: CPT

## 2024-02-08 PROCEDURE — 36591 DRAW BLOOD OFF VENOUS DEVICE: CPT

## 2024-02-08 RX ORDER — HEPARIN SODIUM,PORCINE/PF 10 UNIT/ML
50 SYRINGE (ML) INTRAVENOUS AS NEEDED
Status: CANCELLED | OUTPATIENT
Start: 2024-02-08

## 2024-02-08 RX ORDER — HEPARIN 100 UNIT/ML
500 SYRINGE INTRAVENOUS AS NEEDED
Status: CANCELLED | OUTPATIENT
Start: 2024-02-08

## 2024-02-08 RX ORDER — HEPARIN 100 UNIT/ML
500 SYRINGE INTRAVENOUS AS NEEDED
Status: DISCONTINUED | OUTPATIENT
Start: 2024-02-08 | End: 2024-02-08 | Stop reason: HOSPADM

## 2024-02-08 RX ORDER — GADOTERATE MEGLUMINE 376.9 MG/ML
12 INJECTION INTRAVENOUS
Status: COMPLETED | OUTPATIENT
Start: 2024-02-08 | End: 2024-02-08

## 2024-02-08 RX ADMIN — GADOTERATE MEGLUMINE 12 ML: 376.9 INJECTION INTRAVENOUS at 10:04

## 2024-02-08 RX ADMIN — HEPARIN 500 UNITS: 100 SYRINGE at 10:27

## 2024-02-08 ASSESSMENT — PAIN SCALES - GENERAL: PAINLEVEL: 3

## 2024-02-12 ENCOUNTER — ALLIED HEALTH (OUTPATIENT)
Dept: INTEGRATIVE MEDICINE | Facility: CLINIC | Age: 64
End: 2024-02-12

## 2024-02-12 DIAGNOSIS — M25.552 LEFT HIP PAIN: Primary | ICD-10-CM

## 2024-02-12 PROCEDURE — 97139 UNLISTED THERAPEUTIC PX: CPT | Performed by: ACUPUNCTURIST

## 2024-02-13 ENCOUNTER — TELEPHONE (OUTPATIENT)
Dept: RADIATION ONCOLOGY | Facility: HOSPITAL | Age: 64
End: 2024-02-13
Payer: COMMERCIAL

## 2024-02-13 NOTE — TELEPHONE ENCOUNTER
Spoke to the patient regarding her 2/8/24 MRI of the T-spine.  Per the radiology read, there seems to be progression in the T-spine, most notably at T5.  I told the patient that I'm meeting with Dr. Mejia on 2/15/24 to discuss her imaging and possible treatment options.  All questions/concerns were addressed to the satisfaction of the patient.

## 2024-02-19 ENCOUNTER — INFUSION (OUTPATIENT)
Dept: HEMATOLOGY/ONCOLOGY | Facility: CLINIC | Age: 64
End: 2024-02-19
Payer: COMMERCIAL

## 2024-02-19 VITALS
BODY MASS INDEX: 25.74 KG/M2 | SYSTOLIC BLOOD PRESSURE: 119 MMHG | WEIGHT: 136.24 LBS | RESPIRATION RATE: 17 BRPM | OXYGEN SATURATION: 96 % | HEART RATE: 61 BPM | TEMPERATURE: 97.2 F | DIASTOLIC BLOOD PRESSURE: 77 MMHG

## 2024-02-19 DIAGNOSIS — C50.911 CARCINOMA OF RIGHT BREAST METASTATIC TO BONE (MULTI): ICD-10-CM

## 2024-02-19 DIAGNOSIS — C41.9 MALIGNANT NEOPLASM OF BONE WITH METASTASES (MULTI): ICD-10-CM

## 2024-02-19 DIAGNOSIS — C79.51 CARCINOMA OF RIGHT BREAST METASTATIC TO BONE (MULTI): ICD-10-CM

## 2024-02-19 LAB
ALBUMIN SERPL BCP-MCNC: 3.9 G/DL (ref 3.4–5)
ALP SERPL-CCNC: 243 U/L (ref 33–136)
ALT SERPL W P-5'-P-CCNC: 13 U/L (ref 7–45)
ANION GAP SERPL CALC-SCNC: 9 MMOL/L (ref 10–20)
AST SERPL W P-5'-P-CCNC: 14 U/L (ref 9–39)
BASOPHILS # BLD AUTO: 0.02 X10*3/UL (ref 0–0.1)
BASOPHILS NFR BLD AUTO: 0.6 %
BILIRUB SERPL-MCNC: 0.4 MG/DL (ref 0–1.2)
BUN SERPL-MCNC: 17 MG/DL (ref 6–23)
CALCIUM SERPL-MCNC: 8.4 MG/DL (ref 8.6–10.3)
CANCER AG27-29 SERPL-ACNC: 267.6 U/ML (ref 0–38.6)
CHLORIDE SERPL-SCNC: 107 MMOL/L (ref 98–107)
CO2 SERPL-SCNC: 28 MMOL/L (ref 21–32)
CREAT SERPL-MCNC: 0.66 MG/DL (ref 0.5–1.05)
EGFRCR SERPLBLD CKD-EPI 2021: >90 ML/MIN/1.73M*2
EOSINOPHIL # BLD AUTO: 0.13 X10*3/UL (ref 0–0.7)
EOSINOPHIL NFR BLD AUTO: 3.7 %
ERYTHROCYTE [DISTWIDTH] IN BLOOD BY AUTOMATED COUNT: 14.2 % (ref 11.5–14.5)
GLUCOSE SERPL-MCNC: 90 MG/DL (ref 74–99)
HCT VFR BLD AUTO: 36.1 % (ref 36–46)
HGB BLD-MCNC: 12 G/DL (ref 12–16)
IMM GRANULOCYTES # BLD AUTO: 0.01 X10*3/UL (ref 0–0.7)
IMM GRANULOCYTES NFR BLD AUTO: 0.3 % (ref 0–0.9)
LYMPHOCYTES # BLD AUTO: 1.31 X10*3/UL (ref 1.2–4.8)
LYMPHOCYTES NFR BLD AUTO: 37.5 %
MCH RBC QN AUTO: 29.1 PG (ref 26–34)
MCHC RBC AUTO-ENTMCNC: 33.2 G/DL (ref 32–36)
MCV RBC AUTO: 88 FL (ref 80–100)
MONOCYTES # BLD AUTO: 0.35 X10*3/UL (ref 0.1–1)
MONOCYTES NFR BLD AUTO: 10 %
NEUTROPHILS # BLD AUTO: 1.67 X10*3/UL (ref 1.2–7.7)
NEUTROPHILS NFR BLD AUTO: 47.9 %
PLATELET # BLD AUTO: 225 X10*3/UL (ref 150–450)
POTASSIUM SERPL-SCNC: 4.1 MMOL/L (ref 3.5–5.3)
PROT SERPL-MCNC: 5.8 G/DL (ref 6.4–8.2)
RBC # BLD AUTO: 4.12 X10*6/UL (ref 4–5.2)
SODIUM SERPL-SCNC: 140 MMOL/L (ref 136–145)
WBC # BLD AUTO: 3.5 X10*3/UL (ref 4.4–11.3)

## 2024-02-19 PROCEDURE — 85025 COMPLETE CBC W/AUTO DIFF WBC: CPT

## 2024-02-19 PROCEDURE — 2500000004 HC RX 250 GENERAL PHARMACY W/ HCPCS (ALT 636 FOR OP/ED)

## 2024-02-19 PROCEDURE — 86300 IMMUNOASSAY TUMOR CA 15-3: CPT | Mod: GEALAB

## 2024-02-19 PROCEDURE — 80053 COMPREHEN METABOLIC PANEL: CPT

## 2024-02-19 PROCEDURE — 2500000004 HC RX 250 GENERAL PHARMACY W/ HCPCS (ALT 636 FOR OP/ED): Mod: JZ | Performed by: INTERNAL MEDICINE

## 2024-02-19 PROCEDURE — 96413 CHEMO IV INFUSION 1 HR: CPT

## 2024-02-19 PROCEDURE — 96375 TX/PRO/DX INJ NEW DRUG ADDON: CPT | Mod: INF

## 2024-02-19 PROCEDURE — 96367 TX/PROPH/DG ADDL SEQ IV INF: CPT

## 2024-02-19 PROCEDURE — 2500000004 HC RX 250 GENERAL PHARMACY W/ HCPCS (ALT 636 FOR OP/ED): Performed by: INTERNAL MEDICINE

## 2024-02-19 RX ORDER — PROCHLORPERAZINE EDISYLATE 5 MG/ML
10 INJECTION INTRAMUSCULAR; INTRAVENOUS EVERY 6 HOURS PRN
Status: DISCONTINUED | OUTPATIENT
Start: 2024-02-19 | End: 2024-02-19 | Stop reason: HOSPADM

## 2024-02-19 RX ORDER — HEPARIN 100 UNIT/ML
SYRINGE INTRAVENOUS
Status: COMPLETED
Start: 2024-02-19 | End: 2024-02-19

## 2024-02-19 RX ORDER — ALBUTEROL SULFATE 0.83 MG/ML
3 SOLUTION RESPIRATORY (INHALATION) AS NEEDED
Status: DISCONTINUED | OUTPATIENT
Start: 2024-02-19 | End: 2024-02-19 | Stop reason: HOSPADM

## 2024-02-19 RX ORDER — FAMOTIDINE 10 MG/ML
20 INJECTION INTRAVENOUS ONCE AS NEEDED
Status: DISCONTINUED | OUTPATIENT
Start: 2024-02-19 | End: 2024-02-19 | Stop reason: HOSPADM

## 2024-02-19 RX ORDER — EPINEPHRINE 0.3 MG/.3ML
0.3 INJECTION SUBCUTANEOUS EVERY 5 MIN PRN
Status: DISCONTINUED | OUTPATIENT
Start: 2024-02-19 | End: 2024-02-19 | Stop reason: HOSPADM

## 2024-02-19 RX ORDER — PALONOSETRON 0.05 MG/ML
0.25 INJECTION, SOLUTION INTRAVENOUS ONCE
Status: COMPLETED | OUTPATIENT
Start: 2024-02-19 | End: 2024-02-19

## 2024-02-19 RX ORDER — DIPHENHYDRAMINE HYDROCHLORIDE 50 MG/ML
50 INJECTION INTRAMUSCULAR; INTRAVENOUS AS NEEDED
Status: DISCONTINUED | OUTPATIENT
Start: 2024-02-19 | End: 2024-02-19 | Stop reason: HOSPADM

## 2024-02-19 RX ORDER — PROCHLORPERAZINE MALEATE 10 MG
10 TABLET ORAL EVERY 6 HOURS PRN
Status: DISCONTINUED | OUTPATIENT
Start: 2024-02-19 | End: 2024-02-19 | Stop reason: HOSPADM

## 2024-02-19 RX ORDER — HEPARIN 100 UNIT/ML
500 SYRINGE INTRAVENOUS AS NEEDED
Status: CANCELLED | OUTPATIENT
Start: 2024-02-19

## 2024-02-19 RX ORDER — HEPARIN SODIUM,PORCINE/PF 10 UNIT/ML
50 SYRINGE (ML) INTRAVENOUS AS NEEDED
Status: CANCELLED | OUTPATIENT
Start: 2024-02-19

## 2024-02-19 RX ADMIN — PALONOSETRON 250 MCG: 0.05 INJECTION, SOLUTION INTRAVENOUS at 09:55

## 2024-02-19 RX ADMIN — FAM-TRASTUZUMAB DERUXTECAN-NXKI 270 MG: 100 INJECTION, POWDER, LYOPHILIZED, FOR SOLUTION INTRAVENOUS at 11:11

## 2024-02-19 RX ADMIN — DEXAMETHASONE SODIUM PHOSPHATE 12 MG: 10 INJECTION, SOLUTION INTRAMUSCULAR; INTRAVENOUS at 09:33

## 2024-02-19 RX ADMIN — FOSAPREPITANT DIMEGLUMINE 150 MG: 150 INJECTION, POWDER, LYOPHILIZED, FOR SOLUTION INTRAVENOUS at 09:59

## 2024-02-19 RX ADMIN — HEPARIN 500 UNITS: 100 SYRINGE at 13:20

## 2024-02-19 ASSESSMENT — PAIN SCALES - GENERAL: PAINLEVEL: 3

## 2024-02-19 NOTE — SIGNIFICANT EVENT
02/19/24 0918   Prechemo Checklist   Has the patient been in the hospital, ED, or urgent care since last date of service No   Chemo/Immuno Consent Signed Yes   Protocol/Indications Verified Yes   Confirmed to previous date/time of medication Yes   Compared to previous dose Yes   All medications are dated accurately Yes   Pregnancy Test Negative Not applicable   Parameters Met Yes   BSA/Weight-Height Verified Yes   Dose Calculations Verified Yes

## 2024-02-19 NOTE — PROGRESS NOTES
1335. Enhertu Infusion tolerated without incident. Schedule reviewed then Dc'd via independent / ambulatory

## 2024-02-21 ENCOUNTER — ALLIED HEALTH (OUTPATIENT)
Dept: INTEGRATIVE MEDICINE | Facility: CLINIC | Age: 64
End: 2024-02-21

## 2024-02-21 DIAGNOSIS — M25.552 LEFT HIP PAIN: Primary | ICD-10-CM

## 2024-02-21 PROCEDURE — 97139 UNLISTED THERAPEUTIC PX: CPT | Performed by: ACUPUNCTURIST

## 2024-02-21 NOTE — PROGRESS NOTES
Acupuncture Visit:     Subjective   Patient ID: Trang Jones is a 63 y.o. female who presents for No chief complaint on file.  Pt reported that her left hip feels best in the morning and pain increases by days end.  On average is starts to hurt by lunch.  Her daughter had her first baby and she spend day helping her.  Walking up and down stairs has made hip sore.          Session Information  Is this acupuncture treatment being billed to the patient's insurance company: No  Visit Type: Follow-up visit         Review of Systems         Provider reviewed plan for the acupuncture session, precautions and contraindications. Patient/guardian/hospital staff has given consent to treat with full understanding of what to expect during the session. Before acupuncture began, provider explained to the patient to communicate at any time if the procedure was causing discomfort past their tolerance level. Patient agreed to advise acupuncturist. The acupuncturist counseled the patient on the risks of acupuncture treatment including pain, infection, bleeding, and no relief of pain. The patient was positioned comfortably. There was no evidence of infection at the site of needle insertions.    Objective   Physical Exam         Treatment Plan  Treatment Goals: Pain management    Acupuncture Treatment  Patient Position: Seated and reclining  Needle Guage: 42 guage /.14/ Lime green seirin, 40 guage /.16/ Red seirin, 36 guage /.20/ Blue seirin  Body Points - Left: gb 34, sp 3.2, k 9, piriformis release, tan jayla side x4  Body Points - Bilateral: st qix1,  Needle Count In: 10  Needle Count Out: 10  Needle Retention Time (min): 25 minutes  Total Face to Face Time (min): 25 minutes              Assessment/Plan

## 2024-02-21 NOTE — PATIENT INSTRUCTIONS
Pt tolerated tx well.  Immediate pt reported benefits of looser and easier to stand on leg.  Advised to engage in gentle activity following tx and hydrate.  Schedule 4-6 follow tx with re-evaluation

## 2024-02-23 ENCOUNTER — TELEPHONE (OUTPATIENT)
Dept: RADIATION ONCOLOGY | Facility: HOSPITAL | Age: 64
End: 2024-02-23
Payer: COMMERCIAL

## 2024-02-23 NOTE — TELEPHONE ENCOUNTER
Called pt to remind of appointment on 2/26/24 at 3:00 Pt confirmed appointment.     Rafael Fang MA

## 2024-02-24 LAB — UH GNO TEMPUS PORTAL: NORMAL

## 2024-02-26 ENCOUNTER — HOSPITAL ENCOUNTER (OUTPATIENT)
Dept: RADIOLOGY | Facility: HOSPITAL | Age: 64
Discharge: HOME | End: 2024-02-26
Payer: COMMERCIAL

## 2024-02-26 ENCOUNTER — HOSPITAL ENCOUNTER (OUTPATIENT)
Dept: RADIATION ONCOLOGY | Facility: HOSPITAL | Age: 64
Setting detail: RADIATION/ONCOLOGY SERIES
Discharge: HOME | End: 2024-02-26
Payer: COMMERCIAL

## 2024-02-26 VITALS
HEART RATE: 64 BPM | HEIGHT: 61 IN | RESPIRATION RATE: 18 BRPM | WEIGHT: 134.7 LBS | TEMPERATURE: 97.3 F | DIASTOLIC BLOOD PRESSURE: 82 MMHG | OXYGEN SATURATION: 98 % | SYSTOLIC BLOOD PRESSURE: 128 MMHG | BODY MASS INDEX: 25.43 KG/M2

## 2024-02-26 DIAGNOSIS — C79.51 METASTATIC CANCER TO SPINE (MULTI): Primary | ICD-10-CM

## 2024-02-26 DIAGNOSIS — G89.3 PAIN DUE TO MALIGNANT NEOPLASM METASTATIC TO BONE (MULTI): ICD-10-CM

## 2024-02-26 DIAGNOSIS — C79.51 CARCINOMA OF RIGHT BREAST METASTATIC TO BONE (MULTI): ICD-10-CM

## 2024-02-26 DIAGNOSIS — S22.050G COMPRESSION FRACTURE OF T5 VERTEBRA WITH DELAYED HEALING, SUBSEQUENT ENCOUNTER: ICD-10-CM

## 2024-02-26 DIAGNOSIS — Z79.83 LONG-TERM CURRENT USE OF BISPHOSPHONATE: ICD-10-CM

## 2024-02-26 DIAGNOSIS — M54.50 ACUTE LEFT-SIDED LOW BACK PAIN WITHOUT SCIATICA: ICD-10-CM

## 2024-02-26 DIAGNOSIS — Z51.81 ENCOUNTER FOR MONITORING CARDIOTOXIC DRUG THERAPY: ICD-10-CM

## 2024-02-26 DIAGNOSIS — Z79.899 ENCOUNTER FOR MONITORING CARDIOTOXIC DRUG THERAPY: ICD-10-CM

## 2024-02-26 DIAGNOSIS — C79.51 PAIN DUE TO MALIGNANT NEOPLASM METASTATIC TO BONE (MULTI): ICD-10-CM

## 2024-02-26 DIAGNOSIS — C41.9 MALIGNANT NEOPLASM OF BONE WITH METASTASES (MULTI): ICD-10-CM

## 2024-02-26 DIAGNOSIS — M25.552 LEFT HIP PAIN: ICD-10-CM

## 2024-02-26 DIAGNOSIS — C50.911 CARCINOMA OF RIGHT BREAST METASTATIC TO BONE (MULTI): ICD-10-CM

## 2024-02-26 DIAGNOSIS — Z85.3 HISTORY OF BREAST CANCER IN FEMALE: ICD-10-CM

## 2024-02-26 DIAGNOSIS — Z51.11 ENCOUNTER FOR ANTINEOPLASTIC CHEMOTHERAPY: ICD-10-CM

## 2024-02-26 PROCEDURE — A9575 INJ GADOTERATE MEGLUMI 0.1ML: HCPCS | Performed by: NURSE PRACTITIONER

## 2024-02-26 PROCEDURE — 72158 MRI LUMBAR SPINE W/O & W/DYE: CPT | Performed by: RADIOLOGY

## 2024-02-26 PROCEDURE — 99215 OFFICE O/P EST HI 40 MIN: CPT | Performed by: RADIOLOGY

## 2024-02-26 PROCEDURE — 72158 MRI LUMBAR SPINE W/O & W/DYE: CPT

## 2024-02-26 PROCEDURE — 2500000004 HC RX 250 GENERAL PHARMACY W/ HCPCS (ALT 636 FOR OP/ED): Performed by: NURSE PRACTITIONER

## 2024-02-26 PROCEDURE — 99205 OFFICE O/P NEW HI 60 MIN: CPT | Performed by: RADIOLOGY

## 2024-02-26 PROCEDURE — 73723 MRI JOINT LWR EXTR W/O&W/DYE: CPT | Mod: LT

## 2024-02-26 RX ORDER — GADOTERATE MEGLUMINE 376.9 MG/ML
0.2 INJECTION INTRAVENOUS
Status: COMPLETED | OUTPATIENT
Start: 2024-02-26 | End: 2024-02-26

## 2024-02-26 RX ADMIN — GADOTERATE MEGLUMINE 12 ML: 376.9 INJECTION INTRAVENOUS at 11:13

## 2024-02-26 SDOH — ECONOMIC STABILITY: FOOD INSECURITY: WITHIN THE PAST 12 MONTHS, THE FOOD YOU BOUGHT JUST DIDN'T LAST AND YOU DIDN'T HAVE MONEY TO GET MORE.: NEVER TRUE

## 2024-02-26 SDOH — ECONOMIC STABILITY: FOOD INSECURITY: WITHIN THE PAST 12 MONTHS, YOU WORRIED THAT YOUR FOOD WOULD RUN OUT BEFORE YOU GOT MONEY TO BUY MORE.: NEVER TRUE

## 2024-02-26 ASSESSMENT — ENCOUNTER SYMPTOMS
CARDIOVASCULAR NEGATIVE: 1
ENDOCRINE NEGATIVE: 1
EYES NEGATIVE: 1
NAUSEA: 1
NEUROLOGICAL NEGATIVE: 1
PSYCHIATRIC NEGATIVE: 1
DIARRHEA: 1
CONSTIPATION: 1
RESPIRATORY NEGATIVE: 1
FATIGUE: 1
LOSS OF SENSATION IN FEET: 0
BACK PAIN: 1
DEPRESSION: 0
OCCASIONAL FEELINGS OF UNSTEADINESS: 0

## 2024-02-26 ASSESSMENT — COLUMBIA-SUICIDE SEVERITY RATING SCALE - C-SSRS
2. HAVE YOU ACTUALLY HAD ANY THOUGHTS OF KILLING YOURSELF?: NO
1. IN THE PAST MONTH, HAVE YOU WISHED YOU WERE DEAD OR WISHED YOU COULD GO TO SLEEP AND NOT WAKE UP?: NO
6. HAVE YOU EVER DONE ANYTHING, STARTED TO DO ANYTHING, OR PREPARED TO DO ANYTHING TO END YOUR LIFE?: NO

## 2024-02-26 ASSESSMENT — PATIENT HEALTH QUESTIONNAIRE - PHQ9
1. LITTLE INTEREST OR PLEASURE IN DOING THINGS: NOT AT ALL
2. FEELING DOWN, DEPRESSED OR HOPELESS: NOT AT ALL
SUM OF ALL RESPONSES TO PHQ9 QUESTIONS 1 AND 2: 0

## 2024-02-26 ASSESSMENT — PAIN SCALES - GENERAL: PAINLEVEL: 2

## 2024-02-26 NOTE — PROGRESS NOTES
Radiation Oncology Follow-Up    Patient Name:  Trang Jones  MRN:  [id number]  :  1960    Referring Provider: Pk Andrade NP  Primary Care Provider: Karl Granger DO  Care Team: Patient Care Team:  Karl Granger DO as PCP - General (Family Medicine)  [facility]  Bebeto Tolbert MD as Consulting Physician (Hematology and Oncology)  Bo Sandoval MD as Consulting Physician (Hematology and Oncology)    Date of Service: 2024     SUBJECTIVE  History of Present Illness:   Trang Jones is a 63 y.o. female who was previously seen at the [facility] Department of Radiation Oncology for palliation of metastatic breast cancer causing pain in spine and pelvis.      Onc History:  S/p L mastectomy, chemoRT  (treated by Dr. Dolly Flores at [facility])    Progressive back pain.  MRI shows multifocal osseous metastasis of the thoracic spine most significantly involving T4 through T6 with loss of height of T5. Osseous expansion and epidural extension of neoplasm noted at T5 and to a lesser extent at T4. She was seen by Radiation Oncology on 23 and elected to undergo radiation treatment of her disease.      Most recent radiation therapy:  The patient received palliative EBRT (8 Gy x 1) to her T4-T6 disease on 23, which she tolerated well     She has been under the care of Dr. Tolbert and Dr. Sandoval with integrative oncology and is currently on HER2 targeted therapy.    Her recent imaging from 24 shows progression of disease in her lumbar spine and pelvis. There is no clear area of fracture.    She reports she has progressive pain in her left hip/leg, but also lower grade pain in various parts of her back. She denies any neurological deficits.       Review of Systems:   Review of Systems   Constitutional:  Positive for fatigue.   HENT:  Negative.     Eyes: Negative.    Respiratory: Negative.    "  Cardiovascular: Negative.    Gastrointestinal:  Positive for constipation, diarrhea and nausea.   Endocrine: Negative.    Genitourinary: Negative.     Musculoskeletal:  Positive for back pain (chronic).   Skin: Negative.    Neurological: Negative.    Psychiatric/Behavioral: Negative.       The patient's current pain level was assessed.  They report currently having a pain of 2 out of 10.  They feel their pain is under control with the use of pain medications.    Performance Status:   The Karnofsky performance scale today is 90, Able to carry on normal activity; minor signs or symptoms of disease (ECOG equivalent 0).        OBJECTIVE  Vital Signs:  /82   Pulse 64   Temp 36.3 °C (97.3 °F) (Skin)   Resp 18   Ht 1.549 m (5' 1\")   Wt 61.1 kg (134 lb 11.2 oz)   LMP  (LMP Unknown)   SpO2 98%   BMI 25.45 kg/m²    Physical Exam:  Physical Exam  Constitutional:       Appearance: Normal appearance. She is normal weight.   HENT:      Head: Normocephalic and atraumatic.      Nose: Nose normal.      Mouth/Throat:      Pharynx: Oropharynx is clear.   Eyes:      Extraocular Movements: Extraocular movements intact.      Conjunctiva/sclera: Conjunctivae normal.   Cardiovascular:      Rate and Rhythm: Normal rate.   Pulmonary:      Effort: Pulmonary effort is normal.   Abdominal:      General: Abdomen is flat.      Palpations: Abdomen is soft.   Musculoskeletal:      Cervical back: Normal range of motion.   Skin:     General: Skin is warm.   Neurological:      General: No focal deficit present.      Mental Status: She is alert and oriented to person, place, and time.   Psychiatric:         Mood and Affect: Mood normal.         Behavior: Behavior normal.         Judgment: Judgment normal.             ASSESSMENT:  Trang Jones is a 63 y.o. female with breast cancer, Malignant neoplasm of bone with metastases (CMS/HCC), Clinical: pM1, G3.      We discussed at length with the patient and reviewed her imaging with " her and her  the considerable progression of disease throughout his spine and pelvis. We showed her that ~T5 she continues to develop kyphosis and has a progressive compression at this level that is likely a combination of disease progression and may result in instability that would require surgical intervention if it continues to progress. She has no areas of clear epidural disease or cord compression.    We showed her the disease burden in her pelvis and femurs. There are multiple concerning areas putting her at risk of fracture in her pelvis and femoral neck that likely are contributing to her discomfort.    We reviewed the risks and benefits or palliative RT, including the role of re-irradiation and risks and benefits associated with this.        PLAN:    -Patient to discuss with Dr. Tolbert, but tentatively would like to proceed with RT  -Once we hear from patient if she wants to proceed we will set her up for CT sim to both areas.    NCCN Guidelines were applicable to guide this patients treatment plan.  Jordan Mejia MD

## 2024-02-27 ENCOUNTER — TELEMEDICINE (OUTPATIENT)
Dept: PHYSICAL MEDICINE AND REHAB | Facility: CLINIC | Age: 64
End: 2024-02-27
Payer: COMMERCIAL

## 2024-02-27 ENCOUNTER — TELEPHONE (OUTPATIENT)
Dept: PHYSICAL MEDICINE AND REHAB | Facility: CLINIC | Age: 64
End: 2024-02-27
Payer: COMMERCIAL

## 2024-02-27 DIAGNOSIS — C79.51 PAIN DUE TO MALIGNANT NEOPLASM METASTATIC TO BONE (MULTI): ICD-10-CM

## 2024-02-27 DIAGNOSIS — G89.3 PAIN DUE TO MALIGNANT NEOPLASM METASTATIC TO BONE (MULTI): ICD-10-CM

## 2024-02-27 DIAGNOSIS — M79.2 NEUROPATHIC PAIN: ICD-10-CM

## 2024-02-27 DIAGNOSIS — C41.9 MALIGNANT NEOPLASM OF BONE WITH METASTASES (MULTI): Primary | ICD-10-CM

## 2024-02-27 PROCEDURE — 1036F TOBACCO NON-USER: CPT | Performed by: PHYSICAL MEDICINE & REHABILITATION

## 2024-02-27 PROCEDURE — 99213 OFFICE O/P EST LOW 20 MIN: CPT | Performed by: PHYSICAL MEDICINE & REHABILITATION

## 2024-02-27 NOTE — TELEPHONE ENCOUNTER
Pt received your message regarding spots in her hip.  [facility] feels he can relieve her pain with radiation.  Pt would like to talk to you about it before her and her  make a decision.  If you could return her call.

## 2024-02-27 NOTE — PROGRESS NOTES
Chief Complaint     ref by Dr Sandoval for spine pain S/P breast CA. thoracis spine pain.      History of Present IllnessPhysical Medicine and Rehabilitation Cancer Rehab Consult        Chief Complaint:  Low back pain     HPI:  Ms. Jones is a 62 yo F w pmh of metastatic triple negative breast cancer metastatic PDL1 negative. She was originally dx in 2010 w stage II breast cancer sp lumpectomy, 4 cycles of docetaxel/cyclophosphamide chemo followed by tamoxifen x 7 yrs, completed in [id number]. In spring presents w mid thoraicc back pain and dx w metastatic breast cancer. She completed radiation therapy to T spine. Currently she is on Abraxane chemotherapy, did not tolerate paclitaxel in 6/2023. On zometa q 3.               States prior to mid thoracic back pain from Feb 2023. Up until then vey activey and doing polites, yoga and weight lifting.   She completed radiation in may 2023.  Location: has discomfort in lumbar spine from ho of spinal stenosis and scoliosis  L spine has not changed. states that she used to do pilates and yoga was difficult     Lower back and hip pain is chronic. Has had sciatica back pain as well in the past. Takes gabapentin 300 mg bid.   Mid thoracic pain is not much of an issue.   Radiation: lower back, ,mostly left sometimes R  Quality: throbbing and aching from knee to left calf, daily  Duration: worse at night  back pain is achy and stiffness worse after prolonged sitting  worse w prolonged activites  More difficulty managing it since not doing pilates and yoga since helped maintain range of motion.  sciatica pain is worse w less activity     Aggravating factors:   thoracic; less activity  lumbar ; less acitivity  Alleviating factors:   Severity: [2/10] today, [7/10] most severe  Numbness/Tingling: legs   Bowel or bladder incontinence  Pt's current medication regimen includes: gabapentin, advil prn, ms contin     Treatment to date:  Physical therapy: in the past for lower back, but wo much  relief     Medications taken to date for this complaint include the following:   on MS contin bid and oxycodone prn for pain for general achiness and had not needed it for a while  takes motrin prn for activity  Prior injections: none                             Acupuncture and Reiki.      Has been doing well w therapy. Lower back is better. Has some pain in left hip.  Lower back is better as well. Wants to hold off on injections or further L spine imaging.           Due to liver progression in Dec 2023 imaging initiated new therapy with Enhertu on 1/29/24.    States overall morning are better, chemo causing fatigue. Now taking morphine tid. completed Pt end of Nov and it got colder. In December she did less, had more pain and more muscle.  Did hiking in [place]. Hiked 6 x, 3-4 miles.   During the trip left thigh, femur and pelvis. Thoracic spine MRI pending for monitoring, Lumbar mri pending and xr pelvis, more nerve pain in left lower leg.    So when walking anterior proximal thigh and limping and then better. If walking a mile then limping. Deeper pain.  Lower leg does feel more nerve pain.   If takes gabapentin early enough. Goes to gym a fw times a week and walking on treadmill.       She was last seen in feb 2024.   She has a hard time w walking in the hip.  Ibuprofen helps. Trying not to take oxy.  She is participating in [employer], missed two sessions due to med appts. She is able to her exercises and not really bothered by it.   Warms up on treadmill and better. Leg press favors on the R and setting low weight.     Saw rad onc. Will have T spine and hip radiation. Not having too much pain in spine.   Walking w hiking poles. Pending med onc appt as well. Overall enjoys the program and talking to other people.     Imaging  Reviewed 02/27/24  Mti t spine 2/2024  ALIGNMENT: Levoconvex curvature of the thoracic spine centered at   T9-10. Focal kyphotic deformity centered at T5.       VERTEBRAE: Progression of  metastatic lesions compared to previous MRI   11/01/2023 best compared on the precontrast T1 and STIR sagittal   images. Unable to directly compare postcontrast images as fat   saturation was performed of the current exam and not performed in the   previous. Lesions are as follows:       New multifocal lesions within the T1 vertebral body and extending   into the right-greater-than-left pedicles of T1. Increased STIR   signal throughout the entirety of the T2 vertebral body. Lesion   within the posterior aspect of the T3 vertebral body slightly   progressed from prior exam; extension into the left pedicle and   transverse process is similar to previous exam. STIR hyperintense   expansile lesion within the left transverse process of T4, not   present on previous exam. Marrow replacement throughout the T4   vertebral body is progressed. Redemonstration of anterior compression   fracture of the T5 vertebral body with approximately 6 mm osseous   retropulsion, similar to previous exam. Diffuse replacement of   vertebral marrow, progressed compared to previous exam. Stir   hyperintensity in the posterior elements is also slightly increased   from previous exam. Extramedullary tumor results in moderate canal   stenosis with flattening of the anterior and left lateral aspect of   the cord, progressed from previous MRI. Lesion within the T7   vertebral body is increased in size. Stir hyperintense lesions within   the left transverse process is new from previous exam; right   transverse process lesion is unchanged. Near-complete replacement of   normal marrow within the T10 vertebral body with associated   compression deformity is unchanged. Increased size of marrow   replacing STIR hyperintense lesion within the T11 vertebral body.   Lesions within bilateral transverse processes were possibly present   on previous MRI. Increased marrow replacement throughout the T12   vertebral body with extension into the right pedicle,  progressed from   previous MRI.       DISCS: Multilevel disc desiccation disc height loss.       CORD: The conus medullaris terminates at L1. Increased mass-effect on   the cord at the T5 level secondary to osseous retropulsion and   extraosseous tumor without definite associated edema or myelomalacia.   Remainder of the visualized cord is unremarkable.       ENHANCEMENT: Extensive enhancing metastatic lesions throughout the   thoracic spine as detailed above.       EVALUATION OF INDIVIDUAL LEVELS:       T5: Moderate canal stenosis secondary to osseous retropulsion and   extraosseous tumor. There is also results in moderate   left-greater-than-right foraminal stenosis at T4-5 and T5-6.       T10-11: Disc osteophyte complex and facet hypertrophy results in   mild-to-moderate bilateral foraminal stenosis,   right-greater-than-left. Spinal canal remains patent.       T11-12: Moderate to severe right foraminal stenosis secondary to disc   bulge and facet hypertrophy. Spinal canal and left neural foramina   are patent.       PARAVERTEBRAL SOFT TISSUES: The visualized paravertebral soft tissues   appear within normal limits.       IMPRESSION:   Overall progression of metastatic disease throughout the thoracic   spine as detailed most notable at T5 where there is increased   extraosseous tumor within the posterior elements superimposed on   stable retropulsion of the posterior endplate resulting in increased   canal stenosis and mass-effect on the ventral and left lateral aspect   of the cord. No associated cord edema.       Lesions are best compared on T1 and STIR sagittal images. Comparison   on postcontrast imaging was limited given lack of fat saturation on   previous exams. Please note postcontrast imaging on future exams   should be done with fat saturation.        Mri l spine 2/2024  FINDINGS:   ALIGNMENT: Exaggeration of normal lumbar lordosis. 4 mm grade 1   anterolisthesis of L5 on S1. Trace retrolisthesis of L1  on L2 and L2   on L3. Dextroconvex scoliosis centered at L2-3.       VERTEBRAE: Extensive metastatic disease throughout the lumbar spine   as well as in the partially visualized thoracic spine, sacrum, and   iliac bones. Multifocal lesions in the L1 vertebral body.   Near-complete marrow replacement of the L3 vertebral body. Lesions   throughout the posterior elements of the lumbar spine also noted. No   pathologic compression fracture. No definitive extraosseous extension   of tumor.       DISCS: Multilevel disc desiccation. Moderate disc height loss at L2-3.       CONUS MEDULLARIS AND CAUDA EQUINA: The conus medullaris terminates at   L1-2. There is normal appearance of the conus medullaris and cauda   equina. No abnormal intrathecal enhancement.       ENHANCEMENT: Multifocal enhancing lesions as above.       PARAVERTEBRAL SOFT TISSUES AND VISUALIZED RETROPERITONEUM: The   visualized paravertebral soft tissues appear within normal limits.       EVALUATION OF INDIVIDUAL LEVELS:   L1-2: Facet hypertrophy moderately narrows the left neural foramen.   Spinal canal right neural foramina are patent.       L2-3: Disc bulge asymmetric to the left with facet hypertrophy   results in moderate left foraminal stenosis. There is mild narrowing   of the spinal canal. Right neural foramen is patent.       L3-4: Shallow disc bulge with facet hypertrophy mildly narrows the   spinal canal and bilateral foramina.       L4-5: Shallow disc bulge with facet hypertrophy results in mild right   foraminal stenosis. No narrowing of the spinal canal or left neural   foramen.      L5-S1: 4 mm grade 1 anterolisthesis with disc bulge and facet   hypertrophy. Mild right foraminal stenosis. Spinal canal left neural   foramina are patent.       LIMITED EVALUATION OF UPPER SACRUM AND SACROILIAC JOINTS: Mild   degenerative change. Extensive metastatic disease as above.       IMPRESSION:   Extensive metastatic disease throughout the lumbar spine as  well as a   partially visualized thoracic spine, sacrum, and iliac bones. No   extraosseous extension of tumor or pathologic fracture.       Multilevel degenerative changes of the lumbar spine as detailed. No   high-grade canal or foraminal stenosis at any level.   Mri hip left 12/2023    FINDINGS:   TENDONS:   Severe left gluteus minimus insertional tendinosis with at least   partial tearing. The left gluteus medius, right gluteus minimus and   right gluteus medius tendons are intact. The insertion of the   iliopsoas tendon is normal. Moderate left hamstring origin   tendinosis. The right hamstring tendon origin is normal.       JOINTS:   The hip joint articular cartilage is normal without focal defect. No   evidence of significant arthrosis. No significant hip joint effusions   are identified. The acetabular labrum is normal.   No significant degenerative changes in the pubic symphysis or   bilateral sacroiliac joints. No evidence of avascular necrosis.       OSSEOUS STRUCTURES:   Innumerable enhancing marrow replacing lesions throughout the bony   pelvis and bilateral proximal femora, many of which are new and/or   not appreciable on the prior CT. Index lesions as follows: *   Confluent left hemisacrum lesion anterior to left S1 neural foramen,   4.9 cm (series 15, image 13) * Left posterior acetabular lesion, 1.8   cm (series 15, image 31) * Left intertrochanteric lesion, 2.4 cm   (series 15, image 40) * Left ischial lesion, 7.1 cm (series 15, image   43)      SOFT TISSUES:   No muscle atrophy or tear.       INTERNAL ORGANS:   Evaluation of the internal organs of the pelvis is limited on this   small field of view study tailored for evaluation of the   musculoskeletal system. No acute intrapelvic process. Right ovary   versus intramural fibroid, 2.3 cm (series 15, image 28).       IMPRESSION:   Worsening osseous metastases with innumerable new/enlarging marrow   replacing lesions throughout the imaged pelvis and  bilateral femora.   No pathologic fracture.       Please refer to separately reported lumbar spine MRI for additional   findings.      Severe left gluteus minimus insertional tendinosis with at least   partial tearing. Moderate left hamstring origin tendinosis.   Xr left hip 1/2024  FINDINGS:  No acute fracture or malalignment. There is left acetabular  dysplasia. Multiple sclerotic osseous metastasis of the sacrum, right  iliac bone, and right proximal femur noted and better evaluated on  the previous CT study from 12/22/2023.      Left acetabular dysplasia with lateral uncovering of the femoral head.  Bilateral hip joint spaces are well preserved.  Soft tissues are within normal limits.      IMPRESSION:  1. No acute fracture or malalignment.  2. Left acetabular dysplasia.  3. Multiple osseous metastasis of the pelvis better evaluated on the  previous CT study.    Ct chest 2023  ECHNIQUE:  CT of the chest, abdomen, and pelvis was performed.  Contiguous axial  images were obtained at 3 mm slice thickness through the chest,  abdomen and pelvis. Coronal and sagittal reconstructions at 3 mm  slice thickness were performed.  75 ml of contrast Omnipaque 350 were administered intravenously  without immediate complication.      FINDINGS:  Lungs and Pleura: Streaky bibasilar atelectasis. No pulmonary  consolidation. No pleural effusion. No pneumothorax.      Mediastinum: No adenopathy by CT size criteria. Aberrant right  subclavian artery. No cardiomegaly or pericardial effusion. No  thoracic aortic aneurysm. Right-sided Port-A-Cath. Coronary artery  calcifications. Mitral calcifications.      Liver: Development of a small 9 mm hypodensity in the right hepatic  lobe near the dome. Otherwise stable hypodensities throughout the  liver.      Gallbladder and Biliary: Unremarkable.      Pancreas: Redemonstrated calcifications in the region of the  pancreatic head.      Spleen: No abnormality identified in the spleen.       Adrenals: No abnormality identified in either adrenal gland.      Urinary: Couple of small subcentimeter bilateral renal hypodensities  too small to characterize but statistically likely representing  cysts. No hydronephrosis.      Gastrointestinal/Peritoneum: No small or large bowel obstruction in  the visualized abdomen. In the abdomen, there is no extraluminal air.  No significant free fluid. No evidence of acute appendicitis.      Vascular: Abdominal aorta is normal in caliber.      Lymphatics: No enlarged lymph nodes by size criteria.      MSK/Body Wall/Chest Wall: Multifocal sclerotic metastasis are seen  throughout the axillary and appendicular skeleton, overall similar in  appearance. For example, a right proximal femoral lesion measures 13  mm, stable. A sacral lesion measures 11 mm, stable. Redemonstrated  moderate to severe compression fracture T5 vertebral body, mild at  T10 vertebral body. Degenerative changes in the spine with  dextroscoliosis at the thoracolumbar junction. Left-sided breast  implants.      IMPRESSION:  Overall similar appearance of diffuse osseous metastatic disease.      New subcentimeter lesion in the right hepatic lobe. Question  metastatic disease. Otherwise similar appearance of small  hypodensities throughout the liver.    MRI T spine 11/2023  TECHNIQUE:  Sagittal T1 and T2 and axial T2 and T1 weighted MR images of the  thoracic spine were obtained. Postcontrast T1 weighted imaging was  performed after administration of gadolinium based contrast agent.      FINDINGS:  Numerous areas of abnormal marrow signal are again identified  throughout the thoracic spine. Particularly there is abnormal marrow  signal within T2 vertebral body with more prominent areas of  hypointense T1 signal which may reflect areas of new sclerosis. The  STIR hyperintensity has improved in this region.      The STIR hyperintense signal within T3 has mildly improved as  compared to prior study. The STIR  hyperintense signal within T4 has  mildly improved with new areas of sclerosis. Note is again made of  posterior wedging of T4. There is anterior wedge compression fracture  of T5 as before, again the STIR hyperintense signal within has  improved as compared to prior study. Right transverse process focal  marrow signal abnormality is again noted within the of T7 as well as  within the right pedicle and transverse process of T5 and T6,  slightly improved as compared to prior study.      The marrow signal abnormalities within T6, T9, T10, T11 and T12 are  again identified. The marrow signal abnormality within all lesions  appears improved except for within anterior aspect of T11 on image  15/21 on current exam which appears slightly more pronounced as  compared to prior study.      Note is again made of a compression fracture of T10 with  approximately 80% loss of central vertebral height.      There is again retropulsion of posterior margin of T5 with moderate  central canal stenosis, unchanged as compared to prior study. There  is no deformity of spinal cord contour. There is minimal  intramedullary signal abnormality at this location on STIR imaging,  although this is not confirmed on T2 weighted images.      There is no other focus of significant central canal stenosis.      Note is again made of desiccation of several intervertebral discs  with multilevel anterior and posterior osteophytic spurring.      IMPRESSION:  Redemonstration of heterogenous marrow signal throughout the thoracic  vertebrae. The STIR hyperintense signal associated with majority of  the lesions has mildly improved as compared to prior study except for  lesion within anterior aspect of T11 which appears slightly more  pronounced as compared to prior study.      Moderate central canal stenosis at T5 secondary to retropulsion of  posterior margin.    [SOCHX]  disabiity  cpa  lives w   denies smoking alcohol  medical thc     Review of  Systems:  Constitutional: Denies fever or chills, malaise, weight changes.   Eyes: Denies change in visual acuity   HENT: Denies nasal congestion or sore throat   Respiratory: Denies cough or shortness of breath   Cardiovascular: Denies chest pain or edema   GI: Denies abdominal pain, nausea, vomiting, bloody stools or diarrhea   : Denies dysuria   Integument: Denies rash   Neurologic: As per HPI  MSK: Per above HPI  Endocrine: Denies polyuria or polydipsia   Lymphatic: Denies swollen glands   Psychiatric: Denies depression or anxiety            PHYSICAL EXAM:  None phone visit    General: NAD, well developed,   Psychiatric: appropriate mood & affect.   Cardiovascular: Normal pedal pulses; no LE edema.  Respiratory: Normal rate; unlabored breathing.  Skin: Intact; no erythema; no ecchymosis or rash.  Lymphatic: No lymphadenopathy or lymphedema.  NEURO: Alert and appropriate. Speech fluent, conversing appropriately.   Motor:   Rt: HF 5/5, KE 5/5, KF 5/5, DF 5/5, EHL 5/5, PF 5/5.   Lt: HF 5/5, KE 5/5, KF 5/5, DF 5/5, EHL 5/5, PF 5/5.  Sensation:    Light touch: intact in the b/l L3-S1 dermatomes.   PP: intact in the b/l L3-S1 dermatomes  Reflexes:    Right: patellar 2+, achilles 2+,    Left: patellar 2+, achilles 2+,    Babinski's downgoing b/l; no clonus  Gait: Normal, narrow based gait.   MSK:  Inspection reveals scoliosis and kyphosis  Spinal range of motion: Flexion to 70Â° degrees, Extension of 10 degrees.  There is tenderness over the T and L spine paraspinals   Special tests:   Straight leg raise: - bl   Slump test: - bl  Some pain w hip scour  Mild tenderness left greater troch and glut met tendon       promis screen 9/2023 2/2024  PF: 27/30 25/30  Fatigue: 6/15                            12/15  social: 14/30                              8/15  pain 3, distress 3                   3/1        Impression: Ms. Jones is a 64 yo F w pmh of lumbar spondylosis and  chronic radiculopathy, scoliosis and metastic triple negative breast cancer metastatic PDL1 negative. She was originally dx in 2010 w stage II breast cancer sp lumpectomy, 4 cycles of docetaxel/cyclophosphamide chemo followed by tamoxifen x 7 yrs, completed in [id number]. In spring presents w mid thoraicc back pain and dx w metastatic breast cancer. She completed radiation therapy to T spine. She was on Abraxane chemotherapy but imaging in Dec 2023 showed mets to liver so no started on Enhertu on 1/29/24. She presents re question for exercise safety in setting of bone mets. currently pain overall controlled in T spine w Ms contin.    Plan:     1. discussed main precautions; maintain neutral spine when exercising, avoid excessive flexion/extension and twisting and avoid high impact exercises  2. otherwise she is ok to continue some pilates/yoga w above restrictions and listen to her body pain vs soreness  3.repeat mri hip and T w more metastatic disease, pending radiation. Discussed that ok to continue walking,biking as tolerates well as light weights. She is able to adjust her routine.   4. continue [employer] exercise program for now since overall symptoms are stable and she knows if she is loading hip too much.    3. Completed PT for core strengthening w spine precautions w focus on learning the exercises properly so can do on her own  4. pain meds as per palliative care did suggest to increase the night time gabapentin since radicular pain is worse at night.    6. trial lidocaine patch prn  8. continue fu w symptom management clinic  9. ok to continue postural brace as tolerated    Fu after radiation       Leah Sidhu MD  Physical Medicine and Rehabilitation

## 2024-02-28 ENCOUNTER — OFFICE VISIT (OUTPATIENT)
Dept: HEMATOLOGY/ONCOLOGY | Facility: CLINIC | Age: 64
End: 2024-02-28
Payer: COMMERCIAL

## 2024-02-28 ENCOUNTER — HOSPITAL ENCOUNTER (OUTPATIENT)
Dept: RADIOLOGY | Facility: HOSPITAL | Age: 64
Discharge: HOME | End: 2024-02-28
Payer: COMMERCIAL

## 2024-02-28 VITALS
HEART RATE: 62 BPM | TEMPERATURE: 97.5 F | DIASTOLIC BLOOD PRESSURE: 81 MMHG | SYSTOLIC BLOOD PRESSURE: 122 MMHG | WEIGHT: 136.2 LBS | BODY MASS INDEX: 25.73 KG/M2 | OXYGEN SATURATION: 98 % | RESPIRATION RATE: 18 BRPM

## 2024-02-28 DIAGNOSIS — C50.911 CARCINOMA OF RIGHT BREAST METASTATIC TO BONE (MULTI): ICD-10-CM

## 2024-02-28 DIAGNOSIS — Z79.83 LONG-TERM CURRENT USE OF BISPHOSPHONATE: ICD-10-CM

## 2024-02-28 DIAGNOSIS — C41.9 MALIGNANT NEOPLASM OF BONE WITH METASTASES (MULTI): ICD-10-CM

## 2024-02-28 DIAGNOSIS — Z51.11 ENCOUNTER FOR ANTINEOPLASTIC CHEMOTHERAPY: ICD-10-CM

## 2024-02-28 DIAGNOSIS — Z51.81 ENCOUNTER FOR MONITORING CARDIOTOXIC DRUG THERAPY: ICD-10-CM

## 2024-02-28 DIAGNOSIS — C79.51 CARCINOMA OF RIGHT BREAST METASTATIC TO BONE (MULTI): ICD-10-CM

## 2024-02-28 DIAGNOSIS — Z79.899 ENCOUNTER FOR MONITORING CARDIOTOXIC DRUG THERAPY: ICD-10-CM

## 2024-02-28 PROCEDURE — 1036F TOBACCO NON-USER: CPT | Performed by: INTERNAL MEDICINE

## 2024-02-28 PROCEDURE — 74177 CT ABD & PELVIS W/CONTRAST: CPT | Mod: FOREIGN READ | Performed by: RADIOLOGY

## 2024-02-28 PROCEDURE — 71260 CT THORAX DX C+: CPT

## 2024-02-28 PROCEDURE — 71260 CT THORAX DX C+: CPT | Mod: FOREIGN READ | Performed by: RADIOLOGY

## 2024-02-28 PROCEDURE — 99215 OFFICE O/P EST HI 40 MIN: CPT | Performed by: INTERNAL MEDICINE

## 2024-02-28 PROCEDURE — 99215 OFFICE O/P EST HI 40 MIN: CPT | Mod: GC | Performed by: INTERNAL MEDICINE

## 2024-02-28 PROCEDURE — 2550000001 HC RX 255 CONTRASTS: Performed by: INTERNAL MEDICINE

## 2024-02-28 RX ADMIN — IOHEXOL 75 ML: 350 INJECTION, SOLUTION INTRAVENOUS at 16:21

## 2024-02-28 ASSESSMENT — ENCOUNTER SYMPTOMS
DIZZINESS: 0
SEIZURES: 0
BLOOD IN STOOL: 0
HEMOPTYSIS: 0
SCLERAL ICTERUS: 0
CHEST TIGHTNESS: 0
BACK PAIN: 1
DEPRESSION: 0
ABDOMINAL PAIN: 0
DIARRHEA: 0
ARTHRALGIAS: 0
WHEEZING: 0
FEVER: 0
EXTREMITY WEAKNESS: 0
HOT FLASHES: 0
ADENOPATHY: 0
SLEEP DISTURBANCE: 0
FREQUENCY: 0
CHILLS: 0
COUGH: 0
NAUSEA: 1
CARDIOVASCULAR NEGATIVE: 1
PALPITATIONS: 0
BRUISES/BLEEDS EASILY: 0
LEG SWELLING: 0
HEADACHES: 0
CONSTIPATION: 0
APPETITE CHANGE: 0
SHORTNESS OF BREATH: 0
DIAPHORESIS: 0
FATIGUE: 0
CONFUSION: 0
DYSURIA: 0
ABDOMINAL DISTENTION: 0
NUMBNESS: 0
HEMATURIA: 0
MYALGIAS: 0
EYE PROBLEMS: 0
EYES NEGATIVE: 1
NERVOUS/ANXIOUS: 0
RESPIRATORY NEGATIVE: 1
DIFFICULTY URINATING: 0
CONSTITUTIONAL NEGATIVE: 1

## 2024-02-28 ASSESSMENT — PAIN SCALES - GENERAL: PAINLEVEL: 3

## 2024-02-28 NOTE — PROGRESS NOTES
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 she received prior Taxotere and cyclophosphamide , completed 6/2010. S/p adjuvant endocrine therapy.    Diagnosis of metastatic TNBC 3/2023, presenting with back pain and cord compression      weekly paclitaxel from 5/22/23, changed to abraxane after 1st cycle due to anaphylactic type reaction. Discontinued 12/12/23 due to progressive disease.    CURRENT THERAPY  TDxD since 1/29/24    HISTORY OF PRESENT ILLNESS    Trang Jones is a 63 y.o. woman here with . Recent MRI of spine and hip shows clear disease progression. She has seen Dr Mejia and is planned to have XRT soon. Received C#2 TDxD approx 2 wks ago. Scheduled to receive C#3 next wk. Main issues have been nausea, fatigue, and some intermittent dysphagia with swallowing pills. No cough/dyspnea. Severe hip pain             Review of Systems   Constitutional: Negative.  Negative for appetite change, chills, diaphoresis, fatigue and fever.   HENT:  Negative.  Negative for hearing loss and lump/mass.    Eyes: Negative.  Negative for eye problems and icterus.   Respiratory: Negative.  Negative for chest tightness, cough, hemoptysis, shortness of breath and wheezing.    Cardiovascular: Negative.  Negative for chest pain, leg swelling and palpitations.   Gastrointestinal:  Positive for nausea. Negative for abdominal distention, abdominal pain, blood in stool, constipation and diarrhea.   Endocrine: Negative for hot flashes.   Genitourinary:  Negative for bladder incontinence, difficulty urinating, dyspareunia, dysuria,  frequency and hematuria.    Musculoskeletal:  Positive for back pain. Negative for arthralgias, gait problem and myalgias.        ++hip pain   Neurological:  Negative for dizziness, extremity weakness, gait problem, headaches, numbness and seizures.   Hematological:  Negative for adenopathy. Does not bruise/bleed easily.   Psychiatric/Behavioral:  Negative for confusion, depression and sleep disturbance. The patient is not nervous/anxious.    All other systems reviewed and are negative.        Past Medical History:  has a past medical history of Breast cancer (CMS/HCC), Hypercholesteremia, Metastatic cancer (CMS/HCC), and Personal history of irradiation.  Surgical History:   has a past surgical history that includes CT guided percutaneous biopsy bone deep (2023) and Mastectomy (Left, 2010).  Social History:   reports that she quit smoking about 21 years ago. Her smoking use included cigarettes. She has been exposed to tobacco smoke. She has never used smokeless tobacco. She reports that she does not currently use alcohol. She reports current drug use. Drug: Marijuana.  Family History:    Family History   Problem Relation Name Age of Onset    Leukemia Father       Family Oncology History:  Cancer-related family history is not on file.      OBJECTIVE    VS / Pain:  /81   Pulse 62   Temp 36.4 °C (97.5 °F)   Resp 18   Wt 61.8 kg (136 lb 3.2 oz)   LMP  (LMP Unknown)   SpO2 98%   BMI 25.73 kg/m²   BSA: 1.63 meters squared   Pain Scale: 3    Performance Status:  The ECOG performance scale today is ECO- Restricted in physically strenuous activity.  Carries out light duty.    Physical Exam        Diagnostic Results     STUDY:   MR THORACIC SPINE W AND WO IV CONTRAST       INDICATION:   Signs/Symptoms:Follow-up of treated thoracic spine metastatic disease       COMPARISON:   Thoracic spine MRI 2020.       ACCESSION NUMBER(S):   [id number]      ORDERING CLINICIAN:   OLMAN MANN        TECHNIQUE:   Multi-planar multi-sequential MR imaging of the thoracic spine was   performed before and after the intravenous administration of   contrast, according to standard protocol. 12 ml of Dotarem was   administered (the balance of single use vial(s) has/have been   discarded).      FINDINGS:   ALIGNMENT: Levoconvex curvature of the thoracic spine centered at   T9-10. Focal kyphotic deformity centered at T5.       VERTEBRAE: Progression of metastatic lesions compared to previous MRI   11/01/2023 best compared on the precontrast T1 and STIR sagittal   images. Unable to directly compare postcontrast images as fat   saturation was performed of the current exam and not performed in the   previous. Lesions are as follows:       New multifocal lesions within the T1 vertebral body and extending   into the right-greater-than-left pedicles of T1. Increased STIR   signal throughout the entirety of the T2 vertebral body. Lesion   within the posterior aspect of the T3 vertebral body slightly   progressed from prior exam; extension into the left pedicle and   transverse process is similar to previous exam. STIR hyperintense   expansile lesion within the left transverse process of T4, not   present on previous exam. Marrow replacement throughout the T4   vertebral body is progressed. Redemonstration of anterior compression   fracture of the T5 vertebral body with approximately 6 mm osseous   retropulsion, similar to previous exam. Diffuse replacement of   vertebral marrow, progressed compared to previous exam. Stir   hyperintensity in the posterior elements is also slightly increased   from previous exam. Extramedullary tumor results in moderate canal   stenosis with flattening of the anterior and left lateral aspect of   the cord, progressed from previous MRI. Lesion within the T7   vertebral body is increased in size. Stir hyperintense lesions within   the left transverse process is new from previous exam; right    transverse process lesion is unchanged. Near-complete replacement of   normal marrow within the T10 vertebral body with associated   compression deformity is unchanged. Increased size of marrow   replacing STIR hyperintense lesion within the T11 vertebral body.   Lesions within bilateral transverse processes were possibly present   on previous MRI. Increased marrow replacement throughout the T12   vertebral body with extension into the right pedicle, progressed from   previous MRI.       DISCS: Multilevel disc desiccation disc height loss.       CORD: The conus medullaris terminates at L1. Increased mass-effect on   the cord at the T5 level secondary to osseous retropulsion and   extraosseous tumor without definite associated edema or myelomalacia.   Remainder of the visualized cord is unremarkable.       ENHANCEMENT: Extensive enhancing metastatic lesions throughout the   thoracic spine as detailed above.       EVALUATION OF INDIVIDUAL LEVELS:       T5: Moderate canal stenosis secondary to osseous retropulsion and   extraosseous tumor. There is also results in moderate   left-greater-than-right foraminal stenosis at T4-5 and T5-6.       T10-11: Disc osteophyte complex and facet hypertrophy results in   mild-to-moderate bilateral foraminal stenosis,   right-greater-than-left. Spinal canal remains patent.       T11-12: Moderate to severe right foraminal stenosis secondary to disc   bulge and facet hypertrophy. Spinal canal and left neural foramina   are patent.       PARAVERTEBRAL SOFT TISSUES: The visualized paravertebral soft tissues   appear within normal limits.       IMPRESSION:   Overall progression of metastatic disease throughout the thoracic   spine as detailed most notable at T5 where there is increased   extraosseous tumor within the posterior elements superimposed on   stable retropulsion of the posterior endplate resulting in increased   canal stenosis and mass-effect on the ventral and left lateral  aspect   of the cord. No associated cord edema.        MRI pelvis  COMPARISON:   Radiographs 03/26/2024, CT chest, abdomen and pelvis 12/22/2023, bone   scan 09/26/2023.       ACCESSION NUMBER(S):   [id number]      ORDERING CLINICIAN:   CARMEN POON       TECHNIQUE:   MR imaging of the left hip was obtained  without IV contrast.       FINDINGS:   TENDONS:   Severe left gluteus minimus insertional tendinosis with at least   partial tearing. The left gluteus medius, right gluteus minimus and   right gluteus medius tendons are intact. The insertion of the   iliopsoas tendon is normal. Moderate left hamstring origin   tendinosis. The right hamstring tendon origin is normal.       JOINTS:   The hip joint articular cartilage is normal without focal defect. No   evidence of significant arthrosis. No significant hip joint effusions   are identified. The acetabular labrum is normal.   No significant degenerative changes in the pubic symphysis or   bilateral sacroiliac joints. No evidence of avascular necrosis.       OSSEOUS STRUCTURES:   Innumerable enhancing marrow replacing lesions throughout the bony   pelvis and bilateral proximal femora, many of which are new and/or   not appreciable on the prior CT. Index lesions as follows: *   Confluent left hemisacrum lesion anterior to left S1 neural foramen,   4.9 cm (series 15, image 13) * Left posterior acetabular lesion, 1.8   cm (series 15, image 31) * Left intertrochanteric lesion, 2.4 cm   (series 15, image 40) * Left ischial lesion, 7.1 cm (series 15, image   43)      SOFT TISSUES:   No muscle atrophy or tear.       INTERNAL ORGANS:   Evaluation of the internal organs of the pelvis is limited on this   small field of view study tailored for evaluation of the   musculoskeletal system. No acute intrapelvic process. Right ovary   versus intramural fibroid, 2.3 cm (series 15, image 28).       IMPRESSION:   Worsening osseous metastases with innumerable  new/enlarging marrow   replacing lesions throughout the imaged pelvis and bilateral femora.   No pathologic fracture.   === 12/22/23 ===    CT CHEST ABDOMEN PELVIS W IV CONTRAST    - Impression -  Overall similar appearance of diffuse osseous metastatic disease.    New subcentimeter lesion in the right hepatic lobe. Question  metastatic disease. Otherwise similar appearance of small  hypodensities throughout the liver.    Signed by: Jyotsna Benoit 12/23/2023 4:09 PM  Dictation workstation:   [id number]   No results found for this or any previous visit from the past 365 days.     No images are attached to the encounter.    LABORATORY/PATHOLOGY DATA    Infusion on 02/19/2024   Component Date Value Ref Range Status    CA 27.29 02/19/2024 267.6 (H)  0.0 - 38.6 U/mL Final    Glucose 02/19/2024 90  74 - 99 mg/dL Final    Sodium 02/19/2024 140  136 - 145 mmol/L Final    Potassium 02/19/2024 4.1  3.5 - 5.3 mmol/L Final    Chloride 02/19/2024 107  98 - 107 mmol/L Final    Bicarbonate 02/19/2024 28  21 - 32 mmol/L Final    Anion Gap 02/19/2024 9 (L)  10 - 20 mmol/L Final    Urea Nitrogen 02/19/2024 17  6 - 23 mg/dL Final    Creatinine 02/19/2024 0.66  0.50 - 1.05 mg/dL Final    eGFR 02/19/2024 >90  >60 mL/min/1.73m*2 Final    Calcium 02/19/2024 8.4 (L)  8.6 - 10.3 mg/dL Final    Albumin 02/19/2024 3.9  3.4 - 5.0 g/dL Final    Alkaline Phosphatase 02/19/2024 243 (H)  33 - 136 U/L Final    Total Protein 02/19/2024 5.8 (L)  6.4 - 8.2 g/dL Final    AST 02/19/2024 14  9 - 39 U/L Final    Bilirubin, Total 02/19/2024 0.4  0.0 - 1.2 mg/dL Final    ALT 02/19/2024 13  7 - 45 U/L Final    WBC 02/19/2024 3.5 (L)  4.4 - 11.3 x10*3/uL Final    RBC 02/19/2024 4.12  4.00 - 5.20 x10*6/uL Final    Hemoglobin 02/19/2024 12.0  12.0 - 16.0 g/dL Final    Hematocrit 02/19/2024 36.1  36.0 - 46.0 % Final    MCV 02/19/2024 88  80 - 100 fL Final    MCH 02/19/2024 29.1  26.0 - 34.0 pg Final    MCHC 02/19/2024 33.2  32.0 - 36.0 g/dL Final    RDW  02/19/2024 14.2  11.5 - 14.5 % Final    Platelets 02/19/2024 225  150 - 450 x10*3/uL Final    Neutrophils % 02/19/2024 47.9  40.0 - 80.0 % Final    Immature Granulocytes %, Automated 02/19/2024 0.3  0.0 - 0.9 % Final    Lymphocytes % 02/19/2024 37.5  13.0 - 44.0 % Final    Monocytes % 02/19/2024 10.0  2.0 - 10.0 % Final    Eosinophils % 02/19/2024 3.7  0.0 - 6.0 % Final    Basophils % 02/19/2024 0.6  0.0 - 2.0 % Final    Neutrophils Absolute 02/19/2024 1.67  1.20 - 7.70 x10*3/uL Final    Immature Granulocytes Absolute, Au* 02/19/2024 0.01  0.00 - 0.70 x10*3/uL Final    Lymphocytes Absolute 02/19/2024 1.31  1.20 - 4.80 x10*3/uL Final    Monocytes Absolute 02/19/2024 0.35  0.10 - 1.00 x10*3/uL Final    Eosinophils Absolute 02/19/2024 0.13  0.00 - 0.70 x10*3/uL Final    Basophils Absolute 02/19/2024 0.02  0.00 - 0.10 x10*3/uL Final   Infusion on 02/08/2024   Component Date Value Ref Range Status    [facility] 02/08/2024 [id number]   Final   Infusion on 01/29/2024   Component Date Value Ref Range Status    CA 27.29 01/29/2024 350.0 (H)  0.0 - 38.6 U/mL Final    WBC 01/29/2024 5.3  4.4 - 11.3 x10*3/uL Final    RBC 01/29/2024 4.20  4.00 - 5.20 x10*6/uL Final    Hemoglobin 01/29/2024 12.3  12.0 - 16.0 g/dL Final    Hematocrit 01/29/2024 36.8  36.0 - 46.0 % Final    MCV 01/29/2024 88  80 - 100 fL Final    MCH 01/29/2024 29.3  26.0 - 34.0 pg Final    MCHC 01/29/2024 33.4  32.0 - 36.0 g/dL Final    RDW 01/29/2024 14.2  11.5 - 14.5 % Final    Platelets 01/29/2024 206  150 - 450 x10*3/uL Final    Neutrophils % 01/29/2024 56.4  40.0 - 80.0 % Final    Immature Granulocytes %, Automated 01/29/2024 0.2  0.0 - 0.9 % Final    Lymphocytes % 01/29/2024 31.0  13.0 - 44.0 % Final    Monocytes % 01/29/2024 9.1  2.0 - 10.0 % Final    Eosinophils % 01/29/2024 2.9  0.0 - 6.0 % Final    Basophils % 01/29/2024 0.4  0.0 -  2.0 % Final    Neutrophils Absolute 01/29/2024 2.97  1.20 - 7.70 x10*3/uL Final    Immature Granulocytes Absolute, Au* 01/29/2024 0.01  0.00 - 0.70 x10*3/uL Final    Lymphocytes Absolute 01/29/2024 1.63  1.20 - 4.80 x10*3/uL Final    Monocytes Absolute 01/29/2024 0.48  0.10 - 1.00 x10*3/uL Final    Eosinophils Absolute 01/29/2024 0.15  0.00 - 0.70 x10*3/uL Final    Basophils Absolute 01/29/2024 0.02  0.00 - 0.10 x10*3/uL Final    Glucose 01/29/2024 100 (H)  74 - 99 mg/dL Final    Sodium 01/29/2024 140  136 - 145 mmol/L Final    Potassium 01/29/2024 3.8  3.5 - 5.3 mmol/L Final    Chloride 01/29/2024 103  98 - 107 mmol/L Final    Bicarbonate 01/29/2024 28  21 - 32 mmol/L Final    Anion Gap 01/29/2024 13  10 - 20 mmol/L Final    Urea Nitrogen 01/29/2024 15  6 - 23 mg/dL Final    Creatinine 01/29/2024 0.63  0.50 - 1.05 mg/dL Final    eGFR 01/29/2024 >90  >60 mL/min/1.73m*2 Final    Calcium 01/29/2024 9.3  8.6 - 10.3 mg/dL Final    Albumin 01/29/2024 4.0  3.4 - 5.0 g/dL Final    Alkaline Phosphatase 01/29/2024 56  33 - 136 U/L Final    Total Protein 01/29/2024 6.0 (L)  6.4 - 8.2 g/dL Final    AST 01/29/2024 20  9 - 39 U/L Final    Bilirubin, Total 01/29/2024 0.5  0.0 - 1.2 mg/dL Final    ALT 01/29/2024 11  7 - 45 U/L Final      Lab Results   Component Value Date    LABCA2 267.6 (H) 02/19/2024    LABCA2 350.0 (H) 01/29/2024    LABCA2 277.5 (H) 01/02/2024    LABCA2 276.1 (H) 12/27/2023             IMPRESSION/PLAN      1. 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 Clear evidence of progressive disease in left hip and T1. Scheduled for XRT. Plan to hold TDxD during radiation. Will order CT c/a/p ASAP. If additional progressive disease outside bone noted then would discontinue TDxD and switch to a clinical trial. Consider liver biopsy if feasible. Prior CT there was only 1 liver lesion which was central and not amenable to biopsy.  Repeat blood tempus showed again Her2 exon 19 mutation.     2. Pain  neoplasm related. Following with supportive onc and scheduled for palliative XRT to spine and hip.        We discussed the clinical significance of diagnosis, goals of care and treatment plan in detail.  I personally spent over half of a total 45 minutes face to face with the patient in counseling and discussion and/or coordination of care as described above.         -------------------------------------------------------------------------------------------------------------------------------  Bebeto Tolbert MD  Director of Breast Cancer Medical Oncology Research Program   [facility]  Professor of Medicine  [place] [address], [address]  [place] [id number]  Phone: [phone]  [email or web address]

## 2024-03-01 ENCOUNTER — TELEPHONE (OUTPATIENT)
Dept: HEMATOLOGY/ONCOLOGY | Facility: HOSPITAL | Age: 64
End: 2024-03-01
Payer: COMMERCIAL

## 2024-03-01 NOTE — TELEPHONE ENCOUNTER
I called patient back and we discussed CT scan results which do not show progression in liver. Plan is to hold enhertu and resume about 2 wks post radiation

## 2024-03-01 NOTE — TELEPHONE ENCOUNTER
----- Message from Kristi Olivier RN sent at 3/1/2024  9:02 AM EST -----  Regarding: CT Scan results  Contact: [phone]  Adding the team      ----- Message -----  From: Trang Jones  Sent: 3/1/2024   8:54 AM EST  To: [facility] Rn Care Coordinator - Alexander  Subject: CT Scan results                                  Hi Julee,  I see that the results of my latest CT scan are in my MyChart. We are hesitant to read them on our own due to the language in those reports being difficult for us non-medical people to understand. We always end up interpreting the results to be far worse than they really are. Is it possible to get a phone call from either you or Dr. Tolbert today to just give us a brief overview of the results? Then we can discuss the details further when we meet with him on the 11th. I can be reached at [phone].  Thanks,  Ale Jones

## 2024-03-05 ENCOUNTER — HOSPITAL ENCOUNTER (OUTPATIENT)
Dept: RADIATION ONCOLOGY | Facility: HOSPITAL | Age: 64
Setting detail: RADIATION/ONCOLOGY SERIES
Discharge: HOME | End: 2024-03-05
Payer: COMMERCIAL

## 2024-03-05 ENCOUNTER — HOSPITAL ENCOUNTER (OUTPATIENT)
Dept: RADIOLOGY | Facility: EXTERNAL LOCATION | Age: 64
Discharge: HOME | End: 2024-03-05

## 2024-03-05 DIAGNOSIS — C79.51 SECONDARY MALIGNANT NEOPLASM OF BONE AND BONE MARROW (MULTI): ICD-10-CM

## 2024-03-05 DIAGNOSIS — C79.52 SECONDARY MALIGNANT NEOPLASM OF BONE AND BONE MARROW (MULTI): ICD-10-CM

## 2024-03-05 DIAGNOSIS — C79.51 SECONDARY MALIGNANT NEOPLASM OF BONE AND BONE MARROW (MULTI): Primary | ICD-10-CM

## 2024-03-05 DIAGNOSIS — C79.52 SECONDARY MALIGNANT NEOPLASM OF BONE AND BONE MARROW (MULTI): Primary | ICD-10-CM

## 2024-03-05 PROCEDURE — 77263 THER RADIOLOGY TX PLNG CPLX: CPT | Performed by: RADIOLOGY

## 2024-03-05 PROCEDURE — 77290 THER RAD SIMULAJ FIELD CPLX: CPT | Performed by: RADIOLOGY

## 2024-03-05 PROCEDURE — 77334 RADIATION TREATMENT AID(S): CPT | Performed by: RADIOLOGY

## 2024-03-05 PROCEDURE — 77470 SPECIAL RADIATION TREATMENT: CPT | Performed by: RADIOLOGY

## 2024-03-07 ENCOUNTER — PATIENT MESSAGE (OUTPATIENT)
Dept: HEMATOLOGY/ONCOLOGY | Facility: CLINIC | Age: 64
End: 2024-03-07
Payer: COMMERCIAL

## 2024-03-07 DIAGNOSIS — C41.9 MALIGNANT NEOPLASM OF BONE WITH METASTASES (MULTI): ICD-10-CM

## 2024-03-07 DIAGNOSIS — C50.911 CARCINOMA OF RIGHT BREAST METASTATIC TO BONE (MULTI): ICD-10-CM

## 2024-03-07 DIAGNOSIS — C79.51 CARCINOMA OF RIGHT BREAST METASTATIC TO BONE (MULTI): ICD-10-CM

## 2024-03-07 NOTE — TELEPHONE ENCOUNTER
RN  called and spoke to  Infusion charge no Appts avaible 3/11. RN scheduled patient for 3/12 at [facility]. RN called patient, patient needs to be scheduled for cold cap, RN made numerous attempts to contact [facility] charge to schedule patient for cold cap. Message sent to charge pool. Awaiting response

## 2024-03-08 NOTE — TELEPHONE ENCOUNTER
RN scheduled patient for 3/12 at 9am in [facility] for infusion message left for patient requesting return call.

## 2024-03-10 RX ORDER — DIPHENHYDRAMINE HYDROCHLORIDE 50 MG/ML
50 INJECTION INTRAMUSCULAR; INTRAVENOUS AS NEEDED
Status: CANCELLED | OUTPATIENT
Start: 2024-03-20

## 2024-03-10 RX ORDER — PALONOSETRON 0.05 MG/ML
0.25 INJECTION, SOLUTION INTRAVENOUS ONCE
Status: CANCELLED | OUTPATIENT
Start: 2024-03-20

## 2024-03-10 RX ORDER — PROCHLORPERAZINE MALEATE 10 MG
10 TABLET ORAL EVERY 6 HOURS PRN
Status: CANCELLED | OUTPATIENT
Start: 2024-03-20

## 2024-03-10 RX ORDER — FAMOTIDINE 10 MG/ML
20 INJECTION INTRAVENOUS ONCE AS NEEDED
Status: CANCELLED | OUTPATIENT
Start: 2024-03-20

## 2024-03-10 RX ORDER — PROCHLORPERAZINE EDISYLATE 5 MG/ML
10 INJECTION INTRAMUSCULAR; INTRAVENOUS EVERY 6 HOURS PRN
Status: CANCELLED | OUTPATIENT
Start: 2024-03-20

## 2024-03-10 RX ORDER — EPINEPHRINE 0.3 MG/.3ML
0.3 INJECTION SUBCUTANEOUS EVERY 5 MIN PRN
Status: CANCELLED | OUTPATIENT
Start: 2024-03-20

## 2024-03-10 RX ORDER — ALBUTEROL SULFATE 0.83 MG/ML
3 SOLUTION RESPIRATORY (INHALATION) AS NEEDED
Status: CANCELLED | OUTPATIENT
Start: 2024-03-20

## 2024-03-11 ENCOUNTER — APPOINTMENT (OUTPATIENT)
Dept: HEMATOLOGY/ONCOLOGY | Facility: CLINIC | Age: 64
End: 2024-03-11
Payer: COMMERCIAL

## 2024-03-11 ENCOUNTER — OFFICE VISIT (OUTPATIENT)
Dept: HEMATOLOGY/ONCOLOGY | Facility: HOSPITAL | Age: 64
End: 2024-03-11
Payer: COMMERCIAL

## 2024-03-11 ENCOUNTER — APPOINTMENT (OUTPATIENT)
Dept: HEMATOLOGY/ONCOLOGY | Facility: HOSPITAL | Age: 64
End: 2024-03-11
Payer: COMMERCIAL

## 2024-03-11 VITALS
BODY MASS INDEX: 26.56 KG/M2 | SYSTOLIC BLOOD PRESSURE: 110 MMHG | WEIGHT: 140.65 LBS | HEIGHT: 61 IN | TEMPERATURE: 95.2 F | HEART RATE: 64 BPM | DIASTOLIC BLOOD PRESSURE: 75 MMHG | OXYGEN SATURATION: 98 % | RESPIRATION RATE: 16 BRPM

## 2024-03-11 DIAGNOSIS — Z79.83 LONG-TERM CURRENT USE OF BISPHOSPHONATE: ICD-10-CM

## 2024-03-11 DIAGNOSIS — Z51.81 ENCOUNTER FOR MONITORING CARDIOTOXIC DRUG THERAPY: ICD-10-CM

## 2024-03-11 DIAGNOSIS — C50.911 CARCINOMA OF RIGHT BREAST METASTATIC TO BONE (MULTI): Primary | ICD-10-CM

## 2024-03-11 DIAGNOSIS — C79.51 CARCINOMA OF RIGHT BREAST METASTATIC TO BONE (MULTI): Primary | ICD-10-CM

## 2024-03-11 DIAGNOSIS — Z79.899 ENCOUNTER FOR MONITORING CARDIOTOXIC DRUG THERAPY: ICD-10-CM

## 2024-03-11 DIAGNOSIS — C41.9 MALIGNANT NEOPLASM OF BONE WITH METASTASES (MULTI): ICD-10-CM

## 2024-03-11 DIAGNOSIS — Z51.11 ENCOUNTER FOR ANTINEOPLASTIC CHEMOTHERAPY: ICD-10-CM

## 2024-03-11 PROCEDURE — 99214 OFFICE O/P EST MOD 30 MIN: CPT | Performed by: INTERNAL MEDICINE

## 2024-03-11 PROCEDURE — 1036F TOBACCO NON-USER: CPT | Performed by: INTERNAL MEDICINE

## 2024-03-11 ASSESSMENT — ENCOUNTER SYMPTOMS
ABDOMINAL DISTENTION: 0
COUGH: 0
NAUSEA: 1
APPETITE CHANGE: 0
ARTHRALGIAS: 0
CHEST TIGHTNESS: 0
CHILLS: 0
HEMOPTYSIS: 0
NERVOUS/ANXIOUS: 0
BLOOD IN STOOL: 0
FEVER: 0
DIARRHEA: 0
ABDOMINAL PAIN: 0
DIFFICULTY URINATING: 0
EYE PROBLEMS: 0
CONSTITUTIONAL NEGATIVE: 1
RESPIRATORY NEGATIVE: 1
DIAPHORESIS: 0
HOT FLASHES: 0
SEIZURES: 0
HEMATURIA: 0
ADENOPATHY: 0
EXTREMITY WEAKNESS: 0
NUMBNESS: 0
DEPRESSION: 0
BRUISES/BLEEDS EASILY: 0
CONFUSION: 0
SLEEP DISTURBANCE: 0
CONSTIPATION: 0
EYES NEGATIVE: 1
DYSURIA: 0
FATIGUE: 0
WHEEZING: 0
BACK PAIN: 1
MYALGIAS: 0
HEADACHES: 0
PALPITATIONS: 0
LEG SWELLING: 0
CARDIOVASCULAR NEGATIVE: 1
SCLERAL ICTERUS: 0
DIZZINESS: 0
FREQUENCY: 0
SHORTNESS OF BREATH: 0

## 2024-03-11 ASSESSMENT — PAIN SCALES - GENERAL: PAINLEVEL: 0-NO PAIN

## 2024-03-11 NOTE — PROGRESS NOTES
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 she received prior Taxotere and cyclophosphamide , completed 6/2010. S/p adjuvant endocrine therapy.    Diagnosis of metastatic TNBC 3/2023, presenting with back pain and cord compression      weekly paclitaxel from 5/22/23, changed to abraxane after 1st cycle due to anaphylactic type reaction. Discontinued 12/12/23 due to progressive disease.    CURRENT THERAPY  TDxD since 1/29/24    HISTORY OF PRESENT ILLNESS    Trang Jones is a 63 y.o. woman here with . Recent MRI of spine and hip shows clear disease progression. She has seen Dr Mejia and is planned to have XRT soon. Received C#2 TDxD approx 2 wks ago. Scheduled to receive C#3 tomorrow. Right hip pain has subsided with accupuncture since last wk. Radiation to hip and spine with Dr Mejia is tentatively scheduled for after she returns from her trip to [place] week of 3/25.             Review of Systems   Constitutional: Negative.  Negative for appetite change, chills, diaphoresis, fatigue and fever.   HENT:  Negative.  Negative for hearing loss and lump/mass.    Eyes: Negative.  Negative for eye problems and icterus.   Respiratory: Negative.  Negative for chest tightness, cough, hemoptysis, shortness of breath and wheezing.    Cardiovascular: Negative.  Negative for chest pain, leg swelling and palpitations.   Gastrointestinal:  Positive for nausea. Negative for abdominal distention, abdominal pain, blood in stool, constipation and diarrhea.   Endocrine: Negative for hot flashes.   Genitourinary:  Negative for  "bladder incontinence, difficulty urinating, dyspareunia, dysuria, frequency and hematuria.    Musculoskeletal:  Positive for back pain. Negative for arthralgias, gait problem and myalgias.        ++hip pain   Neurological:  Negative for dizziness, extremity weakness, gait problem, headaches, numbness and seizures.   Hematological:  Negative for adenopathy. Does not bruise/bleed easily.   Psychiatric/Behavioral:  Negative for confusion, depression and sleep disturbance. The patient is not nervous/anxious.    All other systems reviewed and are negative.        Past Medical History:  has a past medical history of Breast cancer (CMS/HCC), Hypercholesteremia, Metastatic cancer (CMS/HCC), and Personal history of irradiation.  Surgical History:   has a past surgical history that includes CT guided percutaneous biopsy bone deep (2023) and Mastectomy (Left, 2010).  Social History:   reports that she quit smoking about 21 years ago. Her smoking use included cigarettes. She has been exposed to tobacco smoke. She has never used smokeless tobacco. She reports that she does not currently use alcohol. She reports current drug use. Drug: Marijuana.  Family History:    Family History   Problem Relation Name Age of Onset    Leukemia Father       Family Oncology History:  Cancer-related family history is not on file.      OBJECTIVE    VS / Pain:  /75   Pulse 64   Temp 35.1 °C (95.2 °F) (Temporal)   Resp 16   Ht 1.549 m (5' 0.98\")   Wt 63.8 kg (140 lb 10.5 oz)   LMP  (LMP Unknown)   SpO2 98%   BMI 26.59 kg/m²   BSA: 1.66 meters squared   Pain Scale: 3    Performance Status:  The ECOG performance scale today is ECO- Restricted in physically strenuous activity.  Carries out light duty.    Physical Exam  Constitutional:       General: She is not in acute distress.     Appearance: She is not ill-appearing, toxic-appearing or diaphoretic.   HENT:      Nose: No congestion or rhinorrhea.      Mouth/Throat:      " Pharynx: No posterior oropharyngeal erythema.   Cardiovascular:      Rate and Rhythm: Normal rate and regular rhythm.      Pulses: Normal pulses.      Heart sounds: Normal heart sounds. No murmur heard.     No gallop.   Pulmonary:      Breath sounds: Normal breath sounds.   Abdominal:      General: There is no distension.      Palpations: There is no mass.      Tenderness: There is no abdominal tenderness.   Musculoskeletal:         General: No swelling.      Cervical back: No rigidity.      Right lower leg: No edema.      Left lower leg: No edema.   Lymphadenopathy:      Cervical: No cervical adenopathy.   Skin:     General: Skin is warm.      Coloration: Skin is not cyanotic.      Findings: No bruising, ecchymosis or erythema.   Neurological:      General: No focal deficit present.      Mental Status: She is oriented to person, place, and time.      Cranial Nerves: Cranial nerves 2-12 are intact.      Motor: Motor function is intact.   Psychiatric:         Attention and Perception: Attention and perception normal.         Mood and Affect: Mood and affect normal.         Behavior: Behavior normal.         Thought Content: Thought content normal.         Judgment: Judgment normal.             Diagnostic Results     STUDY:   MR THORACIC SPINE W AND WO IV CONTRAST       INDICATION:   Signs/Symptoms:Follow-up of treated thoracic spine metastatic disease       COMPARISON:   Thoracic spine MRI 11/01/2020.       ACCESSION NUMBER(S):   [id number]      ORDERING CLINICIAN:   OLMAN MANN       TECHNIQUE:   Multi-planar multi-sequential MR imaging of the thoracic spine was   performed before and after the intravenous administration of   contrast, according to standard protocol. 12 ml of Dotarem was   administered (the balance of single use vial(s) has/have been   discarded).      FINDINGS:   ALIGNMENT: Levoconvex curvature of the thoracic spine centered at   T9-10. Focal kyphotic deformity centered at T5.        VERTEBRAE: Progression of metastatic lesions compared to previous MRI   11/01/2023 best compared on the precontrast T1 and STIR sagittal   images. Unable to directly compare postcontrast images as fat   saturation was performed of the current exam and not performed in the   previous. Lesions are as follows:       New multifocal lesions within the T1 vertebral body and extending   into the right-greater-than-left pedicles of T1. Increased STIR   signal throughout the entirety of the T2 vertebral body. Lesion   within the posterior aspect of the T3 vertebral body slightly   progressed from prior exam; extension into the left pedicle and   transverse process is similar to previous exam. STIR hyperintense   expansile lesion within the left transverse process of T4, not   present on previous exam. Marrow replacement throughout the T4   vertebral body is progressed. Redemonstration of anterior compression   fracture of the T5 vertebral body with approximately 6 mm osseous   retropulsion, similar to previous exam. Diffuse replacement of   vertebral marrow, progressed compared to previous exam. Stir   hyperintensity in the posterior elements is also slightly increased   from previous exam. Extramedullary tumor results in moderate canal   stenosis with flattening of the anterior and left lateral aspect of   the cord, progressed from previous MRI. Lesion within the T7   vertebral body is increased in size. Stir hyperintense lesions within   the left transverse process is new from previous exam; right   transverse process lesion is unchanged. Near-complete replacement of   normal marrow within the T10 vertebral body with associated   compression deformity is unchanged. Increased size of marrow   replacing STIR hyperintense lesion within the T11 vertebral body.   Lesions within bilateral transverse processes were possibly present   on previous MRI. Increased marrow replacement throughout the T12   vertebral body with  extension into the right pedicle, progressed from   previous MRI.       DISCS: Multilevel disc desiccation disc height loss.       CORD: The conus medullaris terminates at L1. Increased mass-effect on   the cord at the T5 level secondary to osseous retropulsion and   extraosseous tumor without definite associated edema or myelomalacia.   Remainder of the visualized cord is unremarkable.       ENHANCEMENT: Extensive enhancing metastatic lesions throughout the   thoracic spine as detailed above.       EVALUATION OF INDIVIDUAL LEVELS:       T5: Moderate canal stenosis secondary to osseous retropulsion and   extraosseous tumor. There is also results in moderate   left-greater-than-right foraminal stenosis at T4-5 and T5-6.       T10-11: Disc osteophyte complex and facet hypertrophy results in   mild-to-moderate bilateral foraminal stenosis,   right-greater-than-left. Spinal canal remains patent.       T11-12: Moderate to severe right foraminal stenosis secondary to disc   bulge and facet hypertrophy. Spinal canal and left neural foramina   are patent.       PARAVERTEBRAL SOFT TISSUES: The visualized paravertebral soft tissues   appear within normal limits.       IMPRESSION:   Overall progression of metastatic disease throughout the thoracic   spine as detailed most notable at T5 where there is increased   extraosseous tumor within the posterior elements superimposed on   stable retropulsion of the posterior endplate resulting in increased   canal stenosis and mass-effect on the ventral and left lateral aspect   of the cord. No associated cord edema.        MRI pelvis  COMPARISON:   Radiographs 03/26/2024, CT chest, abdomen and pelvis 12/22/2023, bone   scan 09/26/2023.       ACCESSION NUMBER(S):   [id number]      ORDERING CLINICIAN:   CARMEN POON       TECHNIQUE:   MR imaging of the left hip was obtained  without IV contrast.       FINDINGS:   TENDONS:   Severe left gluteus minimus insertional  tendinosis with at least   partial tearing. The left gluteus medius, right gluteus minimus and   right gluteus medius tendons are intact. The insertion of the   iliopsoas tendon is normal. Moderate left hamstring origin   tendinosis. The right hamstring tendon origin is normal.       JOINTS:   The hip joint articular cartilage is normal without focal defect. No   evidence of significant arthrosis. No significant hip joint effusions   are identified. The acetabular labrum is normal.   No significant degenerative changes in the pubic symphysis or   bilateral sacroiliac joints. No evidence of avascular necrosis.       OSSEOUS STRUCTURES:   Innumerable enhancing marrow replacing lesions throughout the bony   pelvis and bilateral proximal femora, many of which are new and/or   not appreciable on the prior CT. Index lesions as follows: *   Confluent left hemisacrum lesion anterior to left S1 neural foramen,   4.9 cm (series 15, image 13) * Left posterior acetabular lesion, 1.8   cm (series 15, image 31) * Left intertrochanteric lesion, 2.4 cm   (series 15, image 40) * Left ischial lesion, 7.1 cm (series 15, image   43)      SOFT TISSUES:   No muscle atrophy or tear.       INTERNAL ORGANS:   Evaluation of the internal organs of the pelvis is limited on this   small field of view study tailored for evaluation of the   musculoskeletal system. No acute intrapelvic process. Right ovary   versus intramural fibroid, 2.3 cm (series 15, image 28).       IMPRESSION:   Worsening osseous metastases with innumerable new/enlarging marrow   replacing lesions throughout the imaged pelvis and bilateral femora.   No pathologic fracture.   === 12/22/23 ===    CT CHEST ABDOMEN PELVIS W IV CONTRAST    - Impression -  Overall similar appearance of diffuse osseous metastatic disease.    New subcentimeter lesion in the right hepatic lobe. Question  metastatic disease. Otherwise similar appearance of small  hypodensities throughout the  liver.    Signed by: Jyotsna Benoit 12/23/2023 4:09 PM  Dictation workstation:   [id number]   No results found for this or any previous visit from the past 365 days.     No images are attached to the encounter.    LABORATORY/PATHOLOGY DATA    Infusion on 02/19/2024   Component Date Value Ref Range Status    CA 27.29 02/19/2024 267.6 (H)  0.0 - 38.6 U/mL Final    Glucose 02/19/2024 90  74 - 99 mg/dL Final    Sodium 02/19/2024 140  136 - 145 mmol/L Final    Potassium 02/19/2024 4.1  3.5 - 5.3 mmol/L Final    Chloride 02/19/2024 107  98 - 107 mmol/L Final    Bicarbonate 02/19/2024 28  21 - 32 mmol/L Final    Anion Gap 02/19/2024 9 (L)  10 - 20 mmol/L Final    Urea Nitrogen 02/19/2024 17  6 - 23 mg/dL Final    Creatinine 02/19/2024 0.66  0.50 - 1.05 mg/dL Final    eGFR 02/19/2024 >90  >60 mL/min/1.73m*2 Final    Calcium 02/19/2024 8.4 (L)  8.6 - 10.3 mg/dL Final    Albumin 02/19/2024 3.9  3.4 - 5.0 g/dL Final    Alkaline Phosphatase 02/19/2024 243 (H)  33 - 136 U/L Final    Total Protein 02/19/2024 5.8 (L)  6.4 - 8.2 g/dL Final    AST 02/19/2024 14  9 - 39 U/L Final    Bilirubin, Total 02/19/2024 0.4  0.0 - 1.2 mg/dL Final    ALT 02/19/2024 13  7 - 45 U/L Final    WBC 02/19/2024 3.5 (L)  4.4 - 11.3 x10*3/uL Final    RBC 02/19/2024 4.12  4.00 - 5.20 x10*6/uL Final    Hemoglobin 02/19/2024 12.0  12.0 - 16.0 g/dL Final    Hematocrit 02/19/2024 36.1  36.0 - 46.0 % Final    MCV 02/19/2024 88  80 - 100 fL Final    MCH 02/19/2024 29.1  26.0 - 34.0 pg Final    MCHC 02/19/2024 33.2  32.0 - 36.0 g/dL Final    RDW 02/19/2024 14.2  11.5 - 14.5 % Final    Platelets 02/19/2024 225  150 - 450 x10*3/uL Final    Neutrophils % 02/19/2024 47.9  40.0 - 80.0 % Final    Immature Granulocytes %, Automated 02/19/2024 0.3  0.0 - 0.9 % Final    Lymphocytes % 02/19/2024 37.5  13.0 - 44.0 % Final    Monocytes % 02/19/2024 10.0  2.0 - 10.0 % Final    Eosinophils % 02/19/2024 3.7  0.0 - 6.0 % Final    Basophils % 02/19/2024 0.6  0.0 - 2.0 %  Final    Neutrophils Absolute 02/19/2024 1.67  1.20 - 7.70 x10*3/uL Final    Immature Granulocytes Absolute, Au* 02/19/2024 0.01  0.00 - 0.70 x10*3/uL Final    Lymphocytes Absolute 02/19/2024 1.31  1.20 - 4.80 x10*3/uL Final    Monocytes Absolute 02/19/2024 0.35  0.10 - 1.00 x10*3/uL Final    Eosinophils Absolute 02/19/2024 0.13  0.00 - 0.70 x10*3/uL Final    Basophils Absolute 02/19/2024 0.02  0.00 - 0.10 x10*3/uL Final   Infusion on 02/08/2024   Component Date Value Ref Range Status    [facility] Portal 02/08/2024 [email or web address]   Final      Lab Results   Component Value Date    LABCA2 267.6 (H) 02/19/2024    LABCA2 350.0 (H) 01/29/2024    LABCA2 277.5 (H) 01/02/2024    LABCA2 276.1 (H) 12/27/2023             IMPRESSION/PLAN      1. 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 Clear evidence of progressive disease in left hip and T1. Scheduled for XRT. Plan to hold TDxD during radiation. CT c/a/p didn't show progression outside bone. Resume enhertu tomorrow given repeat blood tempus showed again Her2 exon 19 mutation and she has received only 2 cycles. I would like to see her in about 4 wks after completion of XRT.    2. Pain neoplasm related. Following with supportive onc and scheduled for palliative XRT to spine and hip. Much better now.        We discussed the clinical significance of diagnosis, goals of care and treatment plan in detail.  I personally spent over half of a total 45 minutes face to face with the patient in counseling and discussion and/or coordination of care as described above.         -------------------------------------------------------------------------------------------------------------------------------  Bebeto Tolbert MD  Director of Breast Cancer Medical Oncology Research Program   [facility]  Professor of  Medicine  [facility] [place]  [address] [place] [address] [address], [address]  [place] [id number]  Phone: [phone]  [email or web address]

## 2024-03-11 NOTE — PATIENT INSTRUCTIONS
Enhertu tomorrow and then hold until after radiation therapy  Return to see me in about 4-5 wks at [facility]  Echocardiogram in 4-5 wks

## 2024-03-12 ENCOUNTER — INFUSION (OUTPATIENT)
Dept: HEMATOLOGY/ONCOLOGY | Facility: CLINIC | Age: 64
End: 2024-03-12
Payer: COMMERCIAL

## 2024-03-12 ENCOUNTER — APPOINTMENT (OUTPATIENT)
Dept: HEMATOLOGY/ONCOLOGY | Facility: HOSPITAL | Age: 64
End: 2024-03-12
Payer: COMMERCIAL

## 2024-03-12 ENCOUNTER — TELEMEDICINE (OUTPATIENT)
Dept: PALLIATIVE MEDICINE | Facility: CLINIC | Age: 64
End: 2024-03-12
Payer: COMMERCIAL

## 2024-03-12 VITALS
TEMPERATURE: 97.5 F | OXYGEN SATURATION: 97 % | BODY MASS INDEX: 26.01 KG/M2 | RESPIRATION RATE: 18 BRPM | HEART RATE: 61 BPM | WEIGHT: 137.57 LBS | SYSTOLIC BLOOD PRESSURE: 128 MMHG | DIASTOLIC BLOOD PRESSURE: 84 MMHG

## 2024-03-12 DIAGNOSIS — C79.51 CARCINOMA OF RIGHT BREAST METASTATIC TO BONE (MULTI): ICD-10-CM

## 2024-03-12 DIAGNOSIS — M79.2 NEUROPATHIC PAIN: ICD-10-CM

## 2024-03-12 DIAGNOSIS — G89.3 CANCER RELATED PAIN: ICD-10-CM

## 2024-03-12 DIAGNOSIS — Z51.11 ENCOUNTER FOR ANTINEOPLASTIC CHEMOTHERAPY: ICD-10-CM

## 2024-03-12 DIAGNOSIS — C50.911 CARCINOMA OF RIGHT BREAST METASTATIC TO BONE (MULTI): ICD-10-CM

## 2024-03-12 DIAGNOSIS — Z51.81 ENCOUNTER FOR MONITORING CARDIOTOXIC DRUG THERAPY: ICD-10-CM

## 2024-03-12 DIAGNOSIS — Z51.5 PALLIATIVE CARE ENCOUNTER: Primary | ICD-10-CM

## 2024-03-12 DIAGNOSIS — C41.9 MALIGNANT NEOPLASM OF BONE WITH METASTASES (MULTI): ICD-10-CM

## 2024-03-12 DIAGNOSIS — Z79.83 LONG-TERM CURRENT USE OF BISPHOSPHONATE: ICD-10-CM

## 2024-03-12 DIAGNOSIS — Z79.899 ENCOUNTER FOR MONITORING CARDIOTOXIC DRUG THERAPY: ICD-10-CM

## 2024-03-12 LAB
ALBUMIN SERPL BCP-MCNC: 3.9 G/DL (ref 3.4–5)
ALP SERPL-CCNC: 199 U/L (ref 33–136)
ALT SERPL W P-5'-P-CCNC: 10 U/L (ref 7–45)
ANION GAP SERPL CALC-SCNC: 10 MMOL/L (ref 10–20)
AST SERPL W P-5'-P-CCNC: 13 U/L (ref 9–39)
BASOPHILS # BLD AUTO: 0.01 X10*3/UL (ref 0–0.1)
BASOPHILS NFR BLD AUTO: 0.3 %
BILIRUB SERPL-MCNC: 0.4 MG/DL (ref 0–1.2)
BUN SERPL-MCNC: 19 MG/DL (ref 6–23)
CALCIUM SERPL-MCNC: 8.4 MG/DL (ref 8.6–10.3)
CANCER AG27-29 SERPL-ACNC: 107.5 U/ML (ref 0–38.6)
CHLORIDE SERPL-SCNC: 107 MMOL/L (ref 98–107)
CO2 SERPL-SCNC: 27 MMOL/L (ref 21–32)
CREAT SERPL-MCNC: 0.57 MG/DL (ref 0.5–1.05)
EGFRCR SERPLBLD CKD-EPI 2021: >90 ML/MIN/1.73M*2
EOSINOPHIL # BLD AUTO: 0.15 X10*3/UL (ref 0–0.7)
EOSINOPHIL NFR BLD AUTO: 3.8 %
ERYTHROCYTE [DISTWIDTH] IN BLOOD BY AUTOMATED COUNT: 14.4 % (ref 11.5–14.5)
GLUCOSE SERPL-MCNC: 96 MG/DL (ref 74–99)
HCT VFR BLD AUTO: 36.7 % (ref 36–46)
HGB BLD-MCNC: 12.2 G/DL (ref 12–16)
IMM GRANULOCYTES # BLD AUTO: 0 X10*3/UL (ref 0–0.7)
IMM GRANULOCYTES NFR BLD AUTO: 0 % (ref 0–0.9)
LYMPHOCYTES # BLD AUTO: 1.25 X10*3/UL (ref 1.2–4.8)
LYMPHOCYTES NFR BLD AUTO: 31.6 %
MCH RBC QN AUTO: 29.3 PG (ref 26–34)
MCHC RBC AUTO-ENTMCNC: 33.2 G/DL (ref 32–36)
MCV RBC AUTO: 88 FL (ref 80–100)
MONOCYTES # BLD AUTO: 0.4 X10*3/UL (ref 0.1–1)
MONOCYTES NFR BLD AUTO: 10.1 %
NEUTROPHILS # BLD AUTO: 2.15 X10*3/UL (ref 1.2–7.7)
NEUTROPHILS NFR BLD AUTO: 54.2 %
PLATELET # BLD AUTO: 230 X10*3/UL (ref 150–450)
POTASSIUM SERPL-SCNC: 4.4 MMOL/L (ref 3.5–5.3)
PROT SERPL-MCNC: 5.7 G/DL (ref 6.4–8.2)
RBC # BLD AUTO: 4.17 X10*6/UL (ref 4–5.2)
SODIUM SERPL-SCNC: 140 MMOL/L (ref 136–145)
WBC # BLD AUTO: 4 X10*3/UL (ref 4.4–11.3)

## 2024-03-12 PROCEDURE — 2500000004 HC RX 250 GENERAL PHARMACY W/ HCPCS (ALT 636 FOR OP/ED): Performed by: INTERNAL MEDICINE

## 2024-03-12 PROCEDURE — 96367 TX/PROPH/DG ADDL SEQ IV INF: CPT

## 2024-03-12 PROCEDURE — 86300 IMMUNOASSAY TUMOR CA 15-3: CPT | Mod: GEALAB

## 2024-03-12 PROCEDURE — 96413 CHEMO IV INFUSION 1 HR: CPT

## 2024-03-12 PROCEDURE — 99213 OFFICE O/P EST LOW 20 MIN: CPT | Performed by: NURSE PRACTITIONER

## 2024-03-12 PROCEDURE — 1036F TOBACCO NON-USER: CPT | Performed by: NURSE PRACTITIONER

## 2024-03-12 PROCEDURE — 80053 COMPREHEN METABOLIC PANEL: CPT

## 2024-03-12 PROCEDURE — 96375 TX/PRO/DX INJ NEW DRUG ADDON: CPT | Mod: INF

## 2024-03-12 PROCEDURE — 85025 COMPLETE CBC W/AUTO DIFF WBC: CPT

## 2024-03-12 PROCEDURE — 96360 HYDRATION IV INFUSION INIT: CPT | Mod: INF

## 2024-03-12 PROCEDURE — 96361 HYDRATE IV INFUSION ADD-ON: CPT

## 2024-03-12 RX ORDER — FAMOTIDINE 10 MG/ML
20 INJECTION INTRAVENOUS ONCE AS NEEDED
Status: DISCONTINUED | OUTPATIENT
Start: 2024-03-12 | End: 2024-03-12 | Stop reason: HOSPADM

## 2024-03-12 RX ORDER — ZOLEDRONIC ACID 0.04 MG/ML
4 INJECTION, SOLUTION INTRAVENOUS ONCE
Status: COMPLETED | OUTPATIENT
Start: 2024-03-12 | End: 2024-03-12

## 2024-03-12 RX ORDER — MORPHINE SULFATE 15 MG/1
15 TABLET, FILM COATED, EXTENDED RELEASE ORAL 3 TIMES DAILY
Qty: 90 TABLET | Refills: 0 | Status: SHIPPED | OUTPATIENT
Start: 2024-03-12 | End: 2024-04-11

## 2024-03-12 RX ORDER — ALBUTEROL SULFATE 0.83 MG/ML
3 SOLUTION RESPIRATORY (INHALATION) AS NEEDED
Status: DISCONTINUED | OUTPATIENT
Start: 2024-03-12 | End: 2024-03-12 | Stop reason: HOSPADM

## 2024-03-12 RX ORDER — HEPARIN SODIUM,PORCINE/PF 10 UNIT/ML
50 SYRINGE (ML) INTRAVENOUS AS NEEDED
Status: CANCELLED | OUTPATIENT
Start: 2024-03-12

## 2024-03-12 RX ORDER — PALONOSETRON 0.05 MG/ML
0.25 INJECTION, SOLUTION INTRAVENOUS ONCE
Status: COMPLETED | OUTPATIENT
Start: 2024-03-12 | End: 2024-03-12

## 2024-03-12 RX ORDER — ALBUTEROL SULFATE 0.83 MG/ML
3 SOLUTION RESPIRATORY (INHALATION) AS NEEDED
Status: CANCELLED | OUTPATIENT
Start: 2024-03-24

## 2024-03-12 RX ORDER — EPINEPHRINE 0.3 MG/.3ML
0.3 INJECTION SUBCUTANEOUS EVERY 5 MIN PRN
Status: CANCELLED | OUTPATIENT
Start: 2024-03-24

## 2024-03-12 RX ORDER — FAMOTIDINE 10 MG/ML
20 INJECTION INTRAVENOUS ONCE AS NEEDED
Status: CANCELLED | OUTPATIENT
Start: 2024-03-24

## 2024-03-12 RX ORDER — EPINEPHRINE 0.3 MG/.3ML
0.3 INJECTION SUBCUTANEOUS EVERY 5 MIN PRN
Status: DISCONTINUED | OUTPATIENT
Start: 2024-03-12 | End: 2024-03-12 | Stop reason: HOSPADM

## 2024-03-12 RX ORDER — HEPARIN 100 UNIT/ML
500 SYRINGE INTRAVENOUS AS NEEDED
Status: DISCONTINUED | OUTPATIENT
Start: 2024-03-12 | End: 2024-03-12 | Stop reason: HOSPADM

## 2024-03-12 RX ORDER — ZOLEDRONIC ACID 0.04 MG/ML
4 INJECTION, SOLUTION INTRAVENOUS ONCE
Status: CANCELLED | OUTPATIENT
Start: 2024-03-24

## 2024-03-12 RX ORDER — DIPHENHYDRAMINE HYDROCHLORIDE 50 MG/ML
50 INJECTION INTRAMUSCULAR; INTRAVENOUS AS NEEDED
Status: CANCELLED | OUTPATIENT
Start: 2024-03-24

## 2024-03-12 RX ORDER — HEPARIN 100 UNIT/ML
500 SYRINGE INTRAVENOUS AS NEEDED
Status: CANCELLED | OUTPATIENT
Start: 2024-03-12

## 2024-03-12 RX ORDER — DIPHENHYDRAMINE HYDROCHLORIDE 50 MG/ML
50 INJECTION INTRAMUSCULAR; INTRAVENOUS AS NEEDED
Status: DISCONTINUED | OUTPATIENT
Start: 2024-03-12 | End: 2024-03-12 | Stop reason: HOSPADM

## 2024-03-12 RX ADMIN — PALONOSETRON HYDROCHLORIDE 250 MCG: 0.25 INJECTION INTRAVENOUS at 10:23

## 2024-03-12 RX ADMIN — FOSAPREPITANT 150 MG: 150 INJECTION, POWDER, LYOPHILIZED, FOR SOLUTION INTRAVENOUS at 11:23

## 2024-03-12 RX ADMIN — FAM-TRASTUZUMAB DERUXTECAN-NXKI 270 MG: 100 INJECTION, POWDER, LYOPHILIZED, FOR SOLUTION INTRAVENOUS at 12:10

## 2024-03-12 RX ADMIN — ZOLEDRONIC ACID 4 MG: 0.04 INJECTION, SOLUTION INTRAVENOUS at 13:22

## 2024-03-12 RX ADMIN — HEPARIN 500 UNITS: 100 SYRINGE at 13:48

## 2024-03-12 RX ADMIN — DEXAMETHASONE SODIUM PHOSPHATE 12 MG: 4 INJECTION, SOLUTION INTRA-ARTICULAR; INTRALESIONAL; INTRAMUSCULAR; INTRAVENOUS; SOFT TISSUE at 10:24

## 2024-03-12 RX ADMIN — SODIUM CHLORIDE 500 ML: 9 INJECTION, SOLUTION INTRAVENOUS at 12:45

## 2024-03-12 ASSESSMENT — PAIN SCALES - GENERAL: PAINLEVEL: 0-NO PAIN

## 2024-03-12 NOTE — PROGRESS NOTES
"  SUPPORTIVE AND PALLIATIVE ONCOLOGY OUTPATIENT FOLLOW-UP      SERVICE DATE: 3/12/2024    .Virtual or Telephone Consent    A telephone visit (audio only) between the patient (at the originating site) and the provider (at the distant site) was utilized to provide this telehealth service.   Verbal consent was requested and obtained from Trang Jones on this date, 03/12/24 for a telehealth visit.       Cancer History  Newly diagnosed triple negative breast CA (with bone mets and cord compression)  -s/p L mastectomy in 2010: stage II ER+/HER2- breast CA  -s/p 4 cycles docetaxel/cyclophosphamide chemo  -took tamoxifen x 7 yrs, completed in [id number]  -presented to ED on 3/30/23 with severe back pain  -MRI spine showed cord compression, admitted at that time   -s/p 1 fx RT to spine on 5/4/23  -plan to start treatment with weekly paclitaxel and q21day pembro  -started treatment 5/22/23, changed to abraxane after C1 d/t anaphylactic type reaction  -CT CAP (11/14/23): Overall similar appearance of diffuse osseous metastatic disease. New subcentimeter lesion in the right hepatic lobe. Question  metastatic disease. Otherwise similar appearance of small  hypodensities throughout the liver.  -plan to stop abraxane and change therapy to TDxD, scheduled to start on 1/29/24    Med Onc: Dr. Tolbert  Integrative Medicine: Dr. Sandoval       Subjective   HISTORY OF PRESENT ILLNESS: Trang Jones is a 64 yo female with PMHx of HLD, scoliosis and newly diagnosed triple negative breast CA. She received XRT to the spine on 5/4 and will begin chemo treatment next week.     She presents to supportive oncology for follow up for pain and symptom management.     Spoke with pt on the phone for follow up. States she's \"feeling pretty well,\" hip still bothers her, but not as much. Taking MSER 15mg tid, and ibuprofen for break through pain, has not required oxycodone PRN. Bowels are hit or miss, taking miralax PRN, struggles to find good " regimen that doesn't cause diarrhea. No N/V. Appetite is good, has recently gained back some of the weight she lost. Mood is stable. Sleeping well, taking medical THC every night. Energy levels are good.     Pain Assessment:  Pain Score:  1  Location: hip    Symptom Assessment:  Pain:a little  Headache: none  Dizziness:none  Lack of energy: none  Difficulty sleeping: none  Worrying: none  Anxiety: none  Depression: none  Pain in mouth/swallowing: none  Dry mouth: none  Taste changes: none  Shortness of breath: none  Lack of appetite: very much   Nausea: none  Vomiting: none  Constipation: none  Diarrhea: none  Sore muscles: a little  Numbness or tingling in hands/feet/other: none  Weight loss: none      Information obtained from: interview of patient  ______________________________________________________________________        Objective                PHYSICAL EXAMINATION   Vital Signs:   Vital signs reviewed  There were no vitals filed for this visit.    Pain Score:  1    Physical Exam not performed d/t phone visit     ASSESSMENT/PLAN    Pain: upper back around shoulder blades, radiates to front of chest; dull ache, shooting with sudden movement    Pain is: cancer related pain and acute on chronic  Type: somatic and neuropathic  Pain control: well-controlled  Home regimen:   -continue Morphine Sulfate Contin 15mg tid. Rx sent for fill on 3/28.   -continue oxycodone 10mg every 4hrs PRN. No Rx needed today.   -continue gabapentin 300mg in the morning and 600mg at night. No Rx needed today, sent 90 day supply last visit   -continue ibuprofen PRN   -continue to follow with Dr. Sandoval/ Agustin Dietrich for acupuncture/ reiki   -continue medical THC PRN   Intolerances/previously tried:    Opioid Use  Medication Management:   - OARRS report reviewed with no aberrant behavior; consistent with  prescriptions/records and patient history  - MED 45.  Overdose Risk Score 190.   This has been discussed with patient.   - We  will continue to closely monitor the patient for signs of prescription misuse including UDS, OARRS review and subjective reports at each visit.  - no concurrent benzodiazepine use   - I am a provider who either is or has consulted and collaborated with a provider certified in Hospice and Palliative Medicine and have conducted a face-face visit and examination for this patient.  - Routine Urine Drug Screen completed 5/9/23 appropriately positive for opioids and negative for illicit substances  - Controlled Substance Agreement completed 5/9/23  - Specifically discussed that controlled substance prescriptions will only be provided by our group as outlined in the completed agreement  - Prescribed naloxone previously prescribed  - Red Flags: none    Bowels: at risk for OIC  -educated pt on importance of maintaining daily BM  -continue daily miralax   -continue senna/colace PRN     N/V: currently stable   -continue zofran 4mg ODT q8hrs PRN   -continue migraine medication PRN     Sleep: hx of insomnia prior to cancer dx   -continue THC PRN  -encouraged pt to maintain regular sleep/wake cycle  -plan for better pain control to aid in improved sleep, see pain section above     Mood: improving    -discussed adding medication to help with mood, pt would like to hold off for now    Medical Decision Making/ GOC:   -social work referral to assist with LW/ POA paperwork        Next Follow-Up Visit:  Return to clinic in 6 weeks     Signature and billing  Medical complexity was low level due to due to complexity of problems, extensive data review, and high risk of management/treatment.  Time was spent on the following: Prep Time, Time Directly with Patient/Family/Caregiver, Documentation Time. Total time spent: 20 mins             Some elements copied from my note on 2/6/24, the elements have been updated and all reflect current decision making from today, 2/6/2024.      Plan of Care discussed with: Patient    SIGNATURE: Jennifer LESLIE  Felicia, APRN-CNP    Contact information:  Supportive and Palliative Oncology  Monday-Friday 8 AM-5 PM  Phone:  [phone], press option #5, then option #1.   Or Epic Secure Chat

## 2024-03-13 ENCOUNTER — ALLIED HEALTH (OUTPATIENT)
Dept: INTEGRATIVE MEDICINE | Facility: CLINIC | Age: 64
End: 2024-03-13
Payer: COMMERCIAL

## 2024-03-13 ENCOUNTER — APPOINTMENT (OUTPATIENT)
Dept: HEMATOLOGY/ONCOLOGY | Facility: CLINIC | Age: 64
End: 2024-03-13
Payer: COMMERCIAL

## 2024-03-13 DIAGNOSIS — C79.51 CARCINOMA OF RIGHT BREAST METASTATIC TO BONE (MULTI): ICD-10-CM

## 2024-03-13 DIAGNOSIS — G89.3 NEOPLASM RELATED PAIN: ICD-10-CM

## 2024-03-13 DIAGNOSIS — C79.51 METASTATIC CANCER TO SPINE (MULTI): Primary | ICD-10-CM

## 2024-03-13 DIAGNOSIS — C50.911 CARCINOMA OF RIGHT BREAST METASTATIC TO BONE (MULTI): ICD-10-CM

## 2024-03-13 DIAGNOSIS — M79.2 NEUROPATHIC PAIN: ICD-10-CM

## 2024-03-13 DIAGNOSIS — M25.552 LEFT HIP PAIN: ICD-10-CM

## 2024-03-13 PROCEDURE — 99213 OFFICE O/P EST LOW 20 MIN: CPT | Performed by: HOSPITALIST

## 2024-03-13 ASSESSMENT — PAIN SCALES - GENERAL: PAINLEVEL_OUTOF10: 2

## 2024-03-13 NOTE — PROGRESS NOTES
Acupuncture Visit:     Subjective   Patient ID: Trang Jones is a 63 y.o. female who presents for No chief complaint on file.  Pain is low today  Saw Shaneka a few  sessions, helped with hip  Nerve pain in leg on lefty-mostly at night  Energy- low, is chemo week,   BM- up a down, more constipated  Sleep- waking early AM                Left hip/femur pain   4/10 today  Cold improved.   Sleep-  no issues  No neuropathy today, slight episode last week for an hour           initial visit:  Fatigue, back pain  had recent break from chemo  fatigue is better since holding  typically wiped out after chemo and radiation for 2-3 days   low appetite and difficulty staying hydrated  denies xerostomia    sleep is good- uses cbd product, has [employer] card, but has not pursued yet    pain this week mostly from scoliosis  lumbar into thoracic, has since her 30s, stiffness, constant pain   had has some radiation down legs. foraminal stenosis  no neuropathy symptoms, hands or feet  denies other pain areas.   was having severe throacic pain, from tumor compression, now off of most pain meds except ER morphine.     planned 2 more rounds of chemo then a scan to evaluate next steps     BM- once a day, no blood mucus undigested food,        Diet: no alcohol or sugars. consistently tries to eat healthy  Dr: water, dislikes cold water, drinks warm lemon water, bone broth, hot teas.    General: denies- fever, chills, night sweats  Skin: denies- rash, abnormal lesions  Eyes: occ floaters, last exam 1 year, ,   HENT: denies- headache, sore throat, ear pain, tinnitus, congestion, runny nose,   Cardio: denies- chest pain, palps  Resp: denies- SOB, cough, wheezing  GI: some reflux- every few days,   Uro: occasional urgency, no pain or burning.   Neuro: see HPI  Musc: see HPI                      Pre-treatment Assessment  Pain Score: 2  Anxiety Level (0-10): 2  Stress Level (0-10): 2  Coping Level (0-10): 9  Depression Level (0-10): 0  Fatigue  Level (0-10): 5  Nausea Level (0-10): 1  Wellbeing Level (0-10): 2    Review of Systems         Provider reviewed plan for the acupuncture session, precautions and contraindications. Patient/guardian/hospital staff has given consent to treat with full understanding of what to expect during the session. Before acupuncture began, provider explained to the patient to communicate at any time if the procedure was causing discomfort past their tolerance level. Patient agreed to advise acupuncturist. The acupuncturist counseled the patient on the risks of acupuncture treatment including pain, infection, bleeding, and no relief of pain. The patient was positioned comfortably. There was no evidence of infection at the site of needle insertions.    Objective   Physical Exam          Acupuncture Treatment  Needle Guage: 36 guage /.20/ Blue seirin  Body Points: With retention  Body Points - Bilateral: Scalp thoracic, LI10, LI4, Si3, Ht7, SP6, KI7, KI3, UB62, LR3  Auricular Points: Yes  Auricular Points - Bilateral: BFA 1-3  Needle Count In: 25  Needle Count Out: 25              Post-treatment Assessment  Pain Score: 2  Anxiety Level (0-10): 0  Stress Level (0-10): 1  Coping Level (0-10): 9  Depression Level (0-10): 0  Fatigue Level (0-10): 4  Nausea Level (0-10): 1  Wellbeing Level (0-10): 2    Assessment/Plan

## 2024-03-13 NOTE — PROGRESS NOTES
Patient ID: Trang Jones is a 63 y.o. female.  Referring Physician: No referring provider defined for this encounter.  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 Tolbert  For: breast cancer     Chief complaints and symptoms:  Seen in supp oncology  1. Pain - on ms contin, nsaids, gabapentin. THC twice/week  mostly in back and now in ribs. Improved overall though some pain from walking this last weekend  Now worse since [place] trip and walking - pelvic pain, L hip and pain down L leg     2. Fatigue - related to chemotherapy - improved overall. Worse on chemo weeks     3. Appetite suppressed  Poor taste - improved  Doing well still today     4. Neuropathy - mild developing tingling in fingers - numbness and tingling briefly in fingertips - brief numbness in fingers  Two more in fingertips, tingling/numbness     mild ha     Anxiety about results     Integrative History:  Diet: mostly fruits (taste good), protein smoothies (Vital proteins, collagen powder, almond milk)  Lean proteins, chicken/salmon, o/w plant based     PA: walking some, hiking some now, continued  Doing some Pilates     Sleep: well improved with THC - 8-9 hrs/night     Stress: overwhelmed by dx.  Management: Meditating daily almost, various times, has some experience - Intermittent  Family support  Reiki helping  Focusing more on spirituality and Anabaptism, Women's groups.     Natural Products:  Medical cannabis - one gummy/night  magnesium  Red yeast rice - for  cholesterol  Vit D  Flax seed oil   American Ginseng  [employer] STAMETS 7  Zinc     ROS:  no ha, visual symptoms, hearing loss  no sob, chest pain, palp  ROS o/w non contributory, please see HPI    Objective    BSA: There is no height or weight on file to calculate BSA.  LMP  (LMP Unknown)     PHYSICAL EXAM:  NAD, awake/alert  HEENT, NCAT, OP clear, no oral lesions  CTA bilat  RRR no mgr  Abd soft/nt/nd+bs  No c/c/e/ttp  Motor/sensory intact, CN 2-12 intact     RESULTS:  Lab Results   Component Value Date    WBC 4.0 (L) 03/12/2024    HGB 12.2 03/12/2024    HCT 36.7 03/12/2024     03/12/2024    CREATININE 0.57 03/12/2024    AST 13 03/12/2024       Assessment/Plan   Cancer Staging   Malignant neoplasm of bone with metastases (CMS/HCC)  Staging form: Bone - Appendicular Skeleton, Trunk, Skull, and Facial Bones, AJCC 8th Edition  - Clinical stage from 9/5/2023: pM1, G3 - Signed by Bebeto Tolbert MD on 9/5/2023      CANCER SPECIFIC RECCS:  64 yo woman with recurrent breast ca, Tneg now with bone mets and s/p cord compression  S/p radiation  5/22/23 started taxol (weekly) - now changed to abraxane  No neuropathy now     Breast cancer:   7/17/23 CT c/a/p - will review with Dr. Tolbert:  sclerotic L 3rd rib, increase osseous mets R iliac, 1.1 cm liver hypodensities, can discuss further with Dr. Tolbert  focusing plant based is fine  limit sugar including in smoothies, consider Orgain (using Vital protein)  Would avoid coffee on taxol  Zinc for taste  Flax is fine  THC is good as well  Vitamin D3 5000 IU 3 days/week is good  Consider omega 3   Stop b12, also coq10 - done  on [employer] STAMETS 7     Try to walk 15-30 min/day     Pain - taking motrin prn, steroid weaning  MS contin and gabapentin  Acupuncture will start in Symptom Management Clinic - has started  Feels better when having AM pain meds and supplements  Some improvements  Gentle Yoga with assistance, same with Pilates     Fatigue: Definitely  improved  Acupuncture, consider massage  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 Uses to manage scoliosis.  Would sugg pt see Dr. Cantu (rehab) and/or PT prior to engaging in yoga     Follow up in Symptom Management Clinic      Integrative Oncology Symptom Management:     The integrative oncology symptom management clinic offers supervised care of cancer patients using Integrative Modalities billed to insurance using NCCN and SIO/ASCO guideline-driven practices.  ESAS is obtained prior to and after each treatment by practitioner     Symptoms Managed:  Pain - Increased in L leg and hip and pelvis since coming back from trip - increased in L femur and hip now, worse with hiking recently - improved but now having pain back of L shoulder and upper back, worse pain L hip and leg - MRI pending along with back - intermittent now, acupuncture helping  Done some hiking     Fatigue - still there but improved, same, travelled this last weekend with lots of walking - same overall during chemo weeks, mildly increased  Poor Appetite - doing well     Neuropathy - improved now, one brief episode of numbness in fingers - none now, brief episode over weekend, one episode last week, slightly more and lasting longer, no changes - none now  Gabapentin increased to 300 am and 600 pm     Natural Products utilized:  Medical cannabis - one gummy/night  magnesium  Red yeast rice - for cholesterol  B12  COq10  Vit D  Flax seed oil      Integrative Treatment: Acupuncture     Session #: 19  Frequency: Weekly     Referrals: Supportive Oncology (Has seen)     Recommendations: I would avoid coffee, soy, green tea while on Taxol (can reduce efficacy)  Weekly acupuncture  Has obtained med THC card as well   Ok to take calcium, mildly low and albumin wnl  seen by Dr. Cantu  and going to PT - working with  on core strength  Interested in Reiki     Follow Up:  Symptom Management: weekly  Integrative Oncology: 3 months - could see 9/23     I have personally seen the patient and supervised the treatment by the integrative practitioner during this visit.  Bo Sandoval MD

## 2024-03-18 ENCOUNTER — ALLIED HEALTH (OUTPATIENT)
Dept: INTEGRATIVE MEDICINE | Facility: CLINIC | Age: 64
End: 2024-03-18

## 2024-03-18 DIAGNOSIS — M79.2 NEUROPATHIC PAIN: Primary | ICD-10-CM

## 2024-03-18 DIAGNOSIS — R11.0 NAUSEA: ICD-10-CM

## 2024-03-18 DIAGNOSIS — K30 UPSET STOMACH: ICD-10-CM

## 2024-03-18 PROCEDURE — 97139 UNLISTED THERAPEUTIC PX: CPT | Performed by: ACUPUNCTURIST

## 2024-03-18 NOTE — PROGRESS NOTES
Acupuncture Visit:     Subjective   Patient ID: Trang Jones is a 63 y.o. female who presents for No chief complaint on file.  Pt seeking support for nerve like pain along lateral lower leg and side effects from chemo that are causing stomach upset        Session Information  Is this acupuncture treatment being billed to the patient's insurance company: No  Visit Type: Follow-up visit         Review of Systems         Provider reviewed plan for the acupuncture session, precautions and contraindications. Patient/guardian/hospital staff has given consent to treat with full understanding of what to expect during the session. Before acupuncture began, provider explained to the patient to communicate at any time if the procedure was causing discomfort past their tolerance level. Patient agreed to advise acupuncturist. The acupuncturist counseled the patient on the risks of acupuncture treatment including pain, infection, bleeding, and no relief of pain. The patient was positioned comfortably. There was no evidence of infection at the site of needle insertions.    Objective   Physical Exam         Treatment Plan  Treatment Goals: Pain management, Other (specify) (reduce stomach upset)    Acupuncture Treatment  Patient Position: Seated and reclining  Acupuncture Needling: Yes  Needle Guage: 40 guage /.16/ Red seirin  Body Points: With retention  Body Points - Left: aida 5, lr 4  Body Points - Bilateral: immunex2, st qi x3, sp 6, k 9  Needle Count In: 16  Needle Count Out: 16  Needle Retention Time (min): 25 minutes  Total Face to Face Time (min): 25 minutes              Assessment/Plan

## 2024-03-20 ENCOUNTER — APPOINTMENT (OUTPATIENT)
Dept: INTEGRATIVE MEDICINE | Facility: CLINIC | Age: 64
End: 2024-03-20
Payer: COMMERCIAL

## 2024-03-21 ENCOUNTER — HOSPITAL ENCOUNTER (OUTPATIENT)
Dept: RADIATION ONCOLOGY | Facility: HOSPITAL | Age: 64
Setting detail: RADIATION/ONCOLOGY SERIES
Discharge: HOME | End: 2024-03-21
Payer: COMMERCIAL

## 2024-03-21 PROCEDURE — 77300 RADIATION THERAPY DOSE PLAN: CPT | Performed by: RADIOLOGY

## 2024-03-21 PROCEDURE — 77295 3-D RADIOTHERAPY PLAN: CPT | Performed by: RADIOLOGY

## 2024-03-21 PROCEDURE — 77334 RADIATION TREATMENT AID(S): CPT | Performed by: RADIOLOGY

## 2024-03-21 PROCEDURE — 77370 RADIATION PHYSICS CONSULT: CPT | Performed by: RADIOLOGY

## 2024-03-25 ENCOUNTER — APPOINTMENT (OUTPATIENT)
Dept: INTEGRATIVE MEDICINE | Facility: CLINIC | Age: 64
End: 2024-03-25
Payer: COMMERCIAL

## 2024-03-26 ENCOUNTER — HOSPITAL ENCOUNTER (OUTPATIENT)
Dept: RADIATION ONCOLOGY | Facility: HOSPITAL | Age: 64
Setting detail: RADIATION/ONCOLOGY SERIES
Discharge: HOME | End: 2024-03-26
Payer: COMMERCIAL

## 2024-03-26 DIAGNOSIS — Z51.0 ENCOUNTER FOR ANTINEOPLASTIC RADIATION THERAPY: ICD-10-CM

## 2024-03-26 DIAGNOSIS — C79.51 SECONDARY MALIGNANT NEOPLASM OF BONE (MULTI): ICD-10-CM

## 2024-03-26 LAB
RAD ONC MSQ ACTUAL FRACTIONS DELIVERED: 1
RAD ONC MSQ ACTUAL FRACTIONS DELIVERED: 1
RAD ONC MSQ ACTUAL SESSION DELIVERED DOSE: 900 CGRAY
RAD ONC MSQ ACTUAL SESSION DELIVERED DOSE: 900 CGRAY
RAD ONC MSQ ACTUAL TOTAL DOSE: 900 CGRAY
RAD ONC MSQ ACTUAL TOTAL DOSE: 900 CGRAY
RAD ONC MSQ ELAPSED DAYS: 0
RAD ONC MSQ ELAPSED DAYS: 0
RAD ONC MSQ LAST DATE: NORMAL
RAD ONC MSQ LAST DATE: NORMAL
RAD ONC MSQ PRESCRIBED FRACTIONAL DOSE: 900 CGRAY
RAD ONC MSQ PRESCRIBED FRACTIONAL DOSE: 900 CGRAY
RAD ONC MSQ PRESCRIBED NUMBER OF FRACTIONS: 3
RAD ONC MSQ PRESCRIBED NUMBER OF FRACTIONS: 3
RAD ONC MSQ PRESCRIBED TECHNIQUE: NORMAL
RAD ONC MSQ PRESCRIBED TECHNIQUE: NORMAL
RAD ONC MSQ PRESCRIBED TOTAL DOSE: 2700 CGRAY
RAD ONC MSQ PRESCRIBED TOTAL DOSE: 2700 CGRAY
RAD ONC MSQ PRESCRIPTION PATTERN COMMENT: NORMAL
RAD ONC MSQ PRESCRIPTION PATTERN COMMENT: NORMAL
RAD ONC MSQ START DATE: NORMAL
RAD ONC MSQ START DATE: NORMAL
RAD ONC MSQ TREATMENT COURSE NUMBER: 2
RAD ONC MSQ TREATMENT COURSE NUMBER: 2
RAD ONC MSQ TREATMENT SITE: NORMAL
RAD ONC MSQ TREATMENT SITE: NORMAL

## 2024-03-26 PROCEDURE — 77280 THER RAD SIMULAJ FIELD SMPL: CPT | Performed by: STUDENT IN AN ORGANIZED HEALTH CARE EDUCATION/TRAINING PROGRAM

## 2024-03-26 PROCEDURE — 77373 STRTCTC BDY RAD THER TX DLVR: CPT | Performed by: STUDENT IN AN ORGANIZED HEALTH CARE EDUCATION/TRAINING PROGRAM

## 2024-03-28 ENCOUNTER — HOSPITAL ENCOUNTER (OUTPATIENT)
Dept: RADIATION ONCOLOGY | Facility: HOSPITAL | Age: 64
Setting detail: RADIATION/ONCOLOGY SERIES
Discharge: HOME | End: 2024-03-28
Payer: COMMERCIAL

## 2024-03-28 DIAGNOSIS — C79.51 SECONDARY MALIGNANT NEOPLASM OF BONE (MULTI): ICD-10-CM

## 2024-03-28 DIAGNOSIS — Z51.0 ENCOUNTER FOR ANTINEOPLASTIC RADIATION THERAPY: ICD-10-CM

## 2024-03-28 LAB
RAD ONC MSQ ACTUAL FRACTIONS DELIVERED: 2
RAD ONC MSQ ACTUAL FRACTIONS DELIVERED: 2
RAD ONC MSQ ACTUAL SESSION DELIVERED DOSE: 900 CGRAY
RAD ONC MSQ ACTUAL SESSION DELIVERED DOSE: 900 CGRAY
RAD ONC MSQ ACTUAL TOTAL DOSE: 1800 CGRAY
RAD ONC MSQ ACTUAL TOTAL DOSE: 1800 CGRAY
RAD ONC MSQ ELAPSED DAYS: 2
RAD ONC MSQ ELAPSED DAYS: 2
RAD ONC MSQ LAST DATE: NORMAL
RAD ONC MSQ LAST DATE: NORMAL
RAD ONC MSQ PRESCRIBED FRACTIONAL DOSE: 900 CGRAY
RAD ONC MSQ PRESCRIBED FRACTIONAL DOSE: 900 CGRAY
RAD ONC MSQ PRESCRIBED NUMBER OF FRACTIONS: 3
RAD ONC MSQ PRESCRIBED NUMBER OF FRACTIONS: 3
RAD ONC MSQ PRESCRIBED TECHNIQUE: NORMAL
RAD ONC MSQ PRESCRIBED TECHNIQUE: NORMAL
RAD ONC MSQ PRESCRIBED TOTAL DOSE: 2700 CGRAY
RAD ONC MSQ PRESCRIBED TOTAL DOSE: 2700 CGRAY
RAD ONC MSQ PRESCRIPTION PATTERN COMMENT: NORMAL
RAD ONC MSQ PRESCRIPTION PATTERN COMMENT: NORMAL
RAD ONC MSQ START DATE: NORMAL
RAD ONC MSQ START DATE: NORMAL
RAD ONC MSQ TREATMENT COURSE NUMBER: 2
RAD ONC MSQ TREATMENT COURSE NUMBER: 2
RAD ONC MSQ TREATMENT SITE: NORMAL
RAD ONC MSQ TREATMENT SITE: NORMAL

## 2024-03-28 PROCEDURE — 77373 STRTCTC BDY RAD THER TX DLVR: CPT | Performed by: RADIOLOGY

## 2024-04-01 ENCOUNTER — HOSPITAL ENCOUNTER (OUTPATIENT)
Dept: CARDIOLOGY | Facility: HOSPITAL | Age: 64
Discharge: HOME | End: 2024-04-01
Payer: COMMERCIAL

## 2024-04-01 ENCOUNTER — RADIATION ONCOLOGY OTV (OUTPATIENT)
Dept: RADIATION ONCOLOGY | Facility: HOSPITAL | Age: 64
End: 2024-04-01

## 2024-04-01 ENCOUNTER — HOSPITAL ENCOUNTER (OUTPATIENT)
Dept: RADIATION ONCOLOGY | Facility: HOSPITAL | Age: 64
Setting detail: RADIATION/ONCOLOGY SERIES
Discharge: HOME | End: 2024-04-01
Payer: COMMERCIAL

## 2024-04-01 VITALS
SYSTOLIC BLOOD PRESSURE: 120 MMHG | OXYGEN SATURATION: 100 % | RESPIRATION RATE: 18 BRPM | HEIGHT: 61 IN | DIASTOLIC BLOOD PRESSURE: 77 MMHG | TEMPERATURE: 96.6 F | BODY MASS INDEX: 25.47 KG/M2 | WEIGHT: 134.9 LBS | HEART RATE: 63 BPM

## 2024-04-01 DIAGNOSIS — Z51.81 ENCOUNTER FOR MONITORING CARDIOTOXIC DRUG THERAPY: ICD-10-CM

## 2024-04-01 DIAGNOSIS — Z51.0 ENCOUNTER FOR ANTINEOPLASTIC RADIATION THERAPY: ICD-10-CM

## 2024-04-01 DIAGNOSIS — C50.911 CARCINOMA OF RIGHT BREAST METASTATIC TO BONE (MULTI): ICD-10-CM

## 2024-04-01 DIAGNOSIS — C41.9 MALIGNANT NEOPLASM OF BONE WITH METASTASES (MULTI): ICD-10-CM

## 2024-04-01 DIAGNOSIS — C79.51 SECONDARY MALIGNANT NEOPLASM OF BONE (MULTI): ICD-10-CM

## 2024-04-01 DIAGNOSIS — Z51.11 ENCOUNTER FOR ANTINEOPLASTIC CHEMOTHERAPY: ICD-10-CM

## 2024-04-01 DIAGNOSIS — C79.51 CARCINOMA OF RIGHT BREAST METASTATIC TO BONE (MULTI): ICD-10-CM

## 2024-04-01 DIAGNOSIS — C79.51 METASTATIC CANCER TO SPINE (MULTI): Primary | ICD-10-CM

## 2024-04-01 DIAGNOSIS — Z79.83 LONG-TERM CURRENT USE OF BISPHOSPHONATE: ICD-10-CM

## 2024-04-01 DIAGNOSIS — Z79.899 ENCOUNTER FOR MONITORING CARDIOTOXIC DRUG THERAPY: ICD-10-CM

## 2024-04-01 LAB
EJECTION FRACTION APICAL 4 CHAMBER: 58.2
LEFT ATRIUM VOLUME AREA LENGTH INDEX BSA: 23 ML/M2
LEFT VENTRICLE INTERNAL DIMENSION DIASTOLE: 4.48 CM (ref 3.5–6)
LEFT VENTRICULAR OUTFLOW TRACT DIAMETER: 1.5 CM
LV EJECTION FRACTION BIPLANE: 61 %
MITRAL VALVE E/A RATIO: 0.7
MITRAL VALVE E/E' RATIO: 8.14
RAD ONC MSQ ACTUAL FRACTIONS DELIVERED: 3
RAD ONC MSQ ACTUAL FRACTIONS DELIVERED: 3
RAD ONC MSQ ACTUAL SESSION DELIVERED DOSE: 900 CGRAY
RAD ONC MSQ ACTUAL SESSION DELIVERED DOSE: 900 CGRAY
RAD ONC MSQ ACTUAL TOTAL DOSE: 2700 CGRAY
RAD ONC MSQ ACTUAL TOTAL DOSE: 2700 CGRAY
RAD ONC MSQ ELAPSED DAYS: 6
RAD ONC MSQ ELAPSED DAYS: 6
RAD ONC MSQ LAST DATE: NORMAL
RAD ONC MSQ LAST DATE: NORMAL
RAD ONC MSQ PRESCRIBED FRACTIONAL DOSE: 900 CGRAY
RAD ONC MSQ PRESCRIBED FRACTIONAL DOSE: 900 CGRAY
RAD ONC MSQ PRESCRIBED NUMBER OF FRACTIONS: 3
RAD ONC MSQ PRESCRIBED NUMBER OF FRACTIONS: 3
RAD ONC MSQ PRESCRIBED TECHNIQUE: NORMAL
RAD ONC MSQ PRESCRIBED TECHNIQUE: NORMAL
RAD ONC MSQ PRESCRIBED TOTAL DOSE: 2700 CGRAY
RAD ONC MSQ PRESCRIBED TOTAL DOSE: 2700 CGRAY
RAD ONC MSQ PRESCRIPTION PATTERN COMMENT: NORMAL
RAD ONC MSQ PRESCRIPTION PATTERN COMMENT: NORMAL
RAD ONC MSQ START DATE: NORMAL
RAD ONC MSQ START DATE: NORMAL
RAD ONC MSQ TREATMENT COURSE NUMBER: 2
RAD ONC MSQ TREATMENT COURSE NUMBER: 2
RAD ONC MSQ TREATMENT SITE: NORMAL
RAD ONC MSQ TREATMENT SITE: NORMAL
RIGHT VENTRICLE FREE WALL PEAK S': 8.92 CM/S
RIGHT VENTRICLE PEAK SYSTOLIC PRESSURE: 23.1 MMHG
TRICUSPID ANNULAR PLANE SYSTOLIC EXCURSION: 2.3 CM

## 2024-04-01 PROCEDURE — 77336 RADIATION PHYSICS CONSULT: CPT | Performed by: RADIOLOGY

## 2024-04-01 PROCEDURE — 77373 STRTCTC BDY RAD THER TX DLVR: CPT | Performed by: RADIOLOGY

## 2024-04-01 PROCEDURE — 93308 TTE F-UP OR LMTD: CPT | Performed by: STUDENT IN AN ORGANIZED HEALTH CARE EDUCATION/TRAINING PROGRAM

## 2024-04-01 PROCEDURE — 93325 DOPPLER ECHO COLOR FLOW MAPG: CPT | Performed by: STUDENT IN AN ORGANIZED HEALTH CARE EDUCATION/TRAINING PROGRAM

## 2024-04-01 PROCEDURE — 76376 3D RENDER W/INTRP POSTPROCES: CPT | Performed by: STUDENT IN AN ORGANIZED HEALTH CARE EDUCATION/TRAINING PROGRAM

## 2024-04-01 PROCEDURE — 77435 SBRT MANAGEMENT: CPT | Performed by: RADIOLOGY

## 2024-04-01 PROCEDURE — 93356 MYOCRD STRAIN IMG SPCKL TRCK: CPT | Performed by: STUDENT IN AN ORGANIZED HEALTH CARE EDUCATION/TRAINING PROGRAM

## 2024-04-01 PROCEDURE — 93321 DOPPLER ECHO F-UP/LMTD STD: CPT | Performed by: STUDENT IN AN ORGANIZED HEALTH CARE EDUCATION/TRAINING PROGRAM

## 2024-04-01 PROCEDURE — 76376 3D RENDER W/INTRP POSTPROCES: CPT

## 2024-04-01 ASSESSMENT — PAIN SCALES - GENERAL: PAINLEVEL: 2

## 2024-04-01 NOTE — PROGRESS NOTES
RADIATION ONCOLOGY ON-TREATMENT VISIT NOTE  Patient Name:  Trang Jones  MRN:  [id number]  :  1960    Radiation Oncologist: Jordan Mejia MD  Referring Provider: No ref. provider found  Primary Care Provider: Karl Granger DO  Care Team: Patient Care Team:  Karl Granger DO as PCP - General (Family Medicine)  Bo Sandoval MD as PCP - [employer] PCP  Bebeot Tolbert MD as Consulting Physician (Hematology and Oncology)  Bo Sandoval MD as Consulting Physician (Hematology and Oncology)    Date of Service: 2024     Trang Jones is a 63 y.o.-year-old with:  Malignant neoplasm of bone with metastases (CMS/HCC), Clinical: pM1, G3    Specialty Problems          Radiation Oncology Problems    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 (CMS/HCC)        Malignant neoplasm of bone with metastases (CMS/HCC)        Pain due to malignant neoplasm metastatic to bone (CMS/HCC)        Metastatic cancer to spine (CMS/HCC)           Chemotherapy Summary (if applicable):   albumin-bound PACLitaxel 164 mg IV, 100 mg/m2, intravenous, Once, 10 of 12 cycles    Dose modification: 80 mg/m2 (original dose 100 mg/m2, Cycle 6, Reason: Abnormal LFT's, Comment: gg)    Administration: 131.2 mg (10/2/2023), 130 mg (10/9/2023), 131.2 mg (10/23/2023), 131.2 mg (10/30/2023), 131.2 mg (2023), 131.2 mg (2023), 131.2 mg (2023), 130 mg (2023), 130 mg (2023)    fosaprepitant (Emend) 150 mg in sodium chloride 0.9% 250 mL IV, 150 mg, intravenous, Once, 3 of 17 cycles    Administration: 150 mg (2024), 150 mg (2024), 150 mg (3/12/2024)        palonosetron (Aloxi) injection 250 mcg, 250 mcg, intravenous, Once, 3 of 17 cycles    Administration: 250 mcg (2024), 250 mcg (2024), 250 mcg (3/12/2024)        fam-trastuzumab deruxtecan-nxki (Enhertu) 270 mg in dextrose 5 % in water (D5W) 113.5 mL IV, 4.4 mg/kg = 270 mg (81.5 % of original dose 5.4 mg/kg), intravenous,  "Once, 3 of 17 cycles    Dose modification: 4.4 mg/kg (original dose 5.4 mg/kg, Cycle 1, Reason: Other (See Comments), Comment: aylin)    Administration: 270 mg (1/29/2024), 270 mg (2/19/2024), 270 mg (3/12/2024)       Treatment Summary:  Radiation Treatments       No active radiation treatments to show.            SUBJECTIVE: Patient was seen and examined.  She continues to have ongoing pain to her mid and lower back, which is currently treated with morphine extended release and oxycodone for breakthrough pain.  She notes her pain to be a 4/10 currently which has been mildly exacerbated due to the treatment position during radiation treatment.  She has started to take oxycodone prior to treatment which has improved horrible pain during treatment.  She denies any changes in her neurological symptoms, denies any urinary or bowel incontinence, motor weakness.    OBJECTIVE:   Vital Signs:  /77   Pulse 63   Temp 35.9 °C (96.6 °F) (Temporal)   Resp 18   Ht 1.549 m (5' 0.98\")   Wt 61.2 kg (134 lb 14.4 oz)   LMP  (LMP Unknown)   SpO2 100%   BMI 25.51 kg/m²    Pain Scale: 4 /10.    Vitals within normal limits, no neurological symptoms to report at this time.    Toxicity Assessment          4/1/2024    17:36   Toxicity Assessment   Adverse Events Reviewed (WDL) No (Exceptions to WDL)   Treatment Site Bone   Anorexia Grade 0   Dermatitis Radiation Grade 0   Fatigue Grade 1       minor faitgue, patient resting PRN with some releif   Nausea Grade 0   Pain Grade 1       In morning when 1st waking up 7/10 pain, then once she gets moving and takes extended release morphine and oxycodone PRN her pain decreases throughout the day and become tollerable.   Erythroderma Grade 0   Pruritus Grade 0        ASSESSMENT/PLAN:  The patient is tolerating radiation therapy as anticipated.  Discussed with patient ongoing symptoms after radiation treatment, including pain flares, possible radiation dermatitis.  Discussed with patient " that in the event that she has any new neurological symptoms, motor weakness, exacerbating pain out of proportion to her current pain, to reach out to the office for possible restaging scans to be placed more urgently.  However if she continues to do well we will plan for a 3-month MRI total spine with a follow-up with KELSEA Bojorquez.  All questions were answered satisfactorily.    Chao Montanez MD  PGY-3 Radiation Oncology    Agree with note and plan. Aaron Mejia MD

## 2024-04-03 ENCOUNTER — ALLIED HEALTH (OUTPATIENT)
Dept: INTEGRATIVE MEDICINE | Facility: CLINIC | Age: 64
End: 2024-04-03
Payer: COMMERCIAL

## 2024-04-03 ENCOUNTER — DOCUMENTATION (OUTPATIENT)
Dept: RADIATION ONCOLOGY | Facility: HOSPITAL | Age: 64
End: 2024-04-03

## 2024-04-03 DIAGNOSIS — C79.51 METASTATIC CANCER TO SPINE (MULTI): ICD-10-CM

## 2024-04-03 DIAGNOSIS — M25.552 LEFT HIP PAIN: ICD-10-CM

## 2024-04-03 DIAGNOSIS — R11.0 NAUSEA: ICD-10-CM

## 2024-04-03 DIAGNOSIS — C79.51 CARCINOMA OF RIGHT BREAST METASTATIC TO BONE (MULTI): ICD-10-CM

## 2024-04-03 DIAGNOSIS — K30 UPSET STOMACH: ICD-10-CM

## 2024-04-03 DIAGNOSIS — G89.3 NEOPLASM RELATED PAIN: Primary | ICD-10-CM

## 2024-04-03 DIAGNOSIS — M79.2 NEUROPATHIC PAIN: ICD-10-CM

## 2024-04-03 DIAGNOSIS — C50.911 CARCINOMA OF RIGHT BREAST METASTATIC TO BONE (MULTI): ICD-10-CM

## 2024-04-03 PROCEDURE — 99213 OFFICE O/P EST LOW 20 MIN: CPT | Performed by: HOSPITALIST

## 2024-04-03 ASSESSMENT — PAIN SCALES - GENERAL: PAINLEVEL_OUTOF10: 3

## 2024-04-03 NOTE — PROGRESS NOTES
Radiation Oncology Treatment Summary    Patient Name:  Trang Jones  MRN:  [id number]  :  1960    Referring Provider: No ref. provider found  Primary Care Provider: Karl Granger DO    Brief History: Trang Jones is a 63 y.o. female with Malignant neoplasm of bone with metastases (CMS/HCC), Clinical: pM1, G3.  The patient completed radiotherapy as outlined below.    Radiation Treatment Summary:    Radiation Treatments       Active   No active radiation treatments to show.     Completed   L femur (Started on 3/26/2024)   Most recent fraction: 900 cGy given on 2024   Total given: 2,700 cGy / 2,700 cGy  (3 of 3 fractions)   Elapsed Days: 6   Technique: SBRT        T4-6 (Started on 3/26/2024)   Most recent fraction: 900 cGy given on 2024   Total given: 2,700 cGy / 2,700 cGy  (3 of 3 fractions)   Elapsed Days: 6   Technique: SBRT                     Please see the patient's [facility] chart for further details regarding the radiation plan, including beam energy.    Concurrent Chemotherapy:  Treatment Plans       Name Type Plan Dates Plan Provider         Active    Fam-trastuzumab deruxtecan (5.4 mg/kg), 21 Day Cycles  Oncology Treatment  2024 - Present Bebeto Tolbert MD                    [facility] Toxicity Overview:   Toxicity Assessment          2024    17:36   Toxicity Assessment   Adverse Events Reviewed (WDL) No (Exceptions to WDL)   Treatment Site Bone   Anorexia Grade 0   Dermatitis Radiation Grade 0   Fatigue Grade 1       minor faitgue, patient resting PRN with some releif   Nausea Grade 0   Pain Grade 1       In morning when 1st waking up 7/10 pain, then once she gets moving and takes extended release morphine and oxycodone PRN her pain decreases throughout the day and become tollerable.   Erythroderma Grade 0   Pruritus Grade 0     Patient Disposition: The patient will be scheduled for follow up at our clinic in 3 months with Vazquez Kruse NP with [facility]  prior (orders placed). The patient is encouraged to contact the radiation department for any questions or concerns in the interim.     Jordan Mejia MD

## 2024-04-03 NOTE — PROGRESS NOTES
Acupuncture Visit:     Subjective   Patient ID: Trang Jones is a 63 y.o. female who presents for No chief complaint on file.  Finished radiation spine and hip for pain relief.  Neuropathy has been gone  Chemo side effects were bad ,. Extreme fatigue, occ nausea  Nausea gone now.               Left hip/femur pain   4/10 today  Cold improved.   Sleep-  no issues  No neuropathy today, slight episode last week for an hour           initial visit:  Fatigue, back pain  had recent break from chemo  fatigue is better since holding  typically wiped out after chemo and radiation for 2-3 days   low appetite and difficulty staying hydrated  denies xerostomia    sleep is good- uses cbd product, has [employer] card, but has not pursued yet    pain this week mostly from scoliosis  lumbar into thoracic, has since her 30s, stiffness, constant pain   had has some radiation down legs. foraminal stenosis  no neuropathy symptoms, hands or feet  denies other pain areas.   was having severe throacic pain, from tumor compression, now off of most pain meds except ER morphine.     planned 2 more rounds of chemo then a scan to evaluate next steps     BM- once a day, no blood mucus undigested food,        Diet: no alcohol or sugars. consistently tries to eat healthy  Dr: water, dislikes cold water, drinks warm lemon water, bone broth, hot teas.    General: denies- fever, chills, night sweats  Skin: denies- rash, abnormal lesions  Eyes: occ floaters, last exam 1 year, ,   HENT: denies- headache, sore throat, ear pain, tinnitus, congestion, runny nose,   Cardio: denies- chest pain, palps  Resp: denies- SOB, cough, wheezing  GI: some reflux- every few days,   Uro: occasional urgency, no pain or burning.   Neuro: see HPI  Musc: see HPI                      Pre-treatment Assessment  Pain Score: 3  Anxiety Level (0-10): 3  Stress Level (0-10): 3  Coping Level (0-10): 6  Depression Level (0-10): 1  Fatigue Level (0-10): 5  Nausea Level (0-10):  0  Wellbeing Level (0-10): 4    Review of Systems         Provider reviewed plan for the acupuncture session, precautions and contraindications. Patient/guardian/hospital staff has given consent to treat with full understanding of what to expect during the session. Before acupuncture began, provider explained to the patient to communicate at any time if the procedure was causing discomfort past their tolerance level. Patient agreed to advise acupuncturist. The acupuncturist counseled the patient on the risks of acupuncture treatment including pain, infection, bleeding, and no relief of pain. The patient was positioned comfortably. There was no evidence of infection at the site of needle insertions.    Objective   Physical Exam          Acupuncture Treatment  Needle Guage: 36 guage /.20/ Blue seirin  Body Points: With retention  Body Points - Bilateral: Scalp thoracic, LI10, LI4, Si3, Ht7, SP6, KI7, KI3, UB62, LR3  Auricular Points: Yes  Auricular Points - Bilateral: BFA 1-3  Needle Count In: 25  Needle Count Out: 25              Post-treatment Assessment  Pain Score: 3  Anxiety Level (0-10): 3  Stress Level (0-10): 2  Coping Level (0-10): 7  Depression Level (0-10): 0  Fatigue Level (0-10): 4  Nausea Level (0-10): 0  Wellbeing Level (0-10): 4    Assessment/Plan

## 2024-04-03 NOTE — PROGRESS NOTES
Patient ID: Trang Jones is a 63 y.o. female.  Referring Physician: No referring provider defined for this encounter.  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 Tolbert  For: breast cancer     Chief complaints and symptoms:  Seen in supp oncology  1. Pain - on ms contin, nsaids, gabapentin. THC twice/week  mostly in back and now in ribs. Improved overall though some pain from walking this last weekend  Now worse since [place] trip and walking - pelvic pain, L hip and pain down L leg     2. Fatigue - related to chemotherapy - improved overall. Worse on chemo weeks     3. Appetite suppressed  Poor taste - improved  Doing well still today     4. Neuropathy - mild developing tingling in fingers - numbness and tingling briefly in fingertips - brief numbness in fingers  Two more in fingertips, tingling/numbness     mild ha     Anxiety about results     Integrative History:  Diet: mostly fruits (taste good), protein smoothies (Vital proteins, collagen powder, almond milk)  Lean proteins, chicken/salmon, o/w plant based     PA: walking some, hiking some now, continued  Doing some Pilates     Sleep: well improved with THC - 8-9 hrs/night     Stress: overwhelmed by dx.  Management: Meditating daily almost, various times, has some experience - Intermittent  Family support  Reiki helping  Focusing more on spirituality and Rastafari, Women's groups.     Natural Products:  Medical cannabis - one gummy/night  magnesium  Red yeast rice - for  cholesterol  Vit D  Flax seed oil   American Ginseng  [employer] STAMETS 7  Zinc     ROS:  no ha, visual symptoms, hearing loss  no sob, chest pain, palp  ROS o/w non contributory, please see HPI    Objective    BSA: There is no height or weight on file to calculate BSA.  LMP  (LMP Unknown)     PHYSICAL EXAM:  NAD, awake/alert  HEENT, NCAT, OP clear, no oral lesions  CTA bilat  RRR no mgr  Abd soft/nt/nd+bs  No c/c/e/ttp  Motor/sensory intact, CN 2-12 intact     RESULTS:  Lab Results   Component Value Date    WBC 4.0 (L) 03/12/2024    HGB 12.2 03/12/2024    HCT 36.7 03/12/2024     03/12/2024    CREATININE 0.57 03/12/2024    AST 13 03/12/2024       Assessment/Plan   Cancer Staging   Malignant neoplasm of bone with metastases (CMS/HCC)  Staging form: Bone - Appendicular Skeleton, Trunk, Skull, and Facial Bones, AJCC 8th Edition  - Clinical stage from 9/5/2023: pM1, G3 - Signed by Bebeto Tolbert MD on 9/5/2023      CANCER SPECIFIC RECCS:  62 yo woman with recurrent breast ca, Tneg now with bone mets and s/p cord compression  S/p radiation to hip/spine for pain relief recently  5/22/23 started taxol (weekly) - now changed to abraxane  No neuropathy now     Breast cancer:   7/17/23 CT c/a/p - will review with Dr. Tolbert:  sclerotic L 3rd rib, increase osseous mets R iliac, 1.1 cm liver hypodensities, can discuss further with Dr. Tolbert  focusing plant based is fine  limit sugar including in smoothies, consider Orgain (using Vital protein)  Would avoid coffee on taxol  Zinc for taste  Flax is fine  THC is good as well  Vitamin D3 5000 IU 3 days/week is good  Consider omega 3   Stop b12, also coq10 - done  on [employer] STAMETS 7  Current treatment: Enhertu     Try to walk 15-30 min/day     Pain - taking motrin prn, steroid weaning  MS contin and gabapentin  Acupuncture will start in [facility] - has started  Feels better when having AM pain meds and supplements  Some improvements  Gentle  Yoga with assistance, same with Pilates     Fatigue: Definitely improved  Acupuncture, consider massage  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 Uses to manage scoliosis.  Would sugg pt see Dr. Cantu (rehab) and/or PT prior to engaging in yoga     Follow up in Symptom Management Clinic      Integrative Oncology Symptom Management:     The integrative oncology symptom management clinic offers supervised care of cancer patients using Integrative Modalities billed to insurance using NCCN and SIO/ASCO guideline-driven practices.  ESAS is obtained prior to and after each treatment by practitioner     Symptoms Managed:  Pain - Increased in L leg and hip and pelvis since coming back from trip - increased in L femur and hip now, worse with hiking recently - improved but now having pain back of L shoulder and upper back, worse pain L hip and leg - MRI pending along with back - intermittent now, acupuncture helping  Done some hiking  Just finished radiation spine/L hip     Fatigue - still there but improved, same, travelled this last weekend with lots of walking - same overall during chemo weeks, mildly increased  Poor Appetite - doing well, no nausea, inconsistent     Neuropathy - improved now, one brief episode of numbness in fingers - none now, brief episode over weekend, one episode last week, slightly more and lasting longer, no changes - none now  Gabapentin increased to 300 am and 600 pm    Stomach aches - improved with compazine     Natural Products utilized:  Medical cannabis - one gummy/night  magnesium  Red yeast rice - for cholesterol  B12  COq10  Vit D  Flax seed oil      Integrative Treatment: Acupuncture     Session #: 20  Frequency: Weekly     Referrals: Supportive Oncology (Has seen)     Recommendations: I would avoid coffee, soy,  green tea while on Taxol (can reduce efficacy)  Weekly acupuncture  Has obtained med THC card as well   Ok to take calcium, mildly low and albumin wnl  seen by Dr. Cantu and going to PT - working with  on core strength  Interested in Reiki     Follow Up:  Symptom Management: weekly  Integrative Oncology: 3 months - could see 9/23     I have personally seen the patient and supervised the treatment by the integrative practitioner during this visit.  Bo Sandoval MD

## 2024-04-04 DIAGNOSIS — C41.9 MALIGNANT NEOPLASM OF BONE WITH METASTASES (MULTI): ICD-10-CM

## 2024-04-04 DIAGNOSIS — C50.911 CARCINOMA OF RIGHT BREAST METASTATIC TO BONE (MULTI): Primary | ICD-10-CM

## 2024-04-04 DIAGNOSIS — C79.51 CARCINOMA OF RIGHT BREAST METASTATIC TO BONE (MULTI): Primary | ICD-10-CM

## 2024-04-04 RX ORDER — FAMOTIDINE 10 MG/ML
20 INJECTION INTRAVENOUS ONCE AS NEEDED
Status: CANCELLED | OUTPATIENT
Start: 2024-04-22

## 2024-04-04 RX ORDER — ALBUTEROL SULFATE 0.83 MG/ML
3 SOLUTION RESPIRATORY (INHALATION) AS NEEDED
Status: CANCELLED | OUTPATIENT
Start: 2024-04-22

## 2024-04-04 RX ORDER — PROCHLORPERAZINE MALEATE 10 MG
10 TABLET ORAL EVERY 6 HOURS PRN
Status: CANCELLED | OUTPATIENT
Start: 2024-04-22

## 2024-04-04 RX ORDER — EPINEPHRINE 0.3 MG/.3ML
0.3 INJECTION SUBCUTANEOUS EVERY 5 MIN PRN
Status: CANCELLED | OUTPATIENT
Start: 2024-04-22

## 2024-04-04 RX ORDER — DIPHENHYDRAMINE HYDROCHLORIDE 50 MG/ML
50 INJECTION INTRAMUSCULAR; INTRAVENOUS AS NEEDED
Status: CANCELLED | OUTPATIENT
Start: 2024-04-22

## 2024-04-04 RX ORDER — ZOLEDRONIC ACID 0.04 MG/ML
4 INJECTION, SOLUTION INTRAVENOUS ONCE
Status: CANCELLED | OUTPATIENT
Start: 2024-04-22

## 2024-04-04 RX ORDER — PROCHLORPERAZINE EDISYLATE 5 MG/ML
10 INJECTION INTRAMUSCULAR; INTRAVENOUS EVERY 6 HOURS PRN
Status: CANCELLED | OUTPATIENT
Start: 2024-04-22

## 2024-04-04 RX ORDER — PALONOSETRON 0.05 MG/ML
0.25 INJECTION, SOLUTION INTRAVENOUS ONCE
Status: CANCELLED | OUTPATIENT
Start: 2024-04-22

## 2024-04-10 ENCOUNTER — ALLIED HEALTH (OUTPATIENT)
Dept: INTEGRATIVE MEDICINE | Facility: CLINIC | Age: 64
End: 2024-04-10
Payer: COMMERCIAL

## 2024-04-10 DIAGNOSIS — G89.3 NEOPLASM RELATED PAIN: ICD-10-CM

## 2024-04-10 DIAGNOSIS — C50.911 CARCINOMA OF RIGHT BREAST METASTATIC TO BONE (MULTI): Primary | ICD-10-CM

## 2024-04-10 DIAGNOSIS — M79.2 NEUROPATHIC PAIN: ICD-10-CM

## 2024-04-10 DIAGNOSIS — C79.51 METASTATIC CANCER TO SPINE (MULTI): ICD-10-CM

## 2024-04-10 DIAGNOSIS — C79.51 CARCINOMA OF RIGHT BREAST METASTATIC TO BONE (MULTI): Primary | ICD-10-CM

## 2024-04-10 DIAGNOSIS — R11.0 NAUSEA: ICD-10-CM

## 2024-04-10 DIAGNOSIS — M25.552 LEFT HIP PAIN: ICD-10-CM

## 2024-04-10 DIAGNOSIS — K30 UPSET STOMACH: ICD-10-CM

## 2024-04-10 PROCEDURE — 99213 OFFICE O/P EST LOW 20 MIN: CPT | Performed by: HOSPITALIST

## 2024-04-10 ASSESSMENT — PAIN SCALES - GENERAL: PAINLEVEL_OUTOF10: 2

## 2024-04-10 NOTE — PROGRESS NOTES
Acupuncture Visit:     Subjective   Patient ID: Trang Jones is a 63 y.o. female who presents for No chief complaint on file.  Similar to last week feeling well  Chemo- 22nd next round.   Neuropathy quiet, no nausea  Energy- typical, fatigued at end of the day.   Pain has been low 2/10              Left hip/femur pain   4/10 today  Cold improved.   Sleep-  no issues  No neuropathy today, slight episode last week for an hour           initial visit:  Fatigue, back pain  had recent break from chemo  fatigue is better since holding  typically wiped out after chemo and radiation for 2-3 days   low appetite and difficulty staying hydrated  denies xerostomia    sleep is good- uses cbd product, has [employer] card, but has not pursued yet    pain this week mostly from scoliosis  lumbar into thoracic, has since her 30s, stiffness, constant pain   had has some radiation down legs. foraminal stenosis  no neuropathy symptoms, hands or feet  denies other pain areas.   was having severe throacic pain, from tumor compression, now off of most pain meds except ER morphine.     planned 2 more rounds of chemo then a scan to evaluate next steps     BM- once a day, no blood mucus undigested food,        Diet: no alcohol or sugars. consistently tries to eat healthy  Dr: water, dislikes cold water, drinks warm lemon water, bone broth, hot teas.    General: denies- fever, chills, night sweats  Skin: denies- rash, abnormal lesions  Eyes: occ floaters, last exam 1 year, ,   HENT: denies- headache, sore throat, ear pain, tinnitus, congestion, runny nose,   Cardio: denies- chest pain, palps  Resp: denies- SOB, cough, wheezing  GI: some reflux- every few days,   Uro: occasional urgency, no pain or burning.   Neuro: see HPI  Musc: see HPI                      Pre-treatment Assessment  Pain Score: 2  Anxiety Level (0-10): 1  Stress Level (0-10): 2  Coping Level (0-10): 8  Depression Level (0-10): 0  Fatigue Level (0-10): 5  Nausea Level (0-10):  0  Wellbeing Level (0-10): 3    Review of Systems         Provider reviewed plan for the acupuncture session, precautions and contraindications. Patient/guardian/hospital staff has given consent to treat with full understanding of what to expect during the session. Before acupuncture began, provider explained to the patient to communicate at any time if the procedure was causing discomfort past their tolerance level. Patient agreed to advise acupuncturist. The acupuncturist counseled the patient on the risks of acupuncture treatment including pain, infection, bleeding, and no relief of pain. The patient was positioned comfortably. There was no evidence of infection at the site of needle insertions.    Objective   Physical Exam          Acupuncture Treatment  Needle Guage: 36 guage /.20/ Blue seirin  Body Points: With retention  Body Points - Bilateral: Scalp thoracic, LI10, LI4, Si3, Ht7, SP6, KI7, KI3, UB62, LR3  Auricular Points: Yes  Auricular Points - Bilateral: BFA 1-3  Needle Count In: 25  Needle Count Out: 25              Post-treatment Assessment  Pain Score: 2  Anxiety Level (0-10): 0  Stress Level (0-10): 2  Coping Level (0-10): 8  Depression Level (0-10): 0  Fatigue Level (0-10): 5  Nausea Level (0-10): 0  Wellbeing Level (0-10): 3    Assessment/Plan

## 2024-04-10 NOTE — PROGRESS NOTES
Patient ID: Trang Jones is a 63 y.o. female.  Referring Physician: No referring provider defined for this encounter.  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 Tolbert  For: breast cancer     Chief complaints and symptoms:  Seen in supp oncology  1. Pain - on ms contin, nsaids, gabapentin. THC twice/week  mostly in back and now in ribs. Improved overall though some pain from walking this last weekend  Now worse since [place] trip and walking - pelvic pain, L hip and pain down L leg     2. Fatigue - related to chemotherapy - improved overall. Worse on chemo weeks     3. Appetite suppressed  Poor taste - improved  Doing well still today     4. Neuropathy - mild developing tingling in fingers - numbness and tingling briefly in fingertips - brief numbness in fingers  Two more in fingertips, tingling/numbness     mild ha     Anxiety about results     Integrative History:  Diet: mostly fruits (taste good), protein smoothies (Vital proteins, collagen powder, almond milk)  Lean proteins, chicken/salmon, o/w plant based     PA: walking some, hiking some now, continued  Doing some Pilates     Sleep: well improved with THC - 8-9 hrs/night     Stress: overwhelmed by dx.  Management: Meditating daily almost, various times, has some experience - Intermittent  Family support  Reiki helping  Focusing more on spirituality and Confucianism, Women's groups.     Natural Products:  Medical cannabis - one gummy/night  magnesium  Red yeast rice - for  cholesterol  Vit D  Flax seed oil   American Ginseng  [employer] STAMETS 7  Zinc     ROS:  no ha, visual symptoms, hearing loss  no sob, chest pain, palp  ROS o/w non contributory, please see HPI    Objective    BSA: There is no height or weight on file to calculate BSA.  LMP  (LMP Unknown)     PHYSICAL EXAM:  NAD, awake/alert  HEENT, NCAT, OP clear, no oral lesions  CTA bilat  RRR no mgr  Abd soft/nt/nd+bs  No c/c/e/ttp  Motor/sensory intact, CN 2-12 intact     RESULTS:  Lab Results   Component Value Date    WBC 4.0 (L) 03/12/2024    HGB 12.2 03/12/2024    HCT 36.7 03/12/2024     03/12/2024    CREATININE 0.57 03/12/2024    AST 13 03/12/2024       Assessment/Plan   Cancer Staging   Malignant neoplasm of bone with metastases (CMS/HCC)  Staging form: Bone - Appendicular Skeleton, Trunk, Skull, and Facial Bones, AJCC 8th Edition  - Clinical stage from 9/5/2023: pM1, G3 - Signed by Bebeto Tolbert MD on 9/5/2023      CANCER SPECIFIC RECCS:  64 yo woman with recurrent breast ca, Tneg now with bone mets and s/p cord compression  S/p radiation to hip/spine for pain relief recently  5/22/23 started taxol (weekly) - now changed to abraxane  No neuropathy now     Breast cancer:   7/17/23 CT c/a/p - will review with Dr. Tolbert:  sclerotic L 3rd rib, increase osseous mets R iliac, 1.1 cm liver hypodensities, can discuss further with Dr. Tolbert  focusing plant based is fine  limit sugar including in smoothies, consider Orgain (using Vital protein)  Would avoid coffee on taxol  Zinc for taste  Flax is fine  THC is good as well  Vitamin D3 5000 IU 3 days/week is good  Consider omega 3   Stop b12, also coq10 - done  on [employer] STAMETS 7  Current treatment: Enhertu     Try to walk 15-30 min/day     Pain - taking motrin prn, steroid weaning  MS contin and gabapentin  Acupuncture will start in [facility] - has started  Feels better when having AM pain meds and supplements  Some improvements  Gentle  Yoga with assistance, same with Pilates     Fatigue: Definitely improved  Acupuncture, consider massage  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 Uses to manage scoliosis.  Would sugg pt see Dr. Cantu (rehab) and/or PT prior to engaging in yoga     Follow up in Symptom Management Clinic      Integrative Oncology Symptom Management:     The integrative oncology symptom management clinic offers supervised care of cancer patients using Integrative Modalities billed to insurance using NCCN and SIO/ASCO guideline-driven practices.  ESAS is obtained prior to and after each treatment by practitioner     Symptoms Managed:  Pain - Increased in L leg and hip and pelvis since coming back from trip - increased in L femur and hip now, worse with hiking recently - improved but now having pain back of L shoulder and upper back, worse pain L hip and leg - MRI pending along with back - intermittent now, acupuncture helping  Done some hiking  Just finished radiation spine/L hip     Fatigue - still there but improved, same, travelled this last weekend with lots of walking - same overall during chemo weeks, mild but stable  Poor Appetite - doing well, no nausea, inconsistent     Neuropathy - improved now, one brief episode of numbness in fingers - none now, brief episode over weekend, one episode last week, slightly more and lasting longer, no changes - none now  Gabapentin increased to 300 am and 600 pm     Stomach aches - improved with compazine     Natural Products utilized:  Medical cannabis - one gummy/night  magnesium  Red yeast rice - for cholesterol  B12  COq10  Vit D  Flax seed oil      Integrative Treatment: Acupuncture     Session #: 21  Frequency: Weekly     Referrals: Supportive Oncology (Has seen)     Recommendations: I would avoid coffee, soy,  green tea while on Taxol (can reduce efficacy)  Weekly acupuncture  Has obtained med THC card as well   Ok to take calcium, mildly low and albumin wnl  seen by Dr. Cantu and going to PT - working with  on core strength  Interested in Reiki     Follow Up:  Symptom Management: weekly  Integrative Oncology: 3 months - could see 9/23     I have personally seen the patient and supervised the treatment by the integrative practitioner during this visit.  Bo Sandoval MD

## 2024-04-15 ENCOUNTER — ALLIED HEALTH (OUTPATIENT)
Dept: INTEGRATIVE MEDICINE | Facility: CLINIC | Age: 64
End: 2024-04-15
Payer: COMMERCIAL

## 2024-04-15 DIAGNOSIS — G89.3 NEOPLASM RELATED PAIN: ICD-10-CM

## 2024-04-15 DIAGNOSIS — R11.0 NAUSEA: ICD-10-CM

## 2024-04-15 DIAGNOSIS — M25.552 LEFT HIP PAIN: ICD-10-CM

## 2024-04-15 DIAGNOSIS — C50.911 CARCINOMA OF RIGHT BREAST METASTATIC TO BONE (MULTI): Primary | ICD-10-CM

## 2024-04-15 DIAGNOSIS — K30 UPSET STOMACH: ICD-10-CM

## 2024-04-15 DIAGNOSIS — C79.51 CARCINOMA OF RIGHT BREAST METASTATIC TO BONE (MULTI): Primary | ICD-10-CM

## 2024-04-15 DIAGNOSIS — M79.2 NEUROPATHIC PAIN: ICD-10-CM

## 2024-04-15 DIAGNOSIS — C79.51 METASTATIC CANCER TO SPINE (MULTI): ICD-10-CM

## 2024-04-15 PROCEDURE — 99214 OFFICE O/P EST MOD 30 MIN: CPT | Performed by: HOSPITALIST

## 2024-04-17 ENCOUNTER — APPOINTMENT (OUTPATIENT)
Dept: INTEGRATIVE MEDICINE | Facility: CLINIC | Age: 64
End: 2024-04-17
Payer: COMMERCIAL

## 2024-04-17 ENCOUNTER — OFFICE VISIT (OUTPATIENT)
Dept: HEMATOLOGY/ONCOLOGY | Facility: CLINIC | Age: 64
End: 2024-04-17
Payer: COMMERCIAL

## 2024-04-17 VITALS
SYSTOLIC BLOOD PRESSURE: 121 MMHG | DIASTOLIC BLOOD PRESSURE: 80 MMHG | BODY MASS INDEX: 25.83 KG/M2 | HEART RATE: 57 BPM | WEIGHT: 136.6 LBS | TEMPERATURE: 97.3 F

## 2024-04-17 DIAGNOSIS — C50.911 CARCINOMA OF RIGHT BREAST METASTATIC TO BONE (MULTI): Primary | ICD-10-CM

## 2024-04-17 DIAGNOSIS — C79.51 CARCINOMA OF RIGHT BREAST METASTATIC TO BONE (MULTI): Primary | ICD-10-CM

## 2024-04-17 DIAGNOSIS — C41.9 MALIGNANT NEOPLASM OF BONE WITH METASTASES (MULTI): ICD-10-CM

## 2024-04-17 PROCEDURE — 99215 OFFICE O/P EST HI 40 MIN: CPT | Performed by: INTERNAL MEDICINE

## 2024-04-17 RX ORDER — PALONOSETRON 0.05 MG/ML
0.25 INJECTION, SOLUTION INTRAVENOUS ONCE
Status: CANCELLED | OUTPATIENT
Start: 2024-05-13

## 2024-04-17 RX ORDER — DIPHENHYDRAMINE HYDROCHLORIDE 50 MG/ML
50 INJECTION INTRAMUSCULAR; INTRAVENOUS AS NEEDED
Status: CANCELLED | OUTPATIENT
Start: 2024-05-13

## 2024-04-17 RX ORDER — PROCHLORPERAZINE MALEATE 10 MG
10 TABLET ORAL EVERY 6 HOURS PRN
Status: CANCELLED | OUTPATIENT
Start: 2024-05-13

## 2024-04-17 RX ORDER — OLANZAPINE 5 MG/1
5 TABLET ORAL NIGHTLY
Qty: 30 TABLET | Refills: 0 | Status: SHIPPED | OUTPATIENT
Start: 2024-04-17 | End: 2024-05-20 | Stop reason: WASHOUT

## 2024-04-17 RX ORDER — ALBUTEROL SULFATE 0.83 MG/ML
3 SOLUTION RESPIRATORY (INHALATION) AS NEEDED
Status: CANCELLED | OUTPATIENT
Start: 2024-05-13

## 2024-04-17 RX ORDER — FAMOTIDINE 10 MG/ML
20 INJECTION INTRAVENOUS ONCE AS NEEDED
Status: CANCELLED | OUTPATIENT
Start: 2024-05-13

## 2024-04-17 RX ORDER — PROCHLORPERAZINE EDISYLATE 5 MG/ML
10 INJECTION INTRAMUSCULAR; INTRAVENOUS EVERY 6 HOURS PRN
Status: CANCELLED | OUTPATIENT
Start: 2024-05-13

## 2024-04-17 RX ORDER — EPINEPHRINE 0.3 MG/.3ML
0.3 INJECTION SUBCUTANEOUS EVERY 5 MIN PRN
Status: CANCELLED | OUTPATIENT
Start: 2024-05-13

## 2024-04-17 ASSESSMENT — PAIN SCALES - GENERAL: PAINLEVEL: 0-NO PAIN

## 2024-04-17 NOTE — PATIENT INSTRUCTIONS
Follow-up with faisal in about 6 wks  Repeat CT in about 2 wks  Continue enhertu every 3 wks  Try olanzepine (zyprexa) 5 mg in evenings for nausea

## 2024-04-18 ASSESSMENT — ENCOUNTER SYMPTOMS
HEADACHES: 0
HEMOPTYSIS: 0
ABDOMINAL DISTENTION: 0
MYALGIAS: 0
ABDOMINAL PAIN: 0
CONFUSION: 0
PALPITATIONS: 0
DIZZINESS: 0
FEVER: 0
EYE PROBLEMS: 0
CHILLS: 0
COUGH: 0
SLEEP DISTURBANCE: 0
DYSURIA: 0
CARDIOVASCULAR NEGATIVE: 1
NUMBNESS: 0
NAUSEA: 1
DEPRESSION: 0
EYES NEGATIVE: 1
ARTHRALGIAS: 0
CONSTIPATION: 0
SCLERAL ICTERUS: 0
CONSTITUTIONAL NEGATIVE: 1
HEMATURIA: 0
RESPIRATORY NEGATIVE: 1
SHORTNESS OF BREATH: 0
DIFFICULTY URINATING: 0
WHEEZING: 0
HOT FLASHES: 0
BLOOD IN STOOL: 0
EXTREMITY WEAKNESS: 0
ADENOPATHY: 0
BRUISES/BLEEDS EASILY: 0
LEG SWELLING: 0
NERVOUS/ANXIOUS: 0
FATIGUE: 0
APPETITE CHANGE: 0
FREQUENCY: 0
CHEST TIGHTNESS: 0
SEIZURES: 0
DIAPHORESIS: 0
BACK PAIN: 1
DIARRHEA: 0

## 2024-04-18 NOTE — PROGRESS NOTES
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 she received prior Taxotere and cyclophosphamide , completed 6/2010. S/p adjuvant endocrine therapy.    Diagnosis of metastatic TNBC 3/2023, presenting with back pain and cord compression      weekly paclitaxel from 5/22/23, changed to abraxane after 1st cycle due to anaphylactic type reaction. Discontinued 12/12/23 due to progressive disease.    CURRENT THERAPY  TDxD since 1/29/24, held for XRT to spine  s/p XRT to spine on 4/1/24    HISTORY OF PRESENT ILLNESS    Trang Jones is a 63 y.o. woman here for follow-up post XRT. Pain appears to have improved since XRT. Scheduled to resume TDxD later this wk. No new issues            Review of Systems   Constitutional: Negative.  Negative for appetite change, chills, diaphoresis, fatigue and fever.   HENT:  Negative.  Negative for hearing loss and lump/mass.    Eyes: Negative.  Negative for eye problems and icterus.   Respiratory: Negative.  Negative for chest tightness, cough, hemoptysis, shortness of breath and wheezing.    Cardiovascular: Negative.  Negative for chest pain, leg swelling and palpitations.   Gastrointestinal:  Positive for nausea. Negative for abdominal distention, abdominal pain, blood in stool, constipation and diarrhea.   Endocrine: Negative for hot flashes.   Genitourinary:  Negative for bladder incontinence, difficulty urinating, dyspareunia, dysuria, frequency and hematuria.    Musculoskeletal:  Positive for back pain. Negative for arthralgias, gait problem and myalgias.        ++hip pain   Neurological:   Negative for dizziness, extremity weakness, gait problem, headaches, numbness and seizures.   Hematological:  Negative for adenopathy. Does not bruise/bleed easily.   Psychiatric/Behavioral:  Negative for confusion, depression and sleep disturbance. The patient is not nervous/anxious.    All other systems reviewed and are negative.        Past Medical History:  has a past medical history of Breast cancer (Multi), Hypercholesteremia, Metastatic cancer (Multi), and Personal history of irradiation.  Surgical History:   has a past surgical history that includes CT guided percutaneous biopsy bone deep (2023) and Mastectomy (Left, ).  Social History:   reports that she quit smoking about 21 years ago. Her smoking use included cigarettes. She has been exposed to tobacco smoke. She has never used smokeless tobacco. She reports that she does not currently use alcohol. She reports current drug use. Drug: Marijuana.  Family History:    Family History   Problem Relation Name Age of Onset    Leukemia Father       Family Oncology History:  Cancer-related family history is not on file.      OBJECTIVE    VS / Pain:  /80 (BP Location: Left arm, Patient Position: Sitting, BP Cuff Size: Adult)   Pulse 57   Temp 36.3 °C (97.3 °F) (Temporal)   Wt 62 kg (136 lb 9.6 oz)   LMP  (LMP Unknown)   BMI 25.83 kg/m²   BSA: 1.63 meters squared   Pain Scale: 3    Performance Status:  The ECOG performance scale today is ECO- Restricted in physically strenuous activity.  Carries out light duty.    Physical Exam  Constitutional:       General: She is not in acute distress.     Appearance: She is not ill-appearing, toxic-appearing or diaphoretic.   HENT:      Nose: No congestion or rhinorrhea.      Mouth/Throat:      Pharynx: No posterior oropharyngeal erythema.   Cardiovascular:      Rate and Rhythm: Normal rate and regular rhythm.      Pulses: Normal pulses.      Heart sounds: Normal heart sounds. No murmur heard.     No  gallop.   Pulmonary:      Breath sounds: Normal breath sounds.   Abdominal:      General: There is no distension.      Palpations: There is no mass.      Tenderness: There is no abdominal tenderness.   Musculoskeletal:         General: No swelling.      Cervical back: No rigidity.      Right lower leg: No edema.      Left lower leg: No edema.   Lymphadenopathy:      Cervical: No cervical adenopathy.   Skin:     General: Skin is warm.      Coloration: Skin is not cyanotic.      Findings: No bruising, ecchymosis or erythema.   Neurological:      General: No focal deficit present.      Mental Status: She is oriented to person, place, and time.      Cranial Nerves: Cranial nerves 2-12 are intact.      Motor: Motor function is intact.   Psychiatric:         Attention and Perception: Attention and perception normal.         Mood and Affect: Mood and affect normal.         Behavior: Behavior normal.         Thought Content: Thought content normal.         Judgment: Judgment normal.             Diagnostic Results     STUDY:   MR THORACIC SPINE W AND WO IV CONTRAST       INDICATION:   Signs/Symptoms:Follow-up of treated thoracic spine metastatic disease       COMPARISON:   Thoracic spine MRI 11/01/2020.       ACCESSION NUMBER(S):   [id number]      ORDERING CLINICIAN:   OLMAN MANN       TECHNIQUE:   Multi-planar multi-sequential MR imaging of the thoracic spine was   performed before and after the intravenous administration of   contrast, according to standard protocol. 12 ml of Dotarem was   administered (the balance of single use vial(s) has/have been   discarded).      FINDINGS:   ALIGNMENT: Levoconvex curvature of the thoracic spine centered at   T9-10. Focal kyphotic deformity centered at T5.       VERTEBRAE: Progression of metastatic lesions compared to previous MRI   11/01/2023 best compared on the precontrast T1 and STIR sagittal   images. Unable to directly compare postcontrast images as fat   saturation  was performed of the current exam and not performed in the   previous. Lesions are as follows:       New multifocal lesions within the T1 vertebral body and extending   into the right-greater-than-left pedicles of T1. Increased STIR   signal throughout the entirety of the T2 vertebral body. Lesion   within the posterior aspect of the T3 vertebral body slightly   progressed from prior exam; extension into the left pedicle and   transverse process is similar to previous exam. STIR hyperintense   expansile lesion within the left transverse process of T4, not   present on previous exam. Marrow replacement throughout the T4   vertebral body is progressed. Redemonstration of anterior compression   fracture of the T5 vertebral body with approximately 6 mm osseous   retropulsion, similar to previous exam. Diffuse replacement of   vertebral marrow, progressed compared to previous exam. Stir   hyperintensity in the posterior elements is also slightly increased   from previous exam. Extramedullary tumor results in moderate canal   stenosis with flattening of the anterior and left lateral aspect of   the cord, progressed from previous MRI. Lesion within the T7   vertebral body is increased in size. Stir hyperintense lesions within   the left transverse process is new from previous exam; right   transverse process lesion is unchanged. Near-complete replacement of   normal marrow within the T10 vertebral body with associated   compression deformity is unchanged. Increased size of marrow   replacing STIR hyperintense lesion within the T11 vertebral body.   Lesions within bilateral transverse processes were possibly present   on previous MRI. Increased marrow replacement throughout the T12   vertebral body with extension into the right pedicle, progressed from   previous MRI.       DISCS: Multilevel disc desiccation disc height loss.       CORD: The conus medullaris terminates at L1. Increased mass-effect on   the cord at the T5  level secondary to osseous retropulsion and   extraosseous tumor without definite associated edema or myelomalacia.   Remainder of the visualized cord is unremarkable.       ENHANCEMENT: Extensive enhancing metastatic lesions throughout the   thoracic spine as detailed above.       EVALUATION OF INDIVIDUAL LEVELS:       T5: Moderate canal stenosis secondary to osseous retropulsion and   extraosseous tumor. There is also results in moderate   left-greater-than-right foraminal stenosis at T4-5 and T5-6.       T10-11: Disc osteophyte complex and facet hypertrophy results in   mild-to-moderate bilateral foraminal stenosis,   right-greater-than-left. Spinal canal remains patent.       T11-12: Moderate to severe right foraminal stenosis secondary to disc   bulge and facet hypertrophy. Spinal canal and left neural foramina   are patent.       PARAVERTEBRAL SOFT TISSUES: The visualized paravertebral soft tissues   appear within normal limits.       IMPRESSION:   Overall progression of metastatic disease throughout the thoracic   spine as detailed most notable at T5 where there is increased   extraosseous tumor within the posterior elements superimposed on   stable retropulsion of the posterior endplate resulting in increased   canal stenosis and mass-effect on the ventral and left lateral aspect   of the cord. No associated cord edema.        MRI pelvis  COMPARISON:   Radiographs 03/26/2024, CT chest, abdomen and pelvis 12/22/2023, bone   scan 09/26/2023.       ACCESSION NUMBER(S):   [id number]      ORDERING CLINICIAN:   CARMEN POON       TECHNIQUE:   MR imaging of the left hip was obtained  without IV contrast.       FINDINGS:   TENDONS:   Severe left gluteus minimus insertional tendinosis with at least   partial tearing. The left gluteus medius, right gluteus minimus and   right gluteus medius tendons are intact. The insertion of the   iliopsoas tendon is normal. Moderate left hamstring origin    tendinosis. The right hamstring tendon origin is normal.       JOINTS:   The hip joint articular cartilage is normal without focal defect. No   evidence of significant arthrosis. No significant hip joint effusions   are identified. The acetabular labrum is normal.   No significant degenerative changes in the pubic symphysis or   bilateral sacroiliac joints. No evidence of avascular necrosis.       OSSEOUS STRUCTURES:   Innumerable enhancing marrow replacing lesions throughout the bony   pelvis and bilateral proximal femora, many of which are new and/or   not appreciable on the prior CT. Index lesions as follows: *   Confluent left hemisacrum lesion anterior to left S1 neural foramen,   4.9 cm (series 15, image 13) * Left posterior acetabular lesion, 1.8   cm (series 15, image 31) * Left intertrochanteric lesion, 2.4 cm   (series 15, image 40) * Left ischial lesion, 7.1 cm (series 15, image   43)      SOFT TISSUES:   No muscle atrophy or tear.       INTERNAL ORGANS:   Evaluation of the internal organs of the pelvis is limited on this   small field of view study tailored for evaluation of the   musculoskeletal system. No acute intrapelvic process. Right ovary   versus intramural fibroid, 2.3 cm (series 15, image 28).       IMPRESSION:   Worsening osseous metastases with innumerable new/enlarging marrow   replacing lesions throughout the imaged pelvis and bilateral femora.   No pathologic fracture.   === 12/22/23 ===    CT CHEST ABDOMEN PELVIS W IV CONTRAST    - Impression -  Overall similar appearance of diffuse osseous metastatic disease.    New subcentimeter lesion in the right hepatic lobe. Question  metastatic disease. Otherwise similar appearance of small  hypodensities throughout the liver.    Signed by: Jyotsna Benoit 12/23/2023 4:09 PM  Dictation workstation:   [id number]   No results found for this or any previous visit from the past 365 days.     No images are attached to the  encounter.    LABORATORY/PATHOLOGY DATA    Hospital Outpatient Visit on 04/01/2024   Component Date Value Ref Range Status    Treatment Site 04/01/2024 T4-6   Final    Course Number 04/01/2024 2   Final    Prescribed Fractional Dose 04/01/2024 900  cGray Final    Prescribed Total Dose 04/01/2024 2,700  cGray Final    Actual Fractions Delivered 04/01/2024 3   Final    Prescription Pattern Comment 04/01/2024 Daily CBCT. Align spinal canal.   Final    Actual Session Delivered Dose 04/01/2024 900  cGray Final    Actual Total Dose 04/01/2024 2,700  cGray Final    Prescribed Technique 04/01/2024 SBRT   Final    Elapsed Days 04/01/2024 6   Final    Start Date 04/01/2024 3/26/2024   Final    Last Date 04/01/2024 4/1/2024   Final    Prescribed Number of Fractions 04/01/2024 3   Final    Treatment Site 04/01/2024 L femur   Final    Course Number 04/01/2024 2   Final    Prescribed Fractional Dose 04/01/2024 900  cGray Final    Prescribed Total Dose 04/01/2024 2,700  cGray Final    Actual Fractions Delivered 04/01/2024 3   Final    Prescription Pattern Comment 04/01/2024 Daily CBCT. Align bone   Final    Actual Session Delivered Dose 04/01/2024 900  cGray Final    Actual Total Dose 04/01/2024 2,700  cGray Final    Prescribed Technique 04/01/2024 SBRT   Final    Elapsed Days 04/01/2024 6   Final    Start Date 04/01/2024 3/26/2024   Final    Last Date 04/01/2024 4/1/2024   Final    Prescribed Number of Fractions 04/01/2024 3   Final   Hospital Outpatient Visit on 04/01/2024   Component Date Value Ref Range Status    LVOT diam 04/01/2024 1.50  cm Final    LV Biplane EF 04/01/2024 61  % Final    MV avg E/e' ratio 04/01/2024 8.14   Final    MV E/A ratio 04/01/2024 0.70   Final    LA vol index A/L 04/01/2024 23.0  ml/m2 Final    Tricuspid annular plane systolic e* 04/01/2024 2.3  cm Final    RV free wall pk S' 04/01/2024 8.92  cm/s Final    LVIDd 04/01/2024 4.48  cm Final    RVSP 04/01/2024 23.1  mmHg Final    LV A4C EF 04/01/2024  58.2   Final   Hospital Outpatient Visit on 03/28/2024   Component Date Value Ref Range Status    Treatment Site 03/28/2024 T4-6   Final    Course Number 03/28/2024 2   Final    Prescribed Fractional Dose 03/28/2024 900  cGray Final    Prescribed Total Dose 03/28/2024 2,700  cGray Final    Actual Fractions Delivered 03/28/2024 2   Final    Prescription Pattern Comment 03/28/2024 Daily CBCT. Align spinal canal.   Final    Actual Session Delivered Dose 03/28/2024 900  cGray Final    Actual Total Dose 03/28/2024 1,800  cGray Final    Prescribed Technique 03/28/2024 SBRT   Final    Elapsed Days 03/28/2024 2   Final    Start Date 03/28/2024 3/26/2024   Final    Last Date 03/28/2024 3/28/2024   Final    Prescribed Number of Fractions 03/28/2024 3   Final    Treatment Site 03/28/2024 L femur   Final    Course Number 03/28/2024 2   Final    Prescribed Fractional Dose 03/28/2024 900  cGray Final    Prescribed Total Dose 03/28/2024 2,700  cGray Final    Actual Fractions Delivered 03/28/2024 2   Final    Prescription Pattern Comment 03/28/2024 Daily CBCT. Align bone   Final    Actual Session Delivered Dose 03/28/2024 900  cGray Final    Actual Total Dose 03/28/2024 1,800  cGray Final    Prescribed Technique 03/28/2024 SBRT   Final    Elapsed Days 03/28/2024 2   Final    Start Date 03/28/2024 3/26/2024   Final    Last Date 03/28/2024 3/28/2024   Final    Prescribed Number of Fractions 03/28/2024 3   Final   Hospital Outpatient Visit on 03/26/2024   Component Date Value Ref Range Status    Treatment Site 03/26/2024 T4-6   Final    Course Number 03/26/2024 2   Final    Prescribed Fractional Dose 03/26/2024 900  cGray Final    Prescribed Total Dose 03/26/2024 2,700  cGray Final    Actual Fractions Delivered 03/26/2024 1   Final    Prescription Pattern Comment 03/26/2024 Daily CBCT. Align spinal canal.   Final    Actual Session Delivered Dose 03/26/2024 900  cGray Final    Actual Total Dose 03/26/2024 900  cGray Final    Prescribed  Technique 03/26/2024 SBRT   Final    Elapsed Days 03/26/2024 0   Final    Start Date 03/26/2024 3/26/2024   Final    Last Date 03/26/2024 3/26/2024   Final    Prescribed Number of Fractions 03/26/2024 3   Final    Treatment Site 03/26/2024 L femur   Final    Course Number 03/26/2024 2   Final    Prescribed Fractional Dose 03/26/2024 900  cGray Final    Prescribed Total Dose 03/26/2024 2,700  cGray Final    Actual Fractions Delivered 03/26/2024 1   Final    Prescription Pattern Comment 03/26/2024 Daily CBCT. Align bone   Final    Actual Session Delivered Dose 03/26/2024 900  cGray Final    Actual Total Dose 03/26/2024 900  cGray Final    Prescribed Technique 03/26/2024 SBRT   Final    Elapsed Days 03/26/2024 0   Final    Start Date 03/26/2024 3/26/2024   Final    Last Date 03/26/2024 3/26/2024   Final    Prescribed Number of Fractions 03/26/2024 3   Final   Infusion on 03/12/2024   Component Date Value Ref Range Status    CA 27.29 03/12/2024 107.5 (H)  0.0 - 38.6 U/mL Final    WBC 03/12/2024 4.0 (L)  4.4 - 11.3 x10*3/uL Final    RBC 03/12/2024 4.17  4.00 - 5.20 x10*6/uL Final    Hemoglobin 03/12/2024 12.2  12.0 - 16.0 g/dL Final    Hematocrit 03/12/2024 36.7  36.0 - 46.0 % Final    MCV 03/12/2024 88  80 - 100 fL Final    MCH 03/12/2024 29.3  26.0 - 34.0 pg Final    MCHC 03/12/2024 33.2  32.0 - 36.0 g/dL Final    RDW 03/12/2024 14.4  11.5 - 14.5 % Final    Platelets 03/12/2024 230  150 - 450 x10*3/uL Final    Neutrophils % 03/12/2024 54.2  40.0 - 80.0 % Final    Immature Granulocytes %, Automated 03/12/2024 0.0  0.0 - 0.9 % Final    Lymphocytes % 03/12/2024 31.6  13.0 - 44.0 % Final    Monocytes % 03/12/2024 10.1  2.0 - 10.0 % Final    Eosinophils % 03/12/2024 3.8  0.0 - 6.0 % Final    Basophils % 03/12/2024 0.3  0.0 - 2.0 % Final    Neutrophils Absolute 03/12/2024 2.15  1.20 - 7.70 x10*3/uL Final    Immature Granulocytes Absolute, Au* 03/12/2024 0.00  0.00 - 0.70 x10*3/uL Final    Lymphocytes Absolute 03/12/2024 1.25   1.20 - 4.80 x10*3/uL Final    Monocytes Absolute 03/12/2024 0.40  0.10 - 1.00 x10*3/uL Final    Eosinophils Absolute 03/12/2024 0.15  0.00 - 0.70 x10*3/uL Final    Basophils Absolute 03/12/2024 0.01  0.00 - 0.10 x10*3/uL Final    Glucose 03/12/2024 96  74 - 99 mg/dL Final    Sodium 03/12/2024 140  136 - 145 mmol/L Final    Potassium 03/12/2024 4.4  3.5 - 5.3 mmol/L Final    Chloride 03/12/2024 107  98 - 107 mmol/L Final    Bicarbonate 03/12/2024 27  21 - 32 mmol/L Final    Anion Gap 03/12/2024 10  10 - 20 mmol/L Final    Urea Nitrogen 03/12/2024 19  6 - 23 mg/dL Final    Creatinine 03/12/2024 0.57  0.50 - 1.05 mg/dL Final    eGFR 03/12/2024 >90  >60 mL/min/1.73m*2 Final    Calcium 03/12/2024 8.4 (L)  8.6 - 10.3 mg/dL Final    Albumin 03/12/2024 3.9  3.4 - 5.0 g/dL Final    Alkaline Phosphatase 03/12/2024 199 (H)  33 - 136 U/L Final    Total Protein 03/12/2024 5.7 (L)  6.4 - 8.2 g/dL Final    AST 03/12/2024 13  9 - 39 U/L Final    Bilirubin, Total 03/12/2024 0.4  0.0 - 1.2 mg/dL Final    ALT 03/12/2024 10  7 - 45 U/L Final      Lab Results   Component Value Date    LABCA2 107.5 (H) 03/12/2024    LABCA2 267.6 (H) 02/19/2024    LABCA2 350.0 (H) 01/29/2024    LABCA2 277.5 (H) 01/02/2024    LABCA2 276.1 (H) 12/27/2023             IMPRESSION/PLAN      1. 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 Clear evidence of progressive disease in left hip and T1. S/p XRT. Resume TDxD this wk given repeat blood tempus showed again Her2 exon 19 mutation. Restage patient and I will see her back in 6 wks.    2. Pain neoplasm related. Following with supportive onc.  Much better now. S/p xrt        We discussed the clinical significance of diagnosis, goals of care and treatment plan in detail.  I personally spent over half of a total 45 minutes face to face with the patient in counseling and discussion and/or coordination of care as described above.          -------------------------------------------------------------------------------------------------------------------------------  Bebeto Tolbert MD  Director of Breast Cancer Medical Oncology Research Program   [facility]  Professor of Medicine  [place] [id number],  [phone] [id number]  Phone: [phone]  [email or web address]

## 2024-04-22 ENCOUNTER — INFUSION (OUTPATIENT)
Dept: HEMATOLOGY/ONCOLOGY | Facility: CLINIC | Age: 64
End: 2024-04-22
Payer: COMMERCIAL

## 2024-04-22 VITALS
RESPIRATION RATE: 18 BRPM | TEMPERATURE: 97.7 F | HEART RATE: 62 BPM | DIASTOLIC BLOOD PRESSURE: 71 MMHG | WEIGHT: 138.67 LBS | SYSTOLIC BLOOD PRESSURE: 108 MMHG | OXYGEN SATURATION: 95 % | BODY MASS INDEX: 26.22 KG/M2

## 2024-04-22 DIAGNOSIS — C79.51 CARCINOMA OF RIGHT BREAST METASTATIC TO BONE (MULTI): ICD-10-CM

## 2024-04-22 DIAGNOSIS — C41.9 MALIGNANT NEOPLASM OF BONE WITH METASTASES (MULTI): ICD-10-CM

## 2024-04-22 DIAGNOSIS — C50.911 CARCINOMA OF RIGHT BREAST METASTATIC TO BONE (MULTI): ICD-10-CM

## 2024-04-22 LAB
ALBUMIN SERPL BCP-MCNC: 3.7 G/DL (ref 3.4–5)
ALP SERPL-CCNC: 67 U/L (ref 33–136)
ALT SERPL W P-5'-P-CCNC: 12 U/L (ref 7–45)
ANION GAP SERPL CALC-SCNC: 10 MMOL/L (ref 10–20)
AST SERPL W P-5'-P-CCNC: 15 U/L (ref 9–39)
BASOPHILS # BLD AUTO: 0.01 X10*3/UL (ref 0–0.1)
BASOPHILS NFR BLD AUTO: 0.2 %
BILIRUB SERPL-MCNC: 0.4 MG/DL (ref 0–1.2)
BUN SERPL-MCNC: 14 MG/DL (ref 6–23)
CALCIUM SERPL-MCNC: 8.7 MG/DL (ref 8.6–10.3)
CANCER AG27-29 SERPL-ACNC: 70.3 U/ML (ref 0–38.6)
CHLORIDE SERPL-SCNC: 107 MMOL/L (ref 98–107)
CO2 SERPL-SCNC: 27 MMOL/L (ref 21–32)
CREAT SERPL-MCNC: 0.71 MG/DL (ref 0.5–1.05)
EGFRCR SERPLBLD CKD-EPI 2021: >90 ML/MIN/1.73M*2
EOSINOPHIL # BLD AUTO: 0.32 X10*3/UL (ref 0–0.7)
EOSINOPHIL NFR BLD AUTO: 6.1 %
ERYTHROCYTE [DISTWIDTH] IN BLOOD BY AUTOMATED COUNT: 14 % (ref 11.5–14.5)
GLUCOSE SERPL-MCNC: 101 MG/DL (ref 74–99)
HCT VFR BLD AUTO: 36 % (ref 36–46)
HGB BLD-MCNC: 12.1 G/DL (ref 12–16)
IMM GRANULOCYTES # BLD AUTO: 0.01 X10*3/UL (ref 0–0.7)
IMM GRANULOCYTES NFR BLD AUTO: 0.2 % (ref 0–0.9)
LYMPHOCYTES # BLD AUTO: 0.91 X10*3/UL (ref 1.2–4.8)
LYMPHOCYTES NFR BLD AUTO: 17.4 %
MCH RBC QN AUTO: 29.2 PG (ref 26–34)
MCHC RBC AUTO-ENTMCNC: 33.6 G/DL (ref 32–36)
MCV RBC AUTO: 87 FL (ref 80–100)
MONOCYTES # BLD AUTO: 0.45 X10*3/UL (ref 0.1–1)
MONOCYTES NFR BLD AUTO: 8.6 %
NEUTROPHILS # BLD AUTO: 3.52 X10*3/UL (ref 1.2–7.7)
NEUTROPHILS NFR BLD AUTO: 67.5 %
PLATELET # BLD AUTO: 147 X10*3/UL (ref 150–450)
POTASSIUM SERPL-SCNC: 4 MMOL/L (ref 3.5–5.3)
PROT SERPL-MCNC: 5.4 G/DL (ref 6.4–8.2)
RBC # BLD AUTO: 4.14 X10*6/UL (ref 4–5.2)
SODIUM SERPL-SCNC: 140 MMOL/L (ref 136–145)
WBC # BLD AUTO: 5.2 X10*3/UL (ref 4.4–11.3)

## 2024-04-22 PROCEDURE — 96375 TX/PRO/DX INJ NEW DRUG ADDON: CPT | Mod: INF

## 2024-04-22 PROCEDURE — 96367 TX/PROPH/DG ADDL SEQ IV INF: CPT

## 2024-04-22 PROCEDURE — 96413 CHEMO IV INFUSION 1 HR: CPT

## 2024-04-22 PROCEDURE — 80053 COMPREHEN METABOLIC PANEL: CPT

## 2024-04-22 PROCEDURE — 2500000004 HC RX 250 GENERAL PHARMACY W/ HCPCS (ALT 636 FOR OP/ED): Performed by: NURSE PRACTITIONER

## 2024-04-22 PROCEDURE — 86300 IMMUNOASSAY TUMOR CA 15-3: CPT | Mod: GEALAB

## 2024-04-22 PROCEDURE — 2500000004 HC RX 250 GENERAL PHARMACY W/ HCPCS (ALT 636 FOR OP/ED): Performed by: INTERNAL MEDICINE

## 2024-04-22 PROCEDURE — 85025 COMPLETE CBC W/AUTO DIFF WBC: CPT

## 2024-04-22 RX ORDER — PROCHLORPERAZINE MALEATE 10 MG
10 TABLET ORAL EVERY 6 HOURS PRN
Status: DISCONTINUED | OUTPATIENT
Start: 2024-04-22 | End: 2024-04-22 | Stop reason: HOSPADM

## 2024-04-22 RX ORDER — PROCHLORPERAZINE EDISYLATE 5 MG/ML
10 INJECTION INTRAMUSCULAR; INTRAVENOUS EVERY 6 HOURS PRN
Status: DISCONTINUED | OUTPATIENT
Start: 2024-04-22 | End: 2024-04-22 | Stop reason: HOSPADM

## 2024-04-22 RX ORDER — HEPARIN 100 UNIT/ML
500 SYRINGE INTRAVENOUS AS NEEDED
Status: DISCONTINUED | OUTPATIENT
Start: 2024-04-22 | End: 2024-04-22 | Stop reason: HOSPADM

## 2024-04-22 RX ORDER — HEPARIN SODIUM,PORCINE/PF 10 UNIT/ML
50 SYRINGE (ML) INTRAVENOUS AS NEEDED
Status: CANCELLED | OUTPATIENT
Start: 2024-04-22

## 2024-04-22 RX ORDER — FAMOTIDINE 10 MG/ML
20 INJECTION INTRAVENOUS ONCE AS NEEDED
Status: DISCONTINUED | OUTPATIENT
Start: 2024-04-22 | End: 2024-04-22 | Stop reason: HOSPADM

## 2024-04-22 RX ORDER — EPINEPHRINE 0.3 MG/.3ML
0.3 INJECTION SUBCUTANEOUS EVERY 5 MIN PRN
Status: DISCONTINUED | OUTPATIENT
Start: 2024-04-22 | End: 2024-04-22 | Stop reason: HOSPADM

## 2024-04-22 RX ORDER — HEPARIN 100 UNIT/ML
500 SYRINGE INTRAVENOUS AS NEEDED
Status: CANCELLED | OUTPATIENT
Start: 2024-04-22

## 2024-04-22 RX ORDER — DIPHENHYDRAMINE HYDROCHLORIDE 50 MG/ML
50 INJECTION INTRAMUSCULAR; INTRAVENOUS AS NEEDED
Status: DISCONTINUED | OUTPATIENT
Start: 2024-04-22 | End: 2024-04-22 | Stop reason: HOSPADM

## 2024-04-22 RX ORDER — PALONOSETRON 0.05 MG/ML
0.25 INJECTION, SOLUTION INTRAVENOUS ONCE
Status: COMPLETED | OUTPATIENT
Start: 2024-04-22 | End: 2024-04-22

## 2024-04-22 RX ORDER — ALBUTEROL SULFATE 0.83 MG/ML
3 SOLUTION RESPIRATORY (INHALATION) AS NEEDED
Status: DISCONTINUED | OUTPATIENT
Start: 2024-04-22 | End: 2024-04-22 | Stop reason: HOSPADM

## 2024-04-22 RX ADMIN — PALONOSETRON 250 MCG: 0.05 INJECTION, SOLUTION INTRAVENOUS at 09:37

## 2024-04-22 RX ADMIN — SODIUM CHLORIDE 150 MG: 9 INJECTION, SOLUTION INTRAVENOUS at 10:16

## 2024-04-22 RX ADMIN — FAM-TRASTUZUMAB DERUXTECAN-NXKI 270 MG: 100 INJECTION, POWDER, LYOPHILIZED, FOR SOLUTION INTRAVENOUS at 11:01

## 2024-04-22 RX ADMIN — DEXAMETHASONE SODIUM PHOSPHATE 12 MG: 10 INJECTION, SOLUTION INTRAMUSCULAR; INTRAVENOUS at 09:40

## 2024-04-22 RX ADMIN — SODIUM CHLORIDE, PRESERVATIVE FREE 500 UNITS: 5 INJECTION INTRAVENOUS at 12:40

## 2024-04-22 ASSESSMENT — PAIN SCALES - GENERAL: PAINLEVEL: 0-NO PAIN

## 2024-04-22 NOTE — SIGNIFICANT EVENT
04/22/24 0912   Prechemo Checklist   Has the patient been in the hospital, ED, or urgent care since last date of service No   Chemo/Immuno Consent Signed Yes   Protocol/Indications Verified Yes   Confirmed to previous date/time of medication Yes   Compared to previous dose Yes   All medications are dated accurately Yes   Pregnancy Test Negative Not applicable   Parameters Met Yes   BSA/Weight-Height Verified Yes   Dose Calculations Verified (current, total, cumulative) Yes

## 2024-04-22 NOTE — PROGRESS NOTES
1240. Enhurtu infusion and Pro/Post Coolong cap tolerated without incident. Schedule reviewed then Dc'd via independent / ambulatory

## 2024-04-23 ENCOUNTER — TELEMEDICINE (OUTPATIENT)
Dept: PALLIATIVE MEDICINE | Facility: CLINIC | Age: 64
End: 2024-04-23
Payer: COMMERCIAL

## 2024-04-23 DIAGNOSIS — M79.2 NEUROPATHIC PAIN: ICD-10-CM

## 2024-04-23 DIAGNOSIS — G89.3 CANCER RELATED PAIN: ICD-10-CM

## 2024-04-23 DIAGNOSIS — C41.9 MALIGNANT NEOPLASM OF BONE WITH METASTASES (MULTI): ICD-10-CM

## 2024-04-23 DIAGNOSIS — Z51.5 PALLIATIVE CARE ENCOUNTER: Primary | ICD-10-CM

## 2024-04-23 PROCEDURE — 99213 OFFICE O/P EST LOW 20 MIN: CPT | Performed by: NURSE PRACTITIONER

## 2024-04-23 RX ORDER — MORPHINE SULFATE 15 MG/1
15 TABLET, FILM COATED, EXTENDED RELEASE ORAL 3 TIMES DAILY
Qty: 90 TABLET | Refills: 0 | Status: SHIPPED | OUTPATIENT
Start: 2024-04-23 | End: 2024-05-20 | Stop reason: SDUPTHER

## 2024-04-23 NOTE — PROGRESS NOTES
"  SUPPORTIVE AND PALLIATIVE ONCOLOGY OUTPATIENT FOLLOW-UP      SERVICE DATE: 4/23/2024    .Virtual or Telephone Consent    A telephone visit (audio only) between the patient (at the originating site) and the provider (at the distant site) was utilized to provide this telehealth service.   Verbal consent was requested and obtained from Trang Jones on this date, 04/23/24 for a telehealth visit.       Cancer History  Newly diagnosed triple negative breast CA (with bone mets and cord compression)  -s/p L mastectomy in 2010: stage II ER+/HER2- breast CA  -s/p 4 cycles docetaxel/cyclophosphamide chemo  -took tamoxifen x 7 yrs, completed in [id number]  -presented to ED on 3/30/23 with severe back pain  -MRI spine showed cord compression, admitted at that time   -s/p 1 fx RT to spine on 5/4/23  -plan to start treatment with weekly paclitaxel and q21day pembro  -started treatment 5/22/23, changed to abraxane after C1 d/t anaphylactic type reaction  -CT CAP (11/14/23): Overall similar appearance of diffuse osseous metastatic disease. New subcentimeter lesion in the right hepatic lobe. Question  metastatic disease. Otherwise similar appearance of small  hypodensities throughout the liver.  -plan to stop abraxane and change therapy to TDxD, scheduled to start on 1/29/24  -treatment held for XRT to hip/spine 4/1/24    Med Onc: Dr. Tolbert  Integrative Medicine: Dr. Sandoval       Subjective   HISTORY OF PRESENT ILLNESS: Trang Jones is a 62 yo female with PMHx of HLD, scoliosis and newly diagnosed triple negative breast CA. She received XRT to the spine on 5/4 and will begin chemo treatment next week.     She presents to supportive oncology for follow up for pain and symptom management.     Spoke with pt on the phone for follow up. States she's \"doing pretty well,\" taking MSER 15mg tid, notices when she is late taking her midday dose. Not requiring much oxycodone PRN. Completed XRT to hip earlier this month, does feel " "like it gave her additional pain relief. Continues on gabapentin 300/600. Bowels are \"hit or miss,\" goes between constipation and diarrhea, takes miralax PRN. Has increased nausea with chemo, started compazine PRN, and olanzapine 5mg at bedtime. Reflux also increases with chemo, taking prilosec week of treatment. Appetite is okay, struggles to eat during treatment weeks, makes up for it during off weeks. Mood is stable, keeps herself busy, looks forward to getting out and doing more with nicer weather. Sleeping \"surprisingly well,\" recently went off THC gummies at bedtime upon recommendation from Dr. Sandoval. Has bad fatigue for a few days during treatment week. Continues acupuncture and reiki.     Pain Assessment:  Pain Score:  1  Location: hip    Symptom Assessment:  Pain:a little  Headache: none  Dizziness:none  Lack of energy: none  Difficulty sleeping: none  Worrying: none  Anxiety: none  Depression: none  Pain in mouth/swallowing: none  Dry mouth: none  Taste changes: none  Shortness of breath: none  Lack of appetite: very much   Nausea: none  Vomiting: none  Constipation: none  Diarrhea: none  Sore muscles: a little  Numbness or tingling in hands/feet/other: none  Weight loss: none      Information obtained from: interview of patient  ______________________________________________________________________        Objective                PHYSICAL EXAMINATION not performed d/t phone visit   Vital Signs:   Vital signs reviewed  There were no vitals filed for this visit.    Pain Score:  1      ASSESSMENT/PLAN    Pain: upper back around shoulder blades, radiates to front of chest; dull ache, shooting with sudden movement    Pain is: cancer related pain and acute on chronic  Type: somatic and neuropathic  Pain control: well-controlled  Home regimen:   -continue Morphine Sulfate Contin 15mg tid. Rx sent for fill on 4/27.   -continue oxycodone 10mg every 4hrs PRN. No Rx needed today.   -continue gabapentin 300mg in the " morning and 600mg at night. Refills available.   -continue ibuprofen PRN   -continue to follow with Dr. Sandoval/ Agustin Dietrich for acupuncture/ reiki   -recently discontinued medical THC PRN upon recommendation from Dr. Sandoval   Intolerances/previously tried:    Opioid Use  Medication Management:   - OARRS report reviewed with no aberrant behavior; consistent with  prescriptions/records and patient history  - MED 45.  Overdose Risk Score 190.   This has been discussed with patient.   - We will continue to closely monitor the patient for signs of prescription misuse including UDS, OARRS review and subjective reports at each visit.  - no concurrent benzodiazepine use   - I am a provider who either is or has consulted and collaborated with a provider certified in Hospice and Palliative Medicine and have conducted a face-face visit and examination for this patient.  - Routine Urine Drug Screen completed 5/9/23 appropriately positive for opioids and negative for illicit substances  - Controlled Substance Agreement completed 5/9/23  - Specifically discussed that controlled substance prescriptions will only be provided by our group as outlined in the completed agreement  - Prescribed naloxone previously prescribed  - Red Flags: none    Bowels: at risk for OIC  -educated pt on importance of maintaining daily BM  -continue daily miralax PRN  -continue senna/colace PRN     N/V/reflux:   -continue zofran 4mg ODT q8hrs PRN   -continue compazine 10mg q6hrs PRN.   -continue olanzapine 5mg at bedtime during treatment week  -continue prilosec 40mg daily during treatment week  -continue migraine medication PRN     Sleep: hx of insomnia prior to cancer dx   -recently discontinued medical THC PRN upon recommendation from Dr. Sandoval   -encouraged pt to maintain regular sleep/wake cycle  -plan for better pain control to aid in improved sleep, see pain section above     Mood: improving    -discussed adding medication to help with mood, pt  would like to hold off for now    Medical Decision Making/ GOC:   -social work referral to assist with LW/ POA paperwork        Next Follow-Up Visit:  Return to clinic in 4 weeks     Signature and billing  Medical complexity was low level due to due to complexity of problems, extensive data review, and high risk of management/treatment.  Time was spent on the following: Prep Time, Time Directly with Patient/Family/Caregiver, Documentation Time. Total time spent: 25 mins             Some elements copied from my note on 3/12/24, the elements have been updated and all reflect current decision making from today, 2/6/2024.      Plan of Care discussed with: Patient    SIGNATURE: KELSEA Garg-CNP    Contact information:  [facility]ive [facility] Palliative Oncology  Monday-Friday 8 AM-5 PM  Phone:  [phone], press option #5, then option #1.   Or Epic Secure Chat

## 2024-04-24 ENCOUNTER — ALLIED HEALTH (OUTPATIENT)
Dept: INTEGRATIVE MEDICINE | Facility: CLINIC | Age: 64
End: 2024-04-24

## 2024-04-24 DIAGNOSIS — R53.83 FATIGUE: Primary | ICD-10-CM

## 2024-04-24 PROCEDURE — 97139 UNLISTED THERAPEUTIC PX: CPT | Performed by: ACUPUNCTURIST

## 2024-04-24 NOTE — PROGRESS NOTES
Acupuncture Visit:     Subjective   Patient ID: Trang Jones is a 63 y.o. female who presents for No chief complaint on file.  Pt shared she had chemo this week.  Anti nausea rx has been helpful.  Seeking support for fatigue        Session Information  Is this acupuncture treatment being billed to the patient's insurance company: No  Visit Type: Follow-up visit         Review of Systems         Provider reviewed plan for the acupuncture session, precautions and contraindications. Patient/guardian/hospital staff has given consent to treat with full understanding of what to expect during the session. Before acupuncture began, provider explained to the patient to communicate at any time if the procedure was causing discomfort past their tolerance level. Patient agreed to advise acupuncturist. The acupuncturist counseled the patient on the risks of acupuncture treatment including pain, infection, bleeding, and no relief of pain. The patient was positioned comfortably. There was no evidence of infection at the site of needle insertions.    Objective   Physical Exam         Treatment Plan  Treatment Goals: Fatigue reduction    Acupuncture Treatment  Patient Position: Seated and reclining  Needle Guage: 40 guage /.16/ Red seirin, 42 guage /.14/ Lime green seirin  Body Points: With retention  Body Points - Left: dlpfcx2  Body Points - Bilateral: st qix3, k7, sp 4, p6,  Needle Count In: 14  Needle Count Out: 14  Needle Retention Time (min): 30 minutes  Total Face to Face Time (min): 25 minutes              Assessment/Plan

## 2024-04-26 ENCOUNTER — NURSE TRIAGE (OUTPATIENT)
Dept: ADMISSION | Facility: HOSPITAL | Age: 64
End: 2024-04-26
Payer: COMMERCIAL

## 2024-04-26 NOTE — TELEPHONE ENCOUNTER
Call placed to patient and advised her of Dr. Tolbert's instructions.   Ok to take OTC Tylenol in addition to the Rizatriptan.   If patient's symptoms are different than what she has experienced in the past, or do not resolve in 30-60 minutes, advised patient to be evaluated at the ED. Patient is able to verbalize understanding. No further questions or concerns at this time.

## 2024-04-26 NOTE — TELEPHONE ENCOUNTER
Patient  reports headache pain (3/10), dizziness with change of position, muscle twitches in her hands, and vision changes. She states she can see clearly for distance, but up close words appear to be moving.   Patient states she took a rizatriptan approximately 45 minutes ago.   She reports a tenderness to her neck when touched.   Patient denies any fever, shortness of breath or chest pain.  Patient denies numbness, tingling, GI or  symptoms.   Secure chat sent to the team.     Additional Information   Commented on: What helps these problems?     Rizatriptan    Protocols used: Headache

## 2024-04-30 ENCOUNTER — APPOINTMENT (OUTPATIENT)
Dept: HEMATOLOGY/ONCOLOGY | Facility: CLINIC | Age: 64
End: 2024-04-30
Payer: COMMERCIAL

## 2024-05-01 ENCOUNTER — HOSPITAL ENCOUNTER (OUTPATIENT)
Dept: RADIOLOGY | Facility: HOSPITAL | Age: 64
Discharge: HOME | End: 2024-05-01
Payer: COMMERCIAL

## 2024-05-01 ENCOUNTER — ALLIED HEALTH (OUTPATIENT)
Dept: INTEGRATIVE MEDICINE | Facility: CLINIC | Age: 64
End: 2024-05-01

## 2024-05-01 DIAGNOSIS — M79.605 PAIN OF LEFT LOWER EXTREMITY: ICD-10-CM

## 2024-05-01 DIAGNOSIS — R53.83 FATIGUE: Primary | ICD-10-CM

## 2024-05-01 DIAGNOSIS — C41.9 MALIGNANT NEOPLASM OF BONE WITH METASTASES (MULTI): ICD-10-CM

## 2024-05-01 DIAGNOSIS — C79.51 CARCINOMA OF RIGHT BREAST METASTATIC TO BONE (MULTI): ICD-10-CM

## 2024-05-01 DIAGNOSIS — C50.911 CARCINOMA OF RIGHT BREAST METASTATIC TO BONE (MULTI): ICD-10-CM

## 2024-05-01 PROCEDURE — 74177 CT ABD & PELVIS W/CONTRAST: CPT

## 2024-05-01 PROCEDURE — 97139 UNLISTED THERAPEUTIC PX: CPT | Performed by: ACUPUNCTURIST

## 2024-05-01 PROCEDURE — 2550000001 HC RX 255 CONTRASTS: Performed by: INTERNAL MEDICINE

## 2024-05-01 PROCEDURE — 71260 CT THORAX DX C+: CPT | Performed by: STUDENT IN AN ORGANIZED HEALTH CARE EDUCATION/TRAINING PROGRAM

## 2024-05-01 PROCEDURE — 74177 CT ABD & PELVIS W/CONTRAST: CPT | Performed by: STUDENT IN AN ORGANIZED HEALTH CARE EDUCATION/TRAINING PROGRAM

## 2024-05-01 RX ADMIN — IOHEXOL 75 ML: 350 INJECTION, SOLUTION INTRAVENOUS at 10:26

## 2024-05-01 NOTE — PROGRESS NOTES
Previous:  Pt shared she had chemo this week.  Anti nausea rx has been helpful.  Seeking support for fatigue   Acupuncture Visit:     Subjective   Patient ID: Trang Jones is a 63 y.o. female who presents for No chief complaint on file.  Pt reported acupuncture and reiki helped with fatigue.  She was able to go on a hike yesterday which has made her left hip sore with radiating pain into left lower extremity.          Session Information  Is this acupuncture treatment being billed to the patient's insurance company: No  Visit Type: Follow-up visit         Review of Systems         Provider reviewed plan for the acupuncture session, precautions and contraindications. Patient/guardian/hospital staff has given consent to treat with full understanding of what to expect during the session. Before acupuncture began, provider explained to the patient to communicate at any time if the procedure was causing discomfort past their tolerance level. Patient agreed to advise acupuncturist. The acupuncturist counseled the patient on the risks of acupuncture treatment including pain, infection, bleeding, and no relief of pain. The patient was positioned comfortably. There was no evidence of infection at the site of needle insertions.    Objective   Physical Exam         Treatment Plan  Treatment Goals: Fatigue reduction, Pain management    Acupuncture Treatment  Patient Position: Seated and reclining  Needle Guage: 42 guage /.14/ Lime green seirin, 40 guage /.16/ Red seirin  Body Points: With retention  Body Points - Left: gb 34, 39, sj 5  Body Points - Bilateral: st qix1, k 7, 10, 27, immunex1  Body Points - Right: gb 41  Needle Count In: 14  Needle Count Out: 14  Needle Retention Time (min): 30 minutes  Total Face to Face Time (min): 25 minutes              Assessment/Plan

## 2024-05-08 ENCOUNTER — APPOINTMENT (OUTPATIENT)
Dept: INTEGRATIVE MEDICINE | Facility: CLINIC | Age: 64
End: 2024-05-08
Payer: COMMERCIAL

## 2024-05-13 ENCOUNTER — INFUSION (OUTPATIENT)
Dept: HEMATOLOGY/ONCOLOGY | Facility: CLINIC | Age: 64
End: 2024-05-13
Payer: COMMERCIAL

## 2024-05-13 ENCOUNTER — APPOINTMENT (OUTPATIENT)
Dept: HEMATOLOGY/ONCOLOGY | Facility: CLINIC | Age: 64
End: 2024-05-13
Payer: COMMERCIAL

## 2024-05-13 VITALS
TEMPERATURE: 97.7 F | RESPIRATION RATE: 16 BRPM | HEART RATE: 53 BPM | DIASTOLIC BLOOD PRESSURE: 80 MMHG | WEIGHT: 139.44 LBS | OXYGEN SATURATION: 95 % | SYSTOLIC BLOOD PRESSURE: 118 MMHG | BODY MASS INDEX: 26.36 KG/M2

## 2024-05-13 DIAGNOSIS — C79.51 CARCINOMA OF RIGHT BREAST METASTATIC TO BONE (MULTI): ICD-10-CM

## 2024-05-13 DIAGNOSIS — C50.911 CARCINOMA OF RIGHT BREAST METASTATIC TO BONE (MULTI): ICD-10-CM

## 2024-05-13 DIAGNOSIS — C41.9 MALIGNANT NEOPLASM OF BONE WITH METASTASES (MULTI): ICD-10-CM

## 2024-05-13 LAB
ALBUMIN SERPL BCP-MCNC: 3.7 G/DL (ref 3.4–5)
ALP SERPL-CCNC: 63 U/L (ref 33–136)
ALT SERPL W P-5'-P-CCNC: 32 U/L (ref 7–45)
ANION GAP SERPL CALC-SCNC: 11 MMOL/L (ref 10–20)
AST SERPL W P-5'-P-CCNC: 27 U/L (ref 9–39)
BASOPHILS # BLD AUTO: 0.01 X10*3/UL (ref 0–0.1)
BASOPHILS NFR BLD AUTO: 0.3 %
BILIRUB SERPL-MCNC: 0.4 MG/DL (ref 0–1.2)
BUN SERPL-MCNC: 20 MG/DL (ref 6–23)
CALCIUM SERPL-MCNC: 8.5 MG/DL (ref 8.6–10.3)
CANCER AG27-29 SERPL-ACNC: 59.6 U/ML (ref 0–38.6)
CHLORIDE SERPL-SCNC: 105 MMOL/L (ref 98–107)
CO2 SERPL-SCNC: 27 MMOL/L (ref 21–32)
CREAT SERPL-MCNC: 0.61 MG/DL (ref 0.5–1.05)
EGFRCR SERPLBLD CKD-EPI 2021: >90 ML/MIN/1.73M*2
EOSINOPHIL # BLD AUTO: 0.25 X10*3/UL (ref 0–0.7)
EOSINOPHIL NFR BLD AUTO: 8 %
ERYTHROCYTE [DISTWIDTH] IN BLOOD BY AUTOMATED COUNT: 14.5 % (ref 11.5–14.5)
GLUCOSE SERPL-MCNC: 98 MG/DL (ref 74–99)
HCT VFR BLD AUTO: 35.5 % (ref 36–46)
HGB BLD-MCNC: 11.8 G/DL (ref 12–16)
IMM GRANULOCYTES # BLD AUTO: 0 X10*3/UL (ref 0–0.7)
IMM GRANULOCYTES NFR BLD AUTO: 0 % (ref 0–0.9)
LYMPHOCYTES # BLD AUTO: 0.82 X10*3/UL (ref 1.2–4.8)
LYMPHOCYTES NFR BLD AUTO: 26.3 %
MCH RBC QN AUTO: 29.9 PG (ref 26–34)
MCHC RBC AUTO-ENTMCNC: 33.2 G/DL (ref 32–36)
MCV RBC AUTO: 90 FL (ref 80–100)
MONOCYTES # BLD AUTO: 0.4 X10*3/UL (ref 0.1–1)
MONOCYTES NFR BLD AUTO: 12.8 %
NEUTROPHILS # BLD AUTO: 1.64 X10*3/UL (ref 1.2–7.7)
NEUTROPHILS NFR BLD AUTO: 52.6 %
PLATELET # BLD AUTO: 176 X10*3/UL (ref 150–450)
POTASSIUM SERPL-SCNC: 3.8 MMOL/L (ref 3.5–5.3)
PROT SERPL-MCNC: 5.6 G/DL (ref 6.4–8.2)
RBC # BLD AUTO: 3.95 X10*6/UL (ref 4–5.2)
SODIUM SERPL-SCNC: 139 MMOL/L (ref 136–145)
WBC # BLD AUTO: 3.1 X10*3/UL (ref 4.4–11.3)

## 2024-05-13 PROCEDURE — 96367 TX/PROPH/DG ADDL SEQ IV INF: CPT

## 2024-05-13 PROCEDURE — 80053 COMPREHEN METABOLIC PANEL: CPT

## 2024-05-13 PROCEDURE — 2500000004 HC RX 250 GENERAL PHARMACY W/ HCPCS (ALT 636 FOR OP/ED): Performed by: INTERNAL MEDICINE

## 2024-05-13 PROCEDURE — 96375 TX/PRO/DX INJ NEW DRUG ADDON: CPT | Mod: INF

## 2024-05-13 PROCEDURE — 86300 IMMUNOASSAY TUMOR CA 15-3: CPT | Mod: GEALAB

## 2024-05-13 PROCEDURE — 96413 CHEMO IV INFUSION 1 HR: CPT

## 2024-05-13 PROCEDURE — 85025 COMPLETE CBC W/AUTO DIFF WBC: CPT

## 2024-05-13 RX ORDER — DIPHENHYDRAMINE HYDROCHLORIDE 50 MG/ML
50 INJECTION INTRAMUSCULAR; INTRAVENOUS AS NEEDED
Status: DISCONTINUED | OUTPATIENT
Start: 2024-05-13 | End: 2024-05-13 | Stop reason: HOSPADM

## 2024-05-13 RX ORDER — EPINEPHRINE 0.3 MG/.3ML
0.3 INJECTION SUBCUTANEOUS EVERY 5 MIN PRN
Status: DISCONTINUED | OUTPATIENT
Start: 2024-05-13 | End: 2024-05-13 | Stop reason: HOSPADM

## 2024-05-13 RX ORDER — HEPARIN 100 UNIT/ML
500 SYRINGE INTRAVENOUS AS NEEDED
Status: DISCONTINUED | OUTPATIENT
Start: 2024-05-13 | End: 2024-05-13 | Stop reason: HOSPADM

## 2024-05-13 RX ORDER — HEPARIN 100 UNIT/ML
500 SYRINGE INTRAVENOUS AS NEEDED
Status: CANCELLED | OUTPATIENT
Start: 2024-05-13

## 2024-05-13 RX ORDER — HEPARIN SODIUM,PORCINE/PF 10 UNIT/ML
50 SYRINGE (ML) INTRAVENOUS AS NEEDED
Status: CANCELLED | OUTPATIENT
Start: 2024-05-13

## 2024-05-13 RX ORDER — PROCHLORPERAZINE MALEATE 10 MG
10 TABLET ORAL EVERY 6 HOURS PRN
Status: DISCONTINUED | OUTPATIENT
Start: 2024-05-13 | End: 2024-05-13 | Stop reason: HOSPADM

## 2024-05-13 RX ORDER — PROCHLORPERAZINE EDISYLATE 5 MG/ML
10 INJECTION INTRAMUSCULAR; INTRAVENOUS EVERY 6 HOURS PRN
Status: DISCONTINUED | OUTPATIENT
Start: 2024-05-13 | End: 2024-05-13 | Stop reason: HOSPADM

## 2024-05-13 RX ORDER — ALBUTEROL SULFATE 0.83 MG/ML
3 SOLUTION RESPIRATORY (INHALATION) AS NEEDED
Status: DISCONTINUED | OUTPATIENT
Start: 2024-05-13 | End: 2024-05-13 | Stop reason: HOSPADM

## 2024-05-13 RX ORDER — PALONOSETRON 0.05 MG/ML
0.25 INJECTION, SOLUTION INTRAVENOUS ONCE
Status: COMPLETED | OUTPATIENT
Start: 2024-05-13 | End: 2024-05-13

## 2024-05-13 RX ORDER — FAMOTIDINE 10 MG/ML
20 INJECTION INTRAVENOUS ONCE AS NEEDED
Status: DISCONTINUED | OUTPATIENT
Start: 2024-05-13 | End: 2024-05-13 | Stop reason: HOSPADM

## 2024-05-13 RX ADMIN — DEXAMETHASONE SODIUM PHOSPHATE 12 MG: 10 INJECTION, SOLUTION INTRAMUSCULAR; INTRAVENOUS at 09:39

## 2024-05-13 RX ADMIN — PALONOSETRON HYDROCHLORIDE 250 MCG: 0.25 INJECTION INTRAVENOUS at 09:37

## 2024-05-13 RX ADMIN — HEPARIN 500 UNITS: 100 SYRINGE at 12:31

## 2024-05-13 RX ADMIN — FAM-TRASTUZUMAB DERUXTECAN-NXKI 270 MG: 100 INJECTION, POWDER, LYOPHILIZED, FOR SOLUTION INTRAVENOUS at 11:06

## 2024-05-13 RX ADMIN — FOSAPREPITANT DIMEGLUMINE 150 MG: 150 INJECTION, POWDER, LYOPHILIZED, FOR SOLUTION INTRAVENOUS at 10:14

## 2024-05-13 ASSESSMENT — PAIN SCALES - GENERAL: PAINLEVEL: 0-NO PAIN

## 2024-05-13 NOTE — SIGNIFICANT EVENT
05/13/24 0912   Prechemo Checklist   Has the patient been in the hospital, ED, or urgent care since last date of service Yes   Chemo/Immuno Consent Completed and Signed Yes   Protocol/Indications Verified Yes   Confirmed to previous date/time of medication Yes   Compared to previous dose Yes   All medications are dated accurately Yes   Pregnancy Test Negative Not applicable   Parameters Met Yes   BSA/Weight-Height Verified Yes   Dose Calculations Verified (current, total, cumulative) Yes

## 2024-05-13 NOTE — PROGRESS NOTES
1240. Infusion completed without incident. Schedule reviewed then Dc'd via independent / ambulatory

## 2024-05-15 ENCOUNTER — ALLIED HEALTH (OUTPATIENT)
Dept: INTEGRATIVE MEDICINE | Facility: CLINIC | Age: 64
End: 2024-05-15

## 2024-05-15 DIAGNOSIS — R11.0 NAUSEA: ICD-10-CM

## 2024-05-15 DIAGNOSIS — R53.83 FATIGUE: Primary | ICD-10-CM

## 2024-05-15 PROCEDURE — 97139 UNLISTED THERAPEUTIC PX: CPT | Performed by: ACUPUNCTURIST

## 2024-05-15 NOTE — PROGRESS NOTES
Acupuncture Visit:     Subjective   Patient ID: Trang Jones is a 63 y.o. female who presents for No chief complaint on file.  Pt reported receiving chemo on Monday.  She shared that she feels well and continuing to take anti-nausea rx to stay on top of symptoms        Session Information  Is this acupuncture treatment being billed to the patient's insurance company: No  Visit Type: Follow-up visit         Review of Systems         Provider reviewed plan for the acupuncture session, precautions and contraindications. Patient/guardian/hospital staff has given consent to treat with full understanding of what to expect during the session. Before acupuncture began, provider explained to the patient to communicate at any time if the procedure was causing discomfort past their tolerance level. Patient agreed to advise acupuncturist. The acupuncturist counseled the patient on the risks of acupuncture treatment including pain, infection, bleeding, and no relief of pain. The patient was positioned comfortably. There was no evidence of infection at the site of needle insertions.    Objective   Physical Exam         Treatment Plan  Treatment Goals: Nausea reduction, Fatigue reduction    Acupuncture Treatment  Patient Position: Seated and reclining  Needle Guage: 40 guage /.16/ Red seirin, 42 guage /.14/ Lime green seirin  Body Points - Left: dlpfcx2, aida 5, lr4  Body Points - Bilateral: st qi x2, k 6, 27, immunex1  Needle Count In: 14  Needle Count Out: 14  Needle Retention Time (min): 30 minutes  Total Face to Face Time (min): 25 minutes              Assessment/Plan

## 2024-05-20 ENCOUNTER — OFFICE VISIT (OUTPATIENT)
Dept: PALLIATIVE MEDICINE | Facility: CLINIC | Age: 64
End: 2024-05-20
Payer: COMMERCIAL

## 2024-05-20 VITALS
WEIGHT: 138.01 LBS | TEMPERATURE: 98.4 F | OXYGEN SATURATION: 95 % | SYSTOLIC BLOOD PRESSURE: 129 MMHG | BODY MASS INDEX: 26.09 KG/M2 | RESPIRATION RATE: 18 BRPM | HEART RATE: 81 BPM | DIASTOLIC BLOOD PRESSURE: 78 MMHG

## 2024-05-20 DIAGNOSIS — Z51.5 PALLIATIVE CARE ENCOUNTER: Primary | ICD-10-CM

## 2024-05-20 DIAGNOSIS — C79.51 CARCINOMA OF RIGHT BREAST METASTATIC TO BONE (MULTI): ICD-10-CM

## 2024-05-20 DIAGNOSIS — M79.2 NEUROPATHIC PAIN: ICD-10-CM

## 2024-05-20 DIAGNOSIS — G89.3 CANCER RELATED PAIN: ICD-10-CM

## 2024-05-20 DIAGNOSIS — C50.911 CARCINOMA OF RIGHT BREAST METASTATIC TO BONE (MULTI): ICD-10-CM

## 2024-05-20 PROCEDURE — 99214 OFFICE O/P EST MOD 30 MIN: CPT | Performed by: NURSE PRACTITIONER

## 2024-05-20 RX ORDER — MORPHINE SULFATE 15 MG/1
15 TABLET, FILM COATED, EXTENDED RELEASE ORAL 3 TIMES DAILY
Qty: 90 TABLET | Refills: 0 | Status: SHIPPED | OUTPATIENT
Start: 2024-05-20 | End: 2024-06-19

## 2024-05-20 ASSESSMENT — PAIN SCALES - GENERAL: PAINLEVEL: 0-NO PAIN

## 2024-05-20 ASSESSMENT — COLUMBIA-SUICIDE SEVERITY RATING SCALE - C-SSRS
1. IN THE PAST MONTH, HAVE YOU WISHED YOU WERE DEAD OR WISHED YOU COULD GO TO SLEEP AND NOT WAKE UP?: NO
2. HAVE YOU ACTUALLY HAD ANY THOUGHTS OF KILLING YOURSELF?: NO
6. HAVE YOU EVER DONE ANYTHING, STARTED TO DO ANYTHING, OR PREPARED TO DO ANYTHING TO END YOUR LIFE?: NO

## 2024-05-20 ASSESSMENT — PATIENT HEALTH QUESTIONNAIRE - PHQ9
SUM OF ALL RESPONSES TO PHQ9 QUESTIONS 1 AND 2: 0
1. LITTLE INTEREST OR PLEASURE IN DOING THINGS: NOT AT ALL
2. FEELING DOWN, DEPRESSED OR HOPELESS: NOT AT ALL

## 2024-05-20 NOTE — PROGRESS NOTES
"  SUPPORTIVE AND PALLIATIVE ONCOLOGY OUTPATIENT FOLLOW-UP      SERVICE DATE: 5/20/2024    Cancer History  Newly diagnosed triple negative breast CA (with bone mets and cord compression)  -s/p L mastectomy in 2010: stage II ER+/HER2- breast CA  -s/p 4 cycles docetaxel/cyclophosphamide chemo  -took tamoxifen x 7 yrs, completed in [id number]  -presented to ED on 3/30/23 with severe back pain  -MRI spine showed cord compression, admitted at that time   -s/p 1 fx RT to spine on 5/4/23  -plan to start treatment with weekly paclitaxel and q21day pembro  -started treatment 5/22/23, changed to abraxane after C1 d/t anaphylactic type reaction  -CT CAP (11/14/23): Overall similar appearance of diffuse osseous metastatic disease. New subcentimeter lesion in the right hepatic lobe. Question  metastatic disease. Otherwise similar appearance of small  hypodensities throughout the liver.  -plan to stop abraxane and change therapy to TDxD, scheduled to start on 1/29/24  -treatment held for XRT to hip/spine 4/1/24    Med Onc: Dr. Tolbert  Integrative Medicine: Dr. Sandoval       Subjective   HISTORY OF PRESENT ILLNESS: Trang Jones is a 62 yo female with PMHx of HLD, scoliosis and newly diagnosed triple negative breast CA. She received XRT to the spine on 5/4 and will begin chemo treatment next week.     She presents to supportive oncology for follow up for pain and symptom management.     Pt presents for follow up alone. States pain has been pretty well controlled, did have some improvement in hip pain following XRT. Continues on MSER 15mg tid, has not required PRN oxycodone in some time. Moving bowels regularly, taking miralax PRN. No N/V, does not increased heart burn during treatment week, takes prilosec PRN. Appetite is stable. Mood is improved. Sleep has been \"hit or miss\" lately, recently went off THC gummies, has trouble falling asleep. Energy levels have improved, getting outside more, which she enjoys.     Pain " Assessment:  Pain Score: 1  Location: hip    Symptom Assessment:  Pain:a little  Headache: none  Dizziness:none  Lack of energy: none  Difficulty sleeping: none  Worrying: none  Anxiety: none  Depression: none  Pain in mouth/swallowing: none  Dry mouth: none  Taste changes: none  Shortness of breath: none  Lack of appetite: very much   Nausea: none  Vomiting: none  Constipation: none  Diarrhea: none  Sore muscles: a little  Numbness or tingling in hands/feet/other: none  Weight loss: none      Information obtained from: interview of patient  ______________________________________________________________________        Objective                PHYSICAL EXAMINATION  Vital Signs:   Vital signs reviewed  Vitals:    05/20/24 1025   BP: 129/78   Pulse: 81   Resp: 18   Temp: 36.9 °C (98.4 °F)   SpO2: 95%       Pain Score:  1    Physical Exam  Vitals reviewed.   Constitutional:       Appearance: Normal appearance.   HENT:      Head: Normocephalic.   Cardiovascular:      Rate and Rhythm: Normal rate.   Pulmonary:      Effort: Pulmonary effort is normal.   Abdominal:      Palpations: Abdomen is soft.   Musculoskeletal:         General: Normal range of motion.   Skin:     General: Skin is warm and dry.   Neurological:      General: No focal deficit present.      Mental Status: She is alert and oriented to person, place, and time.   Psychiatric:         Mood and Affect: Mood normal.         Judgment: Judgment normal.           ASSESSMENT/PLAN    Pain: upper back around shoulder blades, radiates to front of chest; dull ache, shooting with sudden movement    Pain is: cancer related pain and acute on chronic  Type: somatic and neuropathic  Pain control: well-controlled  Home regimen:   -continue Morphine Sulfate Contin 15mg tid. Rx sent for fill on 5/28.   -continue oxycodone 10mg every 4hrs PRN. No Rx needed today, has not been requiring this medication  -continue gabapentin 300mg in the morning and 600mg at night. Refills  available.   -continue ibuprofen PRN   -continue to follow with Dr. Sandoval/ Agustin Dietrich for acupuncture/ reiki   -recently discontinued medical THC PRN upon recommendation from Dr. Sandoval   Intolerances/previously tried:    Opioid Use  Medication Management:   - OARRS report reviewed with no aberrant behavior; consistent with  prescriptions/records and patient history  - MED 45.  Overdose Risk Score 190.   This has been discussed with patient.   - We will continue to closely monitor the patient for signs of prescription misuse including UDS, OARRS review and subjective reports at each visit.  - no concurrent benzodiazepine use   - I am a provider who either is or has consulted and collaborated with a provider certified in Hospice and Palliative Medicine and have conducted a face-face visit and examination for this patient.  - Routine Urine Drug Screen completed 5/9/23 appropriately positive for opioids and negative for illicit substances  - Controlled Substance Agreement completed 5/9/23  - Specifically discussed that controlled substance prescriptions will only be provided by our group as outlined in the completed agreement  - Prescribed naloxone previously prescribed  - Red Flags: none    Bowels: at risk for OIC  -educated pt on importance of maintaining daily BM  -continue daily miralax PRN  -continue senna/colace PRN     N/V/reflux:   -continue zofran 4mg ODT q8hrs PRN   -continue compazine 10mg q6hrs PRN.   -continue olanzapine 5mg at bedtime during treatment week  -continue prilosec 40mg daily during treatment week  -continue migraine medication PRN     Sleep: hx of insomnia prior to cancer dx   -recently discontinued medical THC PRN upon recommendation from Dr. Sandoval   -encouraged pt to maintain regular sleep/wake cycle  -plan for better pain control to aid in improved sleep, see pain section above     Mood: improving    -discussed adding medication to help with mood, pt would like to hold off for  now    Medical Decision Making/ GOC:   -social work referral to assist with LW/ POA paperwork        Next Follow-Up Visit:  Return to clinic in 6 weeks     Signature and billing  Medical complexity was moderate level due to due to complexity of problems, extensive data review, and high risk of management/treatment.  Time was spent on the following: Prep Time, Time Directly with Patient/Family/Caregiver, Documentation Time. Total time spent: 30 mins             Some elements copied from my note on 4/23/24, the elements have been updated and all reflect current decision making from today, 5/20/2024.      Plan of Care discussed with: Patient    SIGNATURE: KELSEA Garg-CNP    Contact information:  [facility] Pall[facility]  Monday-Friday 8 AM-5 PM  Phone:  [phone], press option #5, then option #1.   Or Epic Secure Chat

## 2024-05-28 DIAGNOSIS — C50.911 CARCINOMA OF RIGHT BREAST METASTATIC TO BONE (MULTI): Primary | ICD-10-CM

## 2024-05-28 DIAGNOSIS — C41.9 MALIGNANT NEOPLASM OF BONE WITH METASTASES (MULTI): ICD-10-CM

## 2024-05-28 DIAGNOSIS — C79.51 CARCINOMA OF RIGHT BREAST METASTATIC TO BONE (MULTI): Primary | ICD-10-CM

## 2024-05-28 RX ORDER — FAMOTIDINE 10 MG/ML
20 INJECTION INTRAVENOUS ONCE AS NEEDED
Status: CANCELLED | OUTPATIENT
Start: 2024-06-03

## 2024-05-28 RX ORDER — ALBUTEROL SULFATE 0.83 MG/ML
3 SOLUTION RESPIRATORY (INHALATION) AS NEEDED
Status: CANCELLED | OUTPATIENT
Start: 2024-06-03

## 2024-05-28 RX ORDER — PROCHLORPERAZINE MALEATE 10 MG
10 TABLET ORAL EVERY 6 HOURS PRN
Status: CANCELLED | OUTPATIENT
Start: 2024-06-03

## 2024-05-28 RX ORDER — PROCHLORPERAZINE EDISYLATE 5 MG/ML
10 INJECTION INTRAMUSCULAR; INTRAVENOUS EVERY 6 HOURS PRN
Status: CANCELLED | OUTPATIENT
Start: 2024-06-03

## 2024-05-28 RX ORDER — EPINEPHRINE 0.3 MG/.3ML
0.3 INJECTION SUBCUTANEOUS EVERY 5 MIN PRN
Status: CANCELLED | OUTPATIENT
Start: 2024-06-03

## 2024-05-28 RX ORDER — DIPHENHYDRAMINE HYDROCHLORIDE 50 MG/ML
50 INJECTION INTRAMUSCULAR; INTRAVENOUS AS NEEDED
Status: CANCELLED | OUTPATIENT
Start: 2024-06-03

## 2024-05-28 RX ORDER — PALONOSETRON 0.05 MG/ML
0.25 INJECTION, SOLUTION INTRAVENOUS ONCE
Status: CANCELLED | OUTPATIENT
Start: 2024-06-03

## 2024-05-29 ENCOUNTER — APPOINTMENT (OUTPATIENT)
Dept: INTEGRATIVE MEDICINE | Facility: CLINIC | Age: 64
End: 2024-05-29

## 2024-05-29 ENCOUNTER — OFFICE VISIT (OUTPATIENT)
Dept: HEMATOLOGY/ONCOLOGY | Facility: CLINIC | Age: 64
End: 2024-05-29
Payer: COMMERCIAL

## 2024-05-29 ENCOUNTER — OFFICE VISIT (OUTPATIENT)
Dept: INTEGRATIVE MEDICINE | Facility: CLINIC | Age: 64
End: 2024-05-29

## 2024-05-29 VITALS
WEIGHT: 141.54 LBS | DIASTOLIC BLOOD PRESSURE: 72 MMHG | BODY MASS INDEX: 26.76 KG/M2 | HEART RATE: 56 BPM | SYSTOLIC BLOOD PRESSURE: 122 MMHG

## 2024-05-29 DIAGNOSIS — C41.9 MALIGNANT NEOPLASM OF BONE WITH METASTASES (MULTI): ICD-10-CM

## 2024-05-29 DIAGNOSIS — C50.911 CARCINOMA OF RIGHT BREAST METASTATIC TO BONE (MULTI): ICD-10-CM

## 2024-05-29 DIAGNOSIS — M79.2 NEUROPATHIC PAIN: ICD-10-CM

## 2024-05-29 DIAGNOSIS — M79.605 PAIN OF LEFT LOWER EXTREMITY: ICD-10-CM

## 2024-05-29 DIAGNOSIS — Z51.81 ENCOUNTER FOR MONITORING CARDIOTOXIC DRUG THERAPY: ICD-10-CM

## 2024-05-29 DIAGNOSIS — C79.51 CARCINOMA OF RIGHT BREAST METASTATIC TO BONE (MULTI): ICD-10-CM

## 2024-05-29 DIAGNOSIS — Z79.899 ENCOUNTER FOR MONITORING CARDIOTOXIC DRUG THERAPY: ICD-10-CM

## 2024-05-29 DIAGNOSIS — Z51.11 ENCOUNTER FOR ANTINEOPLASTIC CHEMOTHERAPY: ICD-10-CM

## 2024-05-29 DIAGNOSIS — M25.552 LEFT HIP PAIN: ICD-10-CM

## 2024-05-29 DIAGNOSIS — R11.0 NAUSEA: ICD-10-CM

## 2024-05-29 DIAGNOSIS — K30 UPSET STOMACH: ICD-10-CM

## 2024-05-29 DIAGNOSIS — C79.51 METASTATIC CANCER TO SPINE (MULTI): ICD-10-CM

## 2024-05-29 DIAGNOSIS — R53.0 NEOPLASTIC MALIGNANT RELATED FATIGUE: Primary | ICD-10-CM

## 2024-05-29 DIAGNOSIS — G89.3 NEOPLASM RELATED PAIN: Primary | ICD-10-CM

## 2024-05-29 PROCEDURE — 99215 OFFICE O/P EST HI 40 MIN: CPT | Performed by: NURSE PRACTITIONER

## 2024-05-29 PROCEDURE — MASS(CHAIR) MASSAGE (CHAIR): Performed by: HOSPITALIST

## 2024-05-29 RX ORDER — ALBUTEROL SULFATE 0.83 MG/ML
3 SOLUTION RESPIRATORY (INHALATION) AS NEEDED
OUTPATIENT
Start: 2024-07-15

## 2024-05-29 RX ORDER — ALBUTEROL SULFATE 0.83 MG/ML
3 SOLUTION RESPIRATORY (INHALATION) AS NEEDED
OUTPATIENT
Start: 2024-06-27

## 2024-05-29 RX ORDER — PROCHLORPERAZINE MALEATE 10 MG
10 TABLET ORAL EVERY 6 HOURS PRN
OUTPATIENT
Start: 2024-07-15

## 2024-05-29 RX ORDER — PROCHLORPERAZINE EDISYLATE 5 MG/ML
10 INJECTION INTRAMUSCULAR; INTRAVENOUS EVERY 6 HOURS PRN
OUTPATIENT
Start: 2024-07-15

## 2024-05-29 RX ORDER — EPINEPHRINE 0.3 MG/.3ML
0.3 INJECTION SUBCUTANEOUS EVERY 5 MIN PRN
OUTPATIENT
Start: 2024-07-15

## 2024-05-29 RX ORDER — PALONOSETRON 0.05 MG/ML
0.25 INJECTION, SOLUTION INTRAVENOUS ONCE
OUTPATIENT
Start: 2024-07-15

## 2024-05-29 RX ORDER — PROCHLORPERAZINE MALEATE 10 MG
10 TABLET ORAL EVERY 6 HOURS PRN
OUTPATIENT
Start: 2024-06-27

## 2024-05-29 RX ORDER — FAMOTIDINE 10 MG/ML
20 INJECTION INTRAVENOUS ONCE AS NEEDED
OUTPATIENT
Start: 2024-07-15

## 2024-05-29 RX ORDER — DIPHENHYDRAMINE HYDROCHLORIDE 50 MG/ML
50 INJECTION INTRAMUSCULAR; INTRAVENOUS AS NEEDED
OUTPATIENT
Start: 2024-06-27

## 2024-05-29 RX ORDER — PALONOSETRON 0.05 MG/ML
0.25 INJECTION, SOLUTION INTRAVENOUS ONCE
OUTPATIENT
Start: 2024-06-27

## 2024-05-29 RX ORDER — DIPHENHYDRAMINE HYDROCHLORIDE 50 MG/ML
50 INJECTION INTRAMUSCULAR; INTRAVENOUS AS NEEDED
OUTPATIENT
Start: 2024-07-15

## 2024-05-29 RX ORDER — EPINEPHRINE 0.3 MG/.3ML
0.3 INJECTION SUBCUTANEOUS EVERY 5 MIN PRN
OUTPATIENT
Start: 2024-06-27

## 2024-05-29 RX ORDER — FAMOTIDINE 10 MG/ML
20 INJECTION INTRAVENOUS ONCE AS NEEDED
OUTPATIENT
Start: 2024-06-27

## 2024-05-29 RX ORDER — PROCHLORPERAZINE EDISYLATE 5 MG/ML
10 INJECTION INTRAMUSCULAR; INTRAVENOUS EVERY 6 HOURS PRN
OUTPATIENT
Start: 2024-06-27

## 2024-05-29 ASSESSMENT — PAIN SCALES - GENERAL: PAINLEVEL: 0-NO PAIN

## 2024-05-29 NOTE — PROGRESS NOTES
[facility]ative [facility]cology Symptom Management [facility]: Reiki: Visit 1:    Session Start   Date: 05.29.2024  Time: 1145    Session End   Date: 05.29.2024   Time: 1230    Assessment of patient:  Arrived ambulatory, steady gait, no assistive device   Identified patient via full name and date of birth   Has received Acupuncture  Swati Darby PSR advised her of the Reiki Clinic   The cancer diagnosis has been tough and she re-evaluated her life  She was very physically active, but has slowed down  She loves to be in nature, hiking, she has a private /therapist  She has support from family, friends, Spiritism: spiritual journey  She has not connected with [facility], but would like to engage in the [employer] they offer  WellbeingPre 7  CopingPre 8  PainPre 3  TirednessPre 2  AnxietyPre 3  DepressionPre 1  StressPre 2  NauseaPre 0     Session goals:   Promote wellbeing, relief of pain, tiredness, anxiety, stress    Patient Intention:   Calm and stillness    Provider Intention:  Patient's highest and greatest healing and wellbeing     Reiki procedure    Hand positions:   Supine Only:   Hands On: head: frontal, temporal, occiput, posterior neck, anterior and posterior shoulders, elbows, hands, mid-back, lower back, lateral hips, knees, ankles, dorsal and planter surfaces of feet  Hands Off/Hovering: anterior chest abdomen, pelvic region     Duration of session (minutes): 40    Hands on or hands off  [] hands on only  [] hands off only  [x] both     Were you interrupted during the session? No    Was music used during the session? Yes    What music recording was used?   [employer]    Did the patient talk with you during the Reiki session? No    Did the patient fall asleep during the Reiki session? No    Do you have any comments about the session, such as the patient's reaction, or your experience during the Reiki?   Patient rested with eyes closed, relaxed facial and body posture, and deep, even breathing      Do you feel that the patient benefited from the treatment today? Yes  Relaxed  Wellbeing, pain, stress, anxiety improved  WellbeingPost 8  CopingPost 8  PainPost 2  TirednessPost 2  AnxietyPost 1  DepressionPost 1   StressPost 1   NauseaPost 0    Note signed by Janice Wilkerson RN, LMT on 05/29/24

## 2024-05-29 NOTE — PROGRESS NOTES
"  .  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 she received prior Taxotere and cyclophosphamide , completed 6/2010. S/p adjuvant endocrine therapy.    Diagnosis of metastatic TNBC 3/2023, presenting with back pain and cord compression      weekly paclitaxel from 5/22/23, changed to abraxane after 1st cycle due to anaphylactic type reaction. Discontinued 12/12/23 due to progressive disease.    CURRENT THERAPY  Q3 week TDxD since 1/29/24, held for XRT to spine  s/p XRT to spine on 4/1/24    HISTORY OF PRESENT ILLNESS    Trang Jones is a 64 y.o. woman here for IV breast cancer follow-up. Continues on Enhertu. She is doing ok today- continues to walk, hike and bike as much as her body will allow her. Reports some limitations due to pain in the left Femur and right shoulder. She continues to work with supportive oncology and is on pain meds 3 times per day. Does not currently have concerns for needing any palliative radiation.      She denies any chest pain or breathing issues no sob or cough and denies any exertional shortness of breath with all of the various ways she exercises.      She denies any vision changes or headache issues, dizziness or loss of balance, no falls.    She reports nausea is stable- did not tolerate the olanzapine and will not take this anymore. She reports it made her groggy and felt generally \"off.\" Is now using compazine with in the first 3 days of therapy. She feels her appetite is ok- has gained some weight. She reports bowels are stable.     She denies any issues with sleep, fatigue is present " during the week of treatment.       Review of Systems   ROS 14 points performed, See HPI for exceptions            Past Medical History:  has a past medical history of Breast cancer (Multi), Hypercholesteremia, Metastatic cancer (Multi), and Personal history of irradiation.  Surgical History:   has a past surgical history that includes CT guided percutaneous biopsy bone deep (2023) and Mastectomy (Left, ).  Social History:   reports that she quit smoking about 21 years ago. Her smoking use included cigarettes. She has been exposed to tobacco smoke. She has never used smokeless tobacco. She reports that she does not currently use alcohol. She reports current drug use. Drug: Marijuana.  Family History:    Family History   Problem Relation Name Age of Onset    Leukemia Father       Family Oncology History:  Cancer-related family history is not on file.      OBJECTIVE    VS / Pain:  /72 (BP Location: Right arm, Patient Position: Sitting, BP Cuff Size: Adult)   Pulse 56   Wt 64.2 kg (141 lb 8.6 oz)   LMP  (LMP Unknown)   BMI 26.76 kg/m²   BSA: 1.66 meters squared   Pain Scale: 3    Performance Status:  The ECOG performance scale today is ECO- Restricted in physically strenuous activity.  Carries out light duty.      Physical Exam   Constitutional: Well developed, awake/alert/oriented x4, no distress, alert and cooperative  EYES: Sclera clear  ENMT: mucous membranes moist, no apparent injury, no lesions seen  Head/Neck: Neck supple, no apparent injury, thyroid without mass or tenderness, No JVD, trachea midline, no bruits  Respiratory / Thoracic: Patent airways, clear to all lobes, normal breath sounds with good chest expansion, thorax symmetric.  Cardiovascular: Regular, rate and rhythm, no murmurs, 2+ equal pulses of the extremities, normal auscultated S 1and S 2  GI: Nondistended, soft, non-tender, no rebound tenderness or guarding, no masses palpable, no organomegaly, +BS, no  bruits  Musculoskeletal: ROM intact, no joint swelling, normal strength, no spinal tenderness  Extremities: normal extremities, no cyanosis edema, contusions or wounds, no clubbing  Neurological: alert and oriented x4, intact senses, motor, response and reflexes, normal strength  Breast: No palpable masses or lesions in right breast or left chest wall   Lymphatic: No cervical, supraclavicular, infraclavicular or axillary lymphadenopathy  Psychological: Appropriate and talkative mood and behavior  Skin: Warm and dry, no lesions, no rashes, no jaundice      Diagnostic Results     STUDY:   MR THORACIC SPINE W AND WO IV CONTRAST       INDICATION:   Signs/Symptoms:Follow-up of treated thoracic spine metastatic disease       COMPARISON:   Thoracic spine MRI 11/01/2020.       ACCESSION NUMBER(S):   [id number]      ORDERING CLINICIAN:   OLMAN MANN       TECHNIQUE:   Multi-planar multi-sequential MR imaging of the thoracic spine was   performed before and after the intravenous administration of   contrast, according to standard protocol. 12 ml of Dotarem was   administered (the balance of single use vial(s) has/have been   discarded).      FINDINGS:   ALIGNMENT: Levoconvex curvature of the thoracic spine centered at   T9-10. Focal kyphotic deformity centered at T5.       VERTEBRAE: Progression of metastatic lesions compared to previous MRI   11/01/2023 best compared on the precontrast T1 and STIR sagittal   images. Unable to directly compare postcontrast images as fat   saturation was performed of the current exam and not performed in the   previous. Lesions are as follows:       New multifocal lesions within the T1 vertebral body and extending   into the right-greater-than-left pedicles of T1. Increased STIR   signal throughout the entirety of the T2 vertebral body. Lesion   within the posterior aspect of the T3 vertebral body slightly   progressed from prior exam; extension into the left pedicle and    transverse process is similar to previous exam. STIR hyperintense   expansile lesion within the left transverse process of T4, not   present on previous exam. Marrow replacement throughout the T4   vertebral body is progressed. Redemonstration of anterior compression   fracture of the T5 vertebral body with approximately 6 mm osseous   retropulsion, similar to previous exam. Diffuse replacement of   vertebral marrow, progressed compared to previous exam. Stir   hyperintensity in the posterior elements is also slightly increased   from previous exam. Extramedullary tumor results in moderate canal   stenosis with flattening of the anterior and left lateral aspect of   the cord, progressed from previous MRI. Lesion within the T7   vertebral body is increased in size. Stir hyperintense lesions within   the left transverse process is new from previous exam; right   transverse process lesion is unchanged. Near-complete replacement of   normal marrow within the T10 vertebral body with associated   compression deformity is unchanged. Increased size of marrow   replacing STIR hyperintense lesion within the T11 vertebral body.   Lesions within bilateral transverse processes were possibly present   on previous MRI. Increased marrow replacement throughout the T12   vertebral body with extension into the right pedicle, progressed from   previous MRI.       DISCS: Multilevel disc desiccation disc height loss.       CORD: The conus medullaris terminates at L1. Increased mass-effect on   the cord at the T5 level secondary to osseous retropulsion and   extraosseous tumor without definite associated edema or myelomalacia.   Remainder of the visualized cord is unremarkable.       ENHANCEMENT: Extensive enhancing metastatic lesions throughout the   thoracic spine as detailed above.       EVALUATION OF INDIVIDUAL LEVELS:       T5: Moderate canal stenosis secondary to osseous retropulsion and   extraosseous tumor. There is also results  in moderate   left-greater-than-right foraminal stenosis at T4-5 and T5-6.       T10-11: Disc osteophyte complex and facet hypertrophy results in   mild-to-moderate bilateral foraminal stenosis,   right-greater-than-left. Spinal canal remains patent.       T11-12: Moderate to severe right foraminal stenosis secondary to disc   bulge and facet hypertrophy. Spinal canal and left neural foramina   are patent.       PARAVERTEBRAL SOFT TISSUES: The visualized paravertebral soft tissues   appear within normal limits.       IMPRESSION:   Overall progression of metastatic disease throughout the thoracic   spine as detailed most notable at T5 where there is increased   extraosseous tumor within the posterior elements superimposed on   stable retropulsion of the posterior endplate resulting in increased   canal stenosis and mass-effect on the ventral and left lateral aspect   of the cord. No associated cord edema.        MRI pelvis  COMPARISON:   Radiographs 03/26/2024, CT chest, abdomen and pelvis 12/22/2023, bone   scan 09/26/2023.       ACCESSION NUMBER(S):   [id number]      ORDERING CLINICIAN:   CARMEN PONO       TECHNIQUE:   MR imaging of the left hip was obtained  without IV contrast.       FINDINGS:   TENDONS:   Severe left gluteus minimus insertional tendinosis with at least   partial tearing. The left gluteus medius, right gluteus minimus and   right gluteus medius tendons are intact. The insertion of the   iliopsoas tendon is normal. Moderate left hamstring origin   tendinosis. The right hamstring tendon origin is normal.       JOINTS:   The hip joint articular cartilage is normal without focal defect. No   evidence of significant arthrosis. No significant hip joint effusions   are identified. The acetabular labrum is normal.   No significant degenerative changes in the pubic symphysis or   bilateral sacroiliac joints. No evidence of avascular necrosis.       OSSEOUS STRUCTURES:   Innumerable  enhancing marrow replacing lesions throughout the bony   pelvis and bilateral proximal femora, many of which are new and/or   not appreciable on the prior CT. Index lesions as follows: *   Confluent left hemisacrum lesion anterior to left S1 neural foramen,   4.9 cm (series 15, image 13) * Left posterior acetabular lesion, 1.8   cm (series 15, image 31) * Left intertrochanteric lesion, 2.4 cm   (series 15, image 40) * Left ischial lesion, 7.1 cm (series 15, image   43)      SOFT TISSUES:   No muscle atrophy or tear.       INTERNAL ORGANS:   Evaluation of the internal organs of the pelvis is limited on this   small field of view study tailored for evaluation of the   musculoskeletal system. No acute intrapelvic process. Right ovary   versus intramural fibroid, 2.3 cm (series 15, image 28).       IMPRESSION:   Worsening osseous metastases with innumerable new/enlarging marrow   replacing lesions throughout the imaged pelvis and bilateral femora.   No pathologic fracture.     === 05/01/24 ===    CT CHEST ABDOMEN PELVIS W IV CONTRAST    - Impression -  CHEST:  1.  New 5 mm ground-glass opacity right upper lobe and mild right  perihilar ground-glass opacities, also new since prior which are  likely infectious/inflammatory in etiology.  2. Interval progression extensive osseous metastasis.    ABDOMEN-PELVIS:  1.  Multiple hepatic lesions including a few low-attenuation targeted  lesions 1 of which has increased in size in the right inferior lobe,  since 02/28/2024 are concerning for metastasis.  2. Interval worsening osseous metastasis.  3. No abdominopelvic adenopathy.  4. Other stable findings as described above.      Signed by: Beny Church 5/2/2024 7:55 PM  Dictation workstation:   [id number]      LABORATORY/PATHOLOGY DATA    Infusion on 05/13/2024   Component Date Value Ref Range Status    Glucose 05/13/2024 98  74 - 99 mg/dL Final    Sodium 05/13/2024 139  136 - 145 mmol/L Final    Potassium 05/13/2024 3.8   3.5 - 5.3 mmol/L Final    Chloride 05/13/2024 105  98 - 107 mmol/L Final    Bicarbonate 05/13/2024 27  21 - 32 mmol/L Final    Anion Gap 05/13/2024 11  10 - 20 mmol/L Final    Urea Nitrogen 05/13/2024 20  6 - 23 mg/dL Final    Creatinine 05/13/2024 0.61  0.50 - 1.05 mg/dL Final    eGFR 05/13/2024 >90  >60 mL/min/1.73m*2 Final    Calcium 05/13/2024 8.5 (L)  8.6 - 10.3 mg/dL Final    Albumin 05/13/2024 3.7  3.4 - 5.0 g/dL Final    Alkaline Phosphatase 05/13/2024 63  33 - 136 U/L Final    Total Protein 05/13/2024 5.6 (L)  6.4 - 8.2 g/dL Final    AST 05/13/2024 27  9 - 39 U/L Final    Bilirubin, Total 05/13/2024 0.4  0.0 - 1.2 mg/dL Final    ALT 05/13/2024 32  7 - 45 U/L Final    WBC 05/13/2024 3.1 (L)  4.4 - 11.3 x10*3/uL Final    RBC 05/13/2024 3.95 (L)  4.00 - 5.20 x10*6/uL Final    Hemoglobin 05/13/2024 11.8 (L)  12.0 - 16.0 g/dL Final    Hematocrit 05/13/2024 35.5 (L)  36.0 - 46.0 % Final    MCV 05/13/2024 90  80 - 100 fL Final    MCH 05/13/2024 29.9  26.0 - 34.0 pg Final    MCHC 05/13/2024 33.2  32.0 - 36.0 g/dL Final    RDW 05/13/2024 14.5  11.5 - 14.5 % Final    Platelets 05/13/2024 176  150 - 450 x10*3/uL Final    Neutrophils % 05/13/2024 52.6  40.0 - 80.0 % Final    Immature Granulocytes %, Automated 05/13/2024 0.0  0.0 - 0.9 % Final    Lymphocytes % 05/13/2024 26.3  13.0 - 44.0 % Final    Monocytes % 05/13/2024 12.8  2.0 - 10.0 % Final    Eosinophils % 05/13/2024 8.0  0.0 - 6.0 % Final    Basophils % 05/13/2024 0.3  0.0 - 2.0 % Final    Neutrophils Absolute 05/13/2024 1.64  1.20 - 7.70 x10*3/uL Final    Immature Granulocytes Absolute, Au* 05/13/2024 0.00  0.00 - 0.70 x10*3/uL Final    Lymphocytes Absolute 05/13/2024 0.82 (L)  1.20 - 4.80 x10*3/uL Final    Monocytes Absolute 05/13/2024 0.40  0.10 - 1.00 x10*3/uL Final    Eosinophils Absolute 05/13/2024 0.25  0.00 - 0.70 x10*3/uL Final    Basophils Absolute 05/13/2024 0.01  0.00 - 0.10 x10*3/uL Final    CA 27.29 05/13/2024 59.6 (H)  0.0 - 38.6 U/mL Final    Infusion on 04/22/2024   Component Date Value Ref Range Status    Glucose 04/22/2024 101 (H)  74 - 99 mg/dL Final    Sodium 04/22/2024 140  136 - 145 mmol/L Final    Potassium 04/22/2024 4.0  3.5 - 5.3 mmol/L Final    Chloride 04/22/2024 107  98 - 107 mmol/L Final    Bicarbonate 04/22/2024 27  21 - 32 mmol/L Final    Anion Gap 04/22/2024 10  10 - 20 mmol/L Final    Urea Nitrogen 04/22/2024 14  6 - 23 mg/dL Final    Creatinine 04/22/2024 0.71  0.50 - 1.05 mg/dL Final    eGFR 04/22/2024 >90  >60 mL/min/1.73m*2 Final    Calcium 04/22/2024 8.7  8.6 - 10.3 mg/dL Final    Albumin 04/22/2024 3.7  3.4 - 5.0 g/dL Final    Alkaline Phosphatase 04/22/2024 67  33 - 136 U/L Final    Total Protein 04/22/2024 5.4 (L)  6.4 - 8.2 g/dL Final    AST 04/22/2024 15  9 - 39 U/L Final    Bilirubin, Total 04/22/2024 0.4  0.0 - 1.2 mg/dL Final    ALT 04/22/2024 12  7 - 45 U/L Final    WBC 04/22/2024 5.2  4.4 - 11.3 x10*3/uL Final    RBC 04/22/2024 4.14  4.00 - 5.20 x10*6/uL Final    Hemoglobin 04/22/2024 12.1  12.0 - 16.0 g/dL Final    Hematocrit 04/22/2024 36.0  36.0 - 46.0 % Final    MCV 04/22/2024 87  80 - 100 fL Final    MCH 04/22/2024 29.2  26.0 - 34.0 pg Final    MCHC 04/22/2024 33.6  32.0 - 36.0 g/dL Final    RDW 04/22/2024 14.0  11.5 - 14.5 % Final    Platelets 04/22/2024 147 (L)  150 - 450 x10*3/uL Final    Neutrophils % 04/22/2024 67.5  40.0 - 80.0 % Final    Immature Granulocytes %, Automated 04/22/2024 0.2  0.0 - 0.9 % Final    Lymphocytes % 04/22/2024 17.4  13.0 - 44.0 % Final    Monocytes % 04/22/2024 8.6  2.0 - 10.0 % Final    Eosinophils % 04/22/2024 6.1  0.0 - 6.0 % Final    Basophils % 04/22/2024 0.2  0.0 - 2.0 % Final    Neutrophils Absolute 04/22/2024 3.52  1.20 - 7.70 x10*3/uL Final    Immature Granulocytes Absolute, Au* 04/22/2024 0.01  0.00 - 0.70 x10*3/uL Final    Lymphocytes Absolute 04/22/2024 0.91 (L)  1.20 - 4.80 x10*3/uL Final    Monocytes Absolute 04/22/2024 0.45  0.10 - 1.00 x10*3/uL Final    Eosinophils  Absolute 04/22/2024 0.32  0.00 - 0.70 x10*3/uL Final    Basophils Absolute 04/22/2024 0.01  0.00 - 0.10 x10*3/uL Final    CA 27.29 04/22/2024 70.3 (H)  0.0 - 38.6 U/mL Final      Lab Results   Component Value Date    LABCA2 59.6 (H) 05/13/2024    LABCA2 70.3 (H) 04/22/2024    LABCA2 107.5 (H) 03/12/2024    LABCA2 267.6 (H) 02/19/2024    LABCA2 350.0 (H) 01/29/2024             IMPRESSION/PLAN      1. 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   Recent 5/2/24 CT as reviewed with Dr Bebeto Tolbert - feels given his measurements is stable at this time however continue to consider options for pt with a clinical trial.  Will resume TDxD. Prior repeat blood tempus showed again Her2 exon 19 mutation.   Plan to complete 2 more cycles and then Restage prior to a 3rd cycle as reviewed with Dr Bebeto Tolbert. She understands we would make a decision regarding treatment scheduled for 7/15/24 based off of scan performed the prior week and will schedule follow up for 7/17/24. Pt and  are agreeable to this plan    2. Pain neoplasm related. Following with supportive onc.  Much better now. S/p xrt        At least 30 minutes of direct consultation was spent with the patient today reviewing her cancer care plan, educating and answering questions regarding ongoing follow up, greater than 50% in counseling and coordination of care        -------------------------------------------------------------------------------------------------------------------------------  Julee Spears MSN, APRN, FNP-C  [facility]  Division of Medical Oncology- Breast   Collaborating Physician Dr. Bebeto Tolbert   Team Nurse Partners [address] [place] [address]  Phone: [phone]  Fax: [phone]  Available via Secure Chat    Confidential Peer Review Document-  Privilege  Privileged Pursuant to [place]  Revised Code Section 2305.24, .25, .251 & .252

## 2024-05-29 NOTE — PATIENT INSTRUCTIONS
Plan to complete 2 more cycles- 6/3 and 6/27    Will rescan between 7/9-7/11 to decide if we treat on 7/15/24    Follow up 7/17/24 Dr Bebeto Tolbert    Please call [phone] with any questions or concerns prior to your next office visit.

## 2024-05-30 PROBLEM — R53.0 NEOPLASTIC MALIGNANT RELATED FATIGUE: Status: ACTIVE | Noted: 2024-05-30

## 2024-06-03 ENCOUNTER — INFUSION (OUTPATIENT)
Dept: HEMATOLOGY/ONCOLOGY | Facility: CLINIC | Age: 64
End: 2024-06-03
Payer: COMMERCIAL

## 2024-06-03 VITALS
OXYGEN SATURATION: 96 % | SYSTOLIC BLOOD PRESSURE: 121 MMHG | HEART RATE: 55 BPM | TEMPERATURE: 97.2 F | RESPIRATION RATE: 18 BRPM | DIASTOLIC BLOOD PRESSURE: 77 MMHG | BODY MASS INDEX: 26.51 KG/M2 | WEIGHT: 140.21 LBS

## 2024-06-03 DIAGNOSIS — C79.51 CARCINOMA OF RIGHT BREAST METASTATIC TO BONE (MULTI): ICD-10-CM

## 2024-06-03 DIAGNOSIS — C50.911 CARCINOMA OF RIGHT BREAST METASTATIC TO BONE (MULTI): ICD-10-CM

## 2024-06-03 DIAGNOSIS — C41.9 MALIGNANT NEOPLASM OF BONE WITH METASTASES (MULTI): ICD-10-CM

## 2024-06-03 LAB
ALBUMIN SERPL BCP-MCNC: 3.8 G/DL (ref 3.4–5)
ALP SERPL-CCNC: 63 U/L (ref 33–136)
ALT SERPL W P-5'-P-CCNC: 13 U/L (ref 7–45)
ANION GAP SERPL CALC-SCNC: 9 MMOL/L (ref 10–20)
AST SERPL W P-5'-P-CCNC: 15 U/L (ref 9–39)
BASOPHILS # BLD AUTO: 0.02 X10*3/UL (ref 0–0.1)
BASOPHILS NFR BLD AUTO: 0.5 %
BILIRUB SERPL-MCNC: 0.5 MG/DL (ref 0–1.2)
BUN SERPL-MCNC: 16 MG/DL (ref 6–23)
CALCIUM SERPL-MCNC: 8.6 MG/DL (ref 8.6–10.3)
CANCER AG27-29 SERPL-ACNC: 55.6 U/ML (ref 0–38.6)
CHLORIDE SERPL-SCNC: 105 MMOL/L (ref 98–107)
CO2 SERPL-SCNC: 28 MMOL/L (ref 21–32)
CREAT SERPL-MCNC: 0.61 MG/DL (ref 0.5–1.05)
EGFRCR SERPLBLD CKD-EPI 2021: >90 ML/MIN/1.73M*2
EOSINOPHIL # BLD AUTO: 0.27 X10*3/UL (ref 0–0.7)
EOSINOPHIL NFR BLD AUTO: 6.1 %
ERYTHROCYTE [DISTWIDTH] IN BLOOD BY AUTOMATED COUNT: 14.4 % (ref 11.5–14.5)
GLUCOSE SERPL-MCNC: 104 MG/DL (ref 74–99)
HCT VFR BLD AUTO: 35.7 % (ref 36–46)
HGB BLD-MCNC: 12 G/DL (ref 12–16)
IMM GRANULOCYTES # BLD AUTO: 0.01 X10*3/UL (ref 0–0.7)
IMM GRANULOCYTES NFR BLD AUTO: 0.2 % (ref 0–0.9)
LYMPHOCYTES # BLD AUTO: 1.07 X10*3/UL (ref 1.2–4.8)
LYMPHOCYTES NFR BLD AUTO: 24.2 %
MCH RBC QN AUTO: 30.3 PG (ref 26–34)
MCHC RBC AUTO-ENTMCNC: 33.6 G/DL (ref 32–36)
MCV RBC AUTO: 90 FL (ref 80–100)
MONOCYTES # BLD AUTO: 0.46 X10*3/UL (ref 0.1–1)
MONOCYTES NFR BLD AUTO: 10.4 %
NEUTROPHILS # BLD AUTO: 2.6 X10*3/UL (ref 1.2–7.7)
NEUTROPHILS NFR BLD AUTO: 58.6 %
PLATELET # BLD AUTO: 197 X10*3/UL (ref 150–450)
POTASSIUM SERPL-SCNC: 4 MMOL/L (ref 3.5–5.3)
PROT SERPL-MCNC: 5.3 G/DL (ref 6.4–8.2)
RBC # BLD AUTO: 3.96 X10*6/UL (ref 4–5.2)
SODIUM SERPL-SCNC: 138 MMOL/L (ref 136–145)
WBC # BLD AUTO: 4.4 X10*3/UL (ref 4.4–11.3)

## 2024-06-03 PROCEDURE — 2500000004 HC RX 250 GENERAL PHARMACY W/ HCPCS (ALT 636 FOR OP/ED): Performed by: INTERNAL MEDICINE

## 2024-06-03 PROCEDURE — 96375 TX/PRO/DX INJ NEW DRUG ADDON: CPT | Mod: INF

## 2024-06-03 PROCEDURE — 96367 TX/PROPH/DG ADDL SEQ IV INF: CPT

## 2024-06-03 PROCEDURE — 96413 CHEMO IV INFUSION 1 HR: CPT

## 2024-06-03 PROCEDURE — 2500000004 HC RX 250 GENERAL PHARMACY W/ HCPCS (ALT 636 FOR OP/ED): Performed by: NURSE PRACTITIONER

## 2024-06-03 PROCEDURE — 86300 IMMUNOASSAY TUMOR CA 15-3: CPT | Mod: GEALAB

## 2024-06-03 PROCEDURE — 80053 COMPREHEN METABOLIC PANEL: CPT

## 2024-06-03 PROCEDURE — 85025 COMPLETE CBC W/AUTO DIFF WBC: CPT

## 2024-06-03 RX ORDER — FAMOTIDINE 10 MG/ML
20 INJECTION INTRAVENOUS ONCE AS NEEDED
Status: DISCONTINUED | OUTPATIENT
Start: 2024-06-03 | End: 2024-06-03 | Stop reason: HOSPADM

## 2024-06-03 RX ORDER — PROCHLORPERAZINE EDISYLATE 5 MG/ML
10 INJECTION INTRAMUSCULAR; INTRAVENOUS EVERY 6 HOURS PRN
Status: DISCONTINUED | OUTPATIENT
Start: 2024-06-03 | End: 2024-06-03 | Stop reason: HOSPADM

## 2024-06-03 RX ORDER — ALBUTEROL SULFATE 0.83 MG/ML
3 SOLUTION RESPIRATORY (INHALATION) AS NEEDED
Status: DISCONTINUED | OUTPATIENT
Start: 2024-06-03 | End: 2024-06-03 | Stop reason: HOSPADM

## 2024-06-03 RX ORDER — FAMOTIDINE 10 MG/ML
20 INJECTION INTRAVENOUS ONCE AS NEEDED
OUTPATIENT
Start: 2024-07-15

## 2024-06-03 RX ORDER — EPINEPHRINE 0.3 MG/.3ML
0.3 INJECTION SUBCUTANEOUS EVERY 5 MIN PRN
Status: DISCONTINUED | OUTPATIENT
Start: 2024-06-03 | End: 2024-06-03 | Stop reason: HOSPADM

## 2024-06-03 RX ORDER — ALBUTEROL SULFATE 0.83 MG/ML
3 SOLUTION RESPIRATORY (INHALATION) AS NEEDED
OUTPATIENT
Start: 2024-07-15

## 2024-06-03 RX ORDER — PROCHLORPERAZINE MALEATE 10 MG
10 TABLET ORAL EVERY 6 HOURS PRN
Status: DISCONTINUED | OUTPATIENT
Start: 2024-06-03 | End: 2024-06-03 | Stop reason: HOSPADM

## 2024-06-03 RX ORDER — HEPARIN 100 UNIT/ML
500 SYRINGE INTRAVENOUS AS NEEDED
Status: DISCONTINUED | OUTPATIENT
Start: 2024-06-03 | End: 2024-06-03 | Stop reason: HOSPADM

## 2024-06-03 RX ORDER — ZOLEDRONIC ACID 0.04 MG/ML
4 INJECTION, SOLUTION INTRAVENOUS ONCE
OUTPATIENT
Start: 2024-07-15

## 2024-06-03 RX ORDER — EPINEPHRINE 0.3 MG/.3ML
0.3 INJECTION SUBCUTANEOUS EVERY 5 MIN PRN
OUTPATIENT
Start: 2024-07-15

## 2024-06-03 RX ORDER — ZOLEDRONIC ACID 0.04 MG/ML
4 INJECTION, SOLUTION INTRAVENOUS ONCE
Status: COMPLETED | OUTPATIENT
Start: 2024-06-03 | End: 2024-06-03

## 2024-06-03 RX ORDER — HEPARIN 100 UNIT/ML
500 SYRINGE INTRAVENOUS AS NEEDED
OUTPATIENT
Start: 2024-06-03

## 2024-06-03 RX ORDER — DIPHENHYDRAMINE HYDROCHLORIDE 50 MG/ML
50 INJECTION INTRAMUSCULAR; INTRAVENOUS AS NEEDED
Status: DISCONTINUED | OUTPATIENT
Start: 2024-06-03 | End: 2024-06-03 | Stop reason: HOSPADM

## 2024-06-03 RX ORDER — PALONOSETRON 0.05 MG/ML
0.25 INJECTION, SOLUTION INTRAVENOUS ONCE
Status: COMPLETED | OUTPATIENT
Start: 2024-06-03 | End: 2024-06-03

## 2024-06-03 RX ORDER — HEPARIN SODIUM,PORCINE/PF 10 UNIT/ML
50 SYRINGE (ML) INTRAVENOUS AS NEEDED
OUTPATIENT
Start: 2024-06-03

## 2024-06-03 RX ORDER — DIPHENHYDRAMINE HYDROCHLORIDE 50 MG/ML
50 INJECTION INTRAMUSCULAR; INTRAVENOUS AS NEEDED
OUTPATIENT
Start: 2024-07-15

## 2024-06-03 RX ADMIN — HEPARIN 500 UNITS: 100 SYRINGE at 12:25

## 2024-06-03 RX ADMIN — SODIUM CHLORIDE 500 ML: 9 INJECTION, SOLUTION INTRAVENOUS at 11:20

## 2024-06-03 RX ADMIN — DEXAMETHASONE SODIUM PHOSPHATE 12 MG: 10 INJECTION, SOLUTION INTRAMUSCULAR; INTRAVENOUS at 09:23

## 2024-06-03 RX ADMIN — PALONOSETRON 250 MCG: 0.05 INJECTION, SOLUTION INTRAVENOUS at 09:29

## 2024-06-03 RX ADMIN — FOSAPREPITANT 150 MG: 150 INJECTION, POWDER, LYOPHILIZED, FOR SOLUTION INTRAVENOUS at 09:40

## 2024-06-03 RX ADMIN — FAM-TRASTUZUMAB DERUXTECAN-NXKI 270 MG: 100 INJECTION, POWDER, LYOPHILIZED, FOR SOLUTION INTRAVENOUS at 10:39

## 2024-06-03 RX ADMIN — ZOLEDRONIC ACID 4 MG: 0.04 INJECTION, SOLUTION INTRAVENOUS at 11:50

## 2024-06-03 ASSESSMENT — PAIN SCALES - GENERAL: PAINLEVEL: 0-NO PAIN

## 2024-06-05 ENCOUNTER — ALLIED HEALTH (OUTPATIENT)
Dept: INTEGRATIVE MEDICINE | Facility: CLINIC | Age: 64
End: 2024-06-05

## 2024-06-05 DIAGNOSIS — R53.0 NEOPLASTIC MALIGNANT RELATED FATIGUE: Primary | ICD-10-CM

## 2024-06-05 PROCEDURE — 97139 UNLISTED THERAPEUTIC PX: CPT | Performed by: ACUPUNCTURIST

## 2024-06-05 NOTE — PROGRESS NOTES
Acupuncture Visit:     Subjective   Patient ID: Trang Jones is a 64 y.o. female who presents for No chief complaint on file.  Pt on treatment this week and reporting feeling blah, fatigued with lower abdominal discomfort abnd bloating.        Session Information  Is this acupuncture treatment being billed to the patient's insurance company: No  Visit Type: Follow-up visit    Pre-treatment Assessment  Pain Score: 2  Anxiety Level (0-10): 1  Stress Level (0-10): 2  Coping Level (0-10): 8  Depression Level (0-10): 0  Fatigue Level (0-10): 3  Nausea Level (0-10): 0  Wellbeing Level (0-10): 8    Review of Systems         Provider reviewed plan for the acupuncture session, precautions and contraindications. Patient/guardian/hospital staff has given consent to treat with full understanding of what to expect during the session. Before acupuncture began, provider explained to the patient to communicate at any time if the procedure was causing discomfort past their tolerance level. Patient agreed to advise acupuncturist. The acupuncturist counseled the patient on the risks of acupuncture treatment including pain, infection, bleeding, and no relief of pain. The patient was positioned comfortably. There was no evidence of infection at the site of needle insertions.    Objective   Physical Exam         Treatment Plan  Treatment Goals: Fatigue reduction    Acupuncture Treatment  Needle Guage: 40 guage /.16/ Red seirin, 42 guage /.14/ Lime green seirin, 36 guage /.20/ Blue seirin  Body Points: With retention  Body Points - Left: aida 5, lr 4  Body Points - Bilateral: st qi x2, k 7, 10, 27, immune  Body Points - Right: sp 9, jt 3, p5  Needle Count In: 19  Needle Count Out: 19  Needle Retention Time (min): 25 minutes  Total Face to Face Time (min): 25 minutes              Assessment/Plan

## 2024-06-06 ENCOUNTER — TELEPHONE (OUTPATIENT)
Dept: HEMATOLOGY/ONCOLOGY | Facility: HOSPITAL | Age: 64
End: 2024-06-06
Payer: COMMERCIAL

## 2024-06-06 NOTE — TELEPHONE ENCOUNTER
I called patient to check in on her. She's feeling well and about to go on a 2-3 wk vacation in [place] w/family. I will see her back and will review repeat CT scan results

## 2024-06-25 ENCOUNTER — TELEPHONE (OUTPATIENT)
Dept: PALLIATIVE MEDICINE | Facility: CLINIC | Age: 64
End: 2024-06-25
Payer: COMMERCIAL

## 2024-06-25 DIAGNOSIS — G89.3 CANCER RELATED PAIN: ICD-10-CM

## 2024-06-25 RX ORDER — MORPHINE SULFATE 15 MG/1
15 TABLET, FILM COATED, EXTENDED RELEASE ORAL 3 TIMES DAILY
Qty: 90 TABLET | Refills: 0 | Status: SHIPPED | OUTPATIENT
Start: 2024-06-25 | End: 2024-07-25

## 2024-06-25 NOTE — TELEPHONE ENCOUNTER
Per reminder on calendar, patient will need MSER refill sent to pharmacy prior to FUV with Jennifer Duarte CNP. OARRS report reviewed and reflects  prescription history, no aberrancy noted. Per OARRS, patient due for MSER 15mg on 6/29/24. Per last visit with Jennifer Duarte CNP on 5/20/24 patient to continue medication. Patient with follow up visit scheduled with Jennifer on 7/1/24. Refill request routed to provider.

## 2024-06-27 ENCOUNTER — INFUSION (OUTPATIENT)
Dept: HEMATOLOGY/ONCOLOGY | Facility: CLINIC | Age: 64
End: 2024-06-27
Payer: COMMERCIAL

## 2024-06-27 VITALS
DIASTOLIC BLOOD PRESSURE: 79 MMHG | HEART RATE: 51 BPM | BODY MASS INDEX: 26.39 KG/M2 | OXYGEN SATURATION: 97 % | TEMPERATURE: 97.7 F | SYSTOLIC BLOOD PRESSURE: 134 MMHG | WEIGHT: 139.55 LBS | RESPIRATION RATE: 17 BRPM

## 2024-06-27 DIAGNOSIS — C50.911 CARCINOMA OF RIGHT BREAST METASTATIC TO BONE (MULTI): ICD-10-CM

## 2024-06-27 DIAGNOSIS — C79.51 CARCINOMA OF RIGHT BREAST METASTATIC TO BONE (MULTI): ICD-10-CM

## 2024-06-27 DIAGNOSIS — C41.9 MALIGNANT NEOPLASM OF BONE WITH METASTASES (MULTI): ICD-10-CM

## 2024-06-27 LAB
ALBUMIN SERPL BCP-MCNC: 4 G/DL (ref 3.4–5)
ALP SERPL-CCNC: 57 U/L (ref 33–136)
ALT SERPL W P-5'-P-CCNC: 19 U/L (ref 7–45)
ANION GAP SERPL CALC-SCNC: 10 MMOL/L (ref 10–20)
AST SERPL W P-5'-P-CCNC: 19 U/L (ref 9–39)
BASOPHILS # BLD AUTO: 0.02 X10*3/UL (ref 0–0.1)
BASOPHILS NFR BLD AUTO: 0.4 %
BILIRUB SERPL-MCNC: 0.5 MG/DL (ref 0–1.2)
BUN SERPL-MCNC: 14 MG/DL (ref 6–23)
CALCIUM SERPL-MCNC: 8.8 MG/DL (ref 8.6–10.3)
CHLORIDE SERPL-SCNC: 106 MMOL/L (ref 98–107)
CO2 SERPL-SCNC: 26 MMOL/L (ref 21–32)
CREAT SERPL-MCNC: 0.63 MG/DL (ref 0.5–1.05)
EGFRCR SERPLBLD CKD-EPI 2021: >90 ML/MIN/1.73M*2
EOSINOPHIL # BLD AUTO: 0.16 X10*3/UL (ref 0–0.7)
EOSINOPHIL NFR BLD AUTO: 3.3 %
ERYTHROCYTE [DISTWIDTH] IN BLOOD BY AUTOMATED COUNT: 14.5 % (ref 11.5–14.5)
GLUCOSE SERPL-MCNC: 119 MG/DL (ref 74–99)
HCT VFR BLD AUTO: 36.8 % (ref 36–46)
HGB BLD-MCNC: 12.3 G/DL (ref 12–16)
IMM GRANULOCYTES # BLD AUTO: 0 X10*3/UL (ref 0–0.7)
IMM GRANULOCYTES NFR BLD AUTO: 0 % (ref 0–0.9)
LYMPHOCYTES # BLD AUTO: 1.06 X10*3/UL (ref 1.2–4.8)
LYMPHOCYTES NFR BLD AUTO: 21.9 %
MCH RBC QN AUTO: 30.3 PG (ref 26–34)
MCHC RBC AUTO-ENTMCNC: 33.4 G/DL (ref 32–36)
MCV RBC AUTO: 91 FL (ref 80–100)
MONOCYTES # BLD AUTO: 0.46 X10*3/UL (ref 0.1–1)
MONOCYTES NFR BLD AUTO: 9.5 %
NEUTROPHILS # BLD AUTO: 3.14 X10*3/UL (ref 1.2–7.7)
NEUTROPHILS NFR BLD AUTO: 64.9 %
PLATELET # BLD AUTO: 211 X10*3/UL (ref 150–450)
POTASSIUM SERPL-SCNC: 4 MMOL/L (ref 3.5–5.3)
PROT SERPL-MCNC: 5.7 G/DL (ref 6.4–8.2)
RBC # BLD AUTO: 4.06 X10*6/UL (ref 4–5.2)
SODIUM SERPL-SCNC: 138 MMOL/L (ref 136–145)
WBC # BLD AUTO: 4.8 X10*3/UL (ref 4.4–11.3)

## 2024-06-27 PROCEDURE — 2500000004 HC RX 250 GENERAL PHARMACY W/ HCPCS (ALT 636 FOR OP/ED): Performed by: NURSE PRACTITIONER

## 2024-06-27 PROCEDURE — 85025 COMPLETE CBC W/AUTO DIFF WBC: CPT

## 2024-06-27 PROCEDURE — 96413 CHEMO IV INFUSION 1 HR: CPT

## 2024-06-27 PROCEDURE — 96367 TX/PROPH/DG ADDL SEQ IV INF: CPT

## 2024-06-27 PROCEDURE — 2500000004 HC RX 250 GENERAL PHARMACY W/ HCPCS (ALT 636 FOR OP/ED): Performed by: INTERNAL MEDICINE

## 2024-06-27 PROCEDURE — 80053 COMPREHEN METABOLIC PANEL: CPT | Performed by: NURSE PRACTITIONER

## 2024-06-27 PROCEDURE — 96375 TX/PRO/DX INJ NEW DRUG ADDON: CPT | Mod: INF

## 2024-06-27 PROCEDURE — 86300 IMMUNOASSAY TUMOR CA 15-3: CPT | Mod: GEALAB

## 2024-06-27 RX ORDER — FAMOTIDINE 10 MG/ML
20 INJECTION INTRAVENOUS ONCE AS NEEDED
Status: DISCONTINUED | OUTPATIENT
Start: 2024-06-27 | End: 2024-06-27 | Stop reason: HOSPADM

## 2024-06-27 RX ORDER — DIPHENHYDRAMINE HYDROCHLORIDE 50 MG/ML
50 INJECTION INTRAMUSCULAR; INTRAVENOUS AS NEEDED
Status: DISCONTINUED | OUTPATIENT
Start: 2024-06-27 | End: 2024-06-27 | Stop reason: HOSPADM

## 2024-06-27 RX ORDER — HEPARIN SODIUM,PORCINE/PF 10 UNIT/ML
50 SYRINGE (ML) INTRAVENOUS AS NEEDED
OUTPATIENT
Start: 2024-06-27

## 2024-06-27 RX ORDER — PALONOSETRON 0.05 MG/ML
0.25 INJECTION, SOLUTION INTRAVENOUS ONCE
Status: COMPLETED | OUTPATIENT
Start: 2024-06-27 | End: 2024-06-27

## 2024-06-27 RX ORDER — PROCHLORPERAZINE EDISYLATE 5 MG/ML
10 INJECTION INTRAMUSCULAR; INTRAVENOUS EVERY 6 HOURS PRN
Status: DISCONTINUED | OUTPATIENT
Start: 2024-06-27 | End: 2024-06-27 | Stop reason: HOSPADM

## 2024-06-27 RX ORDER — HEPARIN 100 UNIT/ML
500 SYRINGE INTRAVENOUS AS NEEDED
Status: DISCONTINUED | OUTPATIENT
Start: 2024-06-27 | End: 2024-06-27 | Stop reason: HOSPADM

## 2024-06-27 RX ORDER — EPINEPHRINE 0.3 MG/.3ML
0.3 INJECTION SUBCUTANEOUS EVERY 5 MIN PRN
Status: DISCONTINUED | OUTPATIENT
Start: 2024-06-27 | End: 2024-06-27 | Stop reason: HOSPADM

## 2024-06-27 RX ORDER — HEPARIN 100 UNIT/ML
500 SYRINGE INTRAVENOUS AS NEEDED
OUTPATIENT
Start: 2024-06-27

## 2024-06-27 RX ORDER — ALBUTEROL SULFATE 0.83 MG/ML
3 SOLUTION RESPIRATORY (INHALATION) AS NEEDED
Status: DISCONTINUED | OUTPATIENT
Start: 2024-06-27 | End: 2024-06-27 | Stop reason: HOSPADM

## 2024-06-27 RX ORDER — PROCHLORPERAZINE MALEATE 10 MG
10 TABLET ORAL EVERY 6 HOURS PRN
Status: DISCONTINUED | OUTPATIENT
Start: 2024-06-27 | End: 2024-06-27 | Stop reason: HOSPADM

## 2024-06-27 ASSESSMENT — PAIN SCALES - GENERAL: PAINLEVEL: 8

## 2024-06-27 NOTE — SIGNIFICANT EVENT

## 2024-06-27 NOTE — PROGRESS NOTES
Enhertu Infusion tolerated without incident. Pre and post cooling cap completed. Schedule reviewed then Dc'd via independent / ambulatory.

## 2024-06-28 LAB — CANCER AG27-29 SERPL-ACNC: 56.5 U/ML (ref 0–38.6)

## 2024-07-01 ENCOUNTER — HOSPITAL ENCOUNTER (OUTPATIENT)
Dept: RADIOLOGY | Facility: CLINIC | Age: 64
Discharge: HOME | End: 2024-07-01
Payer: COMMERCIAL

## 2024-07-01 ENCOUNTER — APPOINTMENT (OUTPATIENT)
Dept: RADIOLOGY | Facility: CLINIC | Age: 64
End: 2024-07-01
Payer: COMMERCIAL

## 2024-07-01 ENCOUNTER — TELEMEDICINE (OUTPATIENT)
Dept: PALLIATIVE MEDICINE | Facility: CLINIC | Age: 64
End: 2024-07-01
Payer: COMMERCIAL

## 2024-07-01 DIAGNOSIS — C79.51 METASTATIC CANCER TO SPINE (MULTI): ICD-10-CM

## 2024-07-01 DIAGNOSIS — M79.2 NEUROPATHIC PAIN: ICD-10-CM

## 2024-07-01 DIAGNOSIS — G89.3 CANCER RELATED PAIN: ICD-10-CM

## 2024-07-01 DIAGNOSIS — Z51.5 PALLIATIVE CARE ENCOUNTER: Primary | ICD-10-CM

## 2024-07-01 PROCEDURE — 99213 OFFICE O/P EST LOW 20 MIN: CPT | Performed by: NURSE PRACTITIONER

## 2024-07-01 PROCEDURE — 72157 MRI CHEST SPINE W/O & W/DYE: CPT

## 2024-07-01 PROCEDURE — A9575 INJ GADOTERATE MEGLUMI 0.1ML: HCPCS | Performed by: STUDENT IN AN ORGANIZED HEALTH CARE EDUCATION/TRAINING PROGRAM

## 2024-07-01 PROCEDURE — 2550000001 HC RX 255 CONTRASTS: Performed by: STUDENT IN AN ORGANIZED HEALTH CARE EDUCATION/TRAINING PROGRAM

## 2024-07-01 RX ORDER — GADOTERATE MEGLUMINE 376.9 MG/ML
13 INJECTION INTRAVENOUS
Status: COMPLETED | OUTPATIENT
Start: 2024-07-01 | End: 2024-07-01

## 2024-07-01 RX ORDER — MORPHINE SULFATE 15 MG/1
15 TABLET, FILM COATED, EXTENDED RELEASE ORAL 3 TIMES DAILY
Qty: 90 TABLET | Refills: 0 | Status: SHIPPED | OUTPATIENT
Start: 2024-07-01 | End: 2024-07-31

## 2024-07-01 NOTE — PROGRESS NOTES
SUPPORTIVE AND PALLIATIVE ONCOLOGY OUTPATIENT FOLLOW-UP      SERVICE DATE: 7/1/2024    Virtual or Telephone Consent    A telephone visit (audio only) between the patient (at the originating site) and the provider (at the distant site) was utilized to provide this telehealth service.   Verbal consent was requested and obtained from Trang Jones on this date, 07/01/24 for a telehealth visit.      Cancer History  Newly diagnosed triple negative breast CA (with bone mets and cord compression)  -s/p L mastectomy in 2010: stage II ER+/HER2- breast CA  -s/p 4 cycles docetaxel/cyclophosphamide chemo  -took tamoxifen x 7 yrs, completed in 7699-4240  -presented to ED on 3/30/23 with severe back pain  -MRI spine showed cord compression, admitted at that time   -s/p 1 fx RT to spine on 5/4/23  -plan to start treatment with weekly paclitaxel and q21day pembro  -started treatment 5/22/23, changed to abraxane after C1 d/t anaphylactic type reaction  -CT CAP (11/14/23): Overall similar appearance of diffuse osseous metastatic disease. New subcentimeter lesion in the right hepatic lobe. Question  metastatic disease. Otherwise similar appearance of small  hypodensities throughout the liver.  -plan to stop abraxane and change therapy to TDxD, scheduled to start on 1/29/24  -treatment held for XRT to hip/spine 4/1/24    Med Onc: Dr. Tolbert  Integrative Medicine: Dr. Sandoval       Subjective   HISTORY OF PRESENT ILLNESS: Trang Jones is a 64 yo female with PMHx of HLD, scoliosis and newly diagnosed triple negative breast CA. She received XRT to the spine on 5/4 and will begin chemo treatment next week.     She presents to supportive oncology for follow up for pain and symptom management.     Spoke with pt on the phone for follow up. Recently returned from vacation, had a great time, did need to take some PRN oxycodone while hiking but, otherwise, pain controlled well. Having some increased pain in her L femur, meeting with  "rad onc soon. Moving bowels regularly. No N/V, appetite is good. Mood is stable, has an occasional \"rough day,\" but recovers well. Sleeping well. Energy levels are pretty low by end of day, but remains active during day/ when she can. Had some swelling in BLE, L>R while on vacation, now improved since being home.     Pain Assessment:  Pain Score: 2  Location: L femur    Symptom Assessment:  Pain:a little  Headache: none  Dizziness:none  Lack of energy: none  Difficulty sleeping: none  Worrying: none  Anxiety: none  Depression: none  Pain in mouth/swallowing: none  Dry mouth: none  Taste changes: none  Shortness of breath: none  Lack of appetite: none   Nausea: none  Vomiting: none  Constipation: none  Diarrhea: none  Sore muscles: a little  Numbness or tingling in hands/feet/other: none  Weight loss: none      Information obtained from: interview of patient  ______________________________________________________________________        Objective                PHYSICAL EXAMINATION not performed d/t phone visit  Vital Signs:   Vital signs reviewed  There were no vitals filed for this visit.      Pain Score:  2        ASSESSMENT/PLAN    Pain: upper back around shoulder blades, radiates to front of chest; dull ache, shooting with sudden movement    Pain is: cancer related pain and acute on chronic  Type: somatic and neuropathic  Pain control: well-controlled  Home regimen:   -continue Morphine Sulfate Contin 15mg tid. Rx sent for fill on 7/28.   -continue oxycodone 10mg every 4hrs PRN. No Rx needed today, has not filled since June 2023, will need to send refill next visit  -continue gabapentin 300mg in the morning and 600mg at night. Refills available.   -continue ibuprofen PRN   -continue to follow with Dr. Sandoval/ Agustin Dietrich for acupuncture/ reiki   -recently discontinued medical THC PRN upon recommendation from Dr. Sandoval   Intolerances/previously tried:    Opioid Use  Medication Management:   - OARRS report " reviewed with no aberrant behavior; consistent with  prescriptions/records and patient history  - MED 45.  Overdose Risk Score 190.   This has been discussed with patient.   - We will continue to closely monitor the patient for signs of prescription misuse including UDS, OARRS review and subjective reports at each visit.  - no concurrent benzodiazepine use   - I am a provider who either is or has consulted and collaborated with a provider certified in Hospice and Palliative Medicine and have conducted a face-face visit and examination for this patient.  - Routine Urine Drug Screen completed 5/9/23 appropriately positive for opioids and negative for illicit substances  - Controlled Substance Agreement completed 5/9/23  - Specifically discussed that controlled substance prescriptions will only be provided by our group as outlined in the completed agreement  - Prescribed naloxone previously prescribed  - Red Flags: none    Bowels: at risk for OIC  -educated pt on importance of maintaining daily BM  -continue daily miralax PRN  -continue senna/colace PRN     N/V/reflux:   -continue zofran 4mg ODT q8hrs PRN   -continue compazine 10mg q6hrs PRN.   -continue olanzapine 5mg at bedtime during treatment week  -continue prilosec 40mg daily during treatment week  -continue migraine medication PRN     Sleep: hx of insomnia prior to cancer dx   -recently discontinued medical THC PRN upon recommendation from Dr. Sandoval   -encouraged pt to maintain regular sleep/wake cycle  -plan for better pain control to aid in improved sleep, see pain section above     Mood: improving    -discussed adding medication to help with mood, pt would like to hold off for now    Medical Decision Making/ GOC:   -social work referral to assist with LW/ POA paperwork        Next Follow-Up Visit:  Return to clinic in 5 weeks     Signature and billing  Medical complexity was low level due to due to complexity of problems, extensive data review, and high risk  of management/treatment.  Time was spent on the following: Prep Time, Time Directly with Patient/Family/Caregiver, Documentation Time. Total time spent: 20 mins             Some elements copied from my note on 5/20/24, the elements have been updated and all reflect current decision making from today, 5/20/2024.      Plan of Care discussed with: Patient    SIGNATURE: ERROL Garg    Contact information:  [facility]ive [facility] Pall[facility]cology  Monday-Friday 8 AM-5 PM  Phone:  [phone], press option #5, then option #1.   Or Epic Secure Chat

## 2024-07-02 ENCOUNTER — APPOINTMENT (OUTPATIENT)
Dept: RADIOLOGY | Facility: CLINIC | Age: 64
End: 2024-07-02
Payer: COMMERCIAL

## 2024-07-03 ENCOUNTER — HOSPITAL ENCOUNTER (OUTPATIENT)
Dept: RADIOLOGY | Facility: CLINIC | Age: 64
Discharge: HOME | End: 2024-07-03
Payer: COMMERCIAL

## 2024-07-03 DIAGNOSIS — C79.51 METASTATIC CANCER TO SPINE (MULTI): ICD-10-CM

## 2024-07-03 PROCEDURE — 2550000001 HC RX 255 CONTRASTS: Performed by: STUDENT IN AN ORGANIZED HEALTH CARE EDUCATION/TRAINING PROGRAM

## 2024-07-03 PROCEDURE — A9575 INJ GADOTERATE MEGLUMI 0.1ML: HCPCS | Performed by: STUDENT IN AN ORGANIZED HEALTH CARE EDUCATION/TRAINING PROGRAM

## 2024-07-03 PROCEDURE — 72156 MRI NECK SPINE W/O & W/DYE: CPT

## 2024-07-03 PROCEDURE — 72158 MRI LUMBAR SPINE W/O & W/DYE: CPT

## 2024-07-03 PROCEDURE — 72158 MRI LUMBAR SPINE W/O & W/DYE: CPT | Performed by: RADIOLOGY

## 2024-07-03 RX ORDER — GADOTERATE MEGLUMINE 376.9 MG/ML
13 INJECTION INTRAVENOUS
Status: COMPLETED | OUTPATIENT
Start: 2024-07-03 | End: 2024-07-03

## 2024-07-08 ENCOUNTER — APPOINTMENT (OUTPATIENT)
Dept: RADIATION ONCOLOGY | Facility: HOSPITAL | Age: 64
End: 2024-07-08
Payer: COMMERCIAL

## 2024-07-08 ENCOUNTER — TELEPHONE (OUTPATIENT)
Dept: RADIATION ONCOLOGY | Facility: HOSPITAL | Age: 64
End: 2024-07-08
Payer: COMMERCIAL

## 2024-07-09 ENCOUNTER — HOSPITAL ENCOUNTER (OUTPATIENT)
Dept: RADIATION ONCOLOGY | Facility: HOSPITAL | Age: 64
Setting detail: RADIATION/ONCOLOGY SERIES
Discharge: HOME | End: 2024-07-09
Payer: COMMERCIAL

## 2024-07-09 ENCOUNTER — HOSPITAL ENCOUNTER (OUTPATIENT)
Dept: RADIOLOGY | Facility: HOSPITAL | Age: 64
Discharge: HOME | End: 2024-07-09
Payer: COMMERCIAL

## 2024-07-09 DIAGNOSIS — C50.911 CARCINOMA OF RIGHT BREAST METASTATIC TO BONE (MULTI): ICD-10-CM

## 2024-07-09 DIAGNOSIS — Z51.81 ENCOUNTER FOR MONITORING CARDIOTOXIC DRUG THERAPY: ICD-10-CM

## 2024-07-09 DIAGNOSIS — C79.51 CARCINOMA OF RIGHT BREAST METASTATIC TO BONE (MULTI): ICD-10-CM

## 2024-07-09 DIAGNOSIS — C41.9 MALIGNANT NEOPLASM OF BONE WITH METASTASES (MULTI): ICD-10-CM

## 2024-07-09 DIAGNOSIS — Z79.899 ENCOUNTER FOR MONITORING CARDIOTOXIC DRUG THERAPY: ICD-10-CM

## 2024-07-09 DIAGNOSIS — C79.51 METASTATIC CANCER TO SPINE (MULTI): Primary | ICD-10-CM

## 2024-07-09 DIAGNOSIS — C79.51 METASTATIC CANCER TO SPINE (MULTI): ICD-10-CM

## 2024-07-09 DIAGNOSIS — Z51.11 ENCOUNTER FOR ANTINEOPLASTIC CHEMOTHERAPY: ICD-10-CM

## 2024-07-09 PROCEDURE — 74177 CT ABD & PELVIS W/CONTRAST: CPT | Performed by: RADIOLOGY

## 2024-07-09 PROCEDURE — 71260 CT THORAX DX C+: CPT | Performed by: RADIOLOGY

## 2024-07-09 PROCEDURE — 74177 CT ABD & PELVIS W/CONTRAST: CPT

## 2024-07-09 PROCEDURE — 2550000001 HC RX 255 CONTRASTS: Performed by: NURSE PRACTITIONER

## 2024-07-09 PROCEDURE — 99213 OFFICE O/P EST LOW 20 MIN: CPT

## 2024-07-09 ASSESSMENT — ENCOUNTER SYMPTOMS
CHILLS: 0
HEADACHES: 0
ACTIVITY CHANGE: 0
WEAKNESS: 0
BACK PAIN: 1
FATIGUE: 0
LIGHT-HEADEDNESS: 0
MYALGIAS: 1
STRIDOR: 0
APPETITE CHANGE: 0
JOINT SWELLING: 0
SHORTNESS OF BREATH: 0
TREMORS: 0
NUMBNESS: 0
NECK STIFFNESS: 0
DIZZINESS: 0
WHEEZING: 0
ARTHRALGIAS: 0
NECK PAIN: 0
PALPITATIONS: 0

## 2024-07-09 NOTE — PROGRESS NOTES
Cancer synopsis:  Rad/onc: Dr. Mejia/ Aixa CNP    63 y/o female w/ triple negative breast CA (with bone mets and cord compression)    -s/p L mastectomy in 2010: stage II ER+/HER2- breast CA  -s/p 4 cycles docetaxel/cyclophosphamide chemo  -took tamoxifen x 7 yrs, completed in [id number]  -presented to ED on 3/30/23 with severe back pain  -MRI spine showed cord compression w/ new metastatic disease, admitted at that time   -s/p 1 fx RT to spine on 5/4/23  -plan to start treatment with weekly paclitaxel and q21day pembro  -started treatment 5/22/23, changed to abraxane after C1 d/t anaphylactic type reaction  -CT CAP (11/14/23): Overall similar appearance of diffuse osseous metastatic disease. New subcentimeter lesion in the right hepatic lobe. Question  metastatic disease. Otherwise similar appearance of small  hypodensities throughout the liver.  -plan to stop abraxane and change therapy to TDxD, scheduled to start on 1/29/24  -treatment held d/t  XRT to hip/spine 4/1/24    History of presenting illness:    Patient ID: [id number]     Trang Jones is a 64 y.o. female who presents for w/ triple negative metastatic breast cancer w/ mets to spine w/ prior cord compression)    RT Site: lumbar and pelvis   RT Date: 04/2024  Fatigue: Admits/Denies  Bone pain: Admits to mild lumbar pain that patient reports as manageable and believes is more related to ongoing scoliosis. Admits to mild left femur pain that causes a limp initially when starting to walk but then resolves. States not bad enough at this time to persue treatment and that she is still abl to do all activities she would like.  Neuro:  Rectal bleeding: Denies  Colonoscopy: none on file  Other systems: Denies    Review of systems:  Review of Systems   Constitutional:  Negative for activity change, appetite change, chills and fatigue.   Respiratory:  Negative for shortness of breath, wheezing and stridor.    Cardiovascular:  Negative for chest pain,  palpitations and leg swelling.   Musculoskeletal:  Positive for back pain, gait problem and myalgias. Negative for arthralgias, joint swelling, neck pain and neck stiffness.   Neurological:  Negative for dizziness, tremors, syncope, weakness, light-headedness, numbness and headaches.       PERFORMANCE STATUS:  KPS/ECO, Able to carry on normal activity; minor signs or symptoms of disease (ECOG equivalent 0)    Past Medical history  Past Medical History:   Diagnosis Date    Breast cancer (Multi)     Hypercholesteremia     Metastatic cancer (Multi)     Personal history of irradiation         Surgical/family history  Family History   Problem Relation Name Age of Onset    Leukemia Father        Past Surgical History:   Procedure Laterality Date    CT GUIDED PERCUTANEOUS BIOPSY BONE DEEP  2023    CT GUIDED PERCUTANEOUS BIOPSY BONE DEEP [facility] CT    MASTECTOMY Left 2010    left breast      Social History  Tobacco Use: Medium Risk (2024)    Patient History     Smoking Tobacco Use: Former     Smokeless Tobacco Use: Never     Passive Exposure: Past       Current med list:  Current Outpatient Medications   Medication Instructions    atorvastatin (LIPITOR) 10 mg, oral, Daily    calcium carbonate (CALCIUM 600 ORAL) 600 mg, oral, Daily    gabapentin (Neurontin) 300 mg capsule Take 1 capsule (300 mg) by mouth once daily in the morning AND 2 capsules (600 mg) once daily at bedtime.    lidocaine-prilocaine (Emla) 2.5-2.5 % cream  apply topically to MEDIPORT SITE 45 MINUTES BEFORE ACCESSING PORT, USE COVER WITH BANDAID<BR>    MAGNESIUM ORAL 400 mg, oral    morphine CR (MS CONTIN) 15 mg, oral, 3 times daily, Do not crush, chew, or split.    ondansetron ODT (ZOFRAN-ODT) 4 mg, oral, Every 8 hours PRN    oxyCODONE (ROXICODONE) 10 mg, oral, Every 4 hours PRN    propranolol (INDERAL) 40 mg, oral, Daily    rizatriptan MLT (Maxalt-MLT) 5 mg disintegrating tablet  take 1 tablet by mouth if needed MAY REPEAT IN 2 HOURS IF NEEDED  "       Last recorded vital:  virtual    Physical exam  virtual    Pertinent labs:  No results found for: \"PR1\", \"PSA\", \"TESTOST\", \"CMPLASABS\"      Dx:  Problem List Items Addressed This Visit       Metastatic cancer to spine (Multi)    Relevant Orders    Clinic Appointment Request Follow Up; [facility]; [facility] S[facility] RADON (Completed)      Reviewed most recent MRIs of both lumbar, thoracic spine and cervical spine. Treated thoracic areas demonstrate minimal changes. Noted Fractures till present and stable. Lumbar MRI indicates improvement of treated areas. Cervical MRI indicates new small osteophytes most consistent with new metastatic disease. Discussed obtaining imaging from OSH as currently we can only view report from OSH.        PLAN:  FUV MRI in 3m of spine and FUV   Labs none  Imaging MRI in 3 of spine  FUV other providers: PCP for routine evals, med/onc for systemic treatment    Please contact office with any concerns:  Vazquez Francis [facility]  [phone]  "

## 2024-07-10 ENCOUNTER — APPOINTMENT (OUTPATIENT)
Dept: INTEGRATIVE MEDICINE | Facility: CLINIC | Age: 64
End: 2024-07-10
Payer: COMMERCIAL

## 2024-07-10 NOTE — ADDENDUM NOTE
Encounter addended by: Vazquez Kruse, KELSEA-MALVIN on: 7/10/2024 8:44 AM   Actions taken: Visit diagnoses modified, Order list changed, Diagnosis association updated

## 2024-07-11 ENCOUNTER — TELEPHONE (OUTPATIENT)
Dept: HEMATOLOGY/ONCOLOGY | Facility: HOSPITAL | Age: 64
End: 2024-07-11
Payer: COMMERCIAL

## 2024-07-11 NOTE — TELEPHONE ENCOUNTER
"Called patient and we went over CT scan results. She is completely asymptomatic no cough no dyspnea. She is hiking many miles a day. We discussed that we will discontinue Enhertu (Tdxd) due to progression. I am skeptical regarding the CT report calling out \"radiation pneumonitis.\" In my opinion the areas flagged by the radiologist look more like metastatic lung nodules. I have the phase 1 team here evaluating potential clinical trials for her. I will see her on Wednesday   "

## 2024-07-15 ENCOUNTER — APPOINTMENT (OUTPATIENT)
Dept: HEMATOLOGY/ONCOLOGY | Facility: CLINIC | Age: 64
End: 2024-07-15
Payer: COMMERCIAL

## 2024-07-15 ENCOUNTER — PATIENT MESSAGE (OUTPATIENT)
Dept: PALLIATIVE MEDICINE | Facility: CLINIC | Age: 64
End: 2024-07-15
Payer: COMMERCIAL

## 2024-07-17 ENCOUNTER — LAB (OUTPATIENT)
Dept: LAB | Facility: LAB | Age: 64
End: 2024-07-17
Payer: COMMERCIAL

## 2024-07-17 ENCOUNTER — OFFICE VISIT (OUTPATIENT)
Dept: HEMATOLOGY/ONCOLOGY | Facility: CLINIC | Age: 64
End: 2024-07-17
Payer: COMMERCIAL

## 2024-07-17 ENCOUNTER — TELEPHONE (OUTPATIENT)
Dept: HEMATOLOGY/ONCOLOGY | Facility: CLINIC | Age: 64
End: 2024-07-17

## 2024-07-17 VITALS
BODY MASS INDEX: 26.95 KG/M2 | WEIGHT: 138 LBS | DIASTOLIC BLOOD PRESSURE: 72 MMHG | SYSTOLIC BLOOD PRESSURE: 121 MMHG | HEART RATE: 50 BPM

## 2024-07-17 DIAGNOSIS — C41.9 MALIGNANT NEOPLASM OF BONE WITH METASTASES (MULTI): ICD-10-CM

## 2024-07-17 DIAGNOSIS — C79.51 CARCINOMA OF RIGHT BREAST METASTATIC TO BONE (MULTI): ICD-10-CM

## 2024-07-17 DIAGNOSIS — Z51.11 ENCOUNTER FOR ANTINEOPLASTIC CHEMOTHERAPY: ICD-10-CM

## 2024-07-17 DIAGNOSIS — C50.911 CARCINOMA OF RIGHT BREAST METASTATIC TO BONE (MULTI): ICD-10-CM

## 2024-07-17 DIAGNOSIS — Z51.81 ENCOUNTER FOR MONITORING CARDIOTOXIC DRUG THERAPY: ICD-10-CM

## 2024-07-17 DIAGNOSIS — Z79.899 ENCOUNTER FOR MONITORING CARDIOTOXIC DRUG THERAPY: ICD-10-CM

## 2024-07-17 DIAGNOSIS — C79.51 METASTATIC CANCER TO SPINE (MULTI): ICD-10-CM

## 2024-07-17 LAB
ALBUMIN SERPL BCP-MCNC: 4.1 G/DL (ref 3.4–5)
ALP SERPL-CCNC: 56 U/L (ref 33–136)
ALT SERPL W P-5'-P-CCNC: 15 U/L (ref 7–45)
ANION GAP SERPL CALC-SCNC: 10 MMOL/L (ref 10–20)
AST SERPL W P-5'-P-CCNC: 18 U/L (ref 9–39)
BASOPHILS # BLD AUTO: 0.02 X10*3/UL (ref 0–0.1)
BASOPHILS NFR BLD AUTO: 0.5 %
BILIRUB SERPL-MCNC: 0.6 MG/DL (ref 0–1.2)
BUN SERPL-MCNC: 13 MG/DL (ref 6–23)
CALCIUM SERPL-MCNC: 9.1 MG/DL (ref 8.6–10.3)
CHLORIDE SERPL-SCNC: 106 MMOL/L (ref 98–107)
CO2 SERPL-SCNC: 29 MMOL/L (ref 21–32)
CREAT SERPL-MCNC: 0.65 MG/DL (ref 0.5–1.05)
EGFRCR SERPLBLD CKD-EPI 2021: >90 ML/MIN/1.73M*2
EOSINOPHIL # BLD AUTO: 0.14 X10*3/UL (ref 0–0.7)
EOSINOPHIL NFR BLD AUTO: 3.4 %
ERYTHROCYTE [DISTWIDTH] IN BLOOD BY AUTOMATED COUNT: 14.8 % (ref 11.5–14.5)
GLUCOSE SERPL-MCNC: 85 MG/DL (ref 74–99)
HCT VFR BLD AUTO: 39.2 % (ref 36–46)
HGB BLD-MCNC: 13 G/DL (ref 12–16)
IMM GRANULOCYTES # BLD AUTO: 0.01 X10*3/UL (ref 0–0.7)
IMM GRANULOCYTES NFR BLD AUTO: 0.2 % (ref 0–0.9)
INR PPP: 0.9 (ref 0.9–1.1)
LYMPHOCYTES # BLD AUTO: 0.92 X10*3/UL (ref 1.2–4.8)
LYMPHOCYTES NFR BLD AUTO: 22.4 %
MCH RBC QN AUTO: 30.4 PG (ref 26–34)
MCHC RBC AUTO-ENTMCNC: 33.2 G/DL (ref 32–36)
MCV RBC AUTO: 92 FL (ref 80–100)
MONOCYTES # BLD AUTO: 0.48 X10*3/UL (ref 0.1–1)
MONOCYTES NFR BLD AUTO: 11.7 %
NEUTROPHILS # BLD AUTO: 2.54 X10*3/UL (ref 1.2–7.7)
NEUTROPHILS NFR BLD AUTO: 61.8 %
NRBC BLD-RTO: 0 /100 WBCS (ref 0–0)
PLATELET # BLD AUTO: 191 X10*3/UL (ref 150–450)
POTASSIUM SERPL-SCNC: 4.2 MMOL/L (ref 3.5–5.3)
PROT SERPL-MCNC: 5.4 G/DL (ref 6.4–8.2)
PROTHROMBIN TIME: 10.3 SECONDS (ref 9.8–12.8)
RBC # BLD AUTO: 4.28 X10*6/UL (ref 4–5.2)
SODIUM SERPL-SCNC: 141 MMOL/L (ref 136–145)
WBC # BLD AUTO: 4.1 X10*3/UL (ref 4.4–11.3)

## 2024-07-17 PROCEDURE — 99215 OFFICE O/P EST HI 40 MIN: CPT | Performed by: INTERNAL MEDICINE

## 2024-07-17 PROCEDURE — 85025 COMPLETE CBC W/AUTO DIFF WBC: CPT

## 2024-07-17 PROCEDURE — 85610 PROTHROMBIN TIME: CPT

## 2024-07-17 PROCEDURE — 80053 COMPREHEN METABOLIC PANEL: CPT

## 2024-07-17 ASSESSMENT — PAIN SCALES - GENERAL: PAINLEVEL: 0-NO PAIN

## 2024-07-17 ASSESSMENT — ENCOUNTER SYMPTOMS
SEIZURES: 0
MYALGIAS: 0
HEMATURIA: 0
DIAPHORESIS: 0
EXTREMITY WEAKNESS: 0
NAUSEA: 1
DIFFICULTY URINATING: 0
DYSURIA: 0
CHEST TIGHTNESS: 0
LEG SWELLING: 0
SLEEP DISTURBANCE: 0
CARDIOVASCULAR NEGATIVE: 1
CONSTITUTIONAL NEGATIVE: 1
CHILLS: 0
ABDOMINAL PAIN: 0
FATIGUE: 0
NUMBNESS: 0
ABDOMINAL DISTENTION: 0
EYE PROBLEMS: 0
PALPITATIONS: 0
FEVER: 0
COUGH: 0
NERVOUS/ANXIOUS: 0
HOT FLASHES: 0
DEPRESSION: 0
DIARRHEA: 0
FREQUENCY: 0
BLOOD IN STOOL: 0
SHORTNESS OF BREATH: 0
EYES NEGATIVE: 1
APPETITE CHANGE: 0
RESPIRATORY NEGATIVE: 1
BACK PAIN: 1
ADENOPATHY: 0
DIZZINESS: 0
CONFUSION: 0
ARTHRALGIAS: 0
WHEEZING: 0
CONSTIPATION: 0
HEMOPTYSIS: 0
BRUISES/BLEEDS EASILY: 0
SCLERAL ICTERUS: 0
HEADACHES: 0

## 2024-07-17 NOTE — TELEPHONE ENCOUNTER
Nurse reached out to IR about timing of CT liver biopsy. Patient reporting on visit earlier today that she has a vacation planned and wants to make sure that task can get done prior to this vacation. Spoke with Mia ir scheduling about soonest appointment. Alexander had an appointment on 7-31-24 at 8-9am. Scheduled appointment at this time. Nurse then called patient and left a message asking for her to call back and confirm that the appointment works with her schedule.     GAB BROWN RN       Rescheduled appointment for 8-5-24 @ 8am per patient request. Patient called and aware of visit

## 2024-07-17 NOTE — PROGRESS NOTES
Breast Medical Oncology Clinic  Location: [facility]      BREAST CANCER DIAGNOSIS  Malignant neoplasm of bone with metastases (Multi), Clinical: pM1, G3   Diagnosed March 2023 triple negative breast cancer (PDL1 negative 0, breast cancer) with bone metastases and cord compression on [facility] patient has exon 9 kinase domain HER2 mutation [id number]     ONCOLOGIC HISTORY  Stage II (T2 N1MIC M0) hormone positive HER-2 negative and premenopausal. she received prior Taxotere and cyclophosphamide , completed 6/2010. S/p adjuvant endocrine therapy.    Diagnosis of metastatic TNBC 3/2023, presenting with back pain and cord compression      weekly paclitaxel from 5/22/23, changed to abraxane after 1st cycle due to anaphylactic type reaction. Discontinued 12/12/23 due to progressive disease.    TDxD from 1/29/24, held for XRT to spine for a few wks and stopped on 6/27/24  s/p XRT to spine on 4/1/24    CURRENT THERAPY  Planned clinical trial or perhaps gem/carbo    HISTORY OF PRESENT ILLNESS    Trang Jones is a 64 y.o. woman here for follow-up post XRT. Pain appears to have improved since XRT. Very active. Hiking and walking. Unfortunately CT scans and MRIs show clear evidence of progressive disease. Here to discuss next steps.            Review of Systems   Constitutional: Negative.  Negative for appetite change, chills, diaphoresis, fatigue and fever.   HENT:  Negative.  Negative for hearing loss and lump/mass.    Eyes: Negative.  Negative for eye problems and icterus.   Respiratory: Negative.  Negative for chest tightness, cough, hemoptysis, shortness of breath and wheezing.    Cardiovascular: Negative.  Negative for chest pain, leg swelling and palpitations.   Gastrointestinal:  Positive for nausea. Negative for abdominal distention, abdominal pain, blood in stool, constipation and diarrhea.   Endocrine: Negative for hot flashes.   Genitourinary:  Negative for bladder incontinence, difficulty urinating, dyspareunia,  dysuria, frequency and hematuria.    Musculoskeletal:  Positive for back pain. Negative for arthralgias, gait problem and myalgias.        ++hip pain   Neurological:  Negative for dizziness, extremity weakness, gait problem, headaches, numbness and seizures.   Hematological:  Negative for adenopathy. Does not bruise/bleed easily.   Psychiatric/Behavioral:  Negative for confusion, depression and sleep disturbance. The patient is not nervous/anxious.    All other systems reviewed and are negative.        Past Medical History:  has a past medical history of Breast cancer (Multi), Hypercholesteremia, Metastatic cancer (Multi), and Personal history of irradiation.  Surgical History:   has a past surgical history that includes CT guided percutaneous biopsy bone deep (2023) and Mastectomy (Left, ).  Social History:   reports that she quit smoking about 21 years ago. Her smoking use included cigarettes. She has been exposed to tobacco smoke. She has never used smokeless tobacco. She reports that she does not currently use alcohol. She reports current drug use. Drug: Marijuana.  Family History:    Family History   Problem Relation Name Age of Onset    Leukemia Father       Family Oncology History:  Cancer-related family history is not on file.      OBJECTIVE    VS / Pain:  /72 (BP Location: Right arm, Patient Position: Sitting, BP Cuff Size: Adult)   Pulse 50   Wt 62.6 kg (138 lb)   LMP  (LMP Unknown)   BMI 26.95 kg/m²   BSA: 1.63 meters squared   Pain Scale: 3    Performance Status:  The ECOG performance scale today is ECO- Restricted in physically strenuous activity.  Carries out light duty.    Physical Exam  Constitutional:       General: She is not in acute distress.     Appearance: She is not ill-appearing, toxic-appearing or diaphoretic.   HENT:      Nose: No congestion or rhinorrhea.      Mouth/Throat:      Pharynx: No posterior oropharyngeal erythema.   Cardiovascular:      Rate and Rhythm:  Normal rate and regular rhythm.      Pulses: Normal pulses.      Heart sounds: Normal heart sounds. No murmur heard.     No gallop.   Pulmonary:      Breath sounds: Normal breath sounds.   Abdominal:      General: There is no distension.      Palpations: There is no mass.      Tenderness: There is no abdominal tenderness.   Musculoskeletal:         General: No swelling.      Cervical back: No rigidity.      Right lower leg: No edema.      Left lower leg: No edema.   Lymphadenopathy:      Cervical: No cervical adenopathy.   Skin:     General: Skin is warm.      Coloration: Skin is not cyanotic.      Findings: No bruising, ecchymosis or erythema.   Neurological:      General: No focal deficit present.      Mental Status: She is oriented to person, place, and time.      Cranial Nerves: Cranial nerves 2-12 are intact.      Motor: Motor function is intact.   Psychiatric:         Attention and Perception: Attention and perception normal.         Mood and Affect: Mood and affect normal.         Behavior: Behavior normal.         Thought Content: Thought content normal.         Judgment: Judgment normal.             Diagnostic Results     === 07/09/24 ===    CT CHEST ABDOMEN PELVIS W IV CONTRAST    - Impression -  Breast cancer restaging scan.    1. There has been interval progression in metastatic disease burden  evidence by increase in size of multiple hepatic metastatic lesions.  Similar appearing diffuse osseous metastatic disease throughout the  axial and appendicular skeleton.  2. Interval progression of multifocal ground-glass and patchy  opacities most pronounced within the bilateral lower lobes, given the  distribution in the paramediastinal region, to be correlated with  interim history of radiation. 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 This limits evaluation for metastatic  pulmonary nodules. An 8-12 week CT of the chest is recommended for  follow-up.  3. No abdominopelvic lymphadenopathy.  4.  Additional findings as above    I personally reviewed the images/study and I agree with the findings  as stated by Resident Gonzalo Arteaga. This study was interpreted at  [facility], [place], [place].    MACRO:  None    Signed by: Krzysztof Isabel 7/10/2024 9:09 PM  Dictation workstation:   [id number]     === 07/03/24 ===    MR CERVICAL SPINE W AND WO CONTRAST    - Impression -  1. Compared to the MRI from 04/13/2023, interval development of  multiple small lesions scattered throughout the cervical vertebral  bodies which are most consistent with metastases. There is no  extension of tumor into the spinal canal.    2. Stable multilevel degenerative changes as detailed above.    This study was interpreted at [facility].    MACRO:  None    Signed by: Moustapha Mcintosh 7/4/2024 4:10 PM  Dictation workstation:   [id number]   LABORATORY/PATHOLOGY DATA    Lab on 07/17/2024   Component Date Value Ref Range Status    WBC 07/17/2024 4.1 (L)  4.4 - 11.3 x10*3/uL Final    nRBC 07/17/2024 0.0  0.0 - 0.0 /100 WBCs Final    RBC 07/17/2024 4.28  4.00 - 5.20 x10*6/uL Final    Hemoglobin 07/17/2024 13.0  12.0 - 16.0 g/dL Final    Hematocrit 07/17/2024 39.2  36.0 - 46.0 % Final    MCV 07/17/2024 92  80 - 100 fL Final    MCH 07/17/2024 30.4  26.0 - 34.0 pg Final    MCHC 07/17/2024 33.2  32.0 - 36.0 g/dL Final    RDW 07/17/2024 14.8 (H)  11.5 - 14.5 % Final    Platelets 07/17/2024 191  150 - 450 x10*3/uL Final    Neutrophils % 07/17/2024 61.8  40.0 - 80.0 % Final    Immature Granulocytes %, Automated 07/17/2024 0.2  0.0 - 0.9 % Final    Lymphocytes % 07/17/2024 22.4  13.0 - 44.0 % Final    Monocytes % 07/17/2024 11.7  2.0 - 10.0 % Final    Eosinophils % 07/17/2024 3.4  0.0 - 6.0 % Final    Basophils % 07/17/2024 0.5  0.0 - 2.0 % Final    Neutrophils Absolute 07/17/2024 2.54  1.20 - 7.70 x10*3/uL Final    Immature Granulocytes Absolute, Au* 07/17/2024 0.01  0.00 - 0.70  x10*3/uL Final    Lymphocytes Absolute 07/17/2024 0.92 (L)  1.20 - 4.80 x10*3/uL Final    Monocytes Absolute 07/17/2024 0.48  0.10 - 1.00 x10*3/uL Final    Eosinophils Absolute 07/17/2024 0.14  0.00 - 0.70 x10*3/uL Final    Basophils Absolute 07/17/2024 0.02  0.00 - 0.10 x10*3/uL Final    Glucose 07/17/2024 85  74 - 99 mg/dL Final    Sodium 07/17/2024 141  136 - 145 mmol/L Final    Potassium 07/17/2024 4.2  3.5 - 5.3 mmol/L Final    Chloride 07/17/2024 106  98 - 107 mmol/L Final    Bicarbonate 07/17/2024 29  21 - 32 mmol/L Final    Anion Gap 07/17/2024 10  10 - 20 mmol/L Final    Urea Nitrogen 07/17/2024 13  6 - 23 mg/dL Final    Creatinine 07/17/2024 0.65  0.50 - 1.05 mg/dL Final    eGFR 07/17/2024 >90  >60 mL/min/1.73m*2 Final    Calcium 07/17/2024 9.1  8.6 - 10.3 mg/dL Final    Albumin 07/17/2024 4.1  3.4 - 5.0 g/dL Final    Alkaline Phosphatase 07/17/2024 56  33 - 136 U/L Final    Total Protein 07/17/2024 5.4 (L)  6.4 - 8.2 g/dL Final    AST 07/17/2024 18  9 - 39 U/L Final    Bilirubin, Total 07/17/2024 0.6  0.0 - 1.2 mg/dL Final    ALT 07/17/2024 15  7 - 45 U/L Final    Protime 07/17/2024 10.3  9.8 - 12.8 seconds Final    INR 07/17/2024 0.9  0.9 - 1.1 Final   Infusion on 06/27/2024   Component Date Value Ref Range Status    WBC 06/27/2024 4.8  4.4 - 11.3 x10*3/uL Final    RBC 06/27/2024 4.06  4.00 - 5.20 x10*6/uL Final    Hemoglobin 06/27/2024 12.3  12.0 - 16.0 g/dL Final    Hematocrit 06/27/2024 36.8  36.0 - 46.0 % Final    MCV 06/27/2024 91  80 - 100 fL Final    MCH 06/27/2024 30.3  26.0 - 34.0 pg Final    MCHC 06/27/2024 33.4  32.0 - 36.0 g/dL Final    RDW 06/27/2024 14.5  11.5 - 14.5 % Final    Platelets 06/27/2024 211  150 - 450 x10*3/uL Final    Neutrophils % 06/27/2024 64.9  40.0 - 80.0 % Final    Immature Granulocytes %, Automated 06/27/2024 0.0  0.0 - 0.9 % Final    Lymphocytes % 06/27/2024 21.9  13.0 - 44.0 % Final    Monocytes % 06/27/2024 9.5  2.0 - 10.0 % Final    Eosinophils % 06/27/2024 3.3  0.0  - 6.0 % Final    Basophils % 06/27/2024 0.4  0.0 - 2.0 % Final    Neutrophils Absolute 06/27/2024 3.14  1.20 - 7.70 x10*3/uL Final    Immature Granulocytes Absolute, Au* 06/27/2024 0.00  0.00 - 0.70 x10*3/uL Final    Lymphocytes Absolute 06/27/2024 1.06 (L)  1.20 - 4.80 x10*3/uL Final    Monocytes Absolute 06/27/2024 0.46  0.10 - 1.00 x10*3/uL Final    Eosinophils Absolute 06/27/2024 0.16  0.00 - 0.70 x10*3/uL Final    Basophils Absolute 06/27/2024 0.02  0.00 - 0.10 x10*3/uL Final    CA 27.29 06/27/2024 56.5 (H)  0.0 - 38.6 U/mL Final      Lab Results   Component Value Date    LABCA2 56.5 (H) 06/27/2024    LABCA2 55.6 (H) 06/03/2024    LABCA2 59.6 (H) 05/13/2024    LABCA2 70.3 (H) 04/22/2024    LABCA2 107.5 (H) 03/12/2024             IMPRESSION/PLAN      1. 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 Clear evidence of progressive disease in left hip and T1. S/p XRT. Clear progression on TDxD. I am recommending considering participating in the [id number] trial with the oral ATR inhibitor ATRN-119. Also looking at [facility] phase 1 trial with nivolumab and BET inhibitor  [id number]. Has gone to [facility] for 2nd opinion and Dr Randhawa suggested getting liver biopsy in remote chance that tumor is ER+. I have arranged this but the tumor has been rapidly progressing in a way that is entirely c/w TNBC and not ER+ dz. She has previously had genetic testing at [facility] which was negative for germline mutations and has had 2 [facility] tests with + somatic mutations but no BRCA and therefore very unlikely she has germline BRCA. We have requested fax of the original report.     2. Pain neoplasm related. Following with supportive onc.  Much better now. S/p xrt        We discussed the clinical significance of diagnosis, goals of care and treatment plan in detail.  I personally spent over half of a total 55 minutes face to face with the patient in counseling and discussion and/or coordination of care as described above.          -------------------------------------------------------------------------------------------------------------------------------  Bebeto Tolbert MD  Director of Breast Cancer Medical Oncology Research Program   [facility]  Professor of Medicine  [place] [id number],  [id number] [id number]  Phone: [phone]  [email or web address]

## 2024-07-19 ENCOUNTER — TELEPHONE (OUTPATIENT)
Dept: HEMATOLOGY/ONCOLOGY | Facility: HOSPITAL | Age: 64
End: 2024-07-19
Payer: COMMERCIAL

## 2024-07-19 NOTE — TELEPHONE ENCOUNTER
Called patient back and we discussed that there are two potential phase 1 trials. We will contact her to discuss further next wk

## 2024-07-23 ENCOUNTER — DOCUMENTATION (OUTPATIENT)
Dept: HEMATOLOGY/ONCOLOGY | Facility: HOSPITAL | Age: 64
End: 2024-07-23
Payer: COMMERCIAL

## 2024-07-26 ENCOUNTER — TELEPHONE (OUTPATIENT)
Dept: HEMATOLOGY/ONCOLOGY | Facility: HOSPITAL | Age: 64
End: 2024-07-26
Payer: COMMERCIAL

## 2024-07-26 NOTE — TELEPHONE ENCOUNTER
I called patient back at her request to discuss the different clinical trial options available to her UPITT w/immunotherapy and the APT trial which is an oral TKI. We discussed the pros and cons of each and of the uncertain nature of clinical trials. I discussed that I have equipoise between the two options, however I was leaning more towards UPITT because when immunotherapy works it tends to have more prolonged remission than with small molecule inhibitors like the APT drug.

## 2024-07-29 NOTE — TELEPHONE ENCOUNTER
Called patient back and we discussed her concerns. My recommendation is to proceed with the Aprea trial given her concerns instead of UPITT

## 2024-07-29 NOTE — TELEPHONE ENCOUNTER
Trang called nurse line and said she spoke with Isatu the clinical nurse this morning about the clinical trial she was going to be doing but the biopsy will take 2-3 weeks possibly to get done for the trial. She said Isatu was looking to get her an earlier appointment and would call her back. Also, Patient concerned with dose limited toxicity with the trial and Isatu is looking into this as well per patient. Trang is worried since she hasn't had treatment about a month and by the time she gets in the clinical trial could be another month if this is the best trial for her? Messaged team, clinical nurse.    Fall upon presentation unclear if mechanical as denied prodromal symptoms as per H&P, CT head neg for acute process.   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

## 2024-07-30 DIAGNOSIS — Z00.6 EXAMINATION OF PARTICIPANT IN CLINICAL TRIAL: Primary | ICD-10-CM

## 2024-07-30 DIAGNOSIS — C50.919 MALIGNANT NEOPLASM OF FEMALE BREAST, UNSPECIFIED ESTROGEN RECEPTOR STATUS, UNSPECIFIED LATERALITY, UNSPECIFIED SITE OF BREAST (MULTI): ICD-10-CM

## 2024-07-31 ENCOUNTER — APPOINTMENT (OUTPATIENT)
Dept: RADIOLOGY | Facility: HOSPITAL | Age: 64
End: 2024-07-31
Payer: COMMERCIAL

## 2024-08-02 DIAGNOSIS — C50.919 MALIGNANT NEOPLASM OF FEMALE BREAST, UNSPECIFIED ESTROGEN RECEPTOR STATUS, UNSPECIFIED LATERALITY, UNSPECIFIED SITE OF BREAST (MULTI): ICD-10-CM

## 2024-08-02 DIAGNOSIS — Z00.6 EXAMINATION OF PARTICIPANT IN CLINICAL TRIAL: Primary | ICD-10-CM

## 2024-08-05 ENCOUNTER — HOSPITAL ENCOUNTER (OUTPATIENT)
Dept: RADIOLOGY | Facility: HOSPITAL | Age: 64
Discharge: HOME | End: 2024-08-05
Payer: COMMERCIAL

## 2024-08-05 ENCOUNTER — TELEMEDICINE (OUTPATIENT)
Dept: PALLIATIVE MEDICINE | Facility: CLINIC | Age: 64
End: 2024-08-05
Payer: COMMERCIAL

## 2024-08-05 VITALS
OXYGEN SATURATION: 93 % | DIASTOLIC BLOOD PRESSURE: 56 MMHG | BODY MASS INDEX: 25.4 KG/M2 | TEMPERATURE: 97.9 F | WEIGHT: 138 LBS | HEIGHT: 62 IN | HEART RATE: 56 BPM | SYSTOLIC BLOOD PRESSURE: 97 MMHG | RESPIRATION RATE: 11 BRPM

## 2024-08-05 DIAGNOSIS — C79.51 CARCINOMA OF RIGHT BREAST METASTATIC TO BONE (MULTI): ICD-10-CM

## 2024-08-05 DIAGNOSIS — C79.51 METASTATIC CANCER TO SPINE (MULTI): ICD-10-CM

## 2024-08-05 DIAGNOSIS — Z79.899 ENCOUNTER FOR MONITORING CARDIOTOXIC DRUG THERAPY: ICD-10-CM

## 2024-08-05 DIAGNOSIS — C50.911 CARCINOMA OF RIGHT BREAST METASTATIC TO BONE (MULTI): ICD-10-CM

## 2024-08-05 DIAGNOSIS — G89.3 CANCER RELATED PAIN: ICD-10-CM

## 2024-08-05 DIAGNOSIS — Z51.5 PALLIATIVE CARE ENCOUNTER: Primary | ICD-10-CM

## 2024-08-05 DIAGNOSIS — Z51.11 ENCOUNTER FOR ANTINEOPLASTIC CHEMOTHERAPY: ICD-10-CM

## 2024-08-05 DIAGNOSIS — M79.2 NEUROPATHIC PAIN: ICD-10-CM

## 2024-08-05 DIAGNOSIS — C41.9 MALIGNANT NEOPLASM OF BONE WITH METASTASES (MULTI): ICD-10-CM

## 2024-08-05 DIAGNOSIS — Z51.81 ENCOUNTER FOR MONITORING CARDIOTOXIC DRUG THERAPY: ICD-10-CM

## 2024-08-05 PROCEDURE — 2500000004 HC RX 250 GENERAL PHARMACY W/ HCPCS (ALT 636 FOR OP/ED): Performed by: RADIOLOGY

## 2024-08-05 PROCEDURE — 99152 MOD SED SAME PHYS/QHP 5/>YRS: CPT | Performed by: RADIOLOGY

## 2024-08-05 PROCEDURE — 76942 ECHO GUIDE FOR BIOPSY: CPT

## 2024-08-05 PROCEDURE — 99152 MOD SED SAME PHYS/QHP 5/>YRS: CPT

## 2024-08-05 PROCEDURE — 76942 ECHO GUIDE FOR BIOPSY: CPT | Performed by: RADIOLOGY

## 2024-08-05 PROCEDURE — 1036F TOBACCO NON-USER: CPT | Performed by: NURSE PRACTITIONER

## 2024-08-05 PROCEDURE — 7100000010 HC PHASE TWO TIME - EACH INCREMENTAL 1 MINUTE

## 2024-08-05 PROCEDURE — 2720000007 HC OR 272 NO HCPCS

## 2024-08-05 PROCEDURE — 99213 OFFICE O/P EST LOW 20 MIN: CPT | Performed by: NURSE PRACTITIONER

## 2024-08-05 PROCEDURE — 7100000009 HC PHASE TWO TIME - INITIAL BASE CHARGE

## 2024-08-05 PROCEDURE — 47000 NEEDLE BIOPSY OF LIVER PERQ: CPT | Performed by: RADIOLOGY

## 2024-08-05 RX ORDER — MIDAZOLAM HYDROCHLORIDE 1 MG/ML
INJECTION, SOLUTION INTRAMUSCULAR; INTRAVENOUS
Status: DISPENSED
Start: 2024-08-05 | End: 2024-08-05

## 2024-08-05 RX ORDER — MIDAZOLAM HYDROCHLORIDE 1 MG/ML
INJECTION INTRAMUSCULAR; INTRAVENOUS
Status: COMPLETED | OUTPATIENT
Start: 2024-08-05 | End: 2024-08-05

## 2024-08-05 RX ORDER — MORPHINE SULFATE 15 MG/1
15 TABLET, FILM COATED, EXTENDED RELEASE ORAL 3 TIMES DAILY
Qty: 90 TABLET | Refills: 0 | Status: SHIPPED | OUTPATIENT
Start: 2024-08-05 | End: 2024-09-04

## 2024-08-05 RX ORDER — HEPARIN SODIUM,PORCINE/PF 10 UNIT/ML
SYRINGE (ML) INTRAVENOUS
Status: DISPENSED
Start: 2024-08-05 | End: 2024-08-05

## 2024-08-05 RX ORDER — HEPARIN 100 UNIT/ML
100 SYRINGE INTRAVENOUS AS NEEDED
Status: DISCONTINUED | OUTPATIENT
Start: 2024-08-05 | End: 2024-08-06 | Stop reason: HOSPADM

## 2024-08-05 RX ORDER — FENTANYL CITRATE 50 UG/ML
INJECTION, SOLUTION INTRAMUSCULAR; INTRAVENOUS
Status: DISPENSED
Start: 2024-08-05 | End: 2024-08-05

## 2024-08-05 RX ORDER — FENTANYL CITRATE 50 UG/ML
INJECTION, SOLUTION INTRAMUSCULAR; INTRAVENOUS
Status: COMPLETED | OUTPATIENT
Start: 2024-08-05 | End: 2024-08-05

## 2024-08-05 RX ORDER — OXYCODONE HYDROCHLORIDE 10 MG/1
10 TABLET ORAL EVERY 6 HOURS PRN
Qty: 120 TABLET | Refills: 0 | Status: SHIPPED | OUTPATIENT
Start: 2024-08-05 | End: 2024-09-04

## 2024-08-05 ASSESSMENT — PAIN SCALES - GENERAL
PAINLEVEL_OUTOF10: 0 - NO PAIN

## 2024-08-05 ASSESSMENT — PAIN - FUNCTIONAL ASSESSMENT
PAIN_FUNCTIONAL_ASSESSMENT: 0-10

## 2024-08-05 NOTE — DISCHARGE INSTRUCTIONS
Take it easy today.  No heavy lifting. Nothing heavier than a gallon of milk.  May NOT drive today.  Remove dressing tomorrow and you may shower tomorrow.  May take tylenol as needed for pain.  Follow up with Dr. Tolbert In 10 days

## 2024-08-05 NOTE — PROGRESS NOTES
SUPPORTIVE AND PALLIATIVE ONCOLOGY OUTPATIENT FOLLOW-UP      SERVICE DATE: 7/1/2024    Virtual or Telephone Consent    A telephone visit (audio only) between the patient (at the originating site) and the provider (at the distant site) was utilized to provide this telehealth service.   Verbal consent was requested and obtained from Trang Jones on this date, 08/05/24 for a telehealth visit.      Cancer History  Newly diagnosed triple negative breast CA (with bone mets and cord compression)  -s/p L mastectomy in 2010: stage II ER+/HER2- breast CA  -s/p 4 cycles docetaxel/cyclophosphamide chemo  -took tamoxifen x 7 yrs, completed in [id number]  -presented to ED on 3/30/23 with severe back pain  -MRI spine showed cord compression, admitted at that time   -s/p 1 fx RT to spine on 5/4/23  -plan to start treatment with weekly paclitaxel and q21day pembro  -started treatment 5/22/23, changed to abraxane after C1 d/t anaphylactic type reaction  -CT CAP (11/14/23): Overall similar appearance of diffuse osseous metastatic disease. New subcentimeter lesion in the right hepatic lobe. Question  metastatic disease. Otherwise similar appearance of small  hypodensities throughout the liver.  -plan to stop abraxane and change therapy to TDxD, scheduled to start on 1/29/24  -treatment held for XRT to hip/spine 4/1/24  -stopped tx with TDxD on 6/27/24  -plan to start Aprea clinical trial in August 2024    Med Onc: Dr. Tolbert  Integrative Medicine: Dr. Sandoval       Subjective   HISTORY OF PRESENT ILLNESS: Trang Jones is a 62 yo female with PMHx of HLD, scoliosis and newly diagnosed triple negative breast CA. She received XRT to the spine on 5/4 and will begin chemo treatment next week.     She presents to supportive oncology for follow up for pain and symptom management.     Spoke with pt on the phone for follow up. Doing okay with pain, taking MSER tid, has not required oxycodone in some time. Continues on gabapentin bid.  "Moving bowels daily. No N/V, occasional indigestion, takes Tums. Appetite is fine, better in the morning. Mood is okay, a lot of emotional \"ups and downs\" lately in deciding next line of treatment. Sleep is hit or miss, takes THC gummies PRN. Energy levels are pretty good, recently went up to [place] for vacation with friends.      Pain Assessment:  Pain Score: 2  Location: L femur    Symptom Assessment:  Pain:a little  Headache: none  Dizziness:none  Lack of energy: none  Difficulty sleeping: none  Worrying: none  Anxiety: none  Depression: none  Pain in mouth/swallowing: none  Dry mouth: none  Taste changes: none  Shortness of breath: none  Lack of appetite: none   Nausea: none  Vomiting: none  Constipation: none  Diarrhea: none  Sore muscles: a little  Numbness or tingling in hands/feet/other: none  Weight loss: none      Information obtained from: interview of patient  ______________________________________________________________________        Objective                PHYSICAL EXAMINATION not performed d/t phone visit  Vital Signs:   Vital signs reviewed  There were no vitals filed for this visit.      Pain Score:  2        ASSESSMENT/PLAN    Pain: upper back around shoulder blades, radiates to front of chest; dull ache, shooting with sudden movement    Pain is: cancer related pain and acute on chronic  Type: somatic and neuropathic  Pain control: well-controlled  Home regimen:   -continue Morphine Sulfate Contin 15mg tid. Rx sent for fill on 8/25.  -continue oxycodone 10mg every 4hrs PRN. Rx sent for fill on 8/22.  -continue gabapentin 300mg in the morning and 600mg at night. Refills available.   -continue ibuprofen PRN   -continue to follow with Dr. Sandoval/ Agustin Dietrich for acupuncture/ reiki   -recently discontinued medical THC PRN upon recommendation from Dr. Sandoval   Intolerances/previously tried:    Opioid Use  Medication Management:   - OARRS report reviewed with no aberrant behavior; consistent with [facility] " prescriptions/records and patient history  - MED 45.  Overdose Risk Score 190.   This has been discussed with patient.   - We will continue to closely monitor the patient for signs of prescription misuse including UDS, OARRS review and subjective reports at each visit.  - no concurrent benzodiazepine use   - I am a provider who either is or has consulted and collaborated with a provider certified in Hospice and Palliative Medicine and have conducted a face-face visit and examination for this patient.  - Routine Urine Drug Screen completed 5/9/23 appropriately positive for opioids and negative for illicit substances  - Controlled Substance Agreement completed 5/9/23  - Specifically discussed that controlled substance prescriptions will only be provided by our group as outlined in the completed agreement  - Prescribed naloxone previously prescribed  - Red Flags: none    Bowels: at risk for OIC  -educated pt on importance of maintaining daily BM  -continue daily miralax PRN  -continue senna/colace PRN     N/V/reflux:   -continue zofran 4mg ODT q8hrs PRN   -continue compazine 10mg q6hrs PRN.   -continue olanzapine 5mg at bedtime during treatment week  -continue prilosec 40mg daily during treatment week  -continue migraine medication PRN     Sleep: hx of insomnia prior to cancer dx   -recently restarted medical THC PRN   -encouraged pt to maintain regular sleep/wake cycle  -plan for better pain control to aid in improved sleep, see pain section above     Mood: improving    -discussed adding medication to help with mood, pt would like to hold off for now    Medical Decision Making/ GOC:   -social work referral to assist with LW/ POA paperwork        Next Follow-Up Visit:  Return to clinic in 6 weeks     Signature and billing  Medical complexity was low level due to due to complexity of problems, extensive data review, and high risk of management/treatment.  Time was spent on the following: Prep Time, Time Directly with  Patient/Family/Caregiver, Documentation Time. Total time spent: 20 mins             Some elements copied from my note on 7/1/24, the elements have been updated and all reflect current decision making from today, 5/20/2024.      Plan of Care discussed with: Patient    SIGNATURE: KELSEA Garg-CNP    Contact information:  Supportive and Palliative Oncology  Monday-Friday 8 AM-5 PM  Phone:  [phone], press option #5, then option #1.   Or Epic Secure Chat

## 2024-08-06 ENCOUNTER — HOSPITAL ENCOUNTER (OUTPATIENT)
Dept: RESEARCH | Facility: HOSPITAL | Age: 64
Discharge: HOME | End: 2024-08-06
Payer: COMMERCIAL

## 2024-08-06 ENCOUNTER — HOSPITAL ENCOUNTER (OUTPATIENT)
Dept: CARDIOLOGY | Facility: HOSPITAL | Age: 64
Discharge: HOME | End: 2024-08-06
Payer: COMMERCIAL

## 2024-08-06 ENCOUNTER — EDUCATION (OUTPATIENT)
Dept: HEMATOLOGY/ONCOLOGY | Facility: HOSPITAL | Age: 64
End: 2024-08-06

## 2024-08-06 VITALS
OXYGEN SATURATION: 96 % | WEIGHT: 137.35 LBS | HEART RATE: 53 BPM | DIASTOLIC BLOOD PRESSURE: 75 MMHG | RESPIRATION RATE: 16 BRPM | BODY MASS INDEX: 25.93 KG/M2 | HEIGHT: 61 IN | SYSTOLIC BLOOD PRESSURE: 111 MMHG | TEMPERATURE: 98.8 F

## 2024-08-06 DIAGNOSIS — C50.919 MALIGNANT NEOPLASM OF FEMALE BREAST, UNSPECIFIED ESTROGEN RECEPTOR STATUS, UNSPECIFIED LATERALITY, UNSPECIFIED SITE OF BREAST (MULTI): ICD-10-CM

## 2024-08-06 DIAGNOSIS — Z00.6 EXAMINATION OF PARTICIPANT IN CLINICAL TRIAL: ICD-10-CM

## 2024-08-06 DIAGNOSIS — C41.9 MALIGNANT NEOPLASM OF BONE WITH METASTASES (MULTI): ICD-10-CM

## 2024-08-06 DIAGNOSIS — C50.919 MALIGNANT NEOPLASM OF FEMALE BREAST, UNSPECIFIED ESTROGEN RECEPTOR STATUS, UNSPECIFIED LATERALITY, UNSPECIFIED SITE OF BREAST (MULTI): Primary | ICD-10-CM

## 2024-08-06 LAB
ALBUMIN SERPL BCP-MCNC: 3.8 G/DL (ref 3.4–5)
ALP SERPL-CCNC: 69 U/L (ref 33–136)
ALT SERPL W P-5'-P-CCNC: 14 U/L (ref 7–45)
AMYLASE SERPL-CCNC: 37 U/L (ref 29–103)
ANION GAP SERPL CALC-SCNC: 14 MMOL/L (ref 10–20)
APPEARANCE UR: CLEAR
APTT PPP: 200 SECONDS (ref 27–38)
AST SERPL W P-5'-P-CCNC: 18 U/L (ref 9–39)
BASOPHILS # BLD AUTO: 0.02 X10*3/UL (ref 0–0.1)
BASOPHILS NFR BLD AUTO: 0.4 %
BILIRUB SERPL-MCNC: 0.5 MG/DL (ref 0–1.2)
BILIRUB UR STRIP.AUTO-MCNC: NEGATIVE MG/DL
BUN SERPL-MCNC: 18 MG/DL (ref 6–23)
CALCIUM SERPL-MCNC: 9.6 MG/DL (ref 8.6–10.6)
CHLORIDE SERPL-SCNC: 104 MMOL/L (ref 98–107)
CO2 SERPL-SCNC: 29 MMOL/L (ref 21–32)
COLOR UR: NORMAL
CREAT SERPL-MCNC: 0.6 MG/DL (ref 0.5–1.05)
D DIMER PPP FEU-MCNC: 688 NG/ML FEU
EGFRCR SERPLBLD CKD-EPI 2021: >90 ML/MIN/1.73M*2
EJECTION FRACTION: 63 %
EOSINOPHIL # BLD AUTO: 0.17 X10*3/UL (ref 0–0.7)
EOSINOPHIL NFR BLD AUTO: 3.5 %
ERYTHROCYTE [DISTWIDTH] IN BLOOD BY AUTOMATED COUNT: 13.5 % (ref 11.5–14.5)
GLUCOSE SERPL-MCNC: 99 MG/DL (ref 74–99)
GLUCOSE UR STRIP.AUTO-MCNC: NORMAL MG/DL
HBV SURFACE AG SERPL QL IA: NONREACTIVE
HCT VFR BLD AUTO: 39.3 % (ref 36–46)
HCV AB SER QL: NONREACTIVE
HGB BLD-MCNC: 13.1 G/DL (ref 12–16)
HGB RETIC QN: 32 PG (ref 28–38)
HIV 1+2 AB+HIV1 P24 AG SERPL QL IA: NONREACTIVE
IMM GRANULOCYTES # BLD AUTO: 0.02 X10*3/UL (ref 0–0.7)
IMM GRANULOCYTES NFR BLD AUTO: 0.4 % (ref 0–0.9)
IMMATURE RETIC FRACTION: 12.7 %
INR PPP: 0.9 (ref 0.9–1.1)
KETONES UR STRIP.AUTO-MCNC: NEGATIVE MG/DL
LDH SERPL L TO P-CCNC: 168 U/L (ref 84–246)
LEFT VENTRICLE INTERNAL DIMENSION DIASTOLE: 4.5 CM (ref 3.5–6)
LEUKOCYTE ESTERASE UR QL STRIP.AUTO: NEGATIVE
LYMPHOCYTES # BLD AUTO: 1.42 X10*3/UL (ref 1.2–4.8)
LYMPHOCYTES NFR BLD AUTO: 29.3 %
MAGNESIUM SERPL-MCNC: 2.09 MG/DL (ref 1.6–2.4)
MCH RBC QN AUTO: 29.9 PG (ref 26–34)
MCHC RBC AUTO-ENTMCNC: 33.3 G/DL (ref 32–36)
MCV RBC AUTO: 90 FL (ref 80–100)
MONOCYTES # BLD AUTO: 0.42 X10*3/UL (ref 0.1–1)
MONOCYTES NFR BLD AUTO: 8.7 %
NEUTROPHILS # BLD AUTO: 2.79 X10*3/UL (ref 1.2–7.7)
NEUTROPHILS NFR BLD AUTO: 57.7 %
NITRITE UR QL STRIP.AUTO: NEGATIVE
NRBC BLD-RTO: 0 /100 WBCS (ref 0–0)
PH UR STRIP.AUTO: 6.5 [PH]
PHOSPHATE SERPL-MCNC: 5 MG/DL (ref 2.5–4.9)
PLATELET # BLD AUTO: 179 X10*3/UL (ref 150–450)
POTASSIUM SERPL-SCNC: 4.1 MMOL/L (ref 3.5–5.3)
PROT SERPL-MCNC: 5.4 G/DL (ref 6.4–8.2)
PROT UR STRIP.AUTO-MCNC: NEGATIVE MG/DL
PROTHROMBIN TIME: 10.6 SECONDS (ref 9.8–12.8)
RBC # BLD AUTO: 4.38 X10*6/UL (ref 4–5.2)
RBC # UR STRIP.AUTO: NEGATIVE /UL
RETICS #: 0.06 X10*6/UL (ref 0.02–0.08)
RETICS/RBC NFR AUTO: 1.4 % (ref 0.5–2)
SODIUM SERPL-SCNC: 143 MMOL/L (ref 136–145)
SP GR UR STRIP.AUTO: 1.01
URATE SERPL-MCNC: 4.3 MG/DL (ref 2.3–6.7)
UROBILINOGEN UR STRIP.AUTO-MCNC: NORMAL MG/DL
WBC # BLD AUTO: 4.8 X10*3/UL (ref 4.4–11.3)

## 2024-08-06 PROCEDURE — 84550 ASSAY OF BLOOD/URIC ACID: CPT | Performed by: INTERNAL MEDICINE

## 2024-08-06 PROCEDURE — 83615 LACTATE (LD) (LDH) ENZYME: CPT | Performed by: INTERNAL MEDICINE

## 2024-08-06 PROCEDURE — 85730 THROMBOPLASTIN TIME PARTIAL: CPT | Performed by: INTERNAL MEDICINE

## 2024-08-06 PROCEDURE — 81003 URINALYSIS AUTO W/O SCOPE: CPT | Performed by: INTERNAL MEDICINE

## 2024-08-06 PROCEDURE — 93308 TTE F-UP OR LMTD: CPT

## 2024-08-06 PROCEDURE — 80053 COMPREHEN METABOLIC PANEL: CPT | Performed by: INTERNAL MEDICINE

## 2024-08-06 PROCEDURE — 87389 HIV-1 AG W/HIV-1&-2 AB AG IA: CPT | Performed by: INTERNAL MEDICINE

## 2024-08-06 PROCEDURE — 85025 COMPLETE CBC W/AUTO DIFF WBC: CPT | Performed by: INTERNAL MEDICINE

## 2024-08-06 PROCEDURE — 85610 PROTHROMBIN TIME: CPT | Performed by: INTERNAL MEDICINE

## 2024-08-06 PROCEDURE — 83010 ASSAY OF HAPTOGLOBIN QUANT: CPT | Performed by: INTERNAL MEDICINE

## 2024-08-06 PROCEDURE — 85379 FIBRIN DEGRADATION QUANT: CPT | Performed by: INTERNAL MEDICINE

## 2024-08-06 PROCEDURE — 87340 HEPATITIS B SURFACE AG IA: CPT | Performed by: INTERNAL MEDICINE

## 2024-08-06 PROCEDURE — 85045 AUTOMATED RETICULOCYTE COUNT: CPT | Performed by: INTERNAL MEDICINE

## 2024-08-06 PROCEDURE — 84100 ASSAY OF PHOSPHORUS: CPT | Performed by: INTERNAL MEDICINE

## 2024-08-06 PROCEDURE — 82150 ASSAY OF AMYLASE: CPT | Performed by: INTERNAL MEDICINE

## 2024-08-06 PROCEDURE — 83735 ASSAY OF MAGNESIUM: CPT | Performed by: INTERNAL MEDICINE

## 2024-08-06 PROCEDURE — 36591 DRAW BLOOD OFF VENOUS DEVICE: CPT

## 2024-08-06 PROCEDURE — 2500000004 HC RX 250 GENERAL PHARMACY W/ HCPCS (ALT 636 FOR OP/ED)

## 2024-08-06 PROCEDURE — 86803 HEPATITIS C AB TEST: CPT | Performed by: INTERNAL MEDICINE

## 2024-08-06 RX ORDER — HEPARIN 100 UNIT/ML
500 SYRINGE INTRAVENOUS AS NEEDED
OUTPATIENT
Start: 2024-08-06

## 2024-08-06 RX ORDER — PROCHLORPERAZINE MALEATE 10 MG
10 TABLET ORAL EVERY 6 HOURS PRN
COMMUNITY

## 2024-08-06 RX ORDER — CALCIUM CARB, CITRATE, MALATE 250 MG
CAPSULE ORAL
COMMUNITY

## 2024-08-06 RX ORDER — HEPARIN SODIUM,PORCINE/PF 10 UNIT/ML
50 SYRINGE (ML) INTRAVENOUS AS NEEDED
OUTPATIENT
Start: 2024-08-06

## 2024-08-06 RX ORDER — HEPARIN 100 UNIT/ML
5 SYRINGE INTRAVENOUS AS NEEDED
Status: COMPLETED | OUTPATIENT
Start: 2024-08-06 | End: 2024-08-06

## 2024-08-06 RX ORDER — MAGNESIUM GLYCINATE 100 MG
CAPSULE ORAL
COMMUNITY

## 2024-08-06 ASSESSMENT — ENCOUNTER SYMPTOMS
ABDOMINAL DISTENTION: 0
DIZZINESS: 0
CARDIOVASCULAR NEGATIVE: 1
WHEEZING: 0
MYALGIAS: 0
CHEST TIGHTNESS: 0
DIARRHEA: 0
CONSTITUTIONAL NEGATIVE: 1
BACK PAIN: 1
SCLERAL ICTERUS: 0
COUGH: 0
EYES NEGATIVE: 1
HEMATURIA: 0
NERVOUS/ANXIOUS: 0
NUMBNESS: 0
DYSURIA: 0
SLEEP DISTURBANCE: 0
DEPRESSION: 0
BLOOD IN STOOL: 0
LEG SWELLING: 0
HOT FLASHES: 0
BRUISES/BLEEDS EASILY: 0
APPETITE CHANGE: 0
DIAPHORESIS: 0
FREQUENCY: 0
ARTHRALGIAS: 0
CONFUSION: 0
NAUSEA: 1
FATIGUE: 0
CONSTIPATION: 0
FEVER: 0
HEADACHES: 0
RESPIRATORY NEGATIVE: 1
PALPITATIONS: 0
CHILLS: 0
ABDOMINAL PAIN: 0
HEMOPTYSIS: 0
SEIZURES: 0
ADENOPATHY: 0
SHORTNESS OF BREATH: 0
EYE PROBLEMS: 0
DIFFICULTY URINATING: 0
EXTREMITY WEAKNESS: 0

## 2024-08-06 NOTE — PROGRESS NOTES
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 she received prior Taxotere and cyclophosphamide , completed 6/2010. S/p adjuvant endocrine therapy.    Diagnosis of metastatic TNBC 3/2023, presenting with back pain and cord compression      weekly paclitaxel from 5/22/23, changed to abraxane after 1st cycle due to anaphylactic type reaction. Discontinued 12/12/23 due to progressive disease.    TDxD from 1/29/24, held for XRT to spine for a few wks and stopped on 6/27/24  s/p XRT to spine on 4/1/24    CURRENT THERAPY  None    HISTORY OF PRESENT ILLNESS    Trang Jones is a 64 y.o. woman here to screen for [id number] with a potential start date of 8/12/24.    Review of Systems   Constitutional: Negative.  Negative for appetite change, chills, diaphoresis, fatigue and fever.   HENT:  Negative.  Negative for hearing loss and lump/mass.    Eyes: Negative.  Negative for eye problems and icterus.   Respiratory: Negative.  Negative for chest tightness, cough, hemoptysis, shortness of breath and wheezing.    Cardiovascular: Negative.  Negative for chest pain, leg swelling and palpitations.   Gastrointestinal:  Positive for nausea. Negative for abdominal distention, abdominal pain, blood in stool, constipation and diarrhea.   Endocrine: Negative for hot flashes.   Genitourinary:  Negative for bladder incontinence, difficulty urinating, dyspareunia, dysuria, frequency and hematuria.    Musculoskeletal:  Positive for back pain. Negative for arthralgias, gait problem and myalgias.        ++hip pain   Neurological:  Negative for dizziness,  "extremity weakness, gait problem, headaches, numbness and seizures.   Hematological:  Negative for adenopathy. Does not bruise/bleed easily.   Psychiatric/Behavioral:  Negative for confusion, depression and sleep disturbance. The patient is not nervous/anxious.    All other systems reviewed and are negative.        Past Medical History:  has a past medical history of Breast cancer (Multi), Hypercholesteremia, Metastatic cancer (Multi), and Personal history of irradiation.  Surgical History:   has a past surgical history that includes CT guided percutaneous biopsy bone deep (2023); Mastectomy (Left, ); and Breast surgery.  Social History:   reports that she quit smoking about 21 years ago. Her smoking use included cigarettes. She has been exposed to tobacco smoke. She has never used smokeless tobacco. She reports that she does not currently use alcohol. She reports current drug use. Drug: Marijuana.  Family History:    Family History   Problem Relation Name Age of Onset    Leukemia Father       Family Oncology History:  Cancer-related family history is not on file.      OBJECTIVE    VS / Pain:  /75 (BP Location: Right arm, Patient Position: Sitting)   Pulse 53   Temp 37.1 °C (98.8 °F) (Oral)   Resp 16   Ht 1.544 m (5' 0.79\") Comment: verified with second RN  Wt 62.3 kg (137 lb 5.6 oz)   LMP  (LMP Unknown)   SpO2 96%   BMI 26.13 kg/m²   BSA: 1.63 meters squared  Pain Assessment: 0-10  0-10 (Numeric) Pain Score: 0 - No pain  Pain Scale: 3    Performance Status:  The ECOG performance scale today is ECO- Restricted in physically strenuous activity.  Carries out light duty.    Physical Exam  Constitutional:       General: She is not in acute distress.     Appearance: She is not ill-appearing, toxic-appearing or diaphoretic.   HENT:      Nose: No congestion or rhinorrhea.      Mouth/Throat:      Pharynx: No posterior oropharyngeal erythema.   Cardiovascular:      Rate and Rhythm: Normal rate and " regular rhythm.      Pulses: Normal pulses.      Heart sounds: Normal heart sounds. No murmur heard.     No gallop.   Pulmonary:      Breath sounds: Normal breath sounds.   Abdominal:      General: There is no distension.      Palpations: There is no mass.      Tenderness: There is no abdominal tenderness.   Musculoskeletal:         General: No swelling.      Cervical back: No rigidity.      Right lower leg: No edema.      Left lower leg: No edema.   Lymphadenopathy:      Cervical: No cervical adenopathy.   Skin:     General: Skin is warm.      Coloration: Skin is not cyanotic.      Findings: No bruising, ecchymosis or erythema.   Neurological:      General: No focal deficit present.      Mental Status: She is oriented to person, place, and time.      Cranial Nerves: Cranial nerves 2-12 are intact.      Motor: Motor function is intact.   Psychiatric:         Attention and Perception: Attention and perception normal.         Mood and Affect: Mood and affect normal.         Behavior: Behavior normal.         Thought Content: Thought content normal.         Judgment: Judgment normal.             Diagnostic Results   Updated CT planned during screening.    IMPRESSION/PLAN      1. 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  -Tempus XF shows CDKN2A which makes her a potential candidate for [id number]    2. Pain neoplasm related. Following with supportive onc.  Much better now. S/p xrt

## 2024-08-06 NOTE — RESEARCH NOTES
" [id number]><[id number]><SCREENING>    DCRU NURSING VISIT NOTE  Study Name: APRHARISH 1Y22  IRB#: [id number]  DCRU#: DCRU # [id number]  Protocol Version Dated: V5 3.7.24  PI: James Ram M.D    Time point: Screening     Encounter Date: 08/06/2024  Encounter Time:  8:00 AM EDT  Encounter Department: [facility] HEMATOLOGY AND ONCOLOGY     #1 Phone Pager   Richard Encarnacion   [phone] [address]      Study Regimen and Dosing   Part 1_Expansion, Part 1 Expansion, Part 2 - - Advanced Solid Tumors with DNA damage response mutations, failed at least one prior standard of care therapy and must be capable of oral administration of study medication.  Cycle = 28 days   ATRN-119 administered oral daily at least 1 hour prior to a meal      Admission and Prior to Starting Study Activities   Notify  when patient arrives to unit.   will obtain consent prior to other activities.  Complete DCRU/Osorio intake form in EMR.  Obtain height in centimeters - with shoes off.  Obtain weight in kilograms - with shoes off & heavy items removed.  Perform venipuncture or access mediport/central line (if available) for sample collection procedures.     Study Specific Instructions and Documentation   HEIGHT   WEIGHT  VITALS  ECG  LABS  DISCHARGE       Subjective   Trang Jones is a 64 y.o. female and is here for a Research clinical visit.    Visit Provider: GLADYS HOLLEY RN     Allergies:   Allergies   Allergen Reactions    Olanzapine Headache, Dizziness and Drowsiness    Paclitaxel Other     Scratchy throat, erythema face, back pain       Objective   Vital Signs:    Vitals:    08/06/24 0841   BP: 111/75   Pulse: 53   Resp: 16   Temp: 37.1 °C (98.8 °F)   TempSrc: Oral   SpO2: 96%   Weight: 62.3 kg (137 lb 5.6 oz)   Height: 1.544 m (5' 0.79\")  Comment: verified with second RN       Physical Exam     ASSESSMENT and PLAN:  Problem List Items Addressed This Visit          Hematology and " Neoplasia    Breast cancer (Multi)    Relevant Orders    Amylase    aPTT    CBC and Auto Differential    Comprehensive Metabolic Panel    D-Dimer, Non VTE    Haptoglobin    Hepatitis B Surface Antigen    Hepatitis C Antibody    HIV 1/2 Antigen/Antibody Screen with Reflex to Confirmation    Lactate Dehydrogenase    Magnesium    Phosphorus    Protime-INR    Reticulocytes    Uric Acid    Urinalysis with Reflex Microscopic (Completed)     Other Visit Diagnoses       Examination of participant in clinical trial        Relevant Orders    Amylase    aPTT    CBC and Auto Differential    Comprehensive Metabolic Panel    D-Dimer, Non VTE    Haptoglobin    Hepatitis B Surface Antigen    Hepatitis C Antibody    HIV 1/2 Antigen/Antibody Screen with Reflex to Confirmation    Lactate Dehydrogenase    Magnesium    Phosphorus    Protime-INR    Reticulocytes    Uric Acid    Urinalysis with Reflex Microscopic (Completed)             Medications as of the completion of today's visit:  Current Outpatient Medications   Medication Sig Dispense Refill    atorvastatin (Lipitor) 10 mg tablet Take 1 tablet (10 mg) by mouth once daily.      calcium carbonate (CALCIUM 600 ORAL) Take 600 mg by mouth once daily.      gabapentin (Neurontin) 300 mg capsule Take 1 capsule (300 mg) by mouth once daily in the morning AND 2 capsules (600 mg) once daily at bedtime. 270 capsule 3    lidocaine-prilocaine (Emla) 2.5-2.5 % cream apply topically to MEDIPORT SITE 45 MINUTES BEFORE ACCESSING PORT, USE COVER WITH BANDAID      MAGNESIUM ORAL Take 400 mg by mouth.      morphine CR (MS Contin) 15 mg 12 hr tablet Take 1 tablet (15 mg) by mouth 3 times a day. Do not crush, chew, or split. 90 tablet 0    ondansetron ODT (Zofran-ODT) 4 mg disintegrating tablet Take 1 tablet (4 mg) by mouth every 8 hours if needed for nausea or vomiting.      oxyCODONE (Roxicodone) 10 mg immediate release tablet Take 1 tablet (10 mg) by mouth every 6 hours if needed for moderate pain  (4 - 6). 120 tablet 0    propranolol (Inderal) 40 mg tablet Take 1 tablet (40 mg) by mouth once daily.      rizatriptan MLT (Maxalt-MLT) 5 mg disintegrating tablet take 1 tablet by mouth if needed MAY REPEAT IN 2 HOURS IF NEEDED       No current facility-administered medications for this encounter.       Administrations This Visit       heparin flush 100 unit/mL syringe 500 Units       Admin Date  08/06/2024 Action  Given Dose  500 Units Route  intra-catheter Documented By  Andrea Houser RN                    Orders placed during today's visit:  Orders Placed This Encounter   Procedures    Amylase     Standing Status:   Standing     Number of Occurrences:   1     Order Specific Question:   Release result to MyChart     Answer:   Immediate [1]    aPTT     Standing Status:   Standing     Number of Occurrences:   1     Order Specific Question:   Release result to MyChart     Answer:   Immediate [1]    CBC and Auto Differential     Standing Status:   Standing     Number of Occurrences:   1     Order Specific Question:   Release result to MyChart     Answer:   Immediate [1]    Comprehensive Metabolic Panel     Standing Status:   Standing     Number of Occurrences:   1     Order Specific Question:   Release result to MyChart     Answer:   Immediate [1]    D-Dimer, Non VTE     Standing Status:   Standing     Number of Occurrences:   1     Order Specific Question:   Release result to MyChart     Answer:   Immediate [1]    Haptoglobin     Standing Status:   Standing     Number of Occurrences:   1     Order Specific Question:   Release result to MyChart     Answer:   Immediate [1]    Hepatitis B Surface Antigen     Standing Status:   Standing     Number of Occurrences:   1     Order Specific Question:   Release result to MyChart     Answer:   Immediate [1]    Hepatitis C Antibody     Standing Status:   Standing     Number of Occurrences:   1     Order Specific Question:   Release result to MyChart     Answer:   Immediate [1]     HIV 1/2 Antigen/Antibody Screen with Reflex to Confirmation     Standing Status:   Standing     Number of Occurrences:   1     Order Specific Question:   Release result to MyChart     Answer:   Immediate [1]    Lactate Dehydrogenase     Standing Status:   Standing     Number of Occurrences:   1     Order Specific Question:   Release result to MyChart     Answer:   Immediate [1]    Magnesium     Standing Status:   Standing     Number of Occurrences:   1     Order Specific Question:   Release result to MyChart     Answer:   Immediate [1]    Phosphorus     Standing Status:   Standing     Number of Occurrences:   1     Order Specific Question:   Release result to MyChart     Answer:   Immediate [1]    Protime-INR     Standing Status:   Standing     Number of Occurrences:   1     Order Specific Question:   Release result to MyChart     Answer:   Immediate [1]    Reticulocytes     Standing Status:   Standing     Number of Occurrences:   1     Order Specific Question:   Release result to MyChart     Answer:   Immediate [1]    Uric Acid     Standing Status:   Standing     Number of Occurrences:   1     Order Specific Question:   Release result to MyChart     Answer:   Immediate [1]    Urinalysis with Reflex Microscopic     Standing Status:   Standing     Number of Occurrences:   1     Order Specific Question:   Release result to MyChart     Answer:   Immediate [1]        No study found      Vital Signs   Temperature, heart rate, respirations, blood pressure: in a sitting position at rest for at least 5 minutes.       12-Lead ECG   DCRU/[employer] 5500 ECG machine  Triplicate   Each 1 - 5 minutes apart   Rest supine at least 5 minutes prior   Rest Time  QTcF Average QTcF   0842 [phone]        Safety Labs   Refer to [facility] for orders  Disease specific tumor markers - check with coordinator if needed     Discharge Instructions   Discharge patient to home after study requirements completed.   Discharge time: 1002      GLADYS HOLLEY RN  08/06/24

## 2024-08-06 NOTE — RESEARCH NOTES
Research Note Screening    Trang Jones is here today to screen for [id number].   Consent reviewed and signed. Patient given copy.  Procedures completed per protocol. AE's and con-meds reviewed with patient.   Potential treatment start date is 8/12/24.    Education Documentation  Treatment Plan and Schedule, taught by Donte Encarnacion RN at 8/6/2024 12:38 PM.  Learner: Significant Other, Patient  Readiness: Eager  Method: Explanation  Response: Verbalizes Understanding    General Medication Information, taught by Donte Encarnacion RN at 8/6/2024 12:38 PM.  Learner: Significant Other, Patient  Readiness: Eager  Method: Explanation  Response: Verbalizes Understanding    Supportive Medications, taught by Donte Encarnacion RN at 8/6/2024 12:38 PM.  Learner: Significant Other, Patient  Readiness: Eager  Method: Explanation  Response: Verbalizes Understanding    Diagnostic Studies, taught by Donte Encarnacion RN at 8/6/2024 12:38 PM.  Learner: Significant Other, Patient  Readiness: Eager  Method: Explanation  Response: Verbalizes Understanding    Comprehensive Metabolic Panel (CMP), taught by Donte Encarnacion RN at 8/6/2024 12:38 PM.  Learner: Significant Other, Patient  Readiness: Eager  Method: Explanation  Response: Verbalizes Understanding    Complete Blood Count with Differential (CBC w/ Diff), taught by Donte Encarnacion RN at 8/6/2024 12:38 PM.  Learner: Significant Other, Patient  Readiness: Eager  Method: Explanation  Response: Verbalizes Understanding    When and How to Contact Clinic, taught by Donte Encarnacion RN at 8/6/2024 12:38 PM.  Learner: Significant Other, Patient  Readiness: Eager  Method: Explanation  Response: Verbalizes Understanding    Oriented to Facility, taught by Donte Encarnacion RN at 8/6/2024 12:38 PM.  Learner: Significant Other, Patient  Readiness: Eager  Method: Explanation  Response: Verbalizes Understanding    Education Comments  No comments found.

## 2024-08-06 NOTE — ADDENDUM NOTE
Encounter addended by: KELSEA Rodriguez-CNP on: 8/6/2024 2:17 PM   Actions taken: Order Reconciliation Section accessed, Home Medications modified, Medication List reviewed, SmartForm saved, Clinical Note Signed, Level of Service modified

## 2024-08-07 ENCOUNTER — HOSPITAL ENCOUNTER (OUTPATIENT)
Dept: RADIOLOGY | Facility: HOSPITAL | Age: 64
Discharge: HOME | End: 2024-08-07
Payer: COMMERCIAL

## 2024-08-07 ENCOUNTER — APPOINTMENT (OUTPATIENT)
Dept: INTEGRATIVE MEDICINE | Facility: CLINIC | Age: 64
End: 2024-08-07

## 2024-08-07 DIAGNOSIS — Z00.6 EXAMINATION OF PARTICIPANT IN CLINICAL TRIAL: ICD-10-CM

## 2024-08-07 DIAGNOSIS — C50.919 MALIGNANT NEOPLASM OF FEMALE BREAST, UNSPECIFIED ESTROGEN RECEPTOR STATUS, UNSPECIFIED LATERALITY, UNSPECIFIED SITE OF BREAST (MULTI): ICD-10-CM

## 2024-08-07 LAB — HAPTOGLOB SERPL-MCNC: 126 MG/DL (ref 37–246)

## 2024-08-07 PROCEDURE — 2550000001 HC RX 255 CONTRASTS: Performed by: INTERNAL MEDICINE

## 2024-08-07 PROCEDURE — 74177 CT ABD & PELVIS W/CONTRAST: CPT

## 2024-08-08 LAB
LAB AP ASR DISCLAIMER: NORMAL
LABORATORY COMMENT REPORT: NORMAL
PATH REPORT.ADDENDUM SPEC: NORMAL
PATH REPORT.FINAL DX SPEC: NORMAL
PATH REPORT.GROSS SPEC: NORMAL
PATH REPORT.TOTAL CANCER: NORMAL

## 2024-08-09 ENCOUNTER — DOCUMENTATION (OUTPATIENT)
Dept: RESEARCH | Facility: HOSPITAL | Age: 64
End: 2024-08-09
Payer: COMMERCIAL

## 2024-08-09 ENCOUNTER — EDUCATION (OUTPATIENT)
Dept: RESEARCH | Facility: HOSPITAL | Age: 64
End: 2024-08-09
Payer: COMMERCIAL

## 2024-08-09 DIAGNOSIS — C50.911 CARCINOMA OF RIGHT BREAST METASTATIC TO BONE (MULTI): Primary | ICD-10-CM

## 2024-08-09 DIAGNOSIS — C41.9 MALIGNANT NEOPLASM OF BONE WITH METASTASES (MULTI): ICD-10-CM

## 2024-08-09 DIAGNOSIS — C79.51 CARCINOMA OF RIGHT BREAST METASTATIC TO BONE (MULTI): Primary | ICD-10-CM

## 2024-08-09 RX ORDER — FAMOTIDINE 10 MG/ML
20 INJECTION INTRAVENOUS ONCE AS NEEDED
Status: CANCELLED | OUTPATIENT
Start: 2024-08-13

## 2024-08-09 RX ORDER — ALBUTEROL SULFATE 0.83 MG/ML
3 SOLUTION RESPIRATORY (INHALATION) AS NEEDED
Status: CANCELLED | OUTPATIENT
Start: 2024-08-13

## 2024-08-09 RX ORDER — EPINEPHRINE 0.3 MG/.3ML
0.3 INJECTION SUBCUTANEOUS EVERY 5 MIN PRN
Status: CANCELLED | OUTPATIENT
Start: 2024-08-14

## 2024-08-09 RX ORDER — FAMOTIDINE 10 MG/ML
20 INJECTION INTRAVENOUS ONCE AS NEEDED
Status: CANCELLED | OUTPATIENT
Start: 2024-08-14

## 2024-08-09 RX ORDER — ALBUTEROL SULFATE 0.83 MG/ML
3 SOLUTION RESPIRATORY (INHALATION) AS NEEDED
Status: CANCELLED | OUTPATIENT
Start: 2024-08-14

## 2024-08-09 RX ORDER — DIPHENHYDRAMINE HYDROCHLORIDE 50 MG/ML
50 INJECTION INTRAMUSCULAR; INTRAVENOUS AS NEEDED
Status: CANCELLED | OUTPATIENT
Start: 2024-08-14

## 2024-08-09 RX ORDER — DIPHENHYDRAMINE HYDROCHLORIDE 50 MG/ML
50 INJECTION INTRAMUSCULAR; INTRAVENOUS AS NEEDED
Status: CANCELLED | OUTPATIENT
Start: 2024-08-13

## 2024-08-09 RX ORDER — EPINEPHRINE 0.3 MG/.3ML
0.3 INJECTION SUBCUTANEOUS EVERY 5 MIN PRN
Status: CANCELLED | OUTPATIENT
Start: 2024-08-13

## 2024-08-12 ENCOUNTER — EDUCATION (OUTPATIENT)
Dept: HEMATOLOGY/ONCOLOGY | Facility: HOSPITAL | Age: 64
End: 2024-08-12
Payer: COMMERCIAL

## 2024-08-12 ENCOUNTER — HOSPITAL ENCOUNTER (OUTPATIENT)
Dept: RESEARCH | Facility: HOSPITAL | Age: 64
Discharge: HOME | End: 2024-08-12
Payer: COMMERCIAL

## 2024-08-12 VITALS
TEMPERATURE: 98.4 F | OXYGEN SATURATION: 99 % | DIASTOLIC BLOOD PRESSURE: 69 MMHG | HEART RATE: 58 BPM | SYSTOLIC BLOOD PRESSURE: 104 MMHG | RESPIRATION RATE: 16 BRPM

## 2024-08-12 DIAGNOSIS — C41.9 MALIGNANT NEOPLASM OF BONE WITH METASTASES (MULTI): ICD-10-CM

## 2024-08-12 DIAGNOSIS — C79.51 CARCINOMA OF RIGHT BREAST METASTATIC TO BONE (MULTI): Primary | ICD-10-CM

## 2024-08-12 DIAGNOSIS — C50.911 CARCINOMA OF RIGHT BREAST METASTATIC TO BONE (MULTI): Primary | ICD-10-CM

## 2024-08-12 LAB
ALBUMIN SERPL BCP-MCNC: 3.6 G/DL (ref 3.4–5)
ALP SERPL-CCNC: 63 U/L (ref 33–136)
ALT SERPL W P-5'-P-CCNC: 16 U/L (ref 7–45)
AMYLASE SERPL-CCNC: 37 U/L (ref 29–103)
ANION GAP SERPL CALC-SCNC: 12 MMOL/L (ref 10–20)
APPEARANCE UR: CLEAR
APTT PPP: 48 SECONDS (ref 27–38)
AST SERPL W P-5'-P-CCNC: 18 U/L (ref 9–39)
BASOPHILS # BLD AUTO: 0.02 X10*3/UL (ref 0–0.1)
BASOPHILS NFR BLD AUTO: 0.6 %
BILIRUB SERPL-MCNC: 0.5 MG/DL (ref 0–1.2)
BILIRUB UR STRIP.AUTO-MCNC: NEGATIVE MG/DL
BUN SERPL-MCNC: 14 MG/DL (ref 6–23)
CALCIUM SERPL-MCNC: 8.6 MG/DL (ref 8.6–10.6)
CANCER AG27-29 SERPL-ACNC: 100 U/ML (ref 0–38.6)
CHLORIDE SERPL-SCNC: 109 MMOL/L (ref 98–107)
CO2 SERPL-SCNC: 28 MMOL/L (ref 21–32)
COLOR UR: NORMAL
CREAT SERPL-MCNC: 0.59 MG/DL (ref 0.5–1.05)
D DIMER PPP FEU-MCNC: 543 NG/ML FEU
EGFRCR SERPLBLD CKD-EPI 2021: >90 ML/MIN/1.73M*2
EOSINOPHIL # BLD AUTO: 0.15 X10*3/UL (ref 0–0.7)
EOSINOPHIL NFR BLD AUTO: 4.3 %
ERYTHROCYTE [DISTWIDTH] IN BLOOD BY AUTOMATED COUNT: 13.7 % (ref 11.5–14.5)
GLUCOSE SERPL-MCNC: 98 MG/DL (ref 74–99)
GLUCOSE UR STRIP.AUTO-MCNC: NORMAL MG/DL
HCT VFR BLD AUTO: 36.8 % (ref 36–46)
HGB BLD-MCNC: 12.3 G/DL (ref 12–16)
HGB RETIC QN: 34 PG (ref 28–38)
HOLD SPECIMEN: NORMAL
IMM GRANULOCYTES # BLD AUTO: 0.02 X10*3/UL (ref 0–0.7)
IMM GRANULOCYTES NFR BLD AUTO: 0.6 % (ref 0–0.9)
IMMATURE RETIC FRACTION: 15.9 %
INR PPP: 0.9 (ref 0.9–1.1)
KETONES UR STRIP.AUTO-MCNC: NEGATIVE MG/DL
LDH SERPL L TO P-CCNC: 168 U/L (ref 84–246)
LEUKOCYTE ESTERASE UR QL STRIP.AUTO: NEGATIVE
LYMPHOCYTES # BLD AUTO: 1.11 X10*3/UL (ref 1.2–4.8)
LYMPHOCYTES NFR BLD AUTO: 31.6 %
MAGNESIUM SERPL-MCNC: 1.97 MG/DL (ref 1.6–2.4)
MCH RBC QN AUTO: 30.1 PG (ref 26–34)
MCHC RBC AUTO-ENTMCNC: 33.4 G/DL (ref 32–36)
MCV RBC AUTO: 90 FL (ref 80–100)
MONOCYTES # BLD AUTO: 0.33 X10*3/UL (ref 0.1–1)
MONOCYTES NFR BLD AUTO: 9.4 %
NEUTROPHILS # BLD AUTO: 1.88 X10*3/UL (ref 1.2–7.7)
NEUTROPHILS NFR BLD AUTO: 53.5 %
NITRITE UR QL STRIP.AUTO: NEGATIVE
NRBC BLD-RTO: 0 /100 WBCS (ref 0–0)
PH UR STRIP.AUTO: 5 [PH]
PHOSPHATE SERPL-MCNC: 4.6 MG/DL (ref 2.5–4.9)
PLATELET # BLD AUTO: 174 X10*3/UL (ref 150–450)
POTASSIUM SERPL-SCNC: 4.1 MMOL/L (ref 3.5–5.3)
PROT SERPL-MCNC: 5.2 G/DL (ref 6.4–8.2)
PROT UR STRIP.AUTO-MCNC: NEGATIVE MG/DL
PROTHROMBIN TIME: 10.4 SECONDS (ref 9.8–12.8)
RBC # BLD AUTO: 4.09 X10*6/UL (ref 4–5.2)
RBC # UR STRIP.AUTO: NEGATIVE /UL
RETICS #: 0.08 X10*6/UL (ref 0.02–0.08)
RETICS/RBC NFR AUTO: 2 % (ref 0.5–2)
SODIUM SERPL-SCNC: 145 MMOL/L (ref 136–145)
SP GR UR STRIP.AUTO: 1.01
URATE SERPL-MCNC: 3.8 MG/DL (ref 2.3–6.7)
UROBILINOGEN UR STRIP.AUTO-MCNC: NORMAL MG/DL
WBC # BLD AUTO: 3.5 X10*3/UL (ref 4.4–11.3)

## 2024-08-12 PROCEDURE — 86300 IMMUNOASSAY TUMOR CA 15-3: CPT

## 2024-08-12 PROCEDURE — 85045 AUTOMATED RETICULOCYTE COUNT: CPT

## 2024-08-12 PROCEDURE — 85379 FIBRIN DEGRADATION QUANT: CPT

## 2024-08-12 PROCEDURE — 84550 ASSAY OF BLOOD/URIC ACID: CPT

## 2024-08-12 PROCEDURE — 80053 COMPREHEN METABOLIC PANEL: CPT

## 2024-08-12 PROCEDURE — 85610 PROTHROMBIN TIME: CPT

## 2024-08-12 PROCEDURE — 85730 THROMBOPLASTIN TIME PARTIAL: CPT

## 2024-08-12 PROCEDURE — 36591 DRAW BLOOD OFF VENOUS DEVICE: CPT

## 2024-08-12 PROCEDURE — 82150 ASSAY OF AMYLASE: CPT

## 2024-08-12 PROCEDURE — 83010 ASSAY OF HAPTOGLOBIN QUANT: CPT

## 2024-08-12 PROCEDURE — 81003 URINALYSIS AUTO W/O SCOPE: CPT

## 2024-08-12 PROCEDURE — 83735 ASSAY OF MAGNESIUM: CPT

## 2024-08-12 PROCEDURE — 85025 COMPLETE CBC W/AUTO DIFF WBC: CPT

## 2024-08-12 PROCEDURE — 83615 LACTATE (LD) (LDH) ENZYME: CPT

## 2024-08-12 PROCEDURE — 84100 ASSAY OF PHOSPHORUS: CPT

## 2024-08-12 RX ORDER — HEPARIN 100 UNIT/ML
500 SYRINGE INTRAVENOUS AS NEEDED
Status: CANCELLED | OUTPATIENT
Start: 2024-08-12

## 2024-08-12 RX ORDER — EPINEPHRINE 1 MG/ML
0.3 INJECTION INTRAMUSCULAR; INTRAVENOUS; SUBCUTANEOUS EVERY 5 MIN PRN
Status: DISCONTINUED | OUTPATIENT
Start: 2024-08-12 | End: 2024-08-13 | Stop reason: HOSPADM

## 2024-08-12 RX ORDER — FAMOTIDINE 10 MG/ML
20 INJECTION INTRAVENOUS ONCE AS NEEDED
Status: DISCONTINUED | OUTPATIENT
Start: 2024-08-12 | End: 2024-08-13 | Stop reason: HOSPADM

## 2024-08-12 RX ORDER — HEPARIN 100 UNIT/ML
500 SYRINGE INTRAVENOUS AS NEEDED
Status: DISCONTINUED | OUTPATIENT
Start: 2024-08-12 | End: 2024-08-13 | Stop reason: HOSPADM

## 2024-08-12 RX ORDER — DIPHENHYDRAMINE HYDROCHLORIDE 50 MG/ML
50 INJECTION INTRAMUSCULAR; INTRAVENOUS AS NEEDED
Status: DISCONTINUED | OUTPATIENT
Start: 2024-08-12 | End: 2024-08-13 | Stop reason: HOSPADM

## 2024-08-12 RX ORDER — ALBUTEROL SULFATE 0.83 MG/ML
3 SOLUTION RESPIRATORY (INHALATION) AS NEEDED
Status: DISCONTINUED | OUTPATIENT
Start: 2024-08-12 | End: 2024-08-13 | Stop reason: HOSPADM

## 2024-08-12 RX ORDER — HEPARIN SODIUM,PORCINE/PF 10 UNIT/ML
50 SYRINGE (ML) INTRAVENOUS AS NEEDED
Status: DISCONTINUED | OUTPATIENT
Start: 2024-08-12 | End: 2024-08-13 | Stop reason: HOSPADM

## 2024-08-12 RX ORDER — HEPARIN SODIUM,PORCINE/PF 10 UNIT/ML
50 SYRINGE (ML) INTRAVENOUS AS NEEDED
Status: CANCELLED | OUTPATIENT
Start: 2024-08-12

## 2024-08-12 NOTE — RESEARCH NOTES
Research Note Treatment Day    Trang Jones is here today for treatment on [id number]. Today is C1D1. Procedures completed per protocol. AE's and con-meds reviewed with patient. Patient is aware of treatment p    [x]   Received treatment as planned   OR  []    Treatment delayed; patient calendar updated as required   Treatment delayed because:    []   AE    []   Physician Discretion    []   Clinical Deterioration or Progression     []   Other    Pt received pill diary and diary instructions. All reviewed.    Education Documentation  Treatment Plan and Schedule, taught by Donte Encarnacion RN at 8/12/2024 12:58 PM.  Learner: Significant Other, Patient  Readiness: Eager  Method: Explanation  Response: Verbalizes Understanding    General Medication Information, taught by Donte Encarnacion RN at 8/12/2024 12:58 PM.  Learner: Significant Other, Patient  Readiness: Eager  Method: Explanation  Response: Verbalizes Understanding    Supportive Medications, taught by Donte Encarnacion RN at 8/12/2024 12:58 PM.  Learner: Significant Other, Patient  Readiness: Eager  Method: Explanation  Response: Verbalizes Understanding    Oriented to Facility, taught by Donte Encarnacion RN at 8/12/2024 12:58 PM.  Learner: Significant Other, Patient  Readiness: Eager  Method: Explanation  Response: Verbalizes Understanding    Education Comments  No comments found.

## 2024-08-12 NOTE — RESEARCH NOTES
"RSH><[id number]><C1D1 AND C2D1>  DCRU NURSING VISIT NOTE  Study Name: APREA 1Y22  IRB#: [id number]  DCRU#: DCRU # [id number]  Protocol Version Dated: V5 3.7.24  PI: James Ram M.D    Time point: C1D1 or C2D1   Cycle 1    Encounter Date: 08/12/2024  Encounter Time:  7:00 AM EDT  Encounter Department: [facility] HEMATOLOGY AND ONCOLOGY     #1 Phone Pager   Richard Encarnacion   [phone] [address]      Study Regimen and Dosing   Refer to [place] Treatment Plan for orders  Part 1_Expansion - Advanced Solid Tumors with DNA damage response mutations, failed at least one prior standard of care therapy and must be capable of oral administration of study medication.  Cycle = 28 days.   ATRN-119 administered oral daily at least 1 hour prior to a meal      Dietary Guidelines   Regular Diet - D1 overnight fasting until 4 hours after administration - water is permitted     Admission and Prior to Starting Study Activities   Notify [facility] of patient arrival.  Complete DCRU and [facility] Admission/Visit Note.  Obtain weight in kilograms - with shoes off and heavy items removed. (C2 only)  Insert one peripheral IV line or access mediport for sample collection procedures (flush line with 5 - 10 mL normal saline following each blood draw).      Study Specific Instructions and Documentation   WEIGHT (C2 ONLY)  LABS  VITALS  ECG  RESEARCH CORRELATIVES   STUDY MED  LABS   VITALS  ECG  DISCHARGE     Safety Labs    Refer to [place] Treatment Plan for orders   Drawn @ 0735. 20g 3/4\" Valdez needle - R chest mediport    Safety Parameters and Special Instructions   ATRN-119 is a PARP inhibitor.   Potential Side Effects/Adverse Events: nausea, vomiting, myelosuppression, myalgia, hypertension, hypomagnesemia, dysgeusia, dyspepsia, insomnia, headache.      Subjective   Trang Jones is a 64 y.o. female and is here for a Research clinical visit.    Visit Provider: Jordan Randhawa RN     Allergies:   Allergies   Allergen Reactions    " Olanzapine Headache, Dizziness and Drowsiness    Paclitaxel Other     Scratchy throat, erythema face, back pain       Objective   Vital Signs:    Vitals:    08/12/24 0708 08/12/24 0900 08/12/24 1032 08/12/24 1130   BP: 106/70 118/77 112/73 105/70   Pulse: 58 54 58 57   Resp: 16 16 16 16   Temp: 36.8 °C (98.2 °F) 37 °C (98.6 °F) 36.8 °C (98.2 °F) 37 °C (98.6 °F)   TempSrc: Temporal Temporal Temporal Temporal   SpO2: 97% 98% 98% 97%    08/12/24 1328   BP: 104/69   Pulse: 58   Resp: 16   Temp: 36.9 °C (98.4 °F)   TempSrc: Temporal   SpO2: 99%       Physical Exam     ASSESSMENT and PLAN:  Problem List Items Addressed This Visit       Malignant neoplasm of bone with metastases (Multi)    Relevant Medications    sodium chloride 0.9 % bolus 500 mL    dextrose 5 % in water (D5W) bolus    diphenhydrAMINE (BENADryl) injection 50 mg    methylPREDNISolone sod succinate (SOLU-Medrol) 40 mg/mL injection 40 mg    famotidine PF (Pepcid) injection 20 mg    EPINEPHrine (Adrenalin) injection 0.3 mg    albuterol 2.5 mg /3 mL (0.083 %) nebulizer solution 3 mL    Study [id number] ATRN-119 100 mg capsule 800 mg (Completed)    heparin flush 10 unit/mL syringe 50 Units    heparin flush 100 unit/mL syringe 500 Units    alteplase (Cathflo Activase) injection 2 mg    Study [id number] ATRN-119 100 mg capsule    Other Relevant Orders    Cancer Antigen 27-29 (Completed)    Clinic Appointment Request    Infusion Appointment Request    Research collection: 3mL Lavender K2 EDTA - Research Collect ATRN-119/ATRN-157; Pre-Dose; Other (24 hours from dosing on C1D1 +/- 2 hours); Ambient; 8-10x    Research collection: 3mL Lavender K2 EDTA - Research Collect ATRN-119/ATRN-157; Post-Dose; EOI +15 minutes; +/- 5 minutes; Ambient; 8-10x    Research collection: 3mL Lavender K2 EDTA - Research Collect ATRN-119/ATRN-157; Post-Dose; EOI +30 minutes; +/- 5 minutes; Ambient; 8-10x    Research collection: 3mL Lavender K2 EDTA - Research Collect ATRN-119/ATRN-157;  Post-Dose; EOI +1 hour; +/- 5 minutes; Ambient; 8-10x    Research collection: 3mL Lavender K2 EDTA - Research Collect ATRN-119/ATRN-157; Post-Dose; EOI +2 hours; +/- 15 minutes; Ambient; 8-10x    Research collection: 3mL Lavender K2 EDTA - Research Collect ATRN-119/ATRN-157; Post-Dose; EOI +4 hours; +/- 15 minutes; Ambient; 8-10x    Research collection: 3mL Lavender K2 EDTA - Research Collect ATRN-119/ATRN-157; Post-Dose; EOI +6 hours; +/- 15 minutes; Ambient; 8-10x    Research collection: 3mL Lavender K2 EDTA - Research Collect ATRN-119/ATRN-157; Post-Dose; EOI +8 hours; +/- 15 minutes; Ambient; 8-10x    Clinic Appointment Request    Infusion Appointment Request    Research collection: 3mL Lavender K2 EDTA - Research Collect ATRN-119/ATRN-157; Pre-Dose; Other (24 hours from dosing on C1D2 +/- 2 hours); Ambient; 8-10x    CBC and Auto Differential (Completed)    Comprehensive metabolic panel (Completed)    Amylase (Completed)    APTT (Completed)    D-dimer, Quantitative (Completed)    Haptoglobin    Lactate dehydrogenase (Completed)    Magnesium (Completed)    Phosphorus (Completed)    Protime-INR (Completed)    Reticulocytes (Completed)    Uric Acid (Completed)    Urinalysis with Reflex Microscopic (Completed)    Cancer Antigen 27-29 (Completed)    Nursing Communication - Vascular Access (Completed)    Venous Access, CVAD    CENTRAL VENOUS LINE DRESSING CHANGE - ADULT    Insert peripheral IV    Convert IV to saline lock    Treatment Conditions (Completed)    Provider Communication - [id number] (Completed)    Research Triplicate ECG (Completed)    Research Triplicate ECG (Completed)    Research Triplicate ECG (Completed)    Research Triplicate ECG (Completed)    Adult diet Regular    Research Communication (Completed)    Nursing Communication - Hypersensitivity Management, Moderate (Completed)    Nursing Communication - Hypersensitivity Management, Severe (Completed)    Nursing Communication - Respiratory Management  (Completed)    Pulse oximetry, continuous (Completed)    Research collection: 3mL Lavender K2 EDTA - Research Collect ATRN-119/ATRN-157; Pre-Dose; Within 1 hour; Ambient; 8-10x (Completed)    Research collection: 3mL Lavender K2 EDTA - Research Collect ATRN-119/ATRN-157; Post-Dose; EOI +15 minutes; +/- 5 minutes; Ambient; 8-10x (Completed)    Research collection: 3mL Lavender K2 EDTA - Research Collect ATRN-119/ATRN-157; Post-Dose; EOI +30 minutes; +/- 5 minutes; Ambient; 8-10x (Completed)    Research collection: 3mL Lavender K2 EDTA - Research Collect ATRN-119/ATRN-157; Post-Dose; EOI +1 hour; +/- 5 minutes; Ambient; 8-10x (Completed)    Research collection: 3mL Lavender K2 EDTA - Research Collect ATRN-119/ATRN-157; Post-Dose; EOI +2 hours; +/- 15 minutes; Ambient; 8-10x (Completed)    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    Relevant Medications    sodium chloride 0.9 % bolus 500 mL    dextrose 5 % in water (D5W) bolus    diphenhydrAMINE (BENADryl) injection 50 mg    methylPREDNISolone sod succinate (SOLU-Medrol) 40 mg/mL injection 40 mg    famotidine PF (Pepcid) injection 20 mg    EPINEPHrine (Adrenalin) injection 0.3 mg    albuterol 2.5 mg /3 mL (0.083 %) nebulizer solution 3 mL    Study [id number] ATRN-119 100 mg capsule 800 mg (Completed)    Study [id number] ATRN-119 100 mg capsule    Other Relevant Orders    Cancer Antigen 27-29 (Completed)    Clinic Appointment Request    Infusion Appointment Request    Research collection: 3mL Lavender K2 EDTA - Research Collect ATRN-119/ATRN-157; Pre-Dose; Other (24 hours from dosing on C1D1 +/- 2 hours); Ambient; 8-10x    Research collection: 3mL Lavender K2 EDTA - Research Collect ATRN-119/ATRN-157; Post-Dose; EOI +15 minutes; +/- 5 minutes; Ambient; 8-10x    Research collection: 3mL Lavender K2 EDTA - Research Collect ATRN-119/ATRN-157; Post-Dose; EOI +30 minutes; +/- 5 minutes; Ambient; 8-10x    Research collection: 3mL Lavender K2 EDTA -  Research Collect ATRN-119/ATRN-157; Post-Dose; EOI +1 hour; +/- 5 minutes; Ambient; 8-10x    Research collection: 3mL Lavender K2 EDTA - Research Collect ATRN-119/ATRN-157; Post-Dose; EOI +2 hours; +/- 15 minutes; Ambient; 8-10x    Research collection: 3mL Lavender K2 EDTA - Research Collect ATRN-119/ATRN-157; Post-Dose; EOI +4 hours; +/- 15 minutes; Ambient; 8-10x    Research collection: 3mL Lavender K2 EDTA - Research Collect ATRN-119/ATRN-157; Post-Dose; EOI +6 hours; +/- 15 minutes; Ambient; 8-10x    Research collection: 3mL Lavender K2 EDTA - Research Collect ATRN-119/ATRN-157; Post-Dose; EOI +8 hours; +/- 15 minutes; Ambient; 8-10x    Clinic Appointment Request    Infusion Appointment Request    Research collection: 3mL Lavender K2 EDTA - Research Collect ATRN-119/ATRN-157; Pre-Dose; Other (24 hours from dosing on C1D2 +/- 2 hours); Ambient; 8-10x    CBC and Auto Differential (Completed)    Comprehensive metabolic panel (Completed)    Amylase (Completed)    APTT (Completed)    D-dimer, Quantitative (Completed)    Haptoglobin    Lactate dehydrogenase (Completed)    Magnesium (Completed)    Phosphorus (Completed)    Protime-INR (Completed)    Reticulocytes (Completed)    Uric Acid (Completed)    Urinalysis with Reflex Microscopic (Completed)    Cancer Antigen 27-29 (Completed)    Treatment Conditions (Completed)    Provider Communication - [id number] (Completed)    Research Triplicate ECG (Completed)    Research Triplicate ECG (Completed)    Research Triplicate ECG (Completed)    Research Triplicate ECG (Completed)    Adult diet Regular    Research Communication (Completed)    Nursing Communication - Hypersensitivity Management, Moderate (Completed)    Nursing Communication - Hypersensitivity Management, Severe (Completed)    Nursing Communication - Respiratory Management (Completed)    Pulse oximetry, continuous (Completed)    Research collection: 3mL Lavender K2 EDTA - Research Collect ATRN-119/ATRN-157;  Pre-Dose; Within 1 hour; Ambient; 8-10x (Completed)    Research collection: 3mL Lavender K2 EDTA - Research Collect ATRN-119/ATRN-157; Post-Dose; EOI +15 minutes; +/- 5 minutes; Ambient; 8-10x (Completed)    Research collection: 3mL Lavender K2 EDTA - Research Collect ATRN-119/ATRN-157; Post-Dose; EOI +30 minutes; +/- 5 minutes; Ambient; 8-10x (Completed)    Research collection: 3mL Lavender K2 EDTA - Research Collect ATRN-119/ATRN-157; Post-Dose; EOI +1 hour; +/- 5 minutes; Ambient; 8-10x (Completed)    Research collection: 3mL Lavender K2 EDTA - Research Collect ATRN-119/ATRN-157; Post-Dose; EOI +2 hours; +/- 15 minutes; Ambient; 8-10x (Completed)        Medications as of the completion of today's visit:  Current Outpatient Medications   Medication Sig Dispense Refill    atorvastatin (Lipitor) 10 mg tablet Take 1 tablet (10 mg) by mouth once daily.      calcium carbonate (CALCIUM 600 ORAL) Take 600 mg by mouth once daily.      cholecalciferol, vitD3,/vit K2 (vitamin D3-vitamin K2) 125 mcg (5,000 unit)-100 mcg capsule Take by mouth.      gabapentin (Neurontin) 300 mg capsule Take 1 capsule (300 mg) by mouth once daily in the morning AND 2 capsules (600 mg) once daily at bedtime. 270 capsule 3    lidocaine-prilocaine (Emla) 2.5-2.5 % cream apply topically to MEDIPORT SITE 45 MINUTES BEFORE ACCESSING PORT, USE COVER WITH BANDAID      MAGNESIUM ORAL Take 400 mg by mouth.      morphine CR (MS Contin) 15 mg 12 hr tablet Take 1 tablet (15 mg) by mouth 3 times a day. Do not crush, chew, or split. 90 tablet 0    ondansetron ODT (Zofran-ODT) 4 mg disintegrating tablet Take 1 tablet (4 mg) by mouth every 8 hours if needed for nausea or vomiting.      oxyCODONE (Roxicodone) 10 mg immediate release tablet Take 1 tablet (10 mg) by mouth every 6 hours if needed for moderate pain (4 - 6). 120 tablet 0    prochlorperazine (Compazine) 10 mg tablet Take 1 tablet (10 mg) by mouth every 6 hours if needed for nausea or vomiting.       propranolol (Inderal) 40 mg tablet Take 1 tablet (40 mg) by mouth once daily.      rizatriptan MLT (Maxalt-MLT) 5 mg disintegrating tablet take 1 tablet by mouth if needed MAY REPEAT IN 2 HOURS IF NEEDED      Study [id number] ATRN-119 100 mg capsule Take 8 capsules (800 mg total) by mouth once daily for 28 days.  Swallow whole. Take at least 1 hour prior to a meal. Do not eat grapefruit or drink grapefruit juice. 224 capsule 0    zinc glycinate 30 mg capsule Take by mouth.       Current Facility-Administered Medications   Medication Dose Route Frequency Provider Last Rate Last Admin    albuterol 2.5 mg /3 mL (0.083 %) nebulizer solution 3 mL  3 mL nebulization PRN ERROL Rodriguez        alteplase (Cathflo Activase) injection 2 mg  2 mg intra-catheter PRN Bebeto Tolbert MD        dextrose 5 % in water (D5W) bolus  500 mL intravenous PRN ERROL Rodriguez        diphenhydrAMINE (BENADryl) injection 50 mg  50 mg intravenous PRN KELSEA Rodriguez-CNP        EPINEPHrine (Adrenalin) injection 0.3 mg  0.3 mg intramuscular q5 min PRN ERROL Rodriguez        famotidine PF (Pepcid) injection 20 mg  20 mg intravenous Once PRN KELSEA Rodriguez-CNP        heparin flush 10 unit/mL syringe 50 Units  50 Units intra-catheter PRN Bebeto Tolbert MD        heparin flush 100 unit/mL syringe 500 Units  500 Units intra-catheter PRN Bebeto Tolbert MD        methylPREDNISolone sod succinate (SOLU-Medrol) 40 mg/mL injection 40 mg  40 mg intravenous PRN ERROL Rodriguez        perflutren lipid microspheres (Definity) injection 0.5-10 mL of dilution  0.5-10 mL of dilution intravenous Once Israel Whalen MD        sodium chloride 0.9 % bolus 500 mL  500 mL intravenous PRN ERROL Rodriguez           Administrations This Visit       Study [id number] ATRN-119 100 mg capsule 800 mg       Admin Date  08/12/2024 Action  Given Dose  800 mg Route  oral Documented By  Jordan Randhawa RN                     Orders placed during today's visit:  Orders Placed This Encounter   Procedures    Cancer Antigen 27-29     Standing Status:   Future     Number of Occurrences:   1     Standing Expiration Date:   8/12/2025     Order Specific Question:   Release result to [facility]     Answer:   Immediate [1]    Research collection: 3mL Lavender K2 EDTA - Research Collect ATRN-119/ATRN-157; Pre-Dose; Other (24 hours from dosing on C1D1 +/- 2 hours); Ambient; 8-10x     Standing Status:   Future     Standing Expiration Date:   8/13/2025     Order Specific Question:   Test     Answer:   ATRN-119/ATRN-157     Order Specific Question:   Timepoint     Answer:   Pre-Dose     Order Specific Question:   Pre-Dose     Answer:   Other     Comments:   24 hours from dosing on C1D1 +/- 2 hours     Order Specific Question:   Temperature     Answer:   Ambient     Order Specific Question:   Invert     Answer:   8-10x     Order Specific Question:   Optional?     Answer:   No    Research collection: 3mL Lavender K2 EDTA - Research Collect ATRN-119/ATRN-157; Post-Dose; EOI +15 minutes; +/- 5 minutes; Ambient; 8-10x     Standing Status:   Future     Standing Expiration Date:   8/13/2025     Order Specific Question:   Test     Answer:   ATRN-119/ATRN-157     Order Specific Question:   Timepoint     Answer:   Post-Dose     Order Specific Question:   Post-Dose     Answer:   EOI +15 minutes     Order Specific Question:   Post-Dose Window     Answer:   +/- 5 minutes     Order Specific Question:   Temperature     Answer:   Ambient     Order Specific Question:   Invert     Answer:   8-10x     Order Specific Question:   Optional?     Answer:   No    Research collection: 3mL Lavender K2 EDTA - Research Collect ATRN-119/ATRN-157; Post-Dose; EOI +30 minutes; +/- 5 minutes; Ambient; 8-10x     Standing Status:   Future     Standing Expiration Date:   8/13/2025     Order Specific Question:   Test     Answer:   ATRN-119/ATRN-157     Order Specific Question:    Timepoint     Answer:   Post-Dose     Order Specific Question:   Post-Dose     Answer:   EOI +30 minutes     Order Specific Question:   Post-Dose Window     Answer:   +/- 5 minutes     Order Specific Question:   Temperature     Answer:   Ambient     Order Specific Question:   Invert     Answer:   8-10x     Order Specific Question:   Optional?     Answer:   No    Research collection: 3mL Lavender K2 EDTA - Research Collect ATRN-119/ATRN-157; Post-Dose; EOI +1 hour; +/- 5 minutes; Ambient; 8-10x     Standing Status:   Future     Standing Expiration Date:   8/13/2025     Order Specific Question:   Test     Answer:   ATRN-119/ATRN-157     Order Specific Question:   Timepoint     Answer:   Post-Dose     Order Specific Question:   Post-Dose     Answer:   EOI +1 hour     Order Specific Question:   Post-Dose Window     Answer:   +/- 5 minutes     Order Specific Question:   Temperature     Answer:   Ambient     Order Specific Question:   Invert     Answer:   8-10x     Order Specific Question:   Optional?     Answer:   No    Research collection: 3mL Lavender K2 EDTA - Research Collect ATRN-119/ATRN-157; Post-Dose; EOI +2 hours; +/- 15 minutes; Ambient; 8-10x     Standing Status:   Future     Standing Expiration Date:   8/13/2025     Order Specific Question:   Test     Answer:   ATRN-119/ATRN-157     Order Specific Question:   Timepoint     Answer:   Post-Dose     Order Specific Question:   Post-Dose     Answer:   EOI +2 hours     Order Specific Question:   Post-Dose Window     Answer:   +/- 15 minutes     Order Specific Question:   Temperature     Answer:   Ambient     Order Specific Question:   Invert     Answer:   8-10x     Order Specific Question:   Optional?     Answer:   No    Research collection: 3mL Lavender K2 EDTA - Research Collect ATRN-119/ATRN-157; Post-Dose; EOI +4 hours; +/- 15 minutes; Ambient; 8-10x     Standing Status:   Future     Standing Expiration Date:   8/13/2025     Order Specific Question:   Test      Answer:   ATRN-119/ATRN-157     Order Specific Question:   Timepoint     Answer:   Post-Dose     Order Specific Question:   Post-Dose     Answer:   EOI +4 hours     Order Specific Question:   Post-Dose Window     Answer:   +/- 15 minutes     Order Specific Question:   Temperature     Answer:   Ambient     Order Specific Question:   Invert     Answer:   8-10x     Order Specific Question:   Optional?     Answer:   No    Research collection: 3mL Lavender K2 EDTA - Research Collect ATRN-119/ATRN-157; Post-Dose; EOI +6 hours; +/- 15 minutes; Ambient; 8-10x     Standing Status:   Future     Standing Expiration Date:   8/13/2025     Order Specific Question:   Test     Answer:   ATRN-119/ATRN-157     Order Specific Question:   Timepoint     Answer:   Post-Dose     Order Specific Question:   Post-Dose     Answer:   EOI +6 hours     Order Specific Question:   Post-Dose Window     Answer:   +/- 15 minutes     Order Specific Question:   Temperature     Answer:   Ambient     Order Specific Question:   Invert     Answer:   8-10x     Order Specific Question:   Optional?     Answer:   No    Research collection: 3mL Lavender K2 EDTA - Research Collect ATRN-119/ATRN-157; Post-Dose; EOI +8 hours; +/- 15 minutes; Ambient; 8-10x     Standing Status:   Future     Standing Expiration Date:   8/13/2025     Order Specific Question:   Test     Answer:   ATRN-119/ATRN-157     Order Specific Question:   Timepoint     Answer:   Post-Dose     Order Specific Question:   Post-Dose     Answer:   EOI +8 hours     Order Specific Question:   Post-Dose Window     Answer:   +/- 15 minutes     Order Specific Question:   Temperature     Answer:   Ambient     Order Specific Question:   Invert     Answer:   8-10x     Order Specific Question:   Optional?     Answer:   No    Research collection: 3mL Lavender K2 EDTA - Research Collect ATRN-119/ATRN-157; Pre-Dose; Other (24 hours from dosing on C1D2 +/- 2 hours); Ambient; 8-10x     Standing Status:   Future      Standing Expiration Date:   8/14/2025     Order Specific Question:   Test     Answer:   ATRN-119/ATRN-157     Order Specific Question:   Timepoint     Answer:   Pre-Dose     Order Specific Question:   Pre-Dose     Answer:   Other     Comments:   24 hours from dosing on C1D2 +/- 2 hours     Order Specific Question:   Temperature     Answer:   Ambient     Order Specific Question:   Invert     Answer:   8-10x     Order Specific Question:   Optional?     Answer:   No    CBC and Auto Differential     Standing Status:   Standing     Number of Occurrences:   1     Order Specific Question:   Release result to MyChart     Answer:   Immediate [1]    Comprehensive metabolic panel     Standing Status:   Standing     Number of Occurrences:   1     Order Specific Question:   Release result to MyChart     Answer:   Immediate [1]    Amylase     Standing Status:   Standing     Number of Occurrences:   1     Order Specific Question:   Release result to MyChart     Answer:   Immediate [1]    APTT     Standing Status:   Standing     Number of Occurrences:   1     Order Specific Question:   Release result to MyChart     Answer:   Immediate [1]    D-dimer, Quantitative     Standing Status:   Standing     Number of Occurrences:   1     Order Specific Question:   Release result to MyChart     Answer:   Immediate [1]    Haptoglobin     Standing Status:   Standing     Number of Occurrences:   1     Order Specific Question:   Release result to MyChart     Answer:   Immediate [1]    Lactate dehydrogenase     Standing Status:   Standing     Number of Occurrences:   1     Order Specific Question:   Release result to MyChart     Answer:   Immediate [1]    Magnesium     Standing Status:   Standing     Number of Occurrences:   1     Order Specific Question:   Release result to MyChart     Answer:   Immediate [1]    Phosphorus     Standing Status:   Standing     Number of Occurrences:   1     Order Specific Question:   Release result to MyChart      Answer:   Immediate [1]    Protime-INR     Standing Status:   Standing     Number of Occurrences:   1     Order Specific Question:   Release result to MyChart     Answer:   Immediate [1]    Reticulocytes     Standing Status:   Standing     Number of Occurrences:   1     Order Specific Question:   Release result to MyChart     Answer:   Immediate [1]    Uric Acid     Standing Status:   Standing     Number of Occurrences:   1     Order Specific Question:   Release result to MyChart     Answer:   Immediate [1]    Urinalysis with Reflex Microscopic     Standing Status:   Standing     Number of Occurrences:   1     Order Specific Question:   Release result to MyChart     Answer:   Immediate [1]    Cancer Antigen 27-29     Standing Status:   Standing     Number of Occurrences:   1     Order Specific Question:   Release result to MyChart     Answer:   Immediate [1]    Research collection: 3mL Lavender K2 EDTA - Research Collect ATRN-119/ATRN-157; Pre-Dose; Within 1 hour; Ambient; 8-10x     Standing Status:   Standing     Number of Occurrences:   1     Order Specific Question:   Test     Answer:   ATRN-119/ATRN-157     Order Specific Question:   Timepoint     Answer:   Pre-Dose     Order Specific Question:   Pre-Dose     Answer:   Within 1 hour     Order Specific Question:   Temperature     Answer:   Ambient     Order Specific Question:   Invert     Answer:   8-10x     Order Specific Question:   Optional?     Answer:   No    Research collection: 3mL Lavender K2 EDTA - Research Collect ATRN-119/ATRN-157; Post-Dose; EOI +15 minutes; +/- 5 minutes; Ambient; 8-10x     Standing Status:   Standing     Number of Occurrences:   1     Order Specific Question:   Test     Answer:   ATRN-119/ATRN-157     Order Specific Question:   Timepoint     Answer:   Post-Dose     Order Specific Question:   Post-Dose     Answer:   EOI +15 minutes     Order Specific Question:   Post-Dose Window     Answer:   +/- 5 minutes     Order Specific  Question:   Temperature     Answer:   Ambient     Order Specific Question:   Invert     Answer:   8-10x     Order Specific Question:   Optional?     Answer:   No    Research collection: 3mL Lavender K2 EDTA - Research Collect ATRN-119/ATRN-157; Post-Dose; EOI +30 minutes; +/- 5 minutes; Ambient; 8-10x     Standing Status:   Standing     Number of Occurrences:   1     Order Specific Question:   Test     Answer:   ATRN-119/ATRN-157     Order Specific Question:   Timepoint     Answer:   Post-Dose     Order Specific Question:   Post-Dose     Answer:   EOI +30 minutes     Order Specific Question:   Post-Dose Window     Answer:   +/- 5 minutes     Order Specific Question:   Temperature     Answer:   Ambient     Order Specific Question:   Invert     Answer:   8-10x     Order Specific Question:   Optional?     Answer:   No    Research collection: 3mL Lavender K2 EDTA - Research Collect ATRN-119/ATRN-157; Post-Dose; EOI +1 hour; +/- 5 minutes; Ambient; 8-10x     Standing Status:   Standing     Number of Occurrences:   1     Order Specific Question:   Test     Answer:   ATRN-119/ATRN-157     Order Specific Question:   Timepoint     Answer:   Post-Dose     Order Specific Question:   Post-Dose     Answer:   EOI +1 hour     Order Specific Question:   Post-Dose Window     Answer:   +/- 5 minutes     Order Specific Question:   Temperature     Answer:   Ambient     Order Specific Question:   Invert     Answer:   8-10x     Order Specific Question:   Optional?     Answer:   No    Research collection: 3mL Lavender K2 EDTA - Research Collect ATRN-119/ATRN-157; Post-Dose; EOI +2 hours; +/- 15 minutes; Ambient; 8-10x     Standing Status:   Standing     Number of Occurrences:   1     Order Specific Question:   Test     Answer:   ATRN-119/ATRN-157     Order Specific Question:   Timepoint     Answer:   Post-Dose     Order Specific Question:   Post-Dose     Answer:   EOI +2 hours     Order Specific Question:   Post-Dose Window     Answer:    +/- 15 minutes     Order Specific Question:   Temperature     Answer:   Ambient     Order Specific Question:   Invert     Answer:   8-10x     Order Specific Question:   Optional?     Answer:   No    Adult diet Regular     Standing Status:   Standing     Number of Occurrences:   1     Order Specific Question:   Diet type     Answer:   Regular    Nursing Communication - Vascular Access     Refer to the vascular access device resource page and the following policies for additional information on line insertion, maintenance and removal.    CP-148 - Central Vascular Access Device Insertion, Maintenance, and Removal, Adult  NP-28 - Midline Cathter Maintenance & Removal, Adult  NP-34 - High-flow Catheters, (e.g. Hemodialysis, Apheresis, and Continuous Renal Replacement Therapy [CRRT]), Care and Utilization, Adult  NP-39 - Peripheral Intravenous Catheter (PIV) Insertion, Maintenance, Blood Collection & Removal - Adult     Standing Status:   Standing     Number of Occurrences:   1    CENTRAL VENOUS LINE DRESSING CHANGE - ADULT     Standing Status:   Standing     Number of Occurrences:   1    Treatment Conditions     Hold treatment and notify provider if:  · Adverse Event GREATER THAN OR EQUAL TO Grade 2     Standing Status:   Standing     Number of Occurrences:   1    Provider Communication - [id number]     · Cohort 1            · ATRN-119 50 mg daily  · Cohort 2            · ATRN-119 100 mg daily  · Cohort 3            · ATRN-119 200 mg daily  · Cohort 4            · ATRN-119 350 mg daily  · Cohort 5            · ATRN-119 550 mg daily  · Cohort 6            · ATRN-119 800 mg daily  · Cohort 7            · ATRN-119 1100 mg daily  · Cohort 8            · ATRN-119 1300 mg daily     Standing Status:   Standing     Number of Occurrences:   1    Research Triplicate ECG     Refer to study Ajay for instructions and documentation of the research triplicate ECG.     Standing Status:   Standing     Number of Occurrences:   1     Research Triplicate ECG     Refer to study SmartPhrase for instructions and documentation of the research triplicate ECG.     Standing Status:   Standing     Number of Occurrences:   1    Research Triplicate ECG     Refer to study SmartPhrase for instructions and documentation of the research triplicate ECG.     Standing Status:   Standing     Number of Occurrences:   1    Research Triplicate ECG     Refer to study SmartPhrase for instructions and documentation of the research triplicate ECG.     Standing Status:   Standing     Number of Occurrences:   1    Research Communication     Cohort: 6 Part 1  Dose Level: 800 mg flat dose     Standing Status:   Standing     Number of Occurrences:   1    Nursing Communication - Hypersensitivity Management, Moderate     Flushing, rash, pruritus, dyspnea, chest discomfort, back pain, angioedema, SBP LESS THAN 90 mmHg, and/or change in mental status above or below patient's baseline. In the event of moderate or severe hypersensitivity reaction to any medication:   Stop infusion.  Assess vital signs, pulse oximetry, nursing assessment, and patient complaints.  Administer medications per Hypersensitivity Reaction Medication Protocol.   Notify physician.     Standing Status:   Standing     Number of Occurrences:   1    Nursing Communication - Hypersensitivity Management, Severe     Worsening of moderate reaction symptoms, SBP LESS THAN 80 mmHg, and/or respiratory distress (respirations GREATER THAN 40 breaths per minute, wheezing, life-threatening symptoms). In the event of moderate or severe hypersensitivity reaction to any medication:   Stop infusion.  Assess vital signs, pulse oximetry, nursing assessment, and patient complaints.  Administer medications per Hypersensitivity Reaction Medication Protocol.   Notify physician.     Standing Status:   Standing     Number of Occurrences:   1    Nursing Communication - Respiratory Management     Oxygen via nasal cannula at 4 L per  "minute for respiratory rate GREATER THAN OR EQUAL TO to 28 breaths/minute and/or wheezing. Pulse Oximetry continuous monitoring.     Standing Status:   Standing     Number of Occurrences:   1    Pulse oximetry, continuous     Continuous monitoring to maintain SPO2 GREATER THAN 90%     Standing Status:   Standing     Number of Occurrences:   1    Venous Access, CVAD     Standing Status:   Standing     Number of Occurrences:   1    Insert peripheral IV     Obtain venous access for treatment via PIV if no CVAD access or if unable to obtain blood return from CVAD.     Standing Status:   Standing     Number of Occurrences:   1    Convert IV to saline lock     Only if venous access is required over consecutive days. Peripheral catheter can remain in place until completion of last consecutive day infusion, unless IV-related complications are encountered.     Standing Status:   Standing     Number of Occurrences:   1        [id number] - Study Of ATRN-119 In Patients With Advanced Solid Tumors    Patient has no adverse events documented in the Research Adverse Events activity.      Criteria to Treat   [facility] RN reviewed and meets eligibility per treatment plan   Time team notified: 0830 am  [facility] RN notifies study team to review eligibility and approval before dosing procedures  Time team approves: 0830 am     Pre-dose Vital Signs   Temperature, heart rate, respirations, blood pressure: in a sitting position at rest for at least 5 minutes.    Up to 60 minutes prior to dosing     @ 0900 - VS table above     Pre-dose 12-Lead ECG   [facility]/[employer] 5500 ECG machine  Triplicate   Each 1 - 5 minutes apart   Rest supine at least 5 minutes prior   Up to 60 minutes prior to dosing   Contact MD/Provider &  if abnormal from baseline  Rest Time  QTcF Average QTcF   0915 420 msec 410 msec 406 msec 412 msec        Pre-dose Research Correlatives  Deliver to [facility] lab for processing    Access Type: 20g 3/4\" Valdez needle " "Location: R chest [facility]    Refer to [facility] Treatment Plan for orders  Time point Specimen Test Volume Draw Time   Pre dose (within 60 mins) ATRN-119/ATRN-157 PK  3 mL  0930        Research Drug Administration   Document medication administration in MAR activity. Document Research specific instructions below.  ATRN-119 Oral Once Daily Dose.   Refer to diet section for guidelines  If dose is vomited, do not re-dose  [facility] will deliver 28 day supply     Dose given @ 0936.    Infusion-Related Reactions   Infusion Related Reactions   [facility] Hypersensivity Orders      Post-Dose Research Labs  Deliver to [facility] for Processing     Access Type: 20g 3/4\" Valdez needle Location: R chest [facility]    Refer to [facility] Treatment Plan for orders  Time point Specimen Test Volume Draw Time   +15 mins (+/- 5 mins)  ATRN-119/ATRN-157 PK  3 mL  0951   +30 mins (+/- 5 mins)  ATRN-119/ATRN-157 PK  3 mL  1006   +60 mins (+/- 5 mins)  ATRN-119/ATRN-157 PK  3 mL  1036   +2 hours (+/- 15 mins)  ATRN-119/ATRN-157 PK  3 mL  1134   +4 hours (+/- 15 mins)  ATRN-119/ATRN-157 PK  3 mL  1338   +6 hours (+/- 15 mins)  ATRN-119/ATRN-157 PK  3 mL  1521   +8 hours (+/- 15 mins)  ATRN-119/ATRN-157 PK  3 mL  1722        Post-dose Vital Signs    Temperature, Heart Rate, Respiration, Blood Pressure:   after sitting at rest at least 5 minutes prior to obtaining:    +1 hour (+/- 15 mins) - prior to blood draw   @ 1032 - VS table above    +2 hour (+/- 15 mins) - prior to blood draw and ecg   @ 1130 - VS table above    +4 hour (+/- 15 mins) - prior to blood draw and ecg   @ 1328 - VS table above      Post-dose 12-Lead ECG   [facility]/[employer] 5500 ECG machine  Triplicate   Each 1 - 5 minutes apart   Rest supine at least 5 minutes prior   Contact MD/Provider and  if abnormal from baseline  Time Point Rest Time QTcF  Average    +2 hours (+/- 15 mins) 1130 418 msec 431 msec 395 msec 415 msec   +4 hours (+/- 15 mins) " 1328 420 msec 413 msec 419 msec 417 msec   +6 hours (+/- 15 mins) 1515 [phone] 422        Discharge Instructions   Discharge patient to home after study requirements completed.  Confirm patient has ATRN-119 medication diary & bottle.   Remind patient: to bring medication diary & bottle to each visit (full or empty).   Remind patient to return: on Day 2 and that breakfast will be given in clinic prior to   dosing.   Discharge time: 1728     Jordan Randhawa RN  08/12/24

## 2024-08-13 ENCOUNTER — HOSPITAL ENCOUNTER (OUTPATIENT)
Dept: RESEARCH | Facility: HOSPITAL | Age: 64
Discharge: HOME | End: 2024-08-13
Payer: COMMERCIAL

## 2024-08-13 VITALS
DIASTOLIC BLOOD PRESSURE: 81 MMHG | HEART RATE: 74 BPM | SYSTOLIC BLOOD PRESSURE: 120 MMHG | OXYGEN SATURATION: 97 % | RESPIRATION RATE: 16 BRPM | TEMPERATURE: 99 F

## 2024-08-13 DIAGNOSIS — C41.9 MALIGNANT NEOPLASM OF BONE WITH METASTASES (MULTI): Primary | ICD-10-CM

## 2024-08-13 DIAGNOSIS — C79.51 CARCINOMA OF RIGHT BREAST METASTATIC TO BONE (MULTI): ICD-10-CM

## 2024-08-13 DIAGNOSIS — C50.911 CARCINOMA OF RIGHT BREAST METASTATIC TO BONE (MULTI): ICD-10-CM

## 2024-08-13 LAB
HAPTOGLOB SERPL-MCNC: 78 MG/DL (ref 37–246)
HOLD SPECIMEN: NORMAL
LAB AP ASR DISCLAIMER: NORMAL
LABORATORY COMMENT REPORT: NORMAL
PATH REPORT.ADDENDUM SPEC: NORMAL
PATH REPORT.ADDENDUM SPEC: NORMAL
PATH REPORT.FINAL DX SPEC: NORMAL
PATH REPORT.GROSS SPEC: NORMAL
PATH REPORT.TOTAL CANCER: NORMAL

## 2024-08-13 RX ORDER — HEPARIN 100 UNIT/ML
500 SYRINGE INTRAVENOUS AS NEEDED
Status: DISCONTINUED | OUTPATIENT
Start: 2024-08-13 | End: 2024-08-14 | Stop reason: HOSPADM

## 2024-08-13 RX ORDER — ALBUTEROL SULFATE 0.83 MG/ML
3 SOLUTION RESPIRATORY (INHALATION) AS NEEDED
Status: DISCONTINUED | OUTPATIENT
Start: 2024-08-13 | End: 2024-08-14 | Stop reason: HOSPADM

## 2024-08-13 RX ORDER — FAMOTIDINE 10 MG/ML
20 INJECTION INTRAVENOUS ONCE AS NEEDED
Status: DISCONTINUED | OUTPATIENT
Start: 2024-08-13 | End: 2024-08-14 | Stop reason: HOSPADM

## 2024-08-13 RX ORDER — HEPARIN SODIUM,PORCINE/PF 10 UNIT/ML
50 SYRINGE (ML) INTRAVENOUS AS NEEDED
Status: CANCELLED | OUTPATIENT
Start: 2024-08-13

## 2024-08-13 RX ORDER — EPINEPHRINE 1 MG/ML
0.3 INJECTION INTRAMUSCULAR; INTRAVENOUS; SUBCUTANEOUS EVERY 5 MIN PRN
Status: DISCONTINUED | OUTPATIENT
Start: 2024-08-13 | End: 2024-08-14 | Stop reason: HOSPADM

## 2024-08-13 RX ORDER — HEPARIN 100 UNIT/ML
500 SYRINGE INTRAVENOUS AS NEEDED
Status: CANCELLED | OUTPATIENT
Start: 2024-08-13

## 2024-08-13 RX ORDER — DIPHENHYDRAMINE HYDROCHLORIDE 50 MG/ML
50 INJECTION INTRAMUSCULAR; INTRAVENOUS AS NEEDED
Status: DISCONTINUED | OUTPATIENT
Start: 2024-08-13 | End: 2024-08-14 | Stop reason: HOSPADM

## 2024-08-13 NOTE — RESEARCH NOTES
"RSH><[id number]><C1D2>  DCRU NURSING VISIT NOTE  Study Name: TRACY 1Y22  IRB#: [id number]  DCRU#: DCRU # [id number]  Protocol Version Dated: V5 3.7.24  PI: James Ram M.D    Time point: C1D2    Encounter Date: 08/13/2024  Encounter Time:  7:00 AM EDT  Encounter Department: [facility] HEMATOLOGY AND ONCOLOGY     #1 Phone Pager   Richard Encarnacion  [phone] [address]      Study Regimen and Dosing   Refer to [facility] Treatment Plan for orders  Part 1_Expansion - Advanced Solid Tumors with DNA damage response mutations, failed at least one prior standard of care therapy and must be capable of oral administration of study medication.  Cycle = 28 days.   ATRN-119 administered oral daily at least 1 hour prior to a meal      Dietary Guidelines   High fat, high-calorie meal (fed state) prior to administration    Patient should order meal from \"at your request\"  Patient should start the meal 30 minutes prior to administration of ATRN-119. Patients should eat the meal in 25 minutes or less.  ATRN-119 should be administered 30 minutes after the start of the meal and after at least 5 minutes of rest.   NO additional food is allowed for at least 4 hours following ATRN-119 dosing. Water is permitted.      Admission and Prior to Starting Study Activities   Notify [facility] of patient arrival.  Complete DCRU and [facility] Admission/Visit Note.  Obtain weight in kilograms - with shoes off and heavy items removed.   Insert one peripheral IV line or access mediport for sample collection procedures (flush line with 5 - 10 mL normal saline following each blood draw).     Study Specific Instructions and Documentation   LABS  VITALS  ECG  RESEARCH CORRELATIVES   STUDY MEDICATION  LABS   VITALS  ECG  DISCHARGE     Safety Labs    Refer to [facility] Treatment Plan for orders     Safety Parameters and Special Instructions   ATRN-119 is a PARP inhibitor.   Potential Side Effects/Adverse Events: nausea, vomiting, myelosuppression, myalgia, " hypertension, hypomagnesemia, dysgeusia, dyspepsia, insomnia, headache.      Subjective   Trang Jones is a 64 y.o. female and is here for a Research clinical visit.    Visit Provider: GLADYS HOLLEY RN     Allergies:   Allergies   Allergen Reactions    Olanzapine Headache, Dizziness and Drowsiness    Paclitaxel Other     Scratchy throat, erythema face, back pain       Objective   Vital Signs:    Vitals:    08/13/24 0812 08/13/24 1005 08/13/24 1110 08/13/24 1301   BP: 105/67 110/74 102/65 120/66   Pulse: 58 69 75 60   Resp: 18 18 16 16   Temp: 36.9 °C (98.4 °F) 36.9 °C (98.4 °F) 37.2 °C (99 °F) 37.1 °C (98.8 °F)   TempSrc: Temporal Temporal Temporal Temporal   SpO2: 95% 96% 95% 96%    08/13/24 1712   BP: 120/81   Pulse: 74   Resp: 16   Temp: 37.2 °C (99 °F)   TempSrc: Temporal   SpO2: 97%       Physical Exam     ASSESSMENT and PLAN:  Problem List Items Addressed This Visit          Hematology and Neoplasia    Malignant neoplasm of bone with metastases (Multi) - Primary    Relevant Medications    sodium chloride 0.9 % bolus 500 mL    dextrose 5 % in water (D5W) bolus    diphenhydrAMINE (BENADryl) injection 50 mg    methylPREDNISolone sod succinate (SOLU-Medrol) 40 mg/mL injection 40 mg    famotidine PF (Pepcid) injection 20 mg    EPINEPHrine (Adrenalin) injection 0.3 mg    albuterol 2.5 mg /3 mL (0.083 %) nebulizer solution 3 mL    Study [id number] ATRN-119 100 mg capsule 800 mg (Completed)    heparin flush 100 unit/mL syringe 500 Units    Other Relevant Orders    Treatment Conditions (Completed)    Provider Communication - [id number] (Completed)    Research Triplicate ECG (Completed)    Research Triplicate ECG (Completed)    Research Triplicate ECG (Completed)    Research Triplicate ECG (Completed)    Nursing Communication - Hypersensitivity Management, Moderate (Completed)    Nursing Communication - Hypersensitivity Management, Severe (Completed)    Nursing Communication - Respiratory Management (Completed)     Pulse oximetry, continuous (Completed)    Adult diet Regular    Research collection: 3mL Lavender K2 EDTA - Research Collect ATRN-119/ATRN-157; Pre-Dose; Other (24 hours from dosing on C1D1 +/- 2 hours); Ambient; 8-10x (Completed)    Research collection: 3mL Lavender K2 EDTA - Research Collect ATRN-119/ATRN-157; Post-Dose; EOI +15 minutes; +/- 5 minutes; Ambient; 8-10x (Completed)    Research collection: 3mL Lavender K2 EDTA - Research Collect ATRN-119/ATRN-157; Post-Dose; EOI +30 minutes; +/- 5 minutes; Ambient; 8-10x (Completed)    Research collection: 3mL Lavender K2 EDTA - Research Collect ATRN-119/ATRN-157; Post-Dose; EOI +1 hour; +/- 5 minutes; Ambient; 8-10x (Completed)    Research collection: 3mL Lavender K2 EDTA - Research Collect ATRN-119/ATRN-157; Post-Dose; EOI +2 hours; +/- 15 minutes; Ambient; 8-10x (Completed)    Research collection: 3mL Lavender K2 EDTA - Research Collect ATRN-119/ATRN-157; Post-Dose; EOI +4 hours; +/- 15 minutes; Ambient; 8-10x (Completed)    Research collection: 3mL Lavender K2 EDTA - Research Collect ATRN-119/ATRN-157; Post-Dose; EOI +6 hours; +/- 15 minutes; Ambient; 8-10x (Completed)    Nursing Communication - Vascular Access (Completed)    Venous Access, CVAD    CENTRAL VENOUS LINE DRESSING CHANGE - ADULT    Convert IV to saline lock    Research collection: 3mL Lavender K2 EDTA - Research Collect ATRN-119/ATRN-157; Post-Dose; EOI +8 hours; +/- 15 minutes; Ambient; 8-10x    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)    Relevant Medications    sodium chloride 0.9 % bolus 500 mL    dextrose 5 % in water (D5W) bolus    diphenhydrAMINE (BENADryl) injection 50 mg    methylPREDNISolone sod succinate (SOLU-Medrol) 40 mg/mL injection 40 mg    famotidine PF (Pepcid) injection 20 mg    EPINEPHrine (Adrenalin) injection 0.3 mg    albuterol 2.5 mg /3 mL (0.083 %) nebulizer solution 3 mL    Study [id number] ATRN-119 100 mg capsule 800 mg (Completed)    Other Relevant Orders     Treatment Conditions (Completed)    Provider Communication - [id number] (Completed)    Research Triplicate ECG (Completed)    Research Triplicate ECG (Completed)    Research Triplicate ECG (Completed)    Research Triplicate ECG (Completed)    Nursing Communication - Hypersensitivity Management, Moderate (Completed)    Nursing Communication - Hypersensitivity Management, Severe (Completed)    Nursing Communication - Respiratory Management (Completed)    Pulse oximetry, continuous (Completed)    Adult diet Regular    Research collection: 3mL Lavender K2 EDTA - Research Collect ATRN-119/ATRN-157; Pre-Dose; Other (24 hours from dosing on C1D1 +/- 2 hours); Ambient; 8-10x (Completed)    Research collection: 3mL Lavender K2 EDTA - Research Collect ATRN-119/ATRN-157; Post-Dose; EOI +15 minutes; +/- 5 minutes; Ambient; 8-10x (Completed)    Research collection: 3mL Lavender K2 EDTA - Research Collect ATRN-119/ATRN-157; Post-Dose; EOI +30 minutes; +/- 5 minutes; Ambient; 8-10x (Completed)    Research collection: 3mL Lavender K2 EDTA - Research Collect ATRN-119/ATRN-157; Post-Dose; EOI +1 hour; +/- 5 minutes; Ambient; 8-10x (Completed)    Research collection: 3mL Lavender K2 EDTA - Research Collect ATRN-119/ATRN-157; Post-Dose; EOI +2 hours; +/- 15 minutes; Ambient; 8-10x (Completed)    Research collection: 3mL Lavender K2 EDTA - Research Collect ATRN-119/ATRN-157; Post-Dose; EOI +4 hours; +/- 15 minutes; Ambient; 8-10x (Completed)    Research collection: 3mL Lavender K2 EDTA - Research Collect ATRN-119/ATRN-157; Post-Dose; EOI +6 hours; +/- 15 minutes; Ambient; 8-10x (Completed)    Research collection: 3mL Lavender K2 EDTA - Research Collect ATRN-119/ATRN-157; Post-Dose; EOI +8 hours; +/- 15 minutes; Ambient; 8-10x        Medications as of the completion of today's visit:  Current Outpatient Medications   Medication Sig Dispense Refill    atorvastatin (Lipitor) 10 mg tablet Take 1 tablet (10 mg) by mouth once daily.       calcium carbonate (CALCIUM 600 ORAL) Take 600 mg by mouth once daily.      cholecalciferol, vitD3,/vit K2 (vitamin D3-vitamin K2) 125 mcg (5,000 unit)-100 mcg capsule Take by mouth.      gabapentin (Neurontin) 300 mg capsule Take 1 capsule (300 mg) by mouth once daily in the morning AND 2 capsules (600 mg) once daily at bedtime. 270 capsule 3    lidocaine-prilocaine (Emla) 2.5-2.5 % cream apply topically to MEDIPORT SITE 45 MINUTES BEFORE ACCESSING PORT, USE COVER WITH BANDAID      MAGNESIUM ORAL Take 400 mg by mouth.      morphine CR (MS Contin) 15 mg 12 hr tablet Take 1 tablet (15 mg) by mouth 3 times a day. Do not crush, chew, or split. 90 tablet 0    ondansetron ODT (Zofran-ODT) 4 mg disintegrating tablet Take 1 tablet (4 mg) by mouth every 8 hours if needed for nausea or vomiting.      oxyCODONE (Roxicodone) 10 mg immediate release tablet Take 1 tablet (10 mg) by mouth every 6 hours if needed for moderate pain (4 - 6). 120 tablet 0    prochlorperazine (Compazine) 10 mg tablet Take 1 tablet (10 mg) by mouth every 6 hours if needed for nausea or vomiting.      propranolol (Inderal) 40 mg tablet Take 1 tablet (40 mg) by mouth once daily.      rizatriptan MLT (Maxalt-MLT) 5 mg disintegrating tablet take 1 tablet by mouth if needed MAY REPEAT IN 2 HOURS IF NEEDED      Study [id number] ATRN-119 100 mg capsule Take 8 capsules (800 mg total) by mouth once daily for 28 days.  Swallow whole. Take at least 1 hour prior to a meal. Do not eat grapefruit or drink grapefruit juice. 224 capsule 0    zinc glycinate 30 mg capsule Take by mouth.       Current Facility-Administered Medications   Medication Dose Route Frequency Provider Last Rate Last Admin    albuterol 2.5 mg /3 mL (0.083 %) nebulizer solution 3 mL  3 mL nebulization PRN Ajit Hendrickson APRN-CNP        dextrose 5 % in water (D5W) bolus  500 mL intravenous PRN Ajit Hendrickson APRN-CNP        diphenhydrAMINE (BENADryl) injection 50 mg  50 mg intravenous PRN Ajit GUARDADO  Riendeau, APRN-CNP        EPINEPHrine (Adrenalin) injection 0.3 mg  0.3 mg intramuscular q5 min PRN ERROL Rodriguez        famotidine PF (Pepcid) injection 20 mg  20 mg intravenous Once PRN ERROL Rodriguez        heparin flush 100 unit/mL syringe 500 Units  500 Units intra-catheter PRN Bebeto Tolbert MD        methylPREDNISolone sod succinate (SOLU-Medrol) 40 mg/mL injection 40 mg  40 mg intravenous PRN ERROL Rodriguez        perflutren lipid microspheres (Definity) injection 0.5-10 mL of dilution  0.5-10 mL of dilution intravenous Once Israel Whalen MD        sodium chloride 0.9 % bolus 500 mL  500 mL intravenous PRN ERROL Rodriguez           Administrations This Visit       Study [id number] ATRN-119 100 mg capsule 800 mg       Admin Date  08/13/2024 Action  Given Dose  800 mg Route  oral Documented By  Nel Epps RN                    Orders placed during today's visit:  Orders Placed This Encounter   Procedures    Research collection: 3mL Lavender K2 EDTA - Research Collect ATRN-119/ATRN-157; Pre-Dose; Other (24 hours from dosing on C1D1 +/- 2 hours); Ambient; 8-10x     Standing Status:   Standing     Number of Occurrences:   1     Order Specific Question:   Test     Answer:   ATRN-119/ATRN-157     Order Specific Question:   Timepoint     Answer:   Pre-Dose     Order Specific Question:   Pre-Dose     Answer:   Other     Comments:   24 hours from dosing on C1D1 +/- 2 hours     Order Specific Question:   Temperature     Answer:   Ambient     Order Specific Question:   Invert     Answer:   8-10x     Order Specific Question:   Optional?     Answer:   No    Research collection: 3mL Lavender K2 EDTA - Research Collect ATRN-119/ATRN-157; Post-Dose; EOI +15 minutes; +/- 5 minutes; Ambient; 8-10x     Standing Status:   Standing     Number of Occurrences:   1     Order Specific Question:   Test     Answer:   ATRN-119/ATRN-157     Order Specific Question:   Timepoint      Answer:   Post-Dose     Order Specific Question:   Post-Dose     Answer:   EOI +15 minutes     Order Specific Question:   Post-Dose Window     Answer:   +/- 5 minutes     Order Specific Question:   Temperature     Answer:   Ambient     Order Specific Question:   Invert     Answer:   8-10x     Order Specific Question:   Optional?     Answer:   No    Research collection: 3mL Lavender K2 EDTA - Research Collect ATRN-119/ATRN-157; Post-Dose; EOI +30 minutes; +/- 5 minutes; Ambient; 8-10x     Standing Status:   Standing     Number of Occurrences:   1     Order Specific Question:   Test     Answer:   ATRN-119/ATRN-157     Order Specific Question:   Timepoint     Answer:   Post-Dose     Order Specific Question:   Post-Dose     Answer:   EOI +30 minutes     Order Specific Question:   Post-Dose Window     Answer:   +/- 5 minutes     Order Specific Question:   Temperature     Answer:   Ambient     Order Specific Question:   Invert     Answer:   8-10x     Order Specific Question:   Optional?     Answer:   No    Research collection: 3mL Lavender K2 EDTA - Research Collect ATRN-119/ATRN-157; Post-Dose; EOI +1 hour; +/- 5 minutes; Ambient; 8-10x     Standing Status:   Standing     Number of Occurrences:   1     Order Specific Question:   Test     Answer:   ATRN-119/ATRN-157     Order Specific Question:   Timepoint     Answer:   Post-Dose     Order Specific Question:   Post-Dose     Answer:   EOI +1 hour     Order Specific Question:   Post-Dose Window     Answer:   +/- 5 minutes     Order Specific Question:   Temperature     Answer:   Ambient     Order Specific Question:   Invert     Answer:   8-10x     Order Specific Question:   Optional?     Answer:   No    Research collection: 3mL Lavender K2 EDTA - Research Collect ATRN-119/ATRN-157; Post-Dose; EOI +2 hours; +/- 15 minutes; Ambient; 8-10x     Standing Status:   Standing     Number of Occurrences:   1     Order Specific Question:   Test     Answer:   ATRN-119/ATRN-157     Order  Specific Question:   Timepoint     Answer:   Post-Dose     Order Specific Question:   Post-Dose     Answer:   EOI +2 hours     Order Specific Question:   Post-Dose Window     Answer:   +/- 15 minutes     Order Specific Question:   Temperature     Answer:   Ambient     Order Specific Question:   Invert     Answer:   8-10x     Order Specific Question:   Optional?     Answer:   No    Research collection: 3mL Lavender K2 EDTA - Research Collect ATRN-119/ATRN-157; Post-Dose; EOI +4 hours; +/- 15 minutes; Ambient; 8-10x     Standing Status:   Standing     Number of Occurrences:   1     Order Specific Question:   Test     Answer:   ATRN-119/ATRN-157     Order Specific Question:   Timepoint     Answer:   Post-Dose     Order Specific Question:   Post-Dose     Answer:   EOI +4 hours     Order Specific Question:   Post-Dose Window     Answer:   +/- 15 minutes     Order Specific Question:   Temperature     Answer:   Ambient     Order Specific Question:   Invert     Answer:   8-10x     Order Specific Question:   Optional?     Answer:   No    Research collection: 3mL Lavender K2 EDTA - Research Collect ATRN-119/ATRN-157; Post-Dose; EOI +6 hours; +/- 15 minutes; Ambient; 8-10x     Standing Status:   Standing     Number of Occurrences:   1     Order Specific Question:   Test     Answer:   ATRN-119/ATRN-157     Order Specific Question:   Timepoint     Answer:   Post-Dose     Order Specific Question:   Post-Dose     Answer:   EOI +6 hours     Order Specific Question:   Post-Dose Window     Answer:   +/- 15 minutes     Order Specific Question:   Temperature     Answer:   Ambient     Order Specific Question:   Invert     Answer:   8-10x     Order Specific Question:   Optional?     Answer:   No    Research collection: 3mL Lavender K2 EDTA - Research Collect ATRN-119/ATRN-157; Post-Dose; EOI +8 hours; +/- 15 minutes; Ambient; 8-10x     Standing Status:   Standing     Number of Occurrences:   1     Order Specific Question:   Test      Answer:   ATRN-119/ATRN-157     Order Specific Question:   Timepoint     Answer:   Post-Dose     Order Specific Question:   Post-Dose     Answer:   EOI +8 hours     Order Specific Question:   Post-Dose Window     Answer:   +/- 15 minutes     Order Specific Question:   Temperature     Answer:   Ambient     Order Specific Question:   Invert     Answer:   8-10x     Order Specific Question:   Optional?     Answer:   No    Adult diet Regular     Standing Status:   Standing     Number of Occurrences:   1     Order Specific Question:   Diet type     Answer:   Regular    Treatment Conditions     Hold treatment and notify provider if:  · Adverse Event GREATER THAN OR EQUAL TO Grade 2     Standing Status:   Standing     Number of Occurrences:   1    Provider Communication - [id number]     · Cohort 1            · ATRN-119 50 mg daily  · Cohort 2            · ATRN-119 100 mg daily  · Cohort 3            · ATRN-119 200 mg daily  · Cohort 4            · ATRN-119 350 mg daily  · Cohort 5            · ATRN-119 550 mg daily  · Cohort 6            · ATRN-119 800 mg daily  · Cohort 7            · ATRN-119 1100 mg daily  · Cohort 8            · ATRN-119 1300 mg daily     Standing Status:   Standing     Number of Occurrences:   1    Research Triplicate ECG     Refer to study SmartPhrase for instructions and documentation of the research triplicate ECG.     Standing Status:   Standing     Number of Occurrences:   1    Research Triplicate ECG     Refer to study SmartPhrase for instructions and documentation of the research triplicate ECG.     Standing Status:   Standing     Number of Occurrences:   1    Research Triplicate ECG     Refer to study SmartPhrase for instructions and documentation of the research triplicate ECG.     Standing Status:   Standing     Number of Occurrences:   1    Research Triplicate ECG     Refer to study SmartPhrase for instructions and documentation of the research triplicate ECG.     Standing Status:   Standing      Number of Occurrences:   1    Nursing Communication - Hypersensitivity Management, Moderate     Flushing, rash, pruritus, dyspnea, chest discomfort, back pain, angioedema, SBP LESS THAN 90 mmHg, and/or change in mental status above or below patient's baseline. In the event of moderate or severe hypersensitivity reaction to any medication:   Stop infusion.  Assess vital signs, pulse oximetry, nursing assessment, and patient complaints.  Administer medications per Hypersensitivity Reaction Medication Protocol.   Notify physician.     Standing Status:   Standing     Number of Occurrences:   1    Nursing Communication - Hypersensitivity Management, Severe     Worsening of moderate reaction symptoms, SBP LESS THAN 80 mmHg, and/or respiratory distress (respirations GREATER THAN 40 breaths per minute, wheezing, life-threatening symptoms). In the event of moderate or severe hypersensitivity reaction to any medication:   Stop infusion.  Assess vital signs, pulse oximetry, nursing assessment, and patient complaints.  Administer medications per Hypersensitivity Reaction Medication Protocol.   Notify physician.     Standing Status:   Standing     Number of Occurrences:   1    Nursing Communication - Respiratory Management     Oxygen via nasal cannula at 4 L per minute for respiratory rate GREATER THAN OR EQUAL TO to 28 breaths/minute and/or wheezing. Pulse Oximetry continuous monitoring.     Standing Status:   Standing     Number of Occurrences:   1    Nursing Communication - Vascular Access     Refer to the vascular access device resource page and the following policies for additional information on line insertion, maintenance and removal.    CP-148 - Central Vascular Access Device Insertion, Maintenance, and Removal, Adult  NP-28 - Midline Cathter Maintenance & Removal, Adult  NP-34 - High-flow Catheters, (e.g. Hemodialysis, Apheresis, and Continuous Renal Replacement Therapy [CRRT]), Care and Utilization, Adult  NP-39 -  Peripheral Intravenous Catheter (PIV) Insertion, Maintenance, Blood Collection & Removal - Adult     Standing Status:   Standing     Number of Occurrences:   1    CENTRAL VENOUS LINE DRESSING CHANGE - ADULT     Standing Status:   Standing     Number of Occurrences:   1    Pulse oximetry, continuous     Continuous monitoring to maintain SPO2 GREATER THAN 90%     Standing Status:   Standing     Number of Occurrences:   1    Venous Access, CVAD     Standing Status:   Standing     Number of Occurrences:   1    Convert IV to saline lock     Only if venous access is required over consecutive days. Peripheral catheter can remain in place until completion of last consecutive day infusion, unless IV-related complications are encountered.     Standing Status:   Standing     Number of Occurrences:   1        [id number] - Study Of ATRN-119 In Patients With Advanced Solid Tumors    Patient has no adverse events documented in the Research Adverse Events activity.        Criteria to Treat   DCRU RN reviewed and meets eligibility per treatment plan   Time team notified: 0710 am  DCRU RN notifies study team to review eligibility and approval before dosing procedures  Time team approves: 0710 am     Pre-dose Vital Signs   Temperature, heart rate, respirations, blood pressure: in a sitting position at rest for at least 5 minutes.    Up to 60 minutes prior to dosing    /81 (BP Location: Right arm, Patient Position: Sitting)   Pulse 74   Temp 37.2 °C (99 °F) (Temporal)   Resp 16   LMP  (LMP Unknown)   SpO2 97%       [facility]dose [facility]Lead [facility]   [facility]  [facility]   Each 1 - 5 minutes apart   Rest supine at least 5 minutes prior   Up to 60 minutes prior to dosing   Contact MD/Provider &  if abnormal from baseline  Rest Time  QTcF Average QTcF   0805 412 [phone]        Pre-dose Research Correlatives  Deliver to [facility]CR[facility] lab for processing    Access Type: [address]  Location: [facility] [place]    Refer to [place] Treatment Plan for orders  Time point Specimen Test Volume Draw Time   Predose  24 hours from dosing on C1D1 (+/-2 hours) ATRN-119/ATRN-157 PK  3 mL  0738        Research Drug Administration   Document medication administration in MAR activity. Document Research specific instructions below.  ATRN-119 mg Oral Once Daily Dose.   Refer to diet section for guidelines.  If dose is vomited, do not re-dose.  Dispense from patients supply.  Dosed at 0908     Infusion-Related Reactions   Infusion Related Reactions   [facility] Hypersensivity Orders      Post-Dose Research Labs  Deliver to DCRU/TRP for Processing     Access Type: [address] Location: [facility]    Refer to [place] Treatment Plan for orders  Time point Specimen Test Volume Draw Time   +15 mins (+/- 5 mins)  ATRN-119/ATRN-157 PK  3 mL  0923   +30 mins (+/- 5 mins)  ATRN-119/ATRN-157 PK  3 mL  0936   +60 mins (+/- 5 mins)  ATRN-119/ATRN-157 PK  3 mL  1008   +2 hours (+/- 15 mins)  ATRN-119/ATRN-157 PK  3 mL  1121   +4 hours (+/- 15 mins)  ATRN-119/ATRN-157 PK  3 mL  1316   +6 hours (+/- 15 mins)  ATRN-119/ATRN-157 PK  3 mL  1510   +8 hours (+/- 15 mins)  ATRN-119/ATRN-157 PK  3 mL  1710        Post-dose Vital Signs    Temperature, Heart Rate, Respiration, Blood Pressure:   after sitting at rest at least 5 minutes prior to obtaining:    +1 hour (+/- 15 mins) - prior to blood draw: 1005   +2 hour (+/- 15 mins) - prior to blood draw and ec   +4 hour (+/- 15 mins) - prior to blood draw and ecg : 1301     Post-dose 12-Lead ECG   [facility]  Triplicate   Each 1 - 5 minutes apart   Rest supine at least 5 minutes prior   Up to 60 minutes prior to dosing   Contact MD/Provider &  if abnormal from baseline  Time Point Rest Time QTcF Average    +2 hours (+/- 15 mins) 1100 413 [phone]   +4 hours (+/- 15 mins) 1255 410 [phone]   +6 hours (+/- 15 mins) 1500 427 [phone]        Discharge Instructions    Discharge patient to home after study requirements complete.   Confirm patient has ATRN-119 medication diary & bottle.   Remind patient to document each dose in diary including missed doses   Remind patient: to bring medication diary & bottle to each visit (full or empty)   Remind patient to return: on Day 3 for pre-dose PK & treatment.   Discharge time: 1716     GLADYS HOLLEY RN  08/13/24

## 2024-08-13 NOTE — RESEARCH NOTES
Research Note Treatment Day    Trang Jones is here today for treatment on [id number]. Today is C1D2. Procedures completed per protocol. AE's and con-meds reviewed with patient. Patient is aware of treatment plan.    [x]   Received treatment as planned   OR  []    Treatment delayed; patient calendar updated as required   Treatment delayed because:    []   AE    []   Physician Discretion    []   Clinical Deterioration or Progression     []   Other    Education Documentation  Treatment Plan and Schedule, taught by Donte Encarnacion RN at 8/13/2024 12:35 PM.  Learner: Patient  Readiness: Eager  Method: Explanation  Response: Verbalizes Understanding    General Medication Information, taught by Donte Encarnacion RN at 8/13/2024 12:35 PM.  Learner: Patient  Readiness: Eager  Method: Explanation  Response: Verbalizes Understanding    Supportive Medications, taught by Donte Encarnacion RN at 8/13/2024 12:35 PM.  Learner: Patient  Readiness: Eager  Method: Explanation  Response: Verbalizes Understanding    Education Comments  No comments found.

## 2024-08-14 ENCOUNTER — HOSPITAL ENCOUNTER (OUTPATIENT)
Dept: RESEARCH | Facility: HOSPITAL | Age: 64
Discharge: HOME | End: 2024-08-14
Payer: COMMERCIAL

## 2024-08-14 ENCOUNTER — EDUCATION (OUTPATIENT)
Dept: HEMATOLOGY/ONCOLOGY | Facility: HOSPITAL | Age: 64
End: 2024-08-14
Payer: COMMERCIAL

## 2024-08-14 VITALS
DIASTOLIC BLOOD PRESSURE: 68 MMHG | SYSTOLIC BLOOD PRESSURE: 111 MMHG | OXYGEN SATURATION: 98 % | BODY MASS INDEX: 26.05 KG/M2 | RESPIRATION RATE: 16 BRPM | HEART RATE: 56 BPM | WEIGHT: 136.91 LBS | TEMPERATURE: 97.9 F

## 2024-08-14 DIAGNOSIS — C79.51 CARCINOMA OF RIGHT BREAST METASTATIC TO BONE (MULTI): ICD-10-CM

## 2024-08-14 DIAGNOSIS — C50.911 CARCINOMA OF RIGHT BREAST METASTATIC TO BONE (MULTI): ICD-10-CM

## 2024-08-14 DIAGNOSIS — C41.9 MALIGNANT NEOPLASM OF BONE WITH METASTASES (MULTI): ICD-10-CM

## 2024-08-14 LAB — HOLD SPECIMEN: NORMAL

## 2024-08-14 PROCEDURE — 36591 DRAW BLOOD OFF VENOUS DEVICE: CPT

## 2024-08-14 PROCEDURE — 2500000004 HC RX 250 GENERAL PHARMACY W/ HCPCS (ALT 636 FOR OP/ED): Performed by: INTERNAL MEDICINE

## 2024-08-14 RX ORDER — HEPARIN SODIUM,PORCINE/PF 10 UNIT/ML
50 SYRINGE (ML) INTRAVENOUS AS NEEDED
Status: DISCONTINUED | OUTPATIENT
Start: 2024-08-14 | End: 2024-08-15 | Stop reason: HOSPADM

## 2024-08-14 RX ORDER — HEPARIN SODIUM,PORCINE/PF 10 UNIT/ML
50 SYRINGE (ML) INTRAVENOUS AS NEEDED
OUTPATIENT
Start: 2024-08-14

## 2024-08-14 RX ORDER — HEPARIN 100 UNIT/ML
500 SYRINGE INTRAVENOUS AS NEEDED
Status: DISCONTINUED | OUTPATIENT
Start: 2024-08-14 | End: 2024-08-15 | Stop reason: HOSPADM

## 2024-08-14 RX ORDER — HEPARIN 100 UNIT/ML
500 SYRINGE INTRAVENOUS AS NEEDED
OUTPATIENT
Start: 2024-08-14

## 2024-08-14 NOTE — RESEARCH NOTES
RSH><APREA1Y22><C1D3>  DCRU NURSING VISIT NOTE  Study Name: TRACY 1Y22  IRB#: [id number]  DCRU#: DCRU # [id number]  Protocol Version Dated: V5 3.7.24  PI: James Ram M.D    Time point: C1D3    Encounter Date: 08/14/2024  Encounter Time: 10:00 AM EDT  Encounter Department: [facility] HEMATOLOGY AND ONCOLOGY     #1 Phone Pager   Richard Encarnacion   [phone] [address]      Study Regimen and Dosing   Refer to [place] Treatment Plan for orders  Part 1_Expansion - Advanced Solid Tumors with DNA damage response mutations, failed at least one prior standard of care therapy and must be capable of oral administration of study medication.  Cycle = 28 days.   ATRN-119 administered oral daily at least 1 hour prior to a meal      Dietary Guidelines   Regular      Admission and Prior to Starting Study Activities   Notify [facility] of patient arrival.  Complete DCRU and EPIC Admission/Visit Note.  Obtain weight in kilograms - with shoes off and heavy items removed.   Insert one peripheral IV line or access mediport for sample collection procedures (flush line with 5 - 10 mL normal saline following each blood draw).     Study Specific Instructions and Documentation     RESEARCH CORRELATIVES  STUDY MEDICATION  DISCHARGE     Safety Parameters and Special Instructions   ATRN-119 is a PARP inhibitor.   Potential Side Effects/Adverse Events: nausea, vomiting, myelosuppression, myalgia, hypertension, hypomagnesemia, dysgeusia, dyspepsia, insomnia, headache.      Subjective   Trang Jones is a 64 y.o. female and is here for a Research clinical visit.    Visit Provider: Jordan Randhawa RN     Allergies:   Allergies   Allergen Reactions    Olanzapine Headache, Dizziness and Drowsiness    Paclitaxel Other     Scratchy throat, erythema face, back pain       Objective   Vital Signs:    Vitals:    08/14/24 1007   BP: 111/68   Pulse: 56   Resp: 16   Temp: 36.6 °C (97.9 °F)   TempSrc: Temporal   SpO2: 98%   Weight: 62.1 kg (136  lb 14.5 oz)       Physical Exam     ASSESSMENT and PLAN:  Problem List Items Addressed This Visit       Malignant neoplasm of bone with metastases (Multi)    Relevant Medications    Study APREA1Y22 ATRN-119 100 mg capsule 800 mg (Completed)    heparin flush 10 unit/mL syringe 50 Units    heparin flush 100 unit/mL syringe 500 Units    alteplase (Cathflo Activase) injection 2 mg    Other Relevant Orders    Adult diet Regular    Treatment Conditions (Completed)    Provider Communication - APREA1Y22 (Completed)    Research collection: 3mL Lavender K2 EDTA - Research Collect ATRN-119/ATRN-157; Pre-Dose; Other (24 hours from dosing on C1D2 +/- 2 hours); Ambient; 8-10x    Nursing Communication - Vascular Access (Completed)    Venous Access, CVAD    CENTRAL VENOUS LINE DRESSING CHANGE - ADULT    Insert peripheral IV    Convert IV to saline lock    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)    Relevant Medications    Study APREA1Y22 ATRN-119 100 mg capsule 800 mg (Completed)    Other Relevant Orders    Adult diet Regular    Treatment Conditions (Completed)    Provider Communication - APREA1Y22 (Completed)    Research collection: 3mL Lavender K2 EDTA - Research Collect ATRN-119/ATRN-157; Pre-Dose; Other (24 hours from dosing on C1D2 +/- 2 hours); Ambient; 8-10x        Medications as of the completion of today's visit:  Current Outpatient Medications   Medication Sig Dispense Refill    atorvastatin (Lipitor) 10 mg tablet Take 1 tablet (10 mg) by mouth once daily.      calcium carbonate (CALCIUM 600 ORAL) Take 600 mg by mouth once daily.      cholecalciferol, vitD3,/vit K2 (vitamin D3-vitamin K2) 125 mcg (5,000 unit)-100 mcg capsule Take by mouth.      gabapentin (Neurontin) 300 mg capsule Take 1 capsule (300 mg) by mouth once daily in the morning AND 2 capsules (600 mg) once daily at bedtime. 270 capsule 3    lidocaine-prilocaine (Emla) 2.5-2.5 % cream apply topically to MEDIPORT SITE 45 MINUTES BEFORE ACCESSING  PORT, USE COVER WITH BANDAID      MAGNESIUM ORAL Take 400 mg by mouth.      morphine CR (MS Contin) 15 mg 12 hr tablet Take 1 tablet (15 mg) by mouth 3 times a day. Do not crush, chew, or split. 90 tablet 0    ondansetron ODT (Zofran-ODT) 4 mg disintegrating tablet Take 1 tablet (4 mg) by mouth every 8 hours if needed for nausea or vomiting.      oxyCODONE (Roxicodone) 10 mg immediate release tablet Take 1 tablet (10 mg) by mouth every 6 hours if needed for moderate pain (4 - 6). 120 tablet 0    prochlorperazine (Compazine) 10 mg tablet Take 1 tablet (10 mg) by mouth every 6 hours if needed for nausea or vomiting.      propranolol (Inderal) 40 mg tablet Take 1 tablet (40 mg) by mouth once daily.      rizatriptan MLT (Maxalt-MLT) 5 mg disintegrating tablet take 1 tablet by mouth if needed MAY REPEAT IN 2 HOURS IF NEEDED      Study [id number] ATRN-119 100 mg capsule Take 8 capsules (800 mg total) by mouth once daily for 28 days.  Swallow whole. Take at least 1 hour prior to a meal. Do not eat grapefruit or drink grapefruit juice. 224 capsule 0    zinc glycinate 30 mg capsule Take by mouth.       Current Facility-Administered Medications   Medication Dose Route Frequency Provider Last Rate Last Admin    alteplase (Cathflo Activase) injection 2 mg  2 mg intra-catheter PRN Bebeto Tolbert MD        heparin flush 10 unit/mL syringe 50 Units  50 Units intra-catheter PRN Bebeto Tolbert MD        heparin flush 100 unit/mL syringe 500 Units  500 Units intra-catheter PRN Bebeto Tolbert MD   500 Units at 08/14/24 1022    perflutren lipid microspheres (Definity) injection 0.5-10 mL of dilution  0.5-10 mL of dilution intravenous Once Israel Whalen MD           Administrations This Visit       heparin flush 100 unit/mL syringe 500 Units       Admin Date  08/14/2024 Action  Given Dose  500 Units Route  intra-catheter Documented By  Jordan Randhawa, NICOLE              Study [id number] ATRN-119 100 mg capsule 800 mg       Admin  Date  08/14/2024 Action  Given Dose  800 mg Route  oral Documented By  Jordan Randhawa, RN                    Orders placed during today's visit:  Orders Placed This Encounter   Procedures    Research collection: 3mL Lavender K2 EDTA - Research Collect ATRN-119/ATRN-157; Pre-Dose; Other (24 hours from dosing on C1D2 +/- 2 hours); Ambient; 8-10x     Standing Status:   Standing     Number of Occurrences:   1     Order Specific Question:   Test     Answer:   ATRN-119/ATRN-157     Order Specific Question:   Timepoint     Answer:   Pre-Dose     Order Specific Question:   Pre-Dose     Answer:   Other     Comments:   24 hours from dosing on C1D2 +/- 2 hours     Order Specific Question:   Temperature     Answer:   Ambient     Order Specific Question:   Invert     Answer:   8-10x     Order Specific Question:   Optional?     Answer:   No    Adult diet Regular     Standing Status:   Standing     Number of Occurrences:   1     Order Specific Question:   Diet type     Answer:   Regular    Treatment Conditions     Hold treatment and notify provider if:  · Adverse Event GREATER THAN OR EQUAL TO Grade 2     Standing Status:   Standing     Number of Occurrences:   1    Provider Communication - [id number]     · Cohort 1            · ATRN-119 50 mg daily  · Cohort 2            · ATRN-119 100 mg daily  · Cohort 3            · ATRN-119 200 mg daily  · Cohort 4            · ATRN-119 350 mg daily  · Cohort 5            · ATRN-119 550 mg daily  · Cohort 6            · ATRN-119 800 mg daily  · Cohort 7            · ATRN-119 1100 mg daily  · Cohort 8            · ATRN-119 1300 mg daily     Standing Status:   Standing     Number of Occurrences:   1    Nursing Communication - Vascular Access     Refer to the vascular access device resource page and the following policies for additional information on line insertion, maintenance and removal.    CP-148 - Central Vascular Access Device Insertion, Maintenance, and Removal, Adult  NP-28 - Midline  "Cathter Maintenance & Removal, Adult  NP-34 - High-flow Catheters, (e.g. Hemodialysis, Apheresis, and Continuous Renal Replacement Therapy [CRRT]), Care and Utilization, Adult  NP-39 - Peripheral Intravenous Catheter (PIV) Insertion, Maintenance, Blood Collection & Removal - Adult     Standing Status:   Standing     Number of Occurrences:   1    CENTRAL VENOUS LINE DRESSING CHANGE - ADULT     Standing Status:   Standing     Number of Occurrences:   1    Venous Access, CVAD     Standing Status:   Standing     Number of Occurrences:   1    Insert peripheral IV     Obtain venous access for treatment via PIV if no CVAD access or if unable to obtain blood return from CVAD.     Standing Status:   Standing     Number of Occurrences:   1    Convert IV to saline lock     Only if venous access is required over consecutive days. Peripheral catheter can remain in place until completion of last consecutive day infusion, unless IV-related complications are encountered.     Standing Status:   Standing     Number of Occurrences:   1        APREA1Y22 - Study Of ATRN-119 In Patients With Advanced Solid Tumors    Patient has no adverse events documented in the Research Adverse Events activity.      Criteria to Treat   DCRU RN reviewed and meets eligibility per treatment plan   Time team notified: 1004 am  DCRU RN notifies study team to review eligibility and approval before dosing procedures  Time team approves: 1004 am     Pre-dose Research Correlatives  Deliver to DCRU/TRPC lab for processing    Access Type: 20g 3/4\" Valdez needle Location: R chest [facility]   Refer to [place] Treatment Plan for orders  Time point Specimen Test Volume Draw Time   Predose  24 hours from dosing on C1D2 (+/-2 hours) ATRN-119/ATRN-157 PK  3 mL  1020        Research Drug Administration   Document medication administration in MAR activity. Document Research specific instructions below.  ATRN-119 mg Oral Once Daily Dose.   Administer at least 1 hour prior to " a meal.   If dose is vomited, do not re-dose.  Dispense from patients supply.   ATRN-119 given @ 1030    Infusion-Related Reactions   Infusion Related Reactions   [facility] Hypersensivity Orders      Discharge Instructions   Discharge after study requirements completed.   Confirm patient has ATRN-119 medication diary & bottle.   Remind patient to document each dose in diary including missed doses   Remind patient: to bring medication diary & bottle to each visit (full or empty)   Remind patient to return: on Day 8 for pre-dose ECG, vital signs, PK & treatment.   Discharge time: 1034     Jordan Randhawa RN  08/14/24

## 2024-08-14 NOTE — RESEARCH NOTES
Research Note Non-Treatment Day    Trang Jones is here today. Patient is on [id number]. Today is C1D3. Procedures completed per protocol. AE's and con-meds reviewed with patient. Patient is aware of treatment plan.      Education Documentation  Treatment Plan and Schedule, taught by Donte Encarnacion RN at 8/14/2024 11:37 AM.  Learner: Significant Other, Patient  Readiness: Eager  Method: Explanation  Response: Verbalizes Understanding    General Medication Information, taught by Donte Encarnacion RN at 8/14/2024 11:37 AM.  Learner: Significant Other, Patient  Readiness: Eager  Method: Explanation  Response: Verbalizes Understanding    Supportive Medications, taught by Donte Encarnacion RN at 8/14/2024 11:37 AM.  Learner: Significant Other, Patient  Readiness: Eager  Method: Explanation  Response: Verbalizes Understanding    Education Comments  No comments found.

## 2024-08-14 NOTE — ADDENDUM NOTE
Encounter addended by: Jordan Randhawa RN on: 8/14/2024 10:46 AM   Actions taken: Flowsheet accepted

## 2024-08-15 ENCOUNTER — APPOINTMENT (OUTPATIENT)
Dept: RADIOLOGY | Facility: HOSPITAL | Age: 64
End: 2024-08-15
Payer: COMMERCIAL

## 2024-08-15 ASSESSMENT — ENCOUNTER SYMPTOMS
HOT FLASHES: 0
SLEEP DISTURBANCE: 0
NERVOUS/ANXIOUS: 0
BRUISES/BLEEDS EASILY: 0
HEMOPTYSIS: 0
DIFFICULTY URINATING: 0
DEPRESSION: 0
CARDIOVASCULAR NEGATIVE: 1
FEVER: 0
ABDOMINAL DISTENTION: 0
PALPITATIONS: 0
FATIGUE: 0
DIAPHORESIS: 0
BLOOD IN STOOL: 0
CONFUSION: 0
WHEEZING: 0
DYSURIA: 0
DIZZINESS: 0
EYES NEGATIVE: 1
FREQUENCY: 0
APPETITE CHANGE: 0
COUGH: 0
SEIZURES: 0
EXTREMITY WEAKNESS: 0
CHEST TIGHTNESS: 0
CONSTIPATION: 0
CHILLS: 0
NUMBNESS: 0
ABDOMINAL PAIN: 0
MYALGIAS: 0
DIARRHEA: 0
ARTHRALGIAS: 0
LEG SWELLING: 0
HEMATURIA: 0
HEADACHES: 0
CONSTITUTIONAL NEGATIVE: 1
SHORTNESS OF BREATH: 0
RESPIRATORY NEGATIVE: 1
ADENOPATHY: 0
SCLERAL ICTERUS: 0
EYE PROBLEMS: 0

## 2024-08-15 NOTE — ADDENDUM NOTE
Encounter addended by: KELSEA Rodriguez-CNP on: 8/15/2024 3:28 PM   Actions taken: SmartForm saved, Clinical Note Signed, Level of Service modified

## 2024-08-15 NOTE — PROGRESS NOTES
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 she received prior Taxotere and cyclophosphamide , completed 6/2010. S/p adjuvant endocrine therapy.    Diagnosis of metastatic TNBC 3/2023, presenting with back pain and cord compression      weekly paclitaxel from 5/22/23, changed to abraxane after 1st cycle due to anaphylactic type reaction. Discontinued 12/12/23 due to progressive disease.    TDxD from 1/29/24, held for XRT to spine for a few wks and stopped on 6/27/24  s/p XRT to spine on 4/1/24    CURRENT THERAPY  None    HISTORY OF PRESENT ILLNESS    Trang Jones is a 64 y.o. woman here to screen for [id number] with a potential start date of 8/12/24.    Review of Systems   Constitutional: Negative.  Negative for appetite change, chills, diaphoresis, fatigue and fever.   HENT:  Negative.  Negative for hearing loss and lump/mass.    Eyes: Negative.  Negative for eye problems and icterus.   Respiratory: Negative.  Negative for chest tightness, cough, hemoptysis, shortness of breath and wheezing.    Cardiovascular: Negative.  Negative for chest pain, leg swelling and palpitations.   Gastrointestinal:  Negative for abdominal distention, abdominal pain, blood in stool, constipation and diarrhea.   Endocrine: Negative for hot flashes.   Genitourinary:  Negative for bladder incontinence, difficulty urinating, dyspareunia, dysuria, frequency and hematuria.    Musculoskeletal:  Negative for arthralgias, gait problem and myalgias.   Neurological:  Negative for dizziness, extremity weakness, gait problem, headaches, numbness and seizures.    Hematological:  Negative for adenopathy. Does not bruise/bleed easily.   Psychiatric/Behavioral:  Negative for confusion, depression and sleep disturbance. The patient is not nervous/anxious.    All other systems reviewed and are negative.        Past Medical History:  has a past medical history of Breast cancer (Multi), Hypercholesteremia, Metastatic cancer (Multi), and Personal history of irradiation.  Surgical History:   has a past surgical history that includes CT guided percutaneous biopsy bone deep (2023); Mastectomy (Left, ); and Breast surgery.  Social History:   reports that she quit smoking about 21 years ago. Her smoking use included cigarettes. She has been exposed to tobacco smoke. She has never used smokeless tobacco. She reports that she does not currently use alcohol. She reports current drug use. Drug: Marijuana.  Family History:    Family History   Problem Relation Name Age of Onset    Leukemia Father       Family Oncology History:  Cancer-related family history is not on file.      OBJECTIVE    VS / Pain:  /69 (BP Location: Right arm, Patient Position: Sitting)   Pulse 58   Temp 36.9 °C (98.4 °F) (Temporal)   Resp 16   LMP  (LMP Unknown)   SpO2 99%   BSA: There is no height or weight on file to calculate BSA.     Pain Scale: 3    Performance Status:  The ECOG performance scale today is ECO- Restricted in physically strenuous activity.  Carries out light duty.  Hospital Outpatient Visit on 2024   Component Date Value Ref Range Status    WBC 2024 3.5 (L)  4.4 - 11.3 x10*3/uL Final    nRBC 2024 0.0  0.0 - 0.0 /100 WBCs Final    RBC 2024 4.09  4.00 - 5.20 x10*6/uL Final    Hemoglobin 2024 12.3  12.0 - 16.0 g/dL Final    Hematocrit 2024 36.8  36.0 - 46.0 % Final    MCV 2024 90  80 - 100 fL Final    MCH 2024 30.1  26.0 - 34.0 pg Final    MCHC 2024 33.4  32.0 - 36.0 g/dL Final    RDW 2024 13.7  11.5 - 14.5 % Final     Platelets 08/12/2024 174  150 - 450 x10*3/uL Final    Neutrophils % 08/12/2024 53.5  40.0 - 80.0 % Final    Immature Granulocytes %, Automated 08/12/2024 0.6  0.0 - 0.9 % Final    Immature Granulocyte Count (IG) includes promyelocytes, myelocytes and metamyelocytes but does not include bands. Percent differential counts (%) should be interpreted in the context of the absolute cell counts (cells/UL).    Lymphocytes % 08/12/2024 31.6  13.0 - 44.0 % Final    Monocytes % 08/12/2024 9.4  2.0 - 10.0 % Final    Eosinophils % 08/12/2024 4.3  0.0 - 6.0 % Final    Basophils % 08/12/2024 0.6  0.0 - 2.0 % Final    Neutrophils Absolute 08/12/2024 1.88  1.20 - 7.70 x10*3/uL Final    Percent differential counts (%) should be interpreted in the context of the absolute cell counts (cells/uL).    Immature Granulocytes Absolute, Au* 08/12/2024 0.02  0.00 - 0.70 x10*3/uL Final    Lymphocytes Absolute 08/12/2024 1.11 (L)  1.20 - 4.80 x10*3/uL Final    Monocytes Absolute 08/12/2024 0.33  0.10 - 1.00 x10*3/uL Final    Eosinophils Absolute 08/12/2024 0.15  0.00 - 0.70 x10*3/uL Final    Basophils Absolute 08/12/2024 0.02  0.00 - 0.10 x10*3/uL Final    Glucose 08/12/2024 98  74 - 99 mg/dL Final    Sodium 08/12/2024 145  136 - 145 mmol/L Final    Potassium 08/12/2024 4.1  3.5 - 5.3 mmol/L Final    Chloride 08/12/2024 109 (H)  98 - 107 mmol/L Final    Bicarbonate 08/12/2024 28  21 - 32 mmol/L Final    Anion Gap 08/12/2024 12  10 - 20 mmol/L Final    Urea Nitrogen 08/12/2024 14  6 - 23 mg/dL Final    Creatinine 08/12/2024 0.59  0.50 - 1.05 mg/dL Final    eGFR 08/12/2024 >90  >60 mL/min/1.73m*2 Final    Calculations of estimated GFR are performed using the 2021 CKD-EPI Study Refit equation without the race variable for the IDMS-Traceable creatinine methods.  https://jasn.asnjournals.org/content/early/2021/09/22/ASN.3717105631    Calcium 08/12/2024 8.6  8.6 - 10.6 mg/dL Final    Albumin 08/12/2024 3.6  3.4 - 5.0 g/dL Final    Alkaline Phosphatase  "08/12/2024 63  33 - 136 U/L Final    Total Protein 08/12/2024 5.2 (L)  6.4 - 8.2 g/dL Final    AST 08/12/2024 18  9 - 39 U/L Final    Bilirubin, Total 08/12/2024 0.5  0.0 - 1.2 mg/dL Final    ALT 08/12/2024 16  7 - 45 U/L Final    Patients treated with Sulfasalazine may generate falsely decreased results for ALT.    Amylase 08/12/2024 37  29 - 103 U/L Final    aPTT 08/12/2024 48 (H)  27 - 38 seconds Final    D-Dimer Non VTE, Quant (ng/mL FEU) 08/12/2024 543 (H)  <=500 ng/mL FEU Final    Haptoglobin 08/12/2024 78  37 - 246 mg/dL Final    LDH 08/12/2024 168  84 - 246 U/L Final    Magnesium 08/12/2024 1.97  1.60 - 2.40 mg/dL Final    Phosphorus 08/12/2024 4.6  2.5 - 4.9 mg/dL Final    The performance characteristics of phosphorus testing in heparinized plasma have been validated by the individual  laboratory site where testing is performed. Testing on heparinized plasma is not approved by the FDA; however, such approval is not necessary.    Protime 08/12/2024 10.4  9.8 - 12.8 seconds Final    INR 08/12/2024 0.9  0.9 - 1.1 Final    Retic % 08/12/2024 2.0  0.5 - 2.0 % Final    Retic Absolute 08/12/2024 0.082  0.018 - 0.083 x10*6/uL Final    Reticulocyte Hemoglobin 08/12/2024 34  28 - 38 pg Final    Immature Retic fraction 08/12/2024 15.9  <=16.0 % Final    Reticulocytes are measured based on a fluorescent technique. The IRF, or immature reticulocyte fraction, is the percent of reticulocytes that show medium (MFR) or high (HFR) fluorescence.  This value can be used to assess the relative maturity of the reticulocyte population in response to anemia. The \"shift reticulocytes\" are not measured by this technique, eliminating the need for their correction in the reticulocyte index.    Uric Acid 08/12/2024 3.8  2.3 - 6.7 mg/dL Final    Venipuncture immediately after or during the administration of Metamizole may lead to falsely low results. Testing should be performed immediately  prior to Metamizole dosing.    Color, Urine " 08/12/2024 Light-Yellow  Light-Yellow, Yellow, Dark-Yellow Final    Appearance, Urine 08/12/2024 Clear  Clear Final    Specific Gravity, Urine 08/12/2024 1.015  1.005 - 1.035 Final    pH, Urine 08/12/2024 5.0  5.0, 5.5, 6.0, 6.5, 7.0, 7.5, 8.0 Final    Protein, Urine 08/12/2024 NEGATIVE  NEGATIVE, 10 (TRACE), 20 (TRACE) mg/dL Final    Glucose, Urine 08/12/2024 Normal  Normal mg/dL Final    Blood, Urine 08/12/2024 NEGATIVE  NEGATIVE Final    Ketones, Urine 08/12/2024 NEGATIVE  NEGATIVE mg/dL Final    Bilirubin, Urine 08/12/2024 NEGATIVE  NEGATIVE Final    Urobilinogen, Urine 08/12/2024 Normal  Normal mg/dL Final    Nitrite, Urine 08/12/2024 NEGATIVE  NEGATIVE Final    Leukocyte Esterase, Urine 08/12/2024 NEGATIVE  NEGATIVE Final    CA 27.29 08/12/2024 100.0 (H)  0.0 - 38.6 U/mL Final    Extra Tube 08/12/2024 Hold for add-ons.   Final    Auto resulted.    Extra Tube 08/12/2024 Hold for add-ons.   Final    Auto resulted.    Extra Tube 08/12/2024 Hold for add-ons.   Final    Auto resulted.    Extra Tube 08/12/2024 Hold for add-ons.   Final    Auto resulted.    Extra Tube 08/12/2024 Hold for add-ons.   Final    Auto resulted.    Extra Tube 08/12/2024 Hold for add-ons.   Final    Auto resulted.    Extra Tube 08/12/2024 Hold for add-ons.   Final    Auto resulted.    Extra Tube 08/12/2024 Hold for add-ons.   Final    Auto resulted.       Physical Exam  Constitutional:       General: She is not in acute distress.     Appearance: She is not ill-appearing, toxic-appearing or diaphoretic.   HENT:      Nose: No congestion or rhinorrhea.      Mouth/Throat:      Pharynx: No posterior oropharyngeal erythema.   Cardiovascular:      Rate and Rhythm: Normal rate and regular rhythm.      Pulses: Normal pulses.      Heart sounds: Normal heart sounds. No murmur heard.     No gallop.   Pulmonary:      Breath sounds: Normal breath sounds.   Abdominal:      General: There is no distension.      Palpations: There is no mass.      Tenderness:  There is no abdominal tenderness.   Musculoskeletal:         General: No swelling.      Cervical back: No rigidity.      Right lower leg: No edema.      Left lower leg: No edema.   Lymphadenopathy:      Cervical: No cervical adenopathy.   Skin:     General: Skin is warm.      Coloration: Skin is not cyanotic.      Findings: No bruising, ecchymosis or erythema.   Neurological:      General: No focal deficit present.      Mental Status: She is oriented to person, place, and time.      Cranial Nerves: Cranial nerves 2-12 are intact.      Motor: Motor function is intact.   Psychiatric:         Attention and Perception: Attention and perception normal.         Mood and Affect: Mood and affect normal.         Behavior: Behavior normal.         Thought Content: Thought content normal.         Judgment: Judgment normal.             Diagnostic Results       IMPRESSION/PLAN      1. 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  -Start treatment on [id number] with C1D1 today.    2. Pain neoplasm related. Following with supportive onc.  Much better now. S/p xrt      Reviewed and approved by DIANA ESPITIA on 8/15/24 at 3:28 PM.

## 2024-08-19 ENCOUNTER — EDUCATION (OUTPATIENT)
Dept: HEMATOLOGY/ONCOLOGY | Facility: HOSPITAL | Age: 64
End: 2024-08-19
Payer: COMMERCIAL

## 2024-08-19 ENCOUNTER — HOSPITAL ENCOUNTER (OUTPATIENT)
Dept: RESEARCH | Facility: HOSPITAL | Age: 64
Discharge: HOME | End: 2024-08-19
Payer: COMMERCIAL

## 2024-08-19 VITALS
OXYGEN SATURATION: 98 % | HEART RATE: 46 BPM | DIASTOLIC BLOOD PRESSURE: 72 MMHG | SYSTOLIC BLOOD PRESSURE: 117 MMHG | RESPIRATION RATE: 16 BRPM | BODY MASS INDEX: 26.89 KG/M2 | WEIGHT: 141.31 LBS | TEMPERATURE: 97.5 F

## 2024-08-19 DIAGNOSIS — C79.51 CARCINOMA OF RIGHT BREAST METASTATIC TO BONE (MULTI): Primary | ICD-10-CM

## 2024-08-19 DIAGNOSIS — C50.911 CARCINOMA OF RIGHT BREAST METASTATIC TO BONE (MULTI): Primary | ICD-10-CM

## 2024-08-19 DIAGNOSIS — C41.9 MALIGNANT NEOPLASM OF BONE WITH METASTASES (MULTI): ICD-10-CM

## 2024-08-19 LAB
ALBUMIN SERPL BCP-MCNC: 3.7 G/DL (ref 3.4–5)
ALP SERPL-CCNC: 65 U/L (ref 33–136)
ALT SERPL W P-5'-P-CCNC: 17 U/L (ref 7–45)
AMYLASE SERPL-CCNC: 37 U/L (ref 29–103)
ANION GAP SERPL CALC-SCNC: 12 MMOL/L (ref 10–20)
APPEARANCE UR: CLEAR
APTT PPP: 38 SECONDS (ref 27–38)
AST SERPL W P-5'-P-CCNC: 19 U/L (ref 9–39)
BASOPHILS # BLD AUTO: 0.02 X10*3/UL (ref 0–0.1)
BASOPHILS NFR BLD AUTO: 0.5 %
BILIRUB SERPL-MCNC: 0.6 MG/DL (ref 0–1.2)
BILIRUB UR STRIP.AUTO-MCNC: NEGATIVE MG/DL
BUN SERPL-MCNC: 18 MG/DL (ref 6–23)
CALCIUM SERPL-MCNC: 8.5 MG/DL (ref 8.6–10.6)
CHLORIDE SERPL-SCNC: 107 MMOL/L (ref 98–107)
CO2 SERPL-SCNC: 28 MMOL/L (ref 21–32)
COLOR UR: ABNORMAL
CREAT SERPL-MCNC: 0.73 MG/DL (ref 0.5–1.05)
D DIMER PPP FEU-MCNC: 688 NG/ML FEU
EGFRCR SERPLBLD CKD-EPI 2021: >90 ML/MIN/1.73M*2
EOSINOPHIL # BLD AUTO: 0.15 X10*3/UL (ref 0–0.7)
EOSINOPHIL NFR BLD AUTO: 3.7 %
ERYTHROCYTE [DISTWIDTH] IN BLOOD BY AUTOMATED COUNT: 13.5 % (ref 11.5–14.5)
GLUCOSE SERPL-MCNC: 106 MG/DL (ref 74–99)
GLUCOSE UR STRIP.AUTO-MCNC: NORMAL MG/DL
HCT VFR BLD AUTO: 35.4 % (ref 36–46)
HGB BLD-MCNC: 11.6 G/DL (ref 12–16)
HGB RETIC QN: 33 PG (ref 28–38)
HOLD SPECIMEN: NORMAL
IMM GRANULOCYTES # BLD AUTO: 0.01 X10*3/UL (ref 0–0.7)
IMM GRANULOCYTES NFR BLD AUTO: 0.2 % (ref 0–0.9)
IMMATURE RETIC FRACTION: 18.7 %
INR PPP: 0.9 (ref 0.9–1.1)
KETONES UR STRIP.AUTO-MCNC: NEGATIVE MG/DL
LDH SERPL L TO P-CCNC: 182 U/L (ref 84–246)
LEUKOCYTE ESTERASE UR QL STRIP.AUTO: ABNORMAL
LYMPHOCYTES # BLD AUTO: 1.15 X10*3/UL (ref 1.2–4.8)
LYMPHOCYTES NFR BLD AUTO: 28.1 %
MAGNESIUM SERPL-MCNC: 2.06 MG/DL (ref 1.6–2.4)
MCH RBC QN AUTO: 30.1 PG (ref 26–34)
MCHC RBC AUTO-ENTMCNC: 32.8 G/DL (ref 32–36)
MCV RBC AUTO: 92 FL (ref 80–100)
MONOCYTES # BLD AUTO: 0.39 X10*3/UL (ref 0.1–1)
MONOCYTES NFR BLD AUTO: 9.5 %
NEUTROPHILS # BLD AUTO: 2.37 X10*3/UL (ref 1.2–7.7)
NEUTROPHILS NFR BLD AUTO: 58 %
NITRITE UR QL STRIP.AUTO: NEGATIVE
NRBC BLD-RTO: 0 /100 WBCS (ref 0–0)
PH UR STRIP.AUTO: 7 [PH]
PHOSPHATE SERPL-MCNC: 3.8 MG/DL (ref 2.5–4.9)
PLATELET # BLD AUTO: 156 X10*3/UL (ref 150–450)
POTASSIUM SERPL-SCNC: 4 MMOL/L (ref 3.5–5.3)
PROT SERPL-MCNC: 5.3 G/DL (ref 6.4–8.2)
PROT UR STRIP.AUTO-MCNC: NEGATIVE MG/DL
PROTHROMBIN TIME: 10.1 SECONDS (ref 9.8–12.8)
RBC # BLD AUTO: 3.86 X10*6/UL (ref 4–5.2)
RBC # UR STRIP.AUTO: NEGATIVE /UL
RBC #/AREA URNS AUTO: NORMAL /HPF
RETICS #: 0.07 X10*6/UL (ref 0.02–0.08)
RETICS/RBC NFR AUTO: 2 % (ref 0.5–2)
SODIUM SERPL-SCNC: 143 MMOL/L (ref 136–145)
SP GR UR STRIP.AUTO: 1.01
URATE SERPL-MCNC: 4.2 MG/DL (ref 2.3–6.7)
UROBILINOGEN UR STRIP.AUTO-MCNC: NORMAL MG/DL
WBC # BLD AUTO: 4.1 X10*3/UL (ref 4.4–11.3)
WBC #/AREA URNS AUTO: NORMAL /HPF

## 2024-08-19 PROCEDURE — 84100 ASSAY OF PHOSPHORUS: CPT

## 2024-08-19 PROCEDURE — 83010 ASSAY OF HAPTOGLOBIN QUANT: CPT

## 2024-08-19 PROCEDURE — 84132 ASSAY OF SERUM POTASSIUM: CPT

## 2024-08-19 PROCEDURE — 85379 FIBRIN DEGRADATION QUANT: CPT

## 2024-08-19 PROCEDURE — 81001 URINALYSIS AUTO W/SCOPE: CPT

## 2024-08-19 PROCEDURE — 85045 AUTOMATED RETICULOCYTE COUNT: CPT

## 2024-08-19 PROCEDURE — 85730 THROMBOPLASTIN TIME PARTIAL: CPT

## 2024-08-19 PROCEDURE — 83735 ASSAY OF MAGNESIUM: CPT

## 2024-08-19 PROCEDURE — 2500000004 HC RX 250 GENERAL PHARMACY W/ HCPCS (ALT 636 FOR OP/ED): Performed by: INTERNAL MEDICINE

## 2024-08-19 PROCEDURE — 84550 ASSAY OF BLOOD/URIC ACID: CPT

## 2024-08-19 PROCEDURE — 82150 ASSAY OF AMYLASE: CPT

## 2024-08-19 PROCEDURE — 85025 COMPLETE CBC W/AUTO DIFF WBC: CPT

## 2024-08-19 PROCEDURE — 85610 PROTHROMBIN TIME: CPT

## 2024-08-19 PROCEDURE — 83615 LACTATE (LD) (LDH) ENZYME: CPT

## 2024-08-19 RX ORDER — ALBUTEROL SULFATE 0.83 MG/ML
3 SOLUTION RESPIRATORY (INHALATION) AS NEEDED
Status: DISCONTINUED | OUTPATIENT
Start: 2024-08-19 | End: 2024-08-20 | Stop reason: HOSPADM

## 2024-08-19 RX ORDER — HEPARIN 100 UNIT/ML
500 SYRINGE INTRAVENOUS AS NEEDED
OUTPATIENT
Start: 2024-08-19

## 2024-08-19 RX ORDER — HEPARIN 100 UNIT/ML
500 SYRINGE INTRAVENOUS AS NEEDED
Status: DISCONTINUED | OUTPATIENT
Start: 2024-08-19 | End: 2024-08-20 | Stop reason: HOSPADM

## 2024-08-19 RX ORDER — DIPHENHYDRAMINE HYDROCHLORIDE 50 MG/ML
50 INJECTION INTRAMUSCULAR; INTRAVENOUS AS NEEDED
Status: DISCONTINUED | OUTPATIENT
Start: 2024-08-19 | End: 2024-08-20 | Stop reason: HOSPADM

## 2024-08-19 RX ORDER — HEPARIN SODIUM,PORCINE/PF 10 UNIT/ML
50 SYRINGE (ML) INTRAVENOUS AS NEEDED
OUTPATIENT
Start: 2024-08-19

## 2024-08-19 RX ORDER — FAMOTIDINE 10 MG/ML
20 INJECTION INTRAVENOUS ONCE AS NEEDED
Status: DISCONTINUED | OUTPATIENT
Start: 2024-08-19 | End: 2024-08-20 | Stop reason: HOSPADM

## 2024-08-19 RX ORDER — EPINEPHRINE 1 MG/ML
0.3 INJECTION INTRAMUSCULAR; INTRAVENOUS; SUBCUTANEOUS EVERY 5 MIN PRN
Status: DISCONTINUED | OUTPATIENT
Start: 2024-08-19 | End: 2024-08-20 | Stop reason: HOSPADM

## 2024-08-19 ASSESSMENT — PAIN SCALES - GENERAL: PAINLEVEL: 0-NO PAIN

## 2024-08-19 NOTE — ADDENDUM NOTE
Encounter addended by: Fabiola Andrea RN on: 8/19/2024 11:22 AM   Actions taken: Clinical Note Signed

## 2024-08-19 NOTE — RESEARCH NOTES
RSH><[id number]><C1D8 AND C2D8>  DCRU NURSING VISIT NOTE  Study Name: TRACY 1Y22  IRB#: [id number]  DCRU#: DCRU # [id number]  Protocol Version Dated: V5 3.7.24  PI: James Ram M.D    Time point: C1D8 or C2D8  Cycle 2    Encounter Date: 08/19/2024  Encounter Time:  7:00 AM EDT  Encounter Department: [facility] HEMATOLOGY AND ONCOLOGY     #1 Phone Pager   Richard Encarnacion   [phone] [address]      Study Regimen and Dosing   Refer to [place] Treatment Plan for orders  Part 1_Expansion - Advanced Solid Tumors with DNA damage response mutations, failed at least one prior standard of care therapy and must be capable of oral administration of study medication.  Cycle = 28 days.   ATRN-119 administered oral daily at least 1 hour prior to a meal      Dietary Guidelines   Regular      Admission and Prior to Starting Study Activities   Notify [facility] of patient arrival.  Complete DCRU and [facility] Admission/Visit Note.  Obtain weight in kilograms - with shoes off and heavy items removed.   Insert one peripheral IV line or access mediport for sample collection procedures (flush line with 5 - 10 mL normal saline following each blood draw).     Study Specific Instructions and Documentation   LABS  VITALS  ECG   RESEARCH CORRELATIVES  STUDY MEDICATION  DISCHARGE     Safety Labs    Refer to [place] Treatment Plan for orders   Safety labs obtained at 0742    Safety Parameters and Special Instructions   ATRN-119 is a PARP inhibitor.   Potential Side Effects/Adverse Events: nausea, vomiting, myelosuppression, myalgia, hypertension, hypomagnesemia, dysgeusia, dyspepsia, insomnia, headache.      Subjective   Trang Jones is a 64 y.o. female and is here for a Research clinical visit.    Visit Provider: Fabiola Flowers RN     Allergies:   Allergies   Allergen Reactions    Olanzapine Headache, Dizziness and Drowsiness    Paclitaxel Other     Scratchy throat, erythema face, back pain       Objective   Vital Signs:     Vitals:    08/19/24 0732 08/19/24 0932   BP: 116/72 117/72   Pulse: 51 (!) 46   Resp: 16 16   Temp: 36.4 °C (97.5 °F) 36.4 °C (97.5 °F)   TempSrc: Oral Oral   SpO2: 97% 98%   Weight: 64.1 kg (141 lb 5 oz)    PainSc: 0-No pain        Physical Exam     ASSESSMENT and PLAN:  Problem List Items Addressed This Visit          Hematology and Neoplasia    Malignant neoplasm of bone with metastases (Multi)    Relevant Medications    sodium chloride 0.9 % bolus 500 mL    dextrose 5 % in water (D5W) bolus    diphenhydrAMINE (BENADryl) injection 50 mg    methylPREDNISolone sod succinate (SOLU-Medrol) 40 mg/mL injection 40 mg    famotidine PF (Pepcid) injection 20 mg    EPINEPHrine (Adrenalin) injection 0.3 mg    albuterol 2.5 mg /3 mL (0.083 %) nebulizer solution 3 mL    Study [id number] ATRN-119 100 mg capsule 800 mg (Completed)    heparin flush 100 unit/mL syringe 500 Units    Other Relevant Orders    CBC and Auto Differential (Completed)    Comprehensive metabolic panel (Completed)    Amylase (Completed)    APTT (Completed)    D-dimer, Quantitative (Completed)    Haptoglobin    Lactate dehydrogenase (Completed)    Magnesium (Completed)    Phosphorus (Completed)    Protime-INR (Completed)    Reticulocytes (Completed)    Uric Acid (Completed)    Urinalysis with Reflex Microscopic (Completed)    Clinic Appointment Request    Infusion Appointment Request    Research collection: 3mL Lavender K2 EDTA - Research Collect ATRN-119/ATRN-157; Pre-Dose; Within 1 hour; Ambient; 8-10x    CBC and Auto Differential (Completed)    Comprehensive metabolic panel (Completed)    Amylase (Completed)    APTT (Completed)    D-dimer, Quantitative (Completed)    Haptoglobin    Lactate dehydrogenase (Completed)    Magnesium (Completed)    Phosphorus (Completed)    Protime-INR (Completed)    Reticulocytes (Completed)    Uric Acid (Completed)    Urinalysis with Reflex Microscopic (Completed)    Microscopic Only, Urine (Completed)    Adult diet Regular     Provider Communication - [id number] (Completed)    Research Triplicate ECG (Completed)    Nursing Communication - Hypersensitivity Management, Moderate (Completed)    Nursing Communication - Hypersensitivity Management, Severe (Completed)    Nursing Communication - Respiratory Management (Completed)    Pulse oximetry, continuous (Completed)    Research collection: 3mL Lavender K2 EDTA - Research Collect ATRN-119/ATRN-157; Pre-Dose; Within 1 hour; Ambient; 8-10x    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    Relevant Medications    sodium chloride 0.9 % bolus 500 mL    dextrose 5 % in water (D5W) bolus    diphenhydrAMINE (BENADryl) injection 50 mg    methylPREDNISolone sod succinate (SOLU-Medrol) 40 mg/mL injection 40 mg    famotidine PF (Pepcid) injection 20 mg    EPINEPHrine (Adrenalin) injection 0.3 mg    albuterol 2.5 mg /3 mL (0.083 %) nebulizer solution 3 mL    Study [id number] ATRN-119 100 mg capsule 800 mg (Completed)    Other Relevant Orders    CBC and Auto Differential (Completed)    Comprehensive metabolic panel (Completed)    Amylase (Completed)    APTT (Completed)    D-dimer, Quantitative (Completed)    Haptoglobin    Lactate dehydrogenase (Completed)    Magnesium (Completed)    Phosphorus (Completed)    Protime-INR (Completed)    Reticulocytes (Completed)    Uric Acid (Completed)    Urinalysis with Reflex Microscopic (Completed)    Clinic Appointment Request    Infusion Appointment Request    Research collection: 3mL Lavender K2 EDTA - Research Collect ATRN-119/ATRN-157; Pre-Dose; Within 1 hour; Ambient; 8-10x    CBC and Auto Differential (Completed)    Comprehensive metabolic panel (Completed)    Amylase (Completed)    APTT (Completed)    D-dimer, Quantitative (Completed)    Haptoglobin    Lactate dehydrogenase (Completed)    Magnesium (Completed)    Phosphorus (Completed)    Protime-INR (Completed)    Reticulocytes (Completed)    Uric Acid (Completed)    Urinalysis with Reflex  Microscopic (Completed)    Microscopic Only, Urine (Completed)    Adult diet Regular    Provider Communication - [id number] (Completed)    Research Triplicate ECG (Completed)    Nursing Communication - Hypersensitivity Management, Moderate (Completed)    Nursing Communication - Hypersensitivity Management, Severe (Completed)    Nursing Communication - Respiratory Management (Completed)    Pulse oximetry, continuous (Completed)    Research collection: 3mL Lavender K2 EDTA - Research Collect ATRN-119/ATRN-157; Pre-Dose; Within 1 hour; Ambient; 8-10x        Medications as of the completion of today's visit:  Current Outpatient Medications   Medication Sig Dispense Refill    atorvastatin (Lipitor) 10 mg tablet Take 1 tablet (10 mg) by mouth once daily.      calcium carbonate (CALCIUM 600 ORAL) Take 600 mg by mouth once daily.      cholecalciferol, vitD3,/vit K2 (vitamin D3-vitamin K2) 125 mcg (5,000 unit)-100 mcg capsule Take by mouth.      gabapentin (Neurontin) 300 mg capsule Take 1 capsule (300 mg) by mouth once daily in the morning AND 2 capsules (600 mg) once daily at bedtime. 270 capsule 3    lidocaine-prilocaine (Emla) 2.5-2.5 % cream apply topically to MEDIPORT SITE 45 MINUTES BEFORE ACCESSING PORT, USE COVER WITH BANDAID      MAGNESIUM ORAL Take 400 mg by mouth.      morphine CR (MS Contin) 15 mg 12 hr tablet Take 1 tablet (15 mg) by mouth 3 times a day. Do not crush, chew, or split. 90 tablet 0    ondansetron ODT (Zofran-ODT) 4 mg disintegrating tablet Take 1 tablet (4 mg) by mouth every 8 hours if needed for nausea or vomiting.      oxyCODONE (Roxicodone) 10 mg immediate release tablet Take 1 tablet (10 mg) by mouth every 6 hours if needed for moderate pain (4 - 6). 120 tablet 0    prochlorperazine (Compazine) 10 mg tablet Take 1 tablet (10 mg) by mouth every 6 hours if needed for nausea or vomiting.      propranolol (Inderal) 40 mg tablet Take 1 tablet (40 mg) by mouth once daily.      rizatriptan MLT  (Maxalt-MLT) 5 mg disintegrating tablet take 1 tablet by mouth if needed MAY REPEAT IN 2 HOURS IF NEEDED      Study [id number] ATRN-119 100 mg capsule Take 8 capsules (800 mg total) by mouth once daily for 28 days.  Swallow whole. Take at least 1 hour prior to a meal. Do not eat grapefruit or drink grapefruit juice. 224 capsule 0    zinc glycinate 30 mg capsule Take by mouth.       Current Facility-Administered Medications   Medication Dose Route Frequency Provider Last Rate Last Admin    albuterol 2.5 mg /3 mL (0.083 %) nebulizer solution 3 mL  3 mL nebulization PRN ERROL Ty        dextrose 5 % in water (D5W) bolus  500 mL intravenous PRN ERROL Ty        diphenhydrAMINE (BENADryl) injection 50 mg  50 mg intravenous PRN KELSEA Ty-MALVIN        EPINEPHrine (Adrenalin) injection 0.3 mg  0.3 mg intramuscular q5 min PRN KELSEA Ty-CNP        famotidine PF (Pepcid) injection 20 mg  20 mg intravenous Once PRN ERROL Ty        heparin flush 100 unit/mL syringe 500 Units  500 Units intra-catheter PRN Bebeot Tolbert MD   500 Units at 08/19/24 1004    methylPREDNISolone sod succinate (SOLU-Medrol) 40 mg/mL injection 40 mg  40 mg intravenous PRN KELSEA Ty-CNP        perflutren lipid microspheres (Definity) injection 0.5-10 mL of dilution  0.5-10 mL of dilution intravenous Once Israel Whalen MD        sodium chloride 0.9 % bolus 500 mL  500 mL intravenous PRN ERROL Ty           Administrations This Visit       heparin flush 100 unit/mL syringe 500 Units       Admin Date  08/19/2024 Action  Given Dose  500 Units Route  intra-catheter Documented By  Fabiola Andrea RN              Study [id number] ATRN-119 100 mg capsule 800 mg       Admin Date  08/19/2024 Action  Given Dose  800 mg Route  oral Documented By  Fabiola Andrea RN                    Orders placed during today's visit:  Orders Placed This  Encounter   Procedures    CBC and Auto Differential     Standing Status:   Future     Number of Occurrences:   1     Standing Expiration Date:   8/19/2025     Order Specific Question:   Release result to MyChart     Answer:   Immediate [1]    Comprehensive metabolic panel     Standing Status:   Future     Number of Occurrences:   1     Standing Expiration Date:   8/19/2025     Order Specific Question:   Release result to MyChart     Answer:   Immediate [1]    Amylase     Standing Status:   Future     Number of Occurrences:   1     Standing Expiration Date:   8/19/2025     Order Specific Question:   Release result to MyChart     Answer:   Immediate [1]    APTT     Standing Status:   Future     Number of Occurrences:   1     Standing Expiration Date:   8/19/2025     Order Specific Question:   Release result to MyChart     Answer:   Immediate [1]    D-dimer, Quantitative     Standing Status:   Future     Number of Occurrences:   1     Standing Expiration Date:   8/19/2025     Order Specific Question:   Release result to MyChart     Answer:   Immediate [1]    Haptoglobin     Standing Status:   Future     Number of Occurrences:   1     Standing Expiration Date:   8/19/2025     Order Specific Question:   Release result to MyChart     Answer:   Immediate [1]    Lactate dehydrogenase     Standing Status:   Future     Number of Occurrences:   1     Standing Expiration Date:   8/19/2025     Order Specific Question:   Release result to MyChart     Answer:   Immediate [1]    Magnesium     Standing Status:   Future     Number of Occurrences:   1     Standing Expiration Date:   8/19/2025     Order Specific Question:   Release result to MyChart     Answer:   Immediate [1]    Phosphorus     Standing Status:   Future     Number of Occurrences:   1     Standing Expiration Date:   8/19/2025     Order Specific Question:   Release result to MyChart     Answer:   Immediate [1]    Protime-INR     Standing Status:   Future     Number of  Occurrences:   1     Standing Expiration Date:   8/19/2025     Order Specific Question:   Release result to MyChart     Answer:   Immediate [1]    Reticulocytes     Standing Status:   Future     Number of Occurrences:   1     Standing Expiration Date:   8/19/2025     Order Specific Question:   Release result to MyChart     Answer:   Immediate [1]    Uric Acid     Standing Status:   Future     Number of Occurrences:   1     Standing Expiration Date:   8/19/2025     Order Specific Question:   Release result to MyChart     Answer:   Immediate [1]    Urinalysis with Reflex Microscopic     Standing Status:   Future     Number of Occurrences:   1     Standing Expiration Date:   8/19/2025     Order Specific Question:   Release result to MyChart     Answer:   Immediate [1]    Research collection: 3mL Lavender K2 EDTA - Research Collect ATRN-119/ATRN-157; Pre-Dose; Within 1 hour; Ambient; 8-10x     Standing Status:   Future     Number of Occurrences:   1     Standing Expiration Date:   8/19/2025     Order Specific Question:   Test     Answer:   ATRN-119/ATRN-157     Order Specific Question:   Timepoint     Answer:   Pre-Dose     Order Specific Question:   Pre-Dose     Answer:   Within 1 hour     Order Specific Question:   Temperature     Answer:   Ambient     Order Specific Question:   Invert     Answer:   8-10x     Order Specific Question:   Optional?     Answer:   No    CBC and Auto Differential     Standing Status:   Standing     Number of Occurrences:   1     Order Specific Question:   Release result to MyChart     Answer:   Immediate [1]    Comprehensive metabolic panel     Standing Status:   Standing     Number of Occurrences:   1     Order Specific Question:   Release result to MyChart     Answer:   Immediate [1]    Amylase     Standing Status:   Standing     Number of Occurrences:   1     Order Specific Question:   Release result to MyChart     Answer:   Immediate [1]    APTT     Standing Status:   Standing      Number of Occurrences:   1     Order Specific Question:   Release result to MyChart     Answer:   Immediate [1]    D-dimer, Quantitative     Standing Status:   Standing     Number of Occurrences:   1     Order Specific Question:   Release result to MyChart     Answer:   Immediate [1]    Haptoglobin     Standing Status:   Standing     Number of Occurrences:   1     Order Specific Question:   Release result to MyChart     Answer:   Immediate [1]    Lactate dehydrogenase     Standing Status:   Standing     Number of Occurrences:   1     Order Specific Question:   Release result to MyChart     Answer:   Immediate [1]    Magnesium     Standing Status:   Standing     Number of Occurrences:   1     Order Specific Question:   Release result to MyChart     Answer:   Immediate [1]    Phosphorus     Standing Status:   Standing     Number of Occurrences:   1     Order Specific Question:   Release result to MyChart     Answer:   Immediate [1]    Protime-INR     Standing Status:   Standing     Number of Occurrences:   1     Order Specific Question:   Release result to MyChart     Answer:   Immediate [1]    Reticulocytes     Standing Status:   Standing     Number of Occurrences:   1     Order Specific Question:   Release result to MyChart     Answer:   Immediate [1]    Uric Acid     Standing Status:   Standing     Number of Occurrences:   1     Order Specific Question:   Release result to MyChart     Answer:   Immediate [1]    Urinalysis with Reflex Microscopic     Standing Status:   Standing     Number of Occurrences:   1     Order Specific Question:   Release result to MyChart     Answer:   Immediate [1]    Microscopic Only, Urine     Standing Status:   Standing     Number of Occurrences:   1     Order Specific Question:   Release result to MyChart     Answer:   Immediate [1]    Research collection: 3mL Lavender K2 EDTA - Research Collect ATRN-119/ATRN-157; Pre-Dose; Within 1 hour; Ambient; 8-10x     Standing Status:   Standing      Number of Occurrences:   1     Order Specific Question:   Test     Answer:   ATRN-119/ATRN-157     Order Specific Question:   Timepoint     Answer:   Pre-Dose     Order Specific Question:   Pre-Dose     Answer:   Within 1 hour     Order Specific Question:   Temperature     Answer:   Ambient     Order Specific Question:   Invert     Answer:   8-10x     Order Specific Question:   Optional?     Answer:   No    Adult diet Regular     Standing Status:   Standing     Number of Occurrences:   1     Order Specific Question:   Diet type     Answer:   Regular    Provider Communication - [id number]     · Cohort 1            · ATRN-119 50 mg daily  · Cohort 2            · ATRN-119 100 mg daily  · Cohort 3            · ATRN-119 200 mg daily  · Cohort 4            · ATRN-119 350 mg daily  · Cohort 5            · ATRN-119 550 mg daily  · Cohort 6            · ATRN-119 800 mg daily  · Cohort 7            · ATRN-119 1100 mg daily  · Cohort 8            · ATRN-119 1300 mg daily     Standing Status:   Standing     Number of Occurrences:   1    Research Triplicate ECG     Refer to study SmartPhrase for instructions and documentation of the research triplicate ECG.     Standing Status:   Standing     Number of Occurrences:   1    Nursing Communication - Hypersensitivity Management, Moderate     Flushing, rash, pruritus, dyspnea, chest discomfort, back pain, angioedema, SBP LESS THAN 90 mmHg, and/or change in mental status above or below patient's baseline. In the event of moderate or severe hypersensitivity reaction to any medication:   Stop infusion.  Assess vital signs, pulse oximetry, nursing assessment, and patient complaints.  Administer medications per Hypersensitivity Reaction Medication Protocol.   Notify physician.     Standing Status:   Standing     Number of Occurrences:   1    Nursing Communication - Hypersensitivity Management, Severe     Worsening of moderate reaction symptoms, SBP LESS THAN 80 mmHg, and/or respiratory  distress (respirations GREATER THAN 40 breaths per minute, wheezing, life-threatening symptoms). In the event of moderate or severe hypersensitivity reaction to any medication:   Stop infusion.  Assess vital signs, pulse oximetry, nursing assessment, and patient complaints.  Administer medications per Hypersensitivity Reaction Medication Protocol.   Notify physician.     Standing Status:   Standing     Number of Occurrences:   1    Nursing Communication - Respiratory Management     Oxygen via nasal cannula at 4 L per minute for respiratory rate GREATER THAN OR EQUAL TO to 28 breaths/minute and/or wheezing. Pulse Oximetry continuous monitoring.     Standing Status:   Standing     Number of Occurrences:   1    Pulse oximetry, continuous     Continuous monitoring to maintain SPO2 GREATER THAN 90%     Standing Status:   Standing     Number of Occurrences:   1        APREA1Y22 - Study Of ATRN-119 In Patients With Advanced Solid Tumors    Patient has no adverse events documented in the Research Adverse Events activity.      Criteria to Treat   DCRU RN reviewed and meets eligibility per treatment plan   Time team notified: 0859   DCRU RN notifies study team to review eligibility and approval before dosing procedures  Time team approves: 0922      Pre-dose Vital Signs   Temperature, heart rate, respirations, blood pressure: in a sitting position at rest for at least 5 minutes.    Up to 60 minutes prior to dosing     Pre-dose vital signs obtained at 0932    Pre-dose 12[facility]Lead ECG   [facility]  Triplicate   Each 1 - 5 minutes apart   Rest supine at least 5 minutes prior   Up to 60 minutes prior to dosing   Contact MD/Provider &  if abnormal from baseline  Rest Time  QTcF Average QTcF   0935 430 [phone]      Donte Encarnacion RN and Ajit Hendrickson CNP, notified of ECG interpretation and QTcF results as indicated    Pre-dose Research Correlatives  [facility]     Access Type: mediport (#20g pak needle) Location: right chest   Refer to [place] Treatment Plan for orders  Time point Cycle  Specimen Test Volume  Draw Time   Pre dose (within 60 mins) C1 only  ATRN-119/ATRN-157 PK  3 mL 0952        Research Drug Administration   Document medication administration in MAR activity. Document Research specific instructions below.  ATRN-119 mg Oral Once Daily Dose.   Administer at least 1 hour prior to a meal.   If dose is vomited, do not re-dose.  Dispense from patients supply.   Pt dosed at 1010    Infusion-Related Reactions   Infusion Related Reactions   [facility] Hypersensivity Orders      Discharge Instructions   Discharge after study requirements completed.   Confirm patient has ATRN-119 medication diary & bottle.   Remind patient to document each dose in diary including missed doses   Remind patient: to bring medication diary & bottle to each visit (full or empty)   Remind patient to return: on Day 15 for predose PK, ecg, vitals and treatment.  Discharge time: 1014     Fabiola Flowers RN  08/19/24

## 2024-08-19 NOTE — RESEARCH NOTES
Research Note Treatment Day    Trang Jones is here today for treatment on APREA1Y22. Today is C1D8. Procedures completed per protocol. AE's and con-meds reviewed with patient. Patient is aware of treatment plan.    [x]   Received treatment as planned   OR  []    Treatment delayed; patient calendar updated as required   Treatment delayed because:    []   AE    []   Physician Discretion    []   Clinical Deterioration or Progression     []   Other    Education Documentation  Pain Management, taught by Donte Encarnacion RN at 8/19/2024  2:51 PM.  Learner: Significant Other, Patient  Readiness: Eager  Method: Explanation  Response: Verbalizes Understanding    Treatment Plan and Schedule, taught by Donte Encarnacion RN at 8/19/2024  2:51 PM.  Learner: Significant Other, Patient  Readiness: Eager  Method: Explanation  Response: Verbalizes Understanding    General Medication Information, taught by Donte Encarnacion RN at 8/19/2024  2:51 PM.  Learner: Significant Other, Patient  Readiness: Eager  Method: Explanation  Response: Verbalizes Understanding    Supportive Medications, taught by Donte Encarnacion RN at 8/19/2024  2:51 PM.  Learner: Significant Other, Patient  Readiness: Eager  Method: Explanation  Response: Verbalizes Understanding    Education Comments  No comments found.

## 2024-08-20 LAB — HAPTOGLOB SERPL-MCNC: 92 MG/DL (ref 37–246)

## 2024-08-21 DIAGNOSIS — C79.51 CARCINOMA OF RIGHT BREAST METASTATIC TO BONE (MULTI): Primary | ICD-10-CM

## 2024-08-21 DIAGNOSIS — C41.9 MALIGNANT NEOPLASM OF BONE WITH METASTASES (MULTI): ICD-10-CM

## 2024-08-21 DIAGNOSIS — C50.911 CARCINOMA OF RIGHT BREAST METASTATIC TO BONE (MULTI): Primary | ICD-10-CM

## 2024-08-21 RX ORDER — EPINEPHRINE 0.3 MG/.3ML
0.3 INJECTION SUBCUTANEOUS EVERY 5 MIN PRN
Status: CANCELLED | OUTPATIENT
Start: 2024-08-26

## 2024-08-21 RX ORDER — DIPHENHYDRAMINE HYDROCHLORIDE 50 MG/ML
50 INJECTION INTRAMUSCULAR; INTRAVENOUS AS NEEDED
Status: CANCELLED | OUTPATIENT
Start: 2024-08-26

## 2024-08-21 RX ORDER — FAMOTIDINE 10 MG/ML
20 INJECTION INTRAVENOUS ONCE AS NEEDED
Status: CANCELLED | OUTPATIENT
Start: 2024-08-26

## 2024-08-21 RX ORDER — ALBUTEROL SULFATE 0.83 MG/ML
3 SOLUTION RESPIRATORY (INHALATION) AS NEEDED
Status: CANCELLED | OUTPATIENT
Start: 2024-08-26

## 2024-08-21 ASSESSMENT — ENCOUNTER SYMPTOMS
CONFUSION: 0
RESPIRATORY NEGATIVE: 1
BRUISES/BLEEDS EASILY: 0
CONSTIPATION: 0
APPETITE CHANGE: 0
SHORTNESS OF BREATH: 0
CARDIOVASCULAR NEGATIVE: 1
ABDOMINAL DISTENTION: 0
CHEST TIGHTNESS: 0
DIFFICULTY URINATING: 0
MYALGIAS: 0
DYSURIA: 0
FATIGUE: 0
ARTHRALGIAS: 0
HEMOPTYSIS: 0
SLEEP DISTURBANCE: 0
CHILLS: 0
COUGH: 0
NERVOUS/ANXIOUS: 0
ABDOMINAL PAIN: 1
DIZZINESS: 0
CONSTITUTIONAL NEGATIVE: 1
DIAPHORESIS: 0
HEMATURIA: 0
PALPITATIONS: 0
EYE PROBLEMS: 0
SCLERAL ICTERUS: 0
ADENOPATHY: 0
FEVER: 0
BLOOD IN STOOL: 0
FREQUENCY: 0
SEIZURES: 0
EXTREMITY WEAKNESS: 0
NUMBNESS: 0
LEG SWELLING: 0
DIARRHEA: 0
WHEEZING: 0
HOT FLASHES: 0
DEPRESSION: 0
EYES NEGATIVE: 1
HEADACHES: 0

## 2024-08-21 NOTE — PROGRESS NOTES
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 she received prior Taxotere and cyclophosphamide , completed 6/2010. S/p adjuvant endocrine therapy.    Diagnosis of metastatic TNBC 3/2023, presenting with back pain and cord compression      weekly paclitaxel from 5/22/23, changed to abraxane after 1st cycle due to anaphylactic type reaction. Discontinued 12/12/23 due to progressive disease.    TDxD from 1/29/24, held for XRT to spine for a few wks and stopped on 6/27/24  s/p XRT to spine on 4/1/24    CURRENT THERAPY  None    HISTORY OF PRESENT ILLNESS    Trang Jones is a 64 y.o. woman here for C1D8 on [id number] .  She reports that she is feeling well and having no troubles with the oral medication.      Review of Systems   Constitutional: Negative.  Negative for appetite change, chills, diaphoresis, fatigue and fever.   HENT:  Negative.  Negative for hearing loss and lump/mass.    Eyes: Negative.  Negative for eye problems and icterus.   Respiratory: Negative.  Negative for chest tightness, cough, hemoptysis, shortness of breath and wheezing.    Cardiovascular: Negative.  Negative for chest pain, leg swelling and palpitations.   Gastrointestinal:  Positive for abdominal pain (Occasional transient crampy pain). Negative for abdominal distention, blood in stool, constipation and diarrhea.   Endocrine: Negative for hot flashes.   Genitourinary:  Negative for bladder incontinence, difficulty urinating, dyspareunia, dysuria, frequency and hematuria.    Musculoskeletal:  Negative for arthralgias, gait problem and myalgias.   Neurological:   Negative for dizziness, extremity weakness, gait problem, headaches, numbness and seizures.   Hematological:  Negative for adenopathy. Does not bruise/bleed easily.   Psychiatric/Behavioral:  Negative for confusion, depression and sleep disturbance. The patient is not nervous/anxious.    All other systems reviewed and are negative.        Past Medical History:  has a past medical history of Breast cancer (Multi), Hypercholesteremia, Metastatic cancer (Multi), and Personal history of irradiation.  Surgical History:   has a past surgical history that includes CT guided percutaneous biopsy bone deep (2023); Mastectomy (Left, ); and Breast surgery.  Social History:   reports that she quit smoking about 21 years ago. Her smoking use included cigarettes. She has been exposed to tobacco smoke. She has never used smokeless tobacco. She reports that she does not currently use alcohol. She reports current drug use. Drug: Marijuana.  Family History:    Family History   Problem Relation Name Age of Onset    Leukemia Father       Family Oncology History:  Cancer-related family history is not on file.      OBJECTIVE    VS / Pain:  /72 (BP Location: Right arm)   Pulse (!) 46   Temp 36.4 °C (97.5 °F) (Oral)   Resp 16   Wt 64.1 kg (141 lb 5 oz)   LMP  (LMP Unknown)   SpO2 98%   BMI 26.89 kg/m²   BSA: 1.66 meters squared     Pain Scale: 3    Performance Status:  The ECOG performance scale today is ECO- Restricted in physically strenuous activity.  Carries out light duty.  Hospital Outpatient Visit on 2024   Component Date Value Ref Range Status    WBC 2024 4.1 (L)  4.4 - 11.3 x10*3/uL Final    nRBC 2024 0.0  0.0 - 0.0 /100 WBCs Final    RBC 2024 3.86 (L)  4.00 - 5.20 x10*6/uL Final    Hemoglobin 2024 11.6 (L)  12.0 - 16.0 g/dL Final    Hematocrit 2024 35.4 (L)  36.0 - 46.0 % Final    MCV 2024 92  80 - 100 fL Final    MCH 2024 30.1  26.0 - 34.0 pg Final    MCHC  08/19/2024 32.8  32.0 - 36.0 g/dL Final    RDW 08/19/2024 13.5  11.5 - 14.5 % Final    Platelets 08/19/2024 156  150 - 450 x10*3/uL Final    Neutrophils % 08/19/2024 58.0  40.0 - 80.0 % Final    Immature Granulocytes %, Automated 08/19/2024 0.2  0.0 - 0.9 % Final    Immature Granulocyte Count (IG) includes promyelocytes, myelocytes and metamyelocytes but does not include bands. Percent differential counts (%) should be interpreted in the context of the absolute cell counts (cells/UL).    Lymphocytes % 08/19/2024 28.1  13.0 - 44.0 % Final    Monocytes % 08/19/2024 9.5  2.0 - 10.0 % Final    Eosinophils % 08/19/2024 3.7  0.0 - 6.0 % Final    Basophils % 08/19/2024 0.5  0.0 - 2.0 % Final    Neutrophils Absolute 08/19/2024 2.37  1.20 - 7.70 x10*3/uL Final    Percent differential counts (%) should be interpreted in the context of the absolute cell counts (cells/uL).    Immature Granulocytes Absolute, Au* 08/19/2024 0.01  0.00 - 0.70 x10*3/uL Final    Lymphocytes Absolute 08/19/2024 1.15 (L)  1.20 - 4.80 x10*3/uL Final    Monocytes Absolute 08/19/2024 0.39  0.10 - 1.00 x10*3/uL Final    Eosinophils Absolute 08/19/2024 0.15  0.00 - 0.70 x10*3/uL Final    Basophils Absolute 08/19/2024 0.02  0.00 - 0.10 x10*3/uL Final    Glucose 08/19/2024 106 (H)  74 - 99 mg/dL Final    Sodium 08/19/2024 143  136 - 145 mmol/L Final    Potassium 08/19/2024 4.0  3.5 - 5.3 mmol/L Final    Chloride 08/19/2024 107  98 - 107 mmol/L Final    Bicarbonate 08/19/2024 28  21 - 32 mmol/L Final    Anion Gap 08/19/2024 12  10 - 20 mmol/L Final    Urea Nitrogen 08/19/2024 18  6 - 23 mg/dL Final    Creatinine 08/19/2024 0.73  0.50 - 1.05 mg/dL Final    eGFR 08/19/2024 >90  >60 mL/min/1.73m*2 Final    Calculations of estimated GFR are performed using the 2021 CKD-EPI Study Refit equation without the race variable for the IDMS-Traceable creatinine methods.  https://jasn.asnjournals.org/content/early/2021/09/22/ASN.2321080039    Calcium 08/19/2024 8.5 (L)   "8.6 - 10.6 mg/dL Final    Albumin 08/19/2024 3.7  3.4 - 5.0 g/dL Final    Alkaline Phosphatase 08/19/2024 65  33 - 136 U/L Final    Total Protein 08/19/2024 5.3 (L)  6.4 - 8.2 g/dL Final    AST 08/19/2024 19  9 - 39 U/L Final    Bilirubin, Total 08/19/2024 0.6  0.0 - 1.2 mg/dL Final    ALT 08/19/2024 17  7 - 45 U/L Final    Patients treated with Sulfasalazine may generate falsely decreased results for ALT.    Amylase 08/19/2024 37  29 - 103 U/L Final    aPTT 08/19/2024 38  27 - 38 seconds Final    D-Dimer Non VTE, Quant (ng/mL FEU) 08/19/2024 688 (H)  <=500 ng/mL FEU Final    Haptoglobin 08/19/2024 92  37 - 246 mg/dL Final    LDH 08/19/2024 182  84 - 246 U/L Final    Magnesium 08/19/2024 2.06  1.60 - 2.40 mg/dL Final    Phosphorus 08/19/2024 3.8  2.5 - 4.9 mg/dL Final    The performance characteristics of phosphorus testing in heparinized plasma have been validated by the individual  laboratory site where testing is performed. Testing on heparinized plasma is not approved by the FDA; however, such approval is not necessary.    Protime 08/19/2024 10.1  9.8 - 12.8 seconds Final    INR 08/19/2024 0.9  0.9 - 1.1 Final    Retic % 08/19/2024 2.0  0.5 - 2.0 % Final    Retic Absolute 08/19/2024 0.075  0.018 - 0.083 x10*6/uL Final    Reticulocyte Hemoglobin 08/19/2024 33  28 - 38 pg Final    Immature Retic fraction 08/19/2024 18.7 (H)  <=16.0 % Final    Reticulocytes are measured based on a fluorescent technique. The IRF, or immature reticulocyte fraction, is the percent of reticulocytes that show medium (MFR) or high (HFR) fluorescence.  This value can be used to assess the relative maturity of the reticulocyte population in response to anemia. The \"shift reticulocytes\" are not measured by this technique, eliminating the need for their correction in the reticulocyte index.    Uric Acid 08/19/2024 4.2  2.3 - 6.7 mg/dL Final    Venipuncture immediately after or during the administration of Metamizole may lead to falsely low " results. Testing should be performed immediately  prior to Metamizole dosing.    Color, Urine 08/19/2024 Light-Yellow  Light-Yellow, Yellow, Dark-Yellow Final    Appearance, Urine 08/19/2024 Clear  Clear Final    Specific Gravity, Urine 08/19/2024 1.009  1.005 - 1.035 Final    pH, Urine 08/19/2024 7.0  5.0, 5.5, 6.0, 6.5, 7.0, 7.5, 8.0 Final    Protein, Urine 08/19/2024 NEGATIVE  NEGATIVE, 10 (TRACE), 20 (TRACE) mg/dL Final    Glucose, Urine 08/19/2024 Normal  Normal mg/dL Final    Blood, Urine 08/19/2024 NEGATIVE  NEGATIVE Final    Ketones, Urine 08/19/2024 NEGATIVE  NEGATIVE mg/dL Final    Bilirubin, Urine 08/19/2024 NEGATIVE  NEGATIVE Final    Urobilinogen, Urine 08/19/2024 Normal  Normal mg/dL Final    Nitrite, Urine 08/19/2024 NEGATIVE  NEGATIVE Final    Leukocyte Esterase, Urine 08/19/2024 25 Tamara/µL (A)  NEGATIVE Final    WBC, Urine 08/19/2024 1-5  1-5, NONE /HPF Final    RBC, Urine 08/19/2024 1-2  NONE, 1-2, 3-5 /HPF Final    Extra Tube 08/19/2024 Hold for add-ons.   Final    Auto resulted.       Physical Exam  Constitutional:       General: She is not in acute distress.     Appearance: She is not ill-appearing, toxic-appearing or diaphoretic.   HENT:      Nose: No congestion or rhinorrhea.      Mouth/Throat:      Pharynx: No posterior oropharyngeal erythema.   Cardiovascular:      Rate and Rhythm: Normal rate and regular rhythm.      Pulses: Normal pulses.      Heart sounds: Normal heart sounds. No murmur heard.     No gallop.   Pulmonary:      Breath sounds: Normal breath sounds.   Abdominal:      General: There is no distension.      Palpations: There is no mass.      Tenderness: There is no abdominal tenderness.   Musculoskeletal:         General: No swelling.      Cervical back: No rigidity.      Right lower leg: No edema.      Left lower leg: No edema.   Lymphadenopathy:      Cervical: No cervical adenopathy.   Skin:     General: Skin is warm.      Coloration: Skin is not cyanotic.      Findings: No  bruising, ecchymosis or erythema.   Neurological:      General: No focal deficit present.      Mental Status: She is oriented to person, place, and time.      Cranial Nerves: Cranial nerves 2-12 are intact.      Motor: Motor function is intact.   Psychiatric:         Attention and Perception: Attention and perception normal.         Mood and Affect: Mood and affect normal.         Behavior: Behavior normal.         Thought Content: Thought content normal.         Judgment: Judgment normal.             Diagnostic Results       IMPRESSION/PLAN      1. 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  -Adverse events and safety labs are within protocol defined limits.  Proceed with planned C1D8 tx today.    2. Pain neoplasm related. Following with supportive onc.  Much better now. S/p xrt      Reviewed and approved by DIANA ESPITIA on 8/21/24 at 4:53 PM.

## 2024-08-21 NOTE — ADDENDUM NOTE
Encounter addended by: KELSEA Rodriguez-CNP on: 8/21/2024 4:57 PM   Actions taken: SmartForm saved, Clinical Note Signed, Level of Service modified

## 2024-08-22 ENCOUNTER — TELEPHONE (OUTPATIENT)
Dept: ADMISSION | Facility: HOSPITAL | Age: 64
End: 2024-08-22
Payer: COMMERCIAL

## 2024-08-22 NOTE — TELEPHONE ENCOUNTER
Patient has a research appt Monday at 0800 but was also scheduled for a FUV with Julee Spears at 0830. Asking how this will work- will GENEVIEVE come see her over in [place]?

## 2024-08-23 PROBLEM — Z00.6 EXAMINATION OF PARTICIPANT IN CLINICAL TRIAL: Status: ACTIVE | Noted: 2024-08-23

## 2024-08-23 NOTE — PROGRESS NOTES
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 she received prior Taxotere and cyclophosphamide , completed 6/2010. S/p adjuvant endocrine therapy.    Diagnosis of metastatic TNBC 3/2023, presenting with back pain and cord compression      weekly paclitaxel from 5/22/23, changed to abraxane after 1st cycle due to anaphylactic type reaction. Discontinued 12/12/23 due to progressive disease.    TDxD from 1/29/24, held for XRT to spine for a few wks and stopped on 6/27/24  s/p XRT to spine on 4/1/24    CURRENT THERAPY  Clinical trial  [id number] C1 D15    HISTORY OF PRESENT ILLNESS    Trang Jones is a 64 y.o. woman with IV breast cancer and clinical trial follow up with  [id number]. She is generally feeling well. She did need compazine for stomach cramping the first 2 weeks but this has essentially resolved. She reports some mild diarrhea but she denies severity or needing any anti-diarrheals. She feels appetite has been good. She does not have any progression concerns, feels energy and strength is good. Will rest daily mid day. Continues to do yoga and hike 12-15 miles per week.     She denies any chest pain or breathing issues, no cough or sob.      She denies any vision changes or headache issues, dizziness or loss of balance, no falls. 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     She denies any new or unexplained bone aches or pains, chronic left hip pain is stable, worsening of left shoulder mobility but continues with daily yoga and stretching.     She denies  any skin lesions or masses, oral sores lesions or infections    She denies any issues with sleep    Review of Systems  ROS 14 points performed, See HPI for exceptions      Past Medical History:  has a past medical history of Breast cancer (Multi), Hypercholesteremia, Metastatic cancer (Multi), and Personal history of irradiation.  Surgical History:   has a past surgical history that includes CT guided percutaneous biopsy bone deep (2023); Mastectomy (Left, 2010); and Breast surgery.  Social History:   reports that she quit smoking about 21 years ago. Her smoking use included cigarettes. She has been exposed to tobacco smoke. She has never used smokeless tobacco. She reports that she does not currently use alcohol. She reports current drug use. Drug: Marijuana.  Family History:    Family History   Problem Relation Name Age of Onset    Leukemia Father       Family Oncology History:  Cancer-related family history is not on file.      OBJECTIVE    VS / Pain:  LMP  (LMP Unknown) in Chart, reviewed from today in research unit   BSA: There is no height or weight on file to calculate BSA.   Pain Scale: 3    Performance Status:  The ECOG performance scale today is ECO- Restricted in physically strenuous activity.  Carries out light duty.      Physical Exam  Constitutional: Well developed, awake/alert/oriented x4, no distress, alert and cooperative  EYES: Sclera clear  ENMT: mucous membranes moist, no apparent injury, no lesions seen  Head/Neck: Neck supple, no apparent injury, thyroid without mass or tenderness, No JVD, trachea midline, no bruits  Respiratory / Thoracic: Patent airways, clear to all lobes, normal breath sounds with good chest expansion, thorax symmetric.  Cardiovascular: Regular, rate and rhythm, no murmurs, 2+ equal pulses of the extremities, normal auscultated S 1and S 2  GI: Nondistended, soft, non-tender, no rebound tenderness or guarding, no masses palpable, no organomegaly, +BS, no  bruits  Musculoskeletal: ROM intact, no joint swelling, normal strength, no spinal tenderness  Extremities: normal extremities, no cyanosis edema, contusions or wounds, no clubbing  Neurological: alert and oriented x4, intact senses, motor, response and reflexes, normal strength  Breast: No palpable masses or lesions in right breast or left chest wall   Lymphatic: No cervical, supraclavicular, infraclavicular or axillary lymphadenopathy  Psychological: Appropriate and talkative mood and behavior  Skin: Warm and dry, no lesions, no rashes, no jaundice      Diagnostic Results     === 08/07/24 ===    CT CHEST ABDOMEN PELVIS W IV CONTRAST    - Impression -  Restaging scan for recurrent metastatic breast cancer  1. Stable burden of disease involving the liver and skeleton.  2. Findings favoring post radiation changes within the posterior  mediastinal lungs which appear similar to slightly improved.  Superimposed nodularity appears to have decreased size from the prior  study. Continued attention on follow-up is recommended.  3. Mild circumferential thickening of the esophagus in the region of  the aforementioned radiation changes may be radiation induced  esophagitis, correlate clinically.  4. No abdominopelvic lymphadenopathy.  5. Additional stable/chronic findings as above.    I personally reviewed the images/study and I agree with the findings  as stated by Estevan Anna MD (resident) . This study was  interpreted at [facility],  [place], [place].    MACRO:  None    Signed by: Ceferino Arredondo 8/8/2024 12:16 PM  Dictation workstation:   [id number]    === 07/03/24 ===    MR CERVICAL SPINE W AND WO CONTRAST    - Impression -  1. Compared to the MRI from 04/13/2023, interval development of  multiple small lesions scattered throughout the cervical vertebral  bodies which are most consistent with metastases. There is no  extension of tumor into the spinal canal.    2.  Stable multilevel degenerative changes as detailed above.    This study was interpreted at [facility].    MACRO:  None    Signed by: Moustapha Mcintosh 7/4/2024 4:10 PM  Dictation workstation:   [id number]    LABORATORY/PATHOLOGY DATA    Hospital Outpatient Visit on 08/26/2024   Component Date Value Ref Range Status    WBC 08/26/2024 4.3 (L)  4.4 - 11.3 x10*3/uL Final    nRBC 08/26/2024 0.0  0.0 - 0.0 /100 WBCs Final    RBC 08/26/2024 4.10  4.00 - 5.20 x10*6/uL Final    Hemoglobin 08/26/2024 12.3  12.0 - 16.0 g/dL Final    Hematocrit 08/26/2024 37.6  36.0 - 46.0 % Final    MCV 08/26/2024 92  80 - 100 fL Final    MCH 08/26/2024 30.0  26.0 - 34.0 pg Final    MCHC 08/26/2024 32.7  32.0 - 36.0 g/dL Final    RDW 08/26/2024 13.5  11.5 - 14.5 % Final    Platelets 08/26/2024 177  150 - 450 x10*3/uL Final    Neutrophils % 08/26/2024 59.2  40.0 - 80.0 % Final    Immature Granulocytes %, Automated 08/26/2024 0.5  0.0 - 0.9 % Final    Lymphocytes % 08/26/2024 26.3  13.0 - 44.0 % Final    Monocytes % 08/26/2024 9.3  2.0 - 10.0 % Final    Eosinophils % 08/26/2024 4.2  0.0 - 6.0 % Final    Basophils % 08/26/2024 0.5  0.0 - 2.0 % Final    Neutrophils Absolute 08/26/2024 2.55  1.20 - 7.70 x10*3/uL Final    Immature Granulocytes Absolute, Au* 08/26/2024 0.02  0.00 - 0.70 x10*3/uL Final    Lymphocytes Absolute 08/26/2024 1.13 (L)  1.20 - 4.80 x10*3/uL Final    Monocytes Absolute 08/26/2024 0.40  0.10 - 1.00 x10*3/uL Final    Eosinophils Absolute 08/26/2024 0.18  0.00 - 0.70 x10*3/uL Final    Basophils Absolute 08/26/2024 0.02  0.00 - 0.10 x10*3/uL Final    Glucose 08/26/2024 86  74 - 99 mg/dL Final    Sodium 08/26/2024 141  136 - 145 mmol/L Final    Potassium 08/26/2024 4.0  3.5 - 5.3 mmol/L Final    Chloride 08/26/2024 105  98 - 107 mmol/L Final    Bicarbonate 08/26/2024 27  21 - 32 mmol/L Final    Anion Gap 08/26/2024 13  10 - 20 mmol/L Final    Urea Nitrogen 08/26/2024 15  6 - 23 mg/dL Final     Creatinine 08/26/2024 0.80  0.50 - 1.05 mg/dL Final    eGFR 08/26/2024 83  >60 mL/min/1.73m*2 Final    Calcium 08/26/2024 9.4  8.6 - 10.6 mg/dL Final    Albumin 08/26/2024 3.9  3.4 - 5.0 g/dL Final    Alkaline Phosphatase 08/26/2024 73  33 - 136 U/L Final    Total Protein 08/26/2024 5.5 (L)  6.4 - 8.2 g/dL Final    AST 08/26/2024 21  9 - 39 U/L Final    Bilirubin, Total 08/26/2024 0.4  0.0 - 1.2 mg/dL Final    ALT 08/26/2024 19  7 - 45 U/L Final    Amylase 08/26/2024 44  29 - 103 U/L Final    aPTT 08/26/2024 49 (H)  27 - 38 seconds Final    D-Dimer Non VTE, Quant (ng/mL FEU) 08/26/2024 783 (H)  <=500 ng/mL FEU Final    LDH 08/26/2024 182  84 - 246 U/L Final    Magnesium 08/26/2024 1.97  1.60 - 2.40 mg/dL Final    Phosphorus 08/26/2024 5.1 (H)  2.5 - 4.9 mg/dL Final    Protime 08/26/2024 10.1  9.8 - 12.8 seconds Final    INR 08/26/2024 0.9  0.9 - 1.1 Final    Retic % 08/26/2024 2.0  0.5 - 2.0 % Final    Retic Absolute 08/26/2024 0.080  0.018 - 0.083 x10*6/uL Final    Reticulocyte Hemoglobin 08/26/2024 32  28 - 38 pg Final    Immature Retic fraction 08/26/2024 17.7 (H)  <=16.0 % Final    Uric Acid 08/26/2024 5.0  2.3 - 6.7 mg/dL Final    Color, Urine 08/26/2024 Light-Yellow  Light-Yellow, Yellow, Dark-Yellow Final    Appearance, Urine 08/26/2024 Clear  Clear Final    Specific Gravity, Urine 08/26/2024 1.015  1.005 - 1.035 Final    pH, Urine 08/26/2024 5.0  5.0, 5.5, 6.0, 6.5, 7.0, 7.5, 8.0 Final    Protein, Urine 08/26/2024 NEGATIVE  NEGATIVE, 10 (TRACE), 20 (TRACE) mg/dL Final    Glucose, Urine 08/26/2024 Normal  Normal mg/dL Final    Blood, Urine 08/26/2024 NEGATIVE  NEGATIVE Final    Ketones, Urine 08/26/2024 NEGATIVE  NEGATIVE mg/dL Final    Bilirubin, Urine 08/26/2024 NEGATIVE  NEGATIVE Final    Urobilinogen, Urine 08/26/2024 Normal  Normal mg/dL Final    Nitrite, Urine 08/26/2024 NEGATIVE  NEGATIVE Final    Leukocyte Esterase, Urine 08/26/2024 NEGATIVE  NEGATIVE Final   Hospital Outpatient Visit on 08/19/2024    Component Date Value Ref Range Status    WBC 08/19/2024 4.1 (L)  4.4 - 11.3 x10*3/uL Final    nRBC 08/19/2024 0.0  0.0 - 0.0 /100 WBCs Final    RBC 08/19/2024 3.86 (L)  4.00 - 5.20 x10*6/uL Final    Hemoglobin 08/19/2024 11.6 (L)  12.0 - 16.0 g/dL Final    Hematocrit 08/19/2024 35.4 (L)  36.0 - 46.0 % Final    MCV 08/19/2024 92  80 - 100 fL Final    MCH 08/19/2024 30.1  26.0 - 34.0 pg Final    MCHC 08/19/2024 32.8  32.0 - 36.0 g/dL Final    RDW 08/19/2024 13.5  11.5 - 14.5 % Final    Platelets 08/19/2024 156  150 - 450 x10*3/uL Final    Neutrophils % 08/19/2024 58.0  40.0 - 80.0 % Final    Immature Granulocytes %, Automated 08/19/2024 0.2  0.0 - 0.9 % Final    Lymphocytes % 08/19/2024 28.1  13.0 - 44.0 % Final    Monocytes % 08/19/2024 9.5  2.0 - 10.0 % Final    Eosinophils % 08/19/2024 3.7  0.0 - 6.0 % Final    Basophils % 08/19/2024 0.5  0.0 - 2.0 % Final    Neutrophils Absolute 08/19/2024 2.37  1.20 - 7.70 x10*3/uL Final    Immature Granulocytes Absolute, Au* 08/19/2024 0.01  0.00 - 0.70 x10*3/uL Final    Lymphocytes Absolute 08/19/2024 1.15 (L)  1.20 - 4.80 x10*3/uL Final    Monocytes Absolute 08/19/2024 0.39  0.10 - 1.00 x10*3/uL Final    Eosinophils Absolute 08/19/2024 0.15  0.00 - 0.70 x10*3/uL Final    Basophils Absolute 08/19/2024 0.02  0.00 - 0.10 x10*3/uL Final    Glucose 08/19/2024 106 (H)  74 - 99 mg/dL Final    Sodium 08/19/2024 143  136 - 145 mmol/L Final    Potassium 08/19/2024 4.0  3.5 - 5.3 mmol/L Final    Chloride 08/19/2024 107  98 - 107 mmol/L Final    Bicarbonate 08/19/2024 28  21 - 32 mmol/L Final    Anion Gap 08/19/2024 12  10 - 20 mmol/L Final    Urea Nitrogen 08/19/2024 18  6 - 23 mg/dL Final    Creatinine 08/19/2024 0.73  0.50 - 1.05 mg/dL Final    eGFR 08/19/2024 >90  >60 mL/min/1.73m*2 Final    Calcium 08/19/2024 8.5 (L)  8.6 - 10.6 mg/dL Final    Albumin 08/19/2024 3.7  3.4 - 5.0 g/dL Final    Alkaline Phosphatase 08/19/2024 65  33 - 136 U/L Final    Total Protein 08/19/2024 5.3 (L)   6.4 - 8.2 g/dL Final    AST 08/19/2024 19  9 - 39 U/L Final    Bilirubin, Total 08/19/2024 0.6  0.0 - 1.2 mg/dL Final    ALT 08/19/2024 17  7 - 45 U/L Final    Amylase 08/19/2024 37  29 - 103 U/L Final    aPTT 08/19/2024 38  27 - 38 seconds Final    D-Dimer Non VTE, Quant (ng/mL FEU) 08/19/2024 688 (H)  <=500 ng/mL FEU Final    Haptoglobin 08/19/2024 92  37 - 246 mg/dL Final    LDH 08/19/2024 182  84 - 246 U/L Final    Magnesium 08/19/2024 2.06  1.60 - 2.40 mg/dL Final    Phosphorus 08/19/2024 3.8  2.5 - 4.9 mg/dL Final    Protime 08/19/2024 10.1  9.8 - 12.8 seconds Final    INR 08/19/2024 0.9  0.9 - 1.1 Final    Retic % 08/19/2024 2.0  0.5 - 2.0 % Final    Retic Absolute 08/19/2024 0.075  0.018 - 0.083 x10*6/uL Final    Reticulocyte Hemoglobin 08/19/2024 33  28 - 38 pg Final    Immature Retic fraction 08/19/2024 18.7 (H)  <=16.0 % Final    Uric Acid 08/19/2024 4.2  2.3 - 6.7 mg/dL Final    Color, Urine 08/19/2024 Light-Yellow  Light-Yellow, Yellow, Dark-Yellow Final    Appearance, Urine 08/19/2024 Clear  Clear Final    Specific Gravity, Urine 08/19/2024 1.009  1.005 - 1.035 Final    pH, Urine 08/19/2024 7.0  5.0, 5.5, 6.0, 6.5, 7.0, 7.5, 8.0 Final    Protein, Urine 08/19/2024 NEGATIVE  NEGATIVE, 10 (TRACE), 20 (TRACE) mg/dL Final    Glucose, Urine 08/19/2024 Normal  Normal mg/dL Final    Blood, Urine 08/19/2024 NEGATIVE  NEGATIVE Final    Ketones, Urine 08/19/2024 NEGATIVE  NEGATIVE mg/dL Final    Bilirubin, Urine 08/19/2024 NEGATIVE  NEGATIVE Final    Urobilinogen, Urine 08/19/2024 Normal  Normal mg/dL Final    Nitrite, Urine 08/19/2024 NEGATIVE  NEGATIVE Final    Leukocyte Esterase, Urine 08/19/2024 25 Tamara/µL (A)  NEGATIVE Final    WBC, Urine 08/19/2024 1-5  1-5, NONE /HPF Final    RBC, Urine 08/19/2024 1-2  NONE, 1-2, 3-5 /HPF Final    Extra Tube 08/19/2024 Hold for add-ons.   Final   Hospital Outpatient Visit on 08/14/2024   Component Date Value Ref Range Status    Extra Tube 08/14/2024 Hold for add-ons.    Final   Hospital Outpatient Visit on 08/13/2024   Component Date Value Ref Range Status    Extra Tube 08/13/2024 Hold for add-ons.   Final    Extra Tube 08/13/2024 Hold for add-ons.   Final    Extra Tube 08/13/2024 Hold for add-ons.   Final    Extra Tube 08/13/2024 Hold for add-ons.   Final    Extra Tube 08/13/2024 Hold for add-ons.   Final    Extra Tube 08/13/2024 Hold for add-ons.   Final    Extra Tube 08/13/2024 Hold for add-ons.   Final    Extra Tube 08/13/2024 Hold for add-ons.   Final   Hospital Outpatient Visit on 08/12/2024   Component Date Value Ref Range Status    WBC 08/12/2024 3.5 (L)  4.4 - 11.3 x10*3/uL Final    nRBC 08/12/2024 0.0  0.0 - 0.0 /100 WBCs Final    RBC 08/12/2024 4.09  4.00 - 5.20 x10*6/uL Final    Hemoglobin 08/12/2024 12.3  12.0 - 16.0 g/dL Final    Hematocrit 08/12/2024 36.8  36.0 - 46.0 % Final    MCV 08/12/2024 90  80 - 100 fL Final    MCH 08/12/2024 30.1  26.0 - 34.0 pg Final    MCHC 08/12/2024 33.4  32.0 - 36.0 g/dL Final    RDW 08/12/2024 13.7  11.5 - 14.5 % Final    Platelets 08/12/2024 174  150 - 450 x10*3/uL Final    Neutrophils % 08/12/2024 53.5  40.0 - 80.0 % Final    Immature Granulocytes %, Automated 08/12/2024 0.6  0.0 - 0.9 % Final    Lymphocytes % 08/12/2024 31.6  13.0 - 44.0 % Final    Monocytes % 08/12/2024 9.4  2.0 - 10.0 % Final    Eosinophils % 08/12/2024 4.3  0.0 - 6.0 % Final    Basophils % 08/12/2024 0.6  0.0 - 2.0 % Final    Neutrophils Absolute 08/12/2024 1.88  1.20 - 7.70 x10*3/uL Final    Immature Granulocytes Absolute, Au* 08/12/2024 0.02  0.00 - 0.70 x10*3/uL Final    Lymphocytes Absolute 08/12/2024 1.11 (L)  1.20 - 4.80 x10*3/uL Final    Monocytes Absolute 08/12/2024 0.33  0.10 - 1.00 x10*3/uL Final    Eosinophils Absolute 08/12/2024 0.15  0.00 - 0.70 x10*3/uL Final    Basophils Absolute 08/12/2024 0.02  0.00 - 0.10 x10*3/uL Final    Glucose 08/12/2024 98  74 - 99 mg/dL Final    Sodium 08/12/2024 145  136 - 145 mmol/L Final    Potassium 08/12/2024 4.1  3.5  - 5.3 mmol/L Final    Chloride 08/12/2024 109 (H)  98 - 107 mmol/L Final    Bicarbonate 08/12/2024 28  21 - 32 mmol/L Final    Anion Gap 08/12/2024 12  10 - 20 mmol/L Final    Urea Nitrogen 08/12/2024 14  6 - 23 mg/dL Final    Creatinine 08/12/2024 0.59  0.50 - 1.05 mg/dL Final    eGFR 08/12/2024 >90  >60 mL/min/1.73m*2 Final    Calcium 08/12/2024 8.6  8.6 - 10.6 mg/dL Final    Albumin 08/12/2024 3.6  3.4 - 5.0 g/dL Final    Alkaline Phosphatase 08/12/2024 63  33 - 136 U/L Final    Total Protein 08/12/2024 5.2 (L)  6.4 - 8.2 g/dL Final    AST 08/12/2024 18  9 - 39 U/L Final    Bilirubin, Total 08/12/2024 0.5  0.0 - 1.2 mg/dL Final    ALT 08/12/2024 16  7 - 45 U/L Final    Amylase 08/12/2024 37  29 - 103 U/L Final    aPTT 08/12/2024 48 (H)  27 - 38 seconds Final    D-Dimer Non VTE, Quant (ng/mL FEU) 08/12/2024 543 (H)  <=500 ng/mL FEU Final    Haptoglobin 08/12/2024 78  37 - 246 mg/dL Final    LDH 08/12/2024 168  84 - 246 U/L Final    Magnesium 08/12/2024 1.97  1.60 - 2.40 mg/dL Final    Phosphorus 08/12/2024 4.6  2.5 - 4.9 mg/dL Final    Protime 08/12/2024 10.4  9.8 - 12.8 seconds Final    INR 08/12/2024 0.9  0.9 - 1.1 Final    Retic % 08/12/2024 2.0  0.5 - 2.0 % Final    Retic Absolute 08/12/2024 0.082  0.018 - 0.083 x10*6/uL Final    Reticulocyte Hemoglobin 08/12/2024 34  28 - 38 pg Final    Immature Retic fraction 08/12/2024 15.9  <=16.0 % Final    Uric Acid 08/12/2024 3.8  2.3 - 6.7 mg/dL Final    Color, Urine 08/12/2024 Light-Yellow  Light-Yellow, Yellow, Dark-Yellow Final    Appearance, Urine 08/12/2024 Clear  Clear Final    Specific Gravity, Urine 08/12/2024 1.015  1.005 - 1.035 Final    pH, Urine 08/12/2024 5.0  5.0, 5.5, 6.0, 6.5, 7.0, 7.5, 8.0 Final    Protein, Urine 08/12/2024 NEGATIVE  NEGATIVE, 10 (TRACE), 20 (TRACE) mg/dL Final    Glucose, Urine 08/12/2024 Normal  Normal mg/dL Final    Blood, Urine 08/12/2024 NEGATIVE  NEGATIVE Final    Ketones, Urine 08/12/2024 NEGATIVE  NEGATIVE mg/dL Final     Bilirubin, Urine 08/12/2024 NEGATIVE  NEGATIVE Final    Urobilinogen, Urine 08/12/2024 Normal  Normal mg/dL Final    Nitrite, Urine 08/12/2024 NEGATIVE  NEGATIVE Final    Leukocyte Esterase, Urine 08/12/2024 NEGATIVE  NEGATIVE Final    CA 27.29 08/12/2024 100.0 (H)  0.0 - 38.6 U/mL Final    Extra Tube 08/12/2024 Hold for add-ons.   Final    Extra Tube 08/12/2024 Hold for add-ons.   Final    Extra Tube 08/12/2024 Hold for add-ons.   Final    Extra Tube 08/12/2024 Hold for add-ons.   Final    Extra Tube 08/12/2024 Hold for add-ons.   Final    Extra Tube 08/12/2024 Hold for add-ons.   Final    Extra Tube 08/12/2024 Hold for add-ons.   Final    Extra Tube 08/12/2024 Hold for add-ons.   Final   Hospital Outpatient Visit on 08/06/2024   Component Date Value Ref Range Status    LV EF 08/06/2024 63  % Final    LVIDd 08/06/2024 4.50  cm Final   Hospital Outpatient Visit on 08/06/2024   Component Date Value Ref Range Status    Amylase 08/06/2024 37  29 - 103 U/L Final    aPTT 08/06/2024 200 (HH)  27 - 38 seconds Final    WBC 08/06/2024 4.8  4.4 - 11.3 x10*3/uL Final    nRBC 08/06/2024 0.0  0.0 - 0.0 /100 WBCs Final    RBC 08/06/2024 4.38  4.00 - 5.20 x10*6/uL Final    Hemoglobin 08/06/2024 13.1  12.0 - 16.0 g/dL Final    Hematocrit 08/06/2024 39.3  36.0 - 46.0 % Final    MCV 08/06/2024 90  80 - 100 fL Final    MCH 08/06/2024 29.9  26.0 - 34.0 pg Final    MCHC 08/06/2024 33.3  32.0 - 36.0 g/dL Final    RDW 08/06/2024 13.5  11.5 - 14.5 % Final    Platelets 08/06/2024 179  150 - 450 x10*3/uL Final    Neutrophils % 08/06/2024 57.7  40.0 - 80.0 % Final    Immature Granulocytes %, Automated 08/06/2024 0.4  0.0 - 0.9 % Final    Lymphocytes % 08/06/2024 29.3  13.0 - 44.0 % Final    Monocytes % 08/06/2024 8.7  2.0 - 10.0 % Final    Eosinophils % 08/06/2024 3.5  0.0 - 6.0 % Final    Basophils % 08/06/2024 0.4  0.0 - 2.0 % Final    Neutrophils Absolute 08/06/2024 2.79  1.20 - 7.70 x10*3/uL Final    Immature Granulocytes Absolute, Au*  08/06/2024 0.02  0.00 - 0.70 x10*3/uL Final    Lymphocytes Absolute 08/06/2024 1.42  1.20 - 4.80 x10*3/uL Final    Monocytes Absolute 08/06/2024 0.42  0.10 - 1.00 x10*3/uL Final    Eosinophils Absolute 08/06/2024 0.17  0.00 - 0.70 x10*3/uL Final    Basophils Absolute 08/06/2024 0.02  0.00 - 0.10 x10*3/uL Final    Glucose 08/06/2024 99  74 - 99 mg/dL Final    Sodium 08/06/2024 143  136 - 145 mmol/L Final    Potassium 08/06/2024 4.1  3.5 - 5.3 mmol/L Final    Chloride 08/06/2024 104  98 - 107 mmol/L Final    Bicarbonate 08/06/2024 29  21 - 32 mmol/L Final    Anion Gap 08/06/2024 14  10 - 20 mmol/L Final    Urea Nitrogen 08/06/2024 18  6 - 23 mg/dL Final    Creatinine 08/06/2024 0.60  0.50 - 1.05 mg/dL Final    eGFR 08/06/2024 >90  >60 mL/min/1.73m*2 Final    Calcium 08/06/2024 9.6  8.6 - 10.6 mg/dL Final    Albumin 08/06/2024 3.8  3.4 - 5.0 g/dL Final    Alkaline Phosphatase 08/06/2024 69  33 - 136 U/L Final    Total Protein 08/06/2024 5.4 (L)  6.4 - 8.2 g/dL Final    AST 08/06/2024 18  9 - 39 U/L Final    Bilirubin, Total 08/06/2024 0.5  0.0 - 1.2 mg/dL Final    ALT 08/06/2024 14  7 - 45 U/L Final    D-Dimer Non VTE, Quant (ng/mL FEU) 08/06/2024 688 (H)  <=500 ng/mL FEU Final    Haptoglobin 08/06/2024 126  37 - 246 mg/dL Final    Hepatitis B Surface AG 08/06/2024 Nonreactive  Nonreactive Final    Hepatitis C AB 08/06/2024 Nonreactive  Nonreactive Final    HIV 1/2 Antigen/Antibody Screen wi* 08/06/2024 Nonreactive  Nonreactive Final    LDH 08/06/2024 168  84 - 246 U/L Final    Magnesium 08/06/2024 2.09  1.60 - 2.40 mg/dL Final    Phosphorus 08/06/2024 5.0 (H)  2.5 - 4.9 mg/dL Final    Protime 08/06/2024 10.6  9.8 - 12.8 seconds Final    INR 08/06/2024 0.9  0.9 - 1.1 Final    Retic % 08/06/2024 1.4  0.5 - 2.0 % Final    Retic Absolute 08/06/2024 0.063  0.018 - 0.083 x10*6/uL Final    Reticulocyte Hemoglobin 08/06/2024 32  28 - 38 pg Final    Immature Retic fraction 08/06/2024 12.7  <=16.0 % Final    Uric Acid  08/06/2024 4.3  2.3 - 6.7 mg/dL Final    Color, Urine 08/06/2024 Light-Yellow  Light-Yellow, Yellow, Dark-Yellow Final    Appearance, Urine 08/06/2024 Clear  Clear Final    Specific Gravity, Urine 08/06/2024 1.008  1.005 - 1.035 Final    pH, Urine 08/06/2024 6.5  5.0, 5.5, 6.0, 6.5, 7.0, 7.5, 8.0 Final    Protein, Urine 08/06/2024 NEGATIVE  NEGATIVE, 10 (TRACE), 20 (TRACE) mg/dL Final    Glucose, Urine 08/06/2024 Normal  Normal mg/dL Final    Blood, Urine 08/06/2024 NEGATIVE  NEGATIVE Final    Ketones, Urine 08/06/2024 NEGATIVE  NEGATIVE mg/dL Final    Bilirubin, Urine 08/06/2024 NEGATIVE  NEGATIVE Final    Urobilinogen, Urine 08/06/2024 Normal  Normal mg/dL Final    Nitrite, Urine 08/06/2024 NEGATIVE  NEGATIVE Final    Leukocyte Esterase, Urine 08/06/2024 NEGATIVE  NEGATIVE Final   Hospital Outpatient Visit on 08/05/2024   Component Date Value Ref Range Status    Case Report 08/05/2024    Final                    Value:Surgical Pathology                                Case: [id number]                                  Authorizing Provider:  Bebeto Tolbert MD      Collected:           08/05/2024 0941              Ordering Location:     [facility]   Received:            08/05/2024 1050              Pathologist:           Steven Jimenez MD                                                                  Specimen:    LIVER MASS BIOPSY RIGHT, US LIVER MASS BIOPSY                                              FINAL DIAGNOSIS 08/05/2024    Final                    Value:A. LIVER MASS BIOPSY:      -- LIVER INVOLVED BY CARCINOMA, CONSISTENT WITH BREAST PRIMARY, SEE NOTE    Note: Clinical history of breast cancer noted. The tumor cells are positive for DOYLE-3 and TRPS1, supportive of breast origin. ER/KS/HER2 immunostains have been ordered and result will be reported in addendum.          08/05/2024    Final                    Value:By the signature on this report, the individual or group listed as making the Final  Interpretation/Diagnosis certifies that they have reviewed this case.       Addendum 08/05/2024    Final                    Value:Surgical/Block Number: [id number] A1  Specimen Site: Liver mass  Specimen Type: Biopsy    Estrogen Receptor (clone SP1):  Negative            Progesterone Receptor (clone SP2): Negative      HER2 (clone [id number]):            Equivocal (2+).  TANISHA is pending and will be reported in addendum.          Comment:     Ischemic times are not provided for this sample.   Due to the absence of normal breast parenchyma, no internal positive control staining was identified.  External control tissue stains appropriately.      For ER: Ranges for interpretation: Invasive carcinoma cells exhibiting greater than or equal to 10% nuclear staining are considered POSITIVE. Invasive carcinoma cells exhibiting less than 10%, but greater than or equal to 1% are considered LOW POSITIVE. Invasive carcinoma cells exhibiting less than 1% staining are considered NEGATIVE. (Reference: Arch Pathol Lab Med. doi:10.5858/arpa. [id number]) The stated steroid receptor activity for ER was derived from rabbit monoclonal                           antibody staining (clone SP1) on formalin fixed, paraffin embedded specimens, unless otherwise noted.  Each assay is performed using appropriate positive and negative internal controls.    For CT: Ranges for interpretation: Invasive carcinoma cells exhibiting greater than or equal to 1% nuclear staining are considered POSITIVE.  Invasive carcinoma cells exhibiting less than 1% staining are considered NEGATIVE. (Reference: Arch Pathol Lab Med. doi:10.5858/arpa. [id number]) The stated steroid receptor activity for CT was derived from rabbit monoclonal antibody staining (clone 1E2) on formalin fixed, paraffin embedded specimens, unless otherwise noted.  The method employed was a standard peroxidase labeled polymer detection system. Each assay is performed using appropriate positive and  negative internal controls.    For HER2: Ranges for interpretation: POSITIVE (3+): greater than or equal to 10% tumor cells with intense and uniform staining; EQUIVOCAL (2+): weak to moderate complete                           immunoreactivity in >10% of tumor cells or circumferential intense staining in less than or equal to 10% of cells; and NEGATIVE (1+): Faint weak immunoreactivity in >10% of tumor cells, but only a portion of the membrane is positive; NEGATIVE (0):No immunoreactivity or immunoreactivity in less than or equal to 10% of tumor cells. All tests are performed using a [employer] HER-2/loreta (4B5) rabbit monoclonal primary antibody on formalin fixed, paraffin embedded tissue, unless otherwise noted. Only invasive carcinoma is evaluated using the ASCO/CAP scoring system (Arch Pathol Lab Med 2018; 142: 3787-5605) unless otherwise specified.  External cell culture and tissue controls stain appropriately.       Addendum 08/05/2024    Final                    Value:HER2 DUAL TANISHA FOR DETECTION OF HER2 GENE AMPLIFICATION  HER2 Dual TANISHA DNA Probe Cocktail Assay from [employer] ([employer])    Results are expressed as the averaged ratio HER2/chromosome 17 signals in 20 nuclei.       Source of Specimen: Liver mass biopsy   Paraffin Block: [id number] A1  Duration of fixation:  unless otherwise noted, 6-72 hours fixation    TEST RESULTS:    Number of tumor cells counted:  20  Number of observers: 1  Average number of HER2 signals/nucleus:  3.0  Average number of CEP 17 signals/nucleus: 1.85  Ratio of average HER2/CEP 17:  1.6    INTERPRETATION:  Negative (Not amplified)           NOTE:   Patient's with a HER2/CEP 17 Dual TANISHA ratio of greater than or equal to 2.0 were considered eligible for treatment in the adjuvant trastuzumab trials.  (References:  Luis Manuel et al.  Breast Cancer Res Treat 94:S5, 2005 (Supp 1; Abs1); Gustavo et al, N Engl J Med 353:  9995-9404; arjun Sánchez, N Engl J Med  353:  6792-7811, 2005; and GARRET trial study,                           presented as late breaking abstract at 42nd Annual Meeting of the American Society of Clinical Oncology, [place], [place], J Clin Oncol 24, 2006) Alesha et al J. Clin Oncol;  Recommendations for human epidermal growth factor receptor 2 testing in breast cancer: American Society of Clinical Oncology/College of American Pathologists clinical practice guideline update 2018.      REFERENCE RANGES:  Ratio <2.0 and average HER2 signal <4.0: Negative (non-amplified)  Ratio ?2.0 and average HER2 signal ?4.0: Positive (amplified)    Control results:  External (amplified, equivocal, non-amplified) and internal controls perform as expected.    INFORM HER2 Dual TANISHA DNA Probe Cocktail Assay from [employer] ([employer]) is intended to determine HER2 gene status by enumeration of the ratio of the HER2 gene to Chromosome 17. The HER2 and Chromosome 17 probes are detected using two color chromogenic in situ hybridization (TANISHA) in formalin-fixed, paraffin-embedded human breast cancer and                           gastroesophageal tissue specimens following staining on [employer] BenchMark ULTRA automated slide stainer (using [employer] software), by light microscopy. The INFORM HER2 Dual TANISHA DNA Probe Cocktail is indicated as an aid in the assessment of patients for whom Herceptin (trastuzumab) treatment is being considered.    This assay was interpreted by a qualified reader in conjunction with histological examination, relevant clinical information, and proper controls.    This reagent is intended for in vitro diagnostic (IVD) use.    One or more of the reagents used to perform assays on this specimen MAY have contained components considered to be analyte specific reagents (ASR's).  ASR's have not been cleared or approved by the U.S.Food and Drug Administration.  These assays were developed and their performance characteristics determined by the Department of  "Pathology at [facility].  The assays were performed with appropriate positive and negative controls which stained                           appropriately.        Gross Description 08/05/2024    Final                    Value:Received in formalin, labeled with the patient's name and hospital number and \"liver mass biopsy\", are 2 cylindrical segments of tan-red soft tissue measuring 1.5 cm and 1.7 cm in length by < 0.1 cm in diameter. The specimen is submitted in toto in one cassette.   RCC      Disclaimer 08/05/2024    Final                    Value:One or more of the reagents used to perform assays on this specimen MAY have contained components considered to be analyte specific reagents (ASR's).  ASR's have not been cleared or approved by the U.S. Food and Drug Administration.  These assays were developed and their performance characteristics determined by the Department of Pathology at [facility]. The FDA does not require this test to go through premarket FDA review. This test is used for clinical purposes. It should not be regarded as investigational or for research. This laboratory is certified under the Clinical Laboratory Improvement Amendments (CLIA) as qualified to perform high complexity clinical laboratory testing.  The assays were performed with appropriate positive and negative controls which stained appropriately.     Lab on 07/17/2024   Component Date Value Ref Range Status    WBC 07/17/2024 4.1 (L)  4.4 - 11.3 x10*3/uL Final    nRBC 07/17/2024 0.0  0.0 - 0.0 /100 WBCs Final    RBC 07/17/2024 4.28  4.00 - 5.20 x10*6/uL Final    Hemoglobin 07/17/2024 13.0  12.0 - 16.0 g/dL Final    Hematocrit 07/17/2024 39.2  36.0 - 46.0 % Final    MCV 07/17/2024 92  80 - 100 fL Final    MCH 07/17/2024 30.4  26.0 - 34.0 pg Final    MCHC 07/17/2024 33.2  32.0 - 36.0 g/dL Final    RDW 07/17/2024 14.8 (H)  11.5 - 14.5 % Final    Platelets 07/17/2024 191  150 - 450 " x10*3/uL Final    Neutrophils % 07/17/2024 61.8  40.0 - 80.0 % Final    Immature Granulocytes %, Automated 07/17/2024 0.2  0.0 - 0.9 % Final    Lymphocytes % 07/17/2024 22.4  13.0 - 44.0 % Final    Monocytes % 07/17/2024 11.7  2.0 - 10.0 % Final    Eosinophils % 07/17/2024 3.4  0.0 - 6.0 % Final    Basophils % 07/17/2024 0.5  0.0 - 2.0 % Final    Neutrophils Absolute 07/17/2024 2.54  1.20 - 7.70 x10*3/uL Final    Immature Granulocytes Absolute, Au* 07/17/2024 0.01  0.00 - 0.70 x10*3/uL Final    Lymphocytes Absolute 07/17/2024 0.92 (L)  1.20 - 4.80 x10*3/uL Final    Monocytes Absolute 07/17/2024 0.48  0.10 - 1.00 x10*3/uL Final    Eosinophils Absolute 07/17/2024 0.14  0.00 - 0.70 x10*3/uL Final    Basophils Absolute 07/17/2024 0.02  0.00 - 0.10 x10*3/uL Final    Glucose 07/17/2024 85  74 - 99 mg/dL Final    Sodium 07/17/2024 141  136 - 145 mmol/L Final    Potassium 07/17/2024 4.2  3.5 - 5.3 mmol/L Final    Chloride 07/17/2024 106  98 - 107 mmol/L Final    Bicarbonate 07/17/2024 29  21 - 32 mmol/L Final    Anion Gap 07/17/2024 10  10 - 20 mmol/L Final    Urea Nitrogen 07/17/2024 13  6 - 23 mg/dL Final    Creatinine 07/17/2024 0.65  0.50 - 1.05 mg/dL Final    eGFR 07/17/2024 >90  >60 mL/min/1.73m*2 Final    Calcium 07/17/2024 9.1  8.6 - 10.3 mg/dL Final    Albumin 07/17/2024 4.1  3.4 - 5.0 g/dL Final    Alkaline Phosphatase 07/17/2024 56  33 - 136 U/L Final    Total Protein 07/17/2024 5.4 (L)  6.4 - 8.2 g/dL Final    AST 07/17/2024 18  9 - 39 U/L Final    Bilirubin, Total 07/17/2024 0.6  0.0 - 1.2 mg/dL Final    ALT 07/17/2024 15  7 - 45 U/L Final    Protime 07/17/2024 10.3  9.8 - 12.8 seconds Final    INR 07/17/2024 0.9  0.9 - 1.1 Final      Lab Results   Component Value Date    LABCA2 100.0 (H) 08/12/2024    LABCA2 56.5 (H) 06/27/2024    LABCA2 55.6 (H) 06/03/2024    LABCA2 59.6 (H) 05/13/2024    LABCA2 70.3 (H) 04/22/2024             IMPRESSION/PLAN    1. 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  Ms Jones is doing impressively well today, continues to stay active with daily exercise. Is free of any concerns for progress, no issues on PE. Labs WNL, with mild elevation of Phosphorus and D Dimer with no concerning symptoms; will continue to monitor.    Grade 1 nausea Has needed infrequent usage of anti nausea for mild stomach cramping with 1st 2 weeks of therapy. Will continue to monitor      Proceed with planned C1D15 tx today. Will continue with follow up visits per myself / Dr Bebeto Tolbert and Ajit Hendrickson CNP as directed per study protocol.      2. Pain neoplasm related. Following with supportive onc.  Stable at this time, continues to exercise with yoga and hiking. Mild worsening of Left shoulder ROM- at this time is declining any PT and will continue to work with yoga and stretching.  S/p xrt April 2024 Left hip and Spine. Repeat MRI's ordered for Oct 2024 25        At least 25 minutes of direct consultation was spent with the patient today reviewing her cancer care plan, educating and answering questions regarding ongoing follow up, greater than 50% in counseling and coordination of care      Thank you for the opportunity to be involved your care.   We discussed the clinical significance of diagnosis, goals of care and treatment plan in detail.   Please do not hesitate to reach out with any questions.       -------------------------------------------------------------------------------------------------------------------------------  Julee Spears MSN, APRN, FNP-C  [facility]  Division of Medical Oncology- Breast   Collaborating Physician Dr. Bebeto Tolbert   Team Nurse Partners Regine Butler, Annabelle Tru[place]illo [place] [address]  Phone: [phone]  Fax: [phone]  Available via Secure Chat    Confidential Peer Review Document-  Privilege  Privileged Pursuant to [place]  Revised Code Section 2305.24, .25, .251 & .252

## 2024-08-23 NOTE — ADDENDUM NOTE
Encounter addended by: Roberta Harden RN on: 8/23/2024 12:44 AM   Actions taken: Charge Capture section accepted

## 2024-08-26 ENCOUNTER — HOSPITAL ENCOUNTER (OUTPATIENT)
Dept: RESEARCH | Facility: HOSPITAL | Age: 64
Discharge: HOME | End: 2024-08-26
Payer: COMMERCIAL

## 2024-08-26 ENCOUNTER — APPOINTMENT (OUTPATIENT)
Dept: INTEGRATIVE MEDICINE | Facility: CLINIC | Age: 64
End: 2024-08-26
Payer: COMMERCIAL

## 2024-08-26 ENCOUNTER — OFFICE VISIT (OUTPATIENT)
Dept: HEMATOLOGY/ONCOLOGY | Facility: HOSPITAL | Age: 64
End: 2024-08-26
Payer: COMMERCIAL

## 2024-08-26 VITALS
SYSTOLIC BLOOD PRESSURE: 122 MMHG | RESPIRATION RATE: 18 BRPM | BODY MASS INDEX: 26.43 KG/M2 | OXYGEN SATURATION: 100 % | TEMPERATURE: 98.1 F | WEIGHT: 138.89 LBS | DIASTOLIC BLOOD PRESSURE: 77 MMHG | HEART RATE: 56 BPM

## 2024-08-26 DIAGNOSIS — C50.911 CARCINOMA OF RIGHT BREAST METASTATIC TO BONE (MULTI): ICD-10-CM

## 2024-08-26 DIAGNOSIS — G89.3 NEOPLASM RELATED PAIN: ICD-10-CM

## 2024-08-26 DIAGNOSIS — C79.51 CARCINOMA OF RIGHT BREAST METASTATIC TO BONE (MULTI): Primary | ICD-10-CM

## 2024-08-26 DIAGNOSIS — C79.51 CARCINOMA OF RIGHT BREAST METASTATIC TO BONE (MULTI): ICD-10-CM

## 2024-08-26 DIAGNOSIS — M79.2 NEUROPATHIC PAIN: ICD-10-CM

## 2024-08-26 DIAGNOSIS — C41.9 MALIGNANT NEOPLASM OF BONE WITH METASTASES (MULTI): ICD-10-CM

## 2024-08-26 DIAGNOSIS — C50.911 CARCINOMA OF RIGHT BREAST METASTATIC TO BONE (MULTI): Primary | ICD-10-CM

## 2024-08-26 DIAGNOSIS — Z00.6 EXAMINATION OF PARTICIPANT IN CLINICAL TRIAL: ICD-10-CM

## 2024-08-26 DIAGNOSIS — M25.552 LEFT HIP PAIN: ICD-10-CM

## 2024-08-26 DIAGNOSIS — R11.0 NAUSEA: ICD-10-CM

## 2024-08-26 DIAGNOSIS — R53.0 NEOPLASTIC MALIGNANT RELATED FATIGUE: ICD-10-CM

## 2024-08-26 DIAGNOSIS — K30 UPSET STOMACH: ICD-10-CM

## 2024-08-26 DIAGNOSIS — C79.51 METASTATIC CANCER TO SPINE (MULTI): ICD-10-CM

## 2024-08-26 LAB
ALBUMIN SERPL BCP-MCNC: 3.9 G/DL (ref 3.4–5)
ALP SERPL-CCNC: 73 U/L (ref 33–136)
ALT SERPL W P-5'-P-CCNC: 19 U/L (ref 7–45)
AMYLASE SERPL-CCNC: 44 U/L (ref 29–103)
ANION GAP SERPL CALC-SCNC: 13 MMOL/L (ref 10–20)
APPEARANCE UR: CLEAR
APTT PPP: 49 SECONDS (ref 27–38)
AST SERPL W P-5'-P-CCNC: 21 U/L (ref 9–39)
BASOPHILS # BLD AUTO: 0.02 X10*3/UL (ref 0–0.1)
BASOPHILS NFR BLD AUTO: 0.5 %
BILIRUB SERPL-MCNC: 0.4 MG/DL (ref 0–1.2)
BILIRUB UR STRIP.AUTO-MCNC: NEGATIVE MG/DL
BUN SERPL-MCNC: 15 MG/DL (ref 6–23)
CALCIUM SERPL-MCNC: 9.4 MG/DL (ref 8.6–10.6)
CHLORIDE SERPL-SCNC: 105 MMOL/L (ref 98–107)
CO2 SERPL-SCNC: 27 MMOL/L (ref 21–32)
COLOR UR: NORMAL
CREAT SERPL-MCNC: 0.8 MG/DL (ref 0.5–1.05)
D DIMER PPP FEU-MCNC: 783 NG/ML FEU
EGFRCR SERPLBLD CKD-EPI 2021: 83 ML/MIN/1.73M*2
EOSINOPHIL # BLD AUTO: 0.18 X10*3/UL (ref 0–0.7)
EOSINOPHIL NFR BLD AUTO: 4.2 %
ERYTHROCYTE [DISTWIDTH] IN BLOOD BY AUTOMATED COUNT: 13.5 % (ref 11.5–14.5)
GLUCOSE SERPL-MCNC: 86 MG/DL (ref 74–99)
GLUCOSE UR STRIP.AUTO-MCNC: NORMAL MG/DL
HCT VFR BLD AUTO: 37.6 % (ref 36–46)
HGB BLD-MCNC: 12.3 G/DL (ref 12–16)
HGB RETIC QN: 32 PG (ref 28–38)
HOLD SPECIMEN: NORMAL
IMM GRANULOCYTES # BLD AUTO: 0.02 X10*3/UL (ref 0–0.7)
IMM GRANULOCYTES NFR BLD AUTO: 0.5 % (ref 0–0.9)
IMMATURE RETIC FRACTION: 17.7 %
INR PPP: 0.9 (ref 0.9–1.1)
KETONES UR STRIP.AUTO-MCNC: NEGATIVE MG/DL
LDH SERPL L TO P-CCNC: 182 U/L (ref 84–246)
LEUKOCYTE ESTERASE UR QL STRIP.AUTO: NEGATIVE
LYMPHOCYTES # BLD AUTO: 1.13 X10*3/UL (ref 1.2–4.8)
LYMPHOCYTES NFR BLD AUTO: 26.3 %
MAGNESIUM SERPL-MCNC: 1.97 MG/DL (ref 1.6–2.4)
MCH RBC QN AUTO: 30 PG (ref 26–34)
MCHC RBC AUTO-ENTMCNC: 32.7 G/DL (ref 32–36)
MCV RBC AUTO: 92 FL (ref 80–100)
MONOCYTES # BLD AUTO: 0.4 X10*3/UL (ref 0.1–1)
MONOCYTES NFR BLD AUTO: 9.3 %
NEUTROPHILS # BLD AUTO: 2.55 X10*3/UL (ref 1.2–7.7)
NEUTROPHILS NFR BLD AUTO: 59.2 %
NITRITE UR QL STRIP.AUTO: NEGATIVE
NRBC BLD-RTO: 0 /100 WBCS (ref 0–0)
PH UR STRIP.AUTO: 5 [PH]
PHOSPHATE SERPL-MCNC: 5.1 MG/DL (ref 2.5–4.9)
PLATELET # BLD AUTO: 177 X10*3/UL (ref 150–450)
POTASSIUM SERPL-SCNC: 4 MMOL/L (ref 3.5–5.3)
PROT SERPL-MCNC: 5.5 G/DL (ref 6.4–8.2)
PROT UR STRIP.AUTO-MCNC: NEGATIVE MG/DL
PROTHROMBIN TIME: 10.1 SECONDS (ref 9.8–12.8)
RBC # BLD AUTO: 4.1 X10*6/UL (ref 4–5.2)
RBC # UR STRIP.AUTO: NEGATIVE /UL
RETICS #: 0.08 X10*6/UL (ref 0.02–0.08)
RETICS/RBC NFR AUTO: 2 % (ref 0.5–2)
SODIUM SERPL-SCNC: 141 MMOL/L (ref 136–145)
SP GR UR STRIP.AUTO: 1.01
URATE SERPL-MCNC: 5 MG/DL (ref 2.3–6.7)
UROBILINOGEN UR STRIP.AUTO-MCNC: NORMAL MG/DL
WBC # BLD AUTO: 4.3 X10*3/UL (ref 4.4–11.3)

## 2024-08-26 PROCEDURE — 85025 COMPLETE CBC W/AUTO DIFF WBC: CPT | Performed by: INTERNAL MEDICINE

## 2024-08-26 PROCEDURE — 93005 ELECTROCARDIOGRAM TRACING: CPT

## 2024-08-26 PROCEDURE — 85730 THROMBOPLASTIN TIME PARTIAL: CPT | Performed by: INTERNAL MEDICINE

## 2024-08-26 PROCEDURE — 85379 FIBRIN DEGRADATION QUANT: CPT | Performed by: INTERNAL MEDICINE

## 2024-08-26 PROCEDURE — 82150 ASSAY OF AMYLASE: CPT | Performed by: INTERNAL MEDICINE

## 2024-08-26 PROCEDURE — 83615 LACTATE (LD) (LDH) ENZYME: CPT | Performed by: INTERNAL MEDICINE

## 2024-08-26 PROCEDURE — 80053 COMPREHEN METABOLIC PANEL: CPT | Performed by: INTERNAL MEDICINE

## 2024-08-26 PROCEDURE — 2500000004 HC RX 250 GENERAL PHARMACY W/ HCPCS (ALT 636 FOR OP/ED): Performed by: INTERNAL MEDICINE

## 2024-08-26 PROCEDURE — 85045 AUTOMATED RETICULOCYTE COUNT: CPT | Performed by: INTERNAL MEDICINE

## 2024-08-26 PROCEDURE — 83010 ASSAY OF HAPTOGLOBIN QUANT: CPT | Performed by: INTERNAL MEDICINE

## 2024-08-26 PROCEDURE — 84100 ASSAY OF PHOSPHORUS: CPT | Performed by: INTERNAL MEDICINE

## 2024-08-26 PROCEDURE — 83735 ASSAY OF MAGNESIUM: CPT | Performed by: INTERNAL MEDICINE

## 2024-08-26 PROCEDURE — 81003 URINALYSIS AUTO W/O SCOPE: CPT | Performed by: INTERNAL MEDICINE

## 2024-08-26 PROCEDURE — 84550 ASSAY OF BLOOD/URIC ACID: CPT | Performed by: INTERNAL MEDICINE

## 2024-08-26 PROCEDURE — 99214 OFFICE O/P EST MOD 30 MIN: CPT | Performed by: NURSE PRACTITIONER

## 2024-08-26 PROCEDURE — 85610 PROTHROMBIN TIME: CPT | Performed by: INTERNAL MEDICINE

## 2024-08-26 RX ORDER — ALBUTEROL SULFATE 0.83 MG/ML
3 SOLUTION RESPIRATORY (INHALATION) AS NEEDED
Status: DISCONTINUED | OUTPATIENT
Start: 2024-08-26 | End: 2024-08-27 | Stop reason: HOSPADM

## 2024-08-26 RX ORDER — HEPARIN 100 UNIT/ML
500 SYRINGE INTRAVENOUS AS NEEDED
OUTPATIENT
Start: 2024-08-26

## 2024-08-26 RX ORDER — HEPARIN SODIUM,PORCINE/PF 10 UNIT/ML
50 SYRINGE (ML) INTRAVENOUS AS NEEDED
OUTPATIENT
Start: 2024-08-26

## 2024-08-26 RX ORDER — HEPARIN 100 UNIT/ML
500 SYRINGE INTRAVENOUS AS NEEDED
Status: DISCONTINUED | OUTPATIENT
Start: 2024-08-26 | End: 2024-08-27 | Stop reason: HOSPADM

## 2024-08-26 RX ORDER — FAMOTIDINE 10 MG/ML
20 INJECTION INTRAVENOUS ONCE AS NEEDED
Status: DISCONTINUED | OUTPATIENT
Start: 2024-08-26 | End: 2024-08-27 | Stop reason: HOSPADM

## 2024-08-26 RX ORDER — HEPARIN SODIUM,PORCINE/PF 10 UNIT/ML
50 SYRINGE (ML) INTRAVENOUS AS NEEDED
Status: DISCONTINUED | OUTPATIENT
Start: 2024-08-26 | End: 2024-08-27 | Stop reason: HOSPADM

## 2024-08-26 RX ORDER — DIPHENHYDRAMINE HYDROCHLORIDE 50 MG/ML
50 INJECTION INTRAMUSCULAR; INTRAVENOUS AS NEEDED
Status: DISCONTINUED | OUTPATIENT
Start: 2024-08-26 | End: 2024-08-27 | Stop reason: HOSPADM

## 2024-08-26 RX ORDER — EPINEPHRINE 1 MG/ML
0.3 INJECTION INTRAMUSCULAR; INTRAVENOUS; SUBCUTANEOUS EVERY 5 MIN PRN
Status: DISCONTINUED | OUTPATIENT
Start: 2024-08-26 | End: 2024-08-27 | Stop reason: HOSPADM

## 2024-08-26 NOTE — RESEARCH NOTES
RSH><[id number]><C1D15 AND C2D15>  DCRU NURSING VISIT NOTE  Study Name: TRACY 1Y22  IRB#: [id number]  DCRU#: DCRU # [id number]  Protocol Version Dated: V5 3.7.24  PI: James Ram M.D    Time point: C1D15 or C2D15  Cycle 1    Encounter Date: 08/26/2024  Encounter Time:  8:00 AM EDT  Encounter Department: [facility] HEMATOLOGY AND ONCOLOGY     #1 Phone Pager   Richard Encarnacion   [phone] [address]      Study Regimen and Dosing   Refer to [place] Treatment Plan for orders  Part 1_Expansion - Advanced Solid Tumors with DNA damage response mutations, failed at least one prior standard of care therapy and must be capable of oral administration of study medication.  Cycle = 28 days.   ATRN-119 administered oral daily at least 1 hour prior to a meal      Dietary Guidelines   Regular      Admission and Prior to Starting Study Activities   Notify [facility] of patient arrival.  Complete DCRU and EPIC Admission/Visit Note.  Obtain weight in kilograms - with shoes off and heavy items removed.   Insert one peripheral IV line or access mediport for sample collection procedures (flush line with 5 - 10 mL normal saline following each blood draw).     Study Specific Instructions and Documentation   LABS  VITALS  ECG   RESEARCH CORRELATIVES  STUDY MEDICATION  DISCHARGE     Safety Labs    Refer to [place] Treatment Plan for orders     Safety Parameters and Special Instructions   ATRN-119 is a PARP inhibitor.   Potential Side Effects/Adverse Events: nausea, vomiting, myelosuppression, myalgia, hypertension, hypomagnesemia, dysgeusia, dyspepsia, insomnia, headache.      Subjective   Trang Jones is a 64 y.o. female and is here for a Research clinical visit.    Visit Provider: Karen Alejo RN     Allergies:   Allergies   Allergen Reactions    Olanzapine Headache, Dizziness and Drowsiness    Paclitaxel Other     Scratchy throat, erythema face, back pain       Objective   Vital Signs:    Vitals:    08/26/24 0745   BP:  104/69   Pulse: 56   Resp: 16   Temp: 36.5 °C (97.7 °F)   TempSrc: Oral   SpO2: 98%   Weight: 63 kg (138 lb 14.2 oz)       Physical Exam     ASSESSMENT and PLAN:       Medications as of the completion of today's visit      Orders placed during today's visit:       [id number] - Study Of ATRN-119 In Patients With Advanced Solid Tumors    Patient has no adverse events documented in the Research Adverse Events activity.      Criteria to Treat   DCRU RN reviewed and meets eligibility per treatment plan   Time team notified: 0900 am  DCRU RN notifies study team to review eligibility and approval before dosing procedures  Time team approves: 0900 am     Pre-dose Vital Signs   Temperature, heart rate, respirations, blood pressure: in a sitting position at rest for at least 5 minutes.    Up to 60 minutes prior to dosing       Pre-dose 12-Lead ECG   [facility] [facility] ECG machine  Triplicate   Each 1 - 5 minutes apart   Rest supine at least 5 minutes prior   Up to 60 minutes prior to dosing   Contact MD/Provider &  if abnormal from baseline  Rest Time  QTcF Average QTcF   0900 400 [phone]        Pre-dose Research Correlatives  Deliver to DCRU/[facility] lab for processing    Access Type: 20G [place] Location: Right Chest Mediport   Refer to [place] Treatment Plan for orders  Time point Cycle  Specimen Test Volume  Draw Time   Pre dose (within 60 mins) C1 only  ATRN-119/ATRN-157 PK  3 mL 0927        Research Drug Administration   Document medication administration in MAR activity. Document Research specific instructions below.  ATRN-119 mg Oral Once Daily Dose.   Administer at least 1 hour prior to a meal.   If dose is vomited, do not re-dose.  Dispense from patients supply.     Infusion-Related Reactions   Infusion Related Reactions   [facility] SOC Hypersensivity Orders      Discharge Instructions   Discharge after study requirements completed.   Confirm patient has ATRN-119 medication diary & bottle.   Remind patient  to document each dose in diary including missed doses   Remind patient: to bring medication diary & bottle to each visit (full or empty)   Remind patient to return: on C2D1 for predose PK, ecg, vitals and treatment.  Discharge time: 0945     Karen Alejo RN  08/26/24

## 2024-08-26 NOTE — PATIENT INSTRUCTIONS
Continued follow up as dictated by the study with Dr Bebeto Tolbert and Julee ENAMORADO, CNP and Ajit Hendrickson CNP    Next MRI spine 10/10/24.    Will have restaging scans after 2 full cycles     Please call [phone] with any questions or concerns prior to your next office visit.

## 2024-08-26 NOTE — PROGRESS NOTES
Patient ID: Trang Jones is a 63 y.o. female.  Referring Physician: No referring provider defined for this encounter.  Primary Care Provider: Karl Granger DO     CANCER HISTORY:   65 yo woman with newly dx Tneg breast ca to bone, cord compression  2010 - L mtx - ER+ stage II breast ca   AC x 4  Keith x 7 yrs     3/23 - back pain - MRI with cord compression treated with radiation 5/4/23 5/22/23 - Taxol (weekly) - had reaction - held last week and only partial this week  Changing to abraxane  Not planning pembrolizumab as PDL1 neg per pt      Changed abraxane to enhertu  Some fatigue    Current Treatment: APREA 1Y22 8/24     History of Present IllnessConsulted by: Dr. Tolbert  For: breast cancer     Chief complaints and symptoms:  Seen in supp oncology  1. Pain - on ms contin, nsaids, gabapentin. THC twice/week  mostly in back and now in ribs. Improved overall though some pain from walking this last weekend  Now worse since [place] trip and walking - pelvic pain, L hip and pain down L leg   Contd L leg, less mobility in shoulders with reaching     2. Fatigue - related to chemotherapy - improved overall.      3. Appetite suppressed  Poor taste - improved  Doing well still today  Stomach issues improving     4. Neuropathy - mild developing tingling in fingers - numbness and tingling briefly in fingertips - brief numbness in fingers  Two more in fingertips, tingling/numbness     mild ha     Anxiety about results     Chemo on hold for radiation - improved fatigue  Spine and hip - pain improved     Integrative History:  Diet: mostly fruits (taste good), protein smoothies (Vital proteins, collagen powder, almond milk)  Lean proteins, chicken/salmon, o/w plant based  Eating more sugar     PA: walking some, hiking some now, continued  Doing some Pilates, doing some yoga     Sleep: well improved with THC - 8-9 hrs/night     Stress: overwhelmed by dx.  Management: Meditating daily almost, various times, has some  experience - Intermittent  Family support  Reiki helping  Focusing more on spirituality and Mandaeism, Women's groups.     Natural Products:  Medical cannabis - one gummy/night - still using at night for sleep  magnesium  Vit D  Flax seed oil   American Ginseng  [employer] STAMETS 7  Zinc      ROS:  no ha, visual symptoms, hearing loss  no sob, chest pain, palp  ROS o/w non contributory, please see HPI        Objective  BSA: There is no height or weight on file to calculate BSA.  LMP  (LMP Unknown)      PHYSICAL EXAM:  NAD, awake/alert  HEENT, NCAT, OP clear, no oral lesions  CTA bilat  RRR no mgr  Abd soft/nt/nd+bs  No c/c/e/ttp  Motor/sensory intact, CN 2-12 intact      RESULTS:        Lab Results        Assessment/Plan  Cancer Staging   Malignant neoplasm of bone with metastases (Multi)  Staging form: Bone - Appendicular Skeleton, Trunk, Skull, and Facial Bones, AJCC 8th Edition  - Clinical stage from 9/5/2023: pM1, G3 - Signed by Bebeto Tolbert MD on 9/5/2023        CANCER SPECIFIC RECCS:  63 yo woman with recurrent breast ca, Tneg now with bone mets and s/p cord compression  S/p radiation to hip/spine for pain relief recently  5/22/23 started taxol (weekly) - now changed to abraxane  No neuropathy now  Chemo on hold - had radiation to hip and spine - improved pain     Breast cancer:   7/17/23 CT c/a/p - will review with Dr. Tolbert:  sclerotic L 3rd rib, increase osseous mets R iliac, 1.1 cm liver hypodensities, can discuss further with Dr. Tolbert  focusing plant based is fine  limit sugar including in smoothies, consider Orgain (using Vital protein)  Would avoid coffee on taxol  Zinc for taste  Flax is fine  THC is good as well  Vitamin D3 5000 IU 3 days/week is good  Consider omega 3   Stop b12, also coq10 - done  on [employer] STAMETS 7  Current treatment: Enhertu  Feels better off while getting radiation to hip and spine     Given enhertu has an immune mediated mechanism, we often do not recc canabis  in immunotherapy (immunomodulating).  Would consider stopping     High fiber diet - flax, fco, vegetables  Limit sugar  I do not necessarily push the ketogenic diet - we discussed this.  Focus on low sugar, high fiber diet  Ok to use sauna  Re. Functional medicine, post chemo hard to interpret in my opinion in the metastatic setting     Try to walk 15-30 min/day     Pain - taking motrin prn, steroid weaning  MS contin and gabapentin  Acupuncture will start in [facility] - has started  Feels better when having AM pain meds and supplements  Some improvements  Gentle Yoga with assistance, same with Pilates  Would set up massage  L hip - PROZE NERVE or DoTERRA Deep Blue     Fatigue: Definitely improved  Acupuncture, consider massage  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 Uses to manage scoliosis.  Would sugg pt see Dr. Cantu (rehab) and/or PT prior to engaging in yoga  Fatigue is overall improved on new treatment     Follow up in [facility] Management [facility]   6 months     Bo Sandoval MD

## 2024-08-27 LAB — HAPTOGLOB SERPL-MCNC: 119 MG/DL (ref 37–246)

## 2024-08-28 ENCOUNTER — APPOINTMENT (OUTPATIENT)
Dept: INTEGRATIVE MEDICINE | Facility: CLINIC | Age: 64
End: 2024-08-28

## 2024-08-28 DIAGNOSIS — Z00.6 EXAM FOR CLINICAL TRIAL: ICD-10-CM

## 2024-08-28 DIAGNOSIS — C50.919 MALIGNANT NEOPLASM OF FEMALE BREAST, UNSPECIFIED ESTROGEN RECEPTOR STATUS, UNSPECIFIED LATERALITY, UNSPECIFIED SITE OF BREAST (MULTI): Primary | ICD-10-CM

## 2024-08-28 DIAGNOSIS — M79.652 PAIN OF LEFT THIGH: ICD-10-CM

## 2024-08-28 DIAGNOSIS — K30 UPSET STOMACH: Primary | ICD-10-CM

## 2024-08-28 PROCEDURE — 97139 UNLISTED THERAPEUTIC PX: CPT | Performed by: ACUPUNCTURIST

## 2024-08-28 ASSESSMENT — PAIN SCALES - GENERAL: PAINLEVEL_OUTOF10: 2

## 2024-08-28 NOTE — PROGRESS NOTES
Acupuncture Visit:     Subjective   Patient ID: Trang Jones is a 64 y.o. female who presents for No chief complaint on file.  Pt reported having good summer. On new clinical trial which gives her abdominal discomfort and bloating.  Also experiencing thigh pain        Session Information  Is this acupuncture treatment being billed to the patient's insurance company: No  Visit Type: Follow-up visit    Pre-treatment Assessment  Pain Score: 3  Anxiety Level (0-10): 1  Stress Level (0-10): 4  Coping Level (0-10): 9  Depression Level (0-10): 0  Fatigue Level (0-10): 5  Nausea Level (0-10): 1  Wellbeing Level (0-10): 8    Review of Systems         Provider reviewed plan for the acupuncture session, precautions and contraindications. Patient/guardian/hospital staff has given consent to treat with full understanding of what to expect during the session. Before acupuncture began, provider explained to the patient to communicate at any time if the procedure was causing discomfort past their tolerance level. Patient agreed to advise acupuncturist. The acupuncturist counseled the patient on the risks of acupuncture treatment including pain, infection, bleeding, and no relief of pain. The patient was positioned comfortably. There was no evidence of infection at the site of needle insertions.    Objective   Physical Exam         Treatment Plan  Treatment Goals: Pain management    Acupuncture Treatment  Needle Guage: 40 guage /.16/ Red seirin  Body Points: With retention  Body Points - Bilateral: st qi x2, sp 6, gb39, lr 3 li 4, immune  Needle Count In: 14  Needle Count Out: 14  Needle Retention Time (min): 30 minutes  Total Face to Face Time (min): 25 minutes         Post-treatment Assessment  0-10 (Numeric) Pain Score: 2  Anxiety Level (0-10): 1  Stress Level (0-10): 2  Coping Level (0-10): 9  Fatigue Level (0-10): 3  Nausea Level (0-10): 0  Wellbeing Level (0-10): 9    Assessment/Plan

## 2024-08-28 NOTE — ADDENDUM NOTE
Encounter addended by: Roberta Harden RN on: 8/27/2024 9:19 PM   Actions taken: Charge Capture section accepted

## 2024-09-03 ENCOUNTER — EDUCATION (OUTPATIENT)
Dept: HEMATOLOGY/ONCOLOGY | Facility: HOSPITAL | Age: 64
End: 2024-09-03
Payer: COMMERCIAL

## 2024-09-03 NOTE — RESEARCH NOTES
Research Note Treatment Day    Trang Jones was called today for treatment on APREA1Y22. Today is C1D22. Procedures completed per protocol. AE's and con-meds reviewed with patient. Patient is aware of treatment plan.    [x]   Received treatment as planned   OR  []    Treatment delayed; patient calendar updated as required   Treatment delayed because:    []   AE    []   Physician Discretion    []   Clinical Deterioration or Progression     []   Other  Today is a scheduled phone visit. Pt is doing well with no new AE's or other concerning issues. Pt is aware to bring her pill diary and left over pills to the next appointment on Monday 9/9/24 for C2D1 and will be supplied a new diary and pills. Pt also asked to document her last meal and time the night before and to fast for the food study data requirements. Pt will call with any future questions.    Education Documentation  Fatigue, taught by Donte Encarnacion RN at 9/3/2024 10:18 AM.  Learner: Patient  Readiness: Eager  Method: Explanation  Response: Verbalizes Understanding    Treatment Plan and Schedule, taught by Donte Encarnacion RN at 9/3/2024 10:18 AM.  Learner: Patient  Readiness: Eager  Method: Explanation  Response: Verbalizes Understanding    General Medication Information, taught by Donte Encarnacion RN at 9/3/2024 10:18 AM.  Learner: Patient  Readiness: Eager  Method: Explanation  Response: Verbalizes Understanding    Supportive Medications, taught by Donte Encarnacion RN at 9/3/2024 10:18 AM.  Learner: Patient  Readiness: Eager  Method: Explanation  Response: Verbalizes Understanding    Diagnostic Studies, taught by Donte Encarnacion RN at 9/3/2024 10:18 AM.  Learner: Patient  Readiness: Eager  Method: Explanation  Response: Verbalizes Understanding    Education Comments  No comments found.

## 2024-09-04 ENCOUNTER — APPOINTMENT (OUTPATIENT)
Dept: INTEGRATIVE MEDICINE | Facility: CLINIC | Age: 64
End: 2024-09-04

## 2024-09-04 DIAGNOSIS — R19.7 DIARRHEA: ICD-10-CM

## 2024-09-04 DIAGNOSIS — M25.552 LEFT HIP PAIN: Primary | ICD-10-CM

## 2024-09-04 DIAGNOSIS — M72.2 PLANTAR FASCIITIS: ICD-10-CM

## 2024-09-04 DIAGNOSIS — R10.9 STOMACH CRAMPS: ICD-10-CM

## 2024-09-04 DIAGNOSIS — K59.00 CONSTIPATION: ICD-10-CM

## 2024-09-04 PROCEDURE — 97139 UNLISTED THERAPEUTIC PX: CPT | Performed by: ACUPUNCTURIST

## 2024-09-04 ASSESSMENT — PAIN SCALES - GENERAL: PAINLEVEL_OUTOF10: 2

## 2024-09-04 NOTE — PROGRESS NOTES
Acupuncture Visit:     Subjective   Patient ID: Trang Jones is a 64 y.o. female who presents for No chief complaint on file.  Pt reported thigh pain has improved.  She has alternating constipation diarrhea with intermittent stomach cramping and left sided plantar pain        Session Information  Is this acupuncture treatment being billed to the patient's insurance company: No  Visit Type: Follow-up visit    Pre-treatment Assessment  Pain Score: 2  Anxiety Level (0-10): 1  Stress Level (0-10): 3  Coping Level (0-10): 8  Depression Level (0-10): 0  Fatigue Level (0-10): 3  Nausea Level (0-10): 0  Wellbeing Level (0-10): 8    Review of Systems         Provider reviewed plan for the acupuncture session, precautions and contraindications. Patient/guardian/hospital staff has given consent to treat with full understanding of what to expect during the session. Before acupuncture began, provider explained to the patient to communicate at any time if the procedure was causing discomfort past their tolerance level. Patient agreed to advise acupuncturist. The acupuncturist counseled the patient on the risks of acupuncture treatment including pain, infection, bleeding, and no relief of pain. The patient was positioned comfortably. There was no evidence of infection at the site of needle insertions.    Objective   Physical Exam         Treatment Plan  Treatment Goals: Pain management, Constipation reduction    Acupuncture Treatment  Patient Position: Seated and reclining  Needle Guage: 40 guage /.16/ Red seirin  Body Points - Left: sp4, gb 33, 34 soleusx1  Body Points - Bilateral: st qi x1, lr 3, li 4, sp 6  Needle Count In: 12  Needle Count Out: 12  Needle Retention Time (min): 30 minutes  Total Face to Face Time (min): 25 minutes         Post-treatment Assessment  0-10 (Numeric) Pain Score: 2  Anxiety Level (0-10): 1  Stress Level (0-10): 2  Coping Level (0-10): 9  Depression Level (0-10): 0  Fatigue Level (0-10):  3  Nausea Level (0-10): 0  Wellbeing Level (0-10): 8    Assessment/Plan

## 2024-09-09 ENCOUNTER — HOSPITAL ENCOUNTER (OUTPATIENT)
Dept: RESEARCH | Facility: HOSPITAL | Age: 64
Discharge: HOME | End: 2024-09-09
Payer: COMMERCIAL

## 2024-09-09 VITALS
WEIGHT: 138.89 LBS | TEMPERATURE: 97 F | OXYGEN SATURATION: 97 % | HEART RATE: 48 BPM | BODY MASS INDEX: 26.43 KG/M2 | DIASTOLIC BLOOD PRESSURE: 70 MMHG | SYSTOLIC BLOOD PRESSURE: 114 MMHG | RESPIRATION RATE: 16 BRPM

## 2024-09-09 DIAGNOSIS — C79.51 CARCINOMA OF RIGHT BREAST METASTATIC TO BONE (MULTI): Primary | ICD-10-CM

## 2024-09-09 DIAGNOSIS — C41.9 MALIGNANT NEOPLASM OF BONE WITH METASTASES (MULTI): ICD-10-CM

## 2024-09-09 DIAGNOSIS — C50.911 CARCINOMA OF RIGHT BREAST METASTATIC TO BONE (MULTI): Primary | ICD-10-CM

## 2024-09-09 LAB
ALBUMIN SERPL BCP-MCNC: 3.9 G/DL (ref 3.4–5)
ALP SERPL-CCNC: 73 U/L (ref 33–136)
ALT SERPL W P-5'-P-CCNC: 18 U/L (ref 7–45)
AMYLASE SERPL-CCNC: 31 U/L (ref 29–103)
ANION GAP SERPL CALC-SCNC: 11 MMOL/L (ref 10–20)
APPEARANCE UR: CLEAR
APTT PPP: 31 SECONDS (ref 27–38)
AST SERPL W P-5'-P-CCNC: 22 U/L (ref 9–39)
BASOPHILS # BLD AUTO: 0.03 X10*3/UL (ref 0–0.1)
BASOPHILS NFR BLD AUTO: 0.7 %
BILIRUB SERPL-MCNC: 0.4 MG/DL (ref 0–1.2)
BILIRUB UR STRIP.AUTO-MCNC: NEGATIVE MG/DL
BUN SERPL-MCNC: 16 MG/DL (ref 6–23)
CALCIUM SERPL-MCNC: 8.6 MG/DL (ref 8.6–10.6)
CANCER AG27-29 SERPL-ACNC: 223.7 U/ML (ref 0–38.6)
CHLORIDE SERPL-SCNC: 106 MMOL/L (ref 98–107)
CO2 SERPL-SCNC: 28 MMOL/L (ref 21–32)
COLOR UR: ABNORMAL
CREAT SERPL-MCNC: 0.7 MG/DL (ref 0.5–1.05)
D DIMER PPP FEU-MCNC: 770 NG/ML FEU
EGFRCR SERPLBLD CKD-EPI 2021: >90 ML/MIN/1.73M*2
EOSINOPHIL # BLD AUTO: 0.12 X10*3/UL (ref 0–0.7)
EOSINOPHIL NFR BLD AUTO: 2.8 %
ERYTHROCYTE [DISTWIDTH] IN BLOOD BY AUTOMATED COUNT: 13 % (ref 11.5–14.5)
GLUCOSE SERPL-MCNC: 92 MG/DL (ref 74–99)
GLUCOSE UR STRIP.AUTO-MCNC: NORMAL MG/DL
HAPTOGLOB SERPL NEPH-MCNC: 91 MG/DL (ref 30–200)
HCT VFR BLD AUTO: 35.3 % (ref 36–46)
HGB BLD-MCNC: 11.4 G/DL (ref 12–16)
HGB RETIC QN: 32 PG (ref 28–38)
HOLD SPECIMEN: NORMAL
IMM GRANULOCYTES # BLD AUTO: 0.01 X10*3/UL (ref 0–0.7)
IMM GRANULOCYTES NFR BLD AUTO: 0.2 % (ref 0–0.9)
IMMATURE RETIC FRACTION: 16 %
INR PPP: 1 (ref 0.9–1.1)
KETONES UR STRIP.AUTO-MCNC: NEGATIVE MG/DL
LDH SERPL L TO P-CCNC: 205 U/L (ref 84–246)
LEUKOCYTE ESTERASE UR QL STRIP.AUTO: ABNORMAL
LYMPHOCYTES # BLD AUTO: 1.02 X10*3/UL (ref 1.2–4.8)
LYMPHOCYTES NFR BLD AUTO: 23.4 %
MAGNESIUM SERPL-MCNC: 2.31 MG/DL (ref 1.6–2.4)
MCH RBC QN AUTO: 29.6 PG (ref 26–34)
MCHC RBC AUTO-ENTMCNC: 32.3 G/DL (ref 32–36)
MCV RBC AUTO: 92 FL (ref 80–100)
MONOCYTES # BLD AUTO: 0.39 X10*3/UL (ref 0.1–1)
MONOCYTES NFR BLD AUTO: 8.9 %
MUCOUS THREADS #/AREA URNS AUTO: NORMAL /LPF
NEUTROPHILS # BLD AUTO: 2.79 X10*3/UL (ref 1.2–7.7)
NEUTROPHILS NFR BLD AUTO: 64 %
NITRITE UR QL STRIP.AUTO: NEGATIVE
NRBC BLD-RTO: 0 /100 WBCS (ref 0–0)
PH UR STRIP.AUTO: 7 [PH]
PHOSPHATE SERPL-MCNC: 3.8 MG/DL (ref 2.5–4.9)
PLATELET # BLD AUTO: 164 X10*3/UL (ref 150–450)
POTASSIUM SERPL-SCNC: 4 MMOL/L (ref 3.5–5.3)
PROT SERPL-MCNC: 5.1 G/DL (ref 6.4–8.2)
PROT UR STRIP.AUTO-MCNC: NEGATIVE MG/DL
PROTHROMBIN TIME: 10.7 SECONDS (ref 9.8–12.8)
RBC # BLD AUTO: 3.85 X10*6/UL (ref 4–5.2)
RBC # UR STRIP.AUTO: NEGATIVE /UL
RBC #/AREA URNS AUTO: NORMAL /HPF
RETICS #: 0.08 X10*6/UL (ref 0.02–0.08)
RETICS/RBC NFR AUTO: 2 % (ref 0.5–2)
SODIUM SERPL-SCNC: 141 MMOL/L (ref 136–145)
SP GR UR STRIP.AUTO: 1.02
URATE SERPL-MCNC: 4.2 MG/DL (ref 2.3–6.7)
UROBILINOGEN UR STRIP.AUTO-MCNC: NORMAL MG/DL
WBC # BLD AUTO: 4.4 X10*3/UL (ref 4.4–11.3)
WBC #/AREA URNS AUTO: NORMAL /HPF

## 2024-09-09 PROCEDURE — 84550 ASSAY OF BLOOD/URIC ACID: CPT

## 2024-09-09 PROCEDURE — 86300 IMMUNOASSAY TUMOR CA 15-3: CPT

## 2024-09-09 PROCEDURE — 85730 THROMBOPLASTIN TIME PARTIAL: CPT

## 2024-09-09 PROCEDURE — 81001 URINALYSIS AUTO W/SCOPE: CPT

## 2024-09-09 PROCEDURE — 85025 COMPLETE CBC W/AUTO DIFF WBC: CPT

## 2024-09-09 PROCEDURE — 85379 FIBRIN DEGRADATION QUANT: CPT

## 2024-09-09 PROCEDURE — 80053 COMPREHEN METABOLIC PANEL: CPT

## 2024-09-09 PROCEDURE — 84100 ASSAY OF PHOSPHORUS: CPT

## 2024-09-09 PROCEDURE — 83010 ASSAY OF HAPTOGLOBIN QUANT: CPT

## 2024-09-09 PROCEDURE — 85610 PROTHROMBIN TIME: CPT

## 2024-09-09 PROCEDURE — 85045 AUTOMATED RETICULOCYTE COUNT: CPT

## 2024-09-09 PROCEDURE — 83735 ASSAY OF MAGNESIUM: CPT

## 2024-09-09 PROCEDURE — 83615 LACTATE (LD) (LDH) ENZYME: CPT

## 2024-09-09 PROCEDURE — 82150 ASSAY OF AMYLASE: CPT

## 2024-09-09 PROCEDURE — 36591 DRAW BLOOD OFF VENOUS DEVICE: CPT

## 2024-09-09 RX ORDER — ALBUTEROL SULFATE 0.83 MG/ML
3 SOLUTION RESPIRATORY (INHALATION) AS NEEDED
Status: DISCONTINUED | OUTPATIENT
Start: 2024-09-09 | End: 2024-09-10 | Stop reason: HOSPADM

## 2024-09-09 RX ORDER — DIPHENHYDRAMINE HYDROCHLORIDE 50 MG/ML
50 INJECTION INTRAMUSCULAR; INTRAVENOUS AS NEEDED
Status: DISCONTINUED | OUTPATIENT
Start: 2024-09-09 | End: 2024-09-10 | Stop reason: HOSPADM

## 2024-09-09 RX ORDER — EPINEPHRINE 1 MG/ML
0.3 INJECTION INTRAMUSCULAR; INTRAVENOUS; SUBCUTANEOUS EVERY 5 MIN PRN
Status: DISCONTINUED | OUTPATIENT
Start: 2024-09-09 | End: 2024-09-10 | Stop reason: HOSPADM

## 2024-09-09 RX ORDER — FAMOTIDINE 10 MG/ML
20 INJECTION INTRAVENOUS ONCE AS NEEDED
Status: DISCONTINUED | OUTPATIENT
Start: 2024-09-09 | End: 2024-09-10 | Stop reason: HOSPADM

## 2024-09-09 ASSESSMENT — ENCOUNTER SYMPTOMS
DEPRESSION: 0
CARDIOVASCULAR NEGATIVE: 1
BRUISES/BLEEDS EASILY: 0
ABDOMINAL PAIN: 1
ADENOPATHY: 0
DIARRHEA: 1
MYALGIAS: 0
WHEEZING: 0
SLEEP DISTURBANCE: 0
APPETITE CHANGE: 0
EXTREMITY WEAKNESS: 0
RESPIRATORY NEGATIVE: 1
NERVOUS/ANXIOUS: 0
NUMBNESS: 0
HEMOPTYSIS: 0
EYE PROBLEMS: 0
HEADACHES: 0
ARTHRALGIAS: 0
DYSURIA: 0
EYES NEGATIVE: 1
CHILLS: 0
ABDOMINAL DISTENTION: 0
FEVER: 0
LEG SWELLING: 0
CONFUSION: 0
BLOOD IN STOOL: 0
PALPITATIONS: 0
HEMATURIA: 0
CHEST TIGHTNESS: 0
COUGH: 0
SCLERAL ICTERUS: 0
SEIZURES: 0
DIAPHORESIS: 0
CONSTITUTIONAL NEGATIVE: 1
CONSTIPATION: 1
HOT FLASHES: 0
FREQUENCY: 0
SHORTNESS OF BREATH: 0
DIZZINESS: 0
DIFFICULTY URINATING: 0
FATIGUE: 0

## 2024-09-09 NOTE — RESEARCH NOTES
RSH><APREA1Y22><C1D1 AND C2D1>  DCRU NURSING VISIT NOTE  Study Name: TRACY 1Y22  IRB#: [id number]  DCRU#: DCRU # [id number]  Protocol Version Dated: V5 3.7.24  PI: James Ram M.D  Time point: C1D1 or C2D1   Cycle 2  Encounter Date: 09/09/2024  Encounter Time:  8:00 AM EDT  Encounter Department: [facility] HEMATOLOGY AND ONCOLOGY   #1 Phone Pager   Richard Encarnacion   [phone] [address]      Study Regimen and Dosing   Refer to [place] Treatment Plan for orders  Part 1_Expansion - Advanced Solid Tumors with DNA damage response mutations, failed at least one prior standard of care therapy and must be capable of oral administration of study medication.  Cycle = 28 days.   ATRN-119 administered oral daily at least 1 hour prior to a meal      Dietary Guidelines   Regular Diet - D1 overnight fasting until 4 hours after administration - water is permitted   Dose was administered at 1045.  The +4hr faiza was at 1445.  Pt. Continued to fast until 1450.      Admission and Prior to Starting Study Activities   Notify [facility] of patient arrival.  Complete DCRU and [facility] Admission/Visit Note.  Obtain weight in kilograms - with shoes off and heavy items removed. (C2 only)[x]  Insert one peripheral IV line or access mediport for sample collection procedures (flush line with 5 - 10 mL normal saline following each blood draw).      Study Specific Instructions and Documentation   WEIGHT (C2 ONLY)  Vitals:    09/09/24 0847   Weight: 63 kg (138 lb 14.2 oz)     LABS  VITALS  ECG  RESEARCH CORRELATIVES   STUDY MED  LABS   VITALS  ECG  DISCHARGE     Safety Labs    Refer to [place] Treatment Plan for orders     Safety Parameters and Special Instructions   ATRN-119 is a PARP inhibitor.   Potential Side Effects/Adverse Events: nausea, vomiting, myelosuppression, myalgia, hypertension, hypomagnesemia, dysgeusia, dyspepsia, insomnia, headache.    Subjective   Trang Jones is a 64 y.o. female and is here for a Research clinical  "visit.  Visit Provider: Abdulaziz Lamar RN   Allergies:   Allergies   Allergen Reactions    Olanzapine Headache, Dizziness and Drowsiness    Paclitaxel Other     Scratchy throat, erythema face, back pain   Objective   Vital Signs:    There were no vitals filed for this visit.  Physical Exam   Criteria to Treat   DCRU RN reviewed and meets eligibility per treatment plan   Time team notified: 1013   DCRU RN notifies study team to review eligibility and approval before dosing procedures  Time team approves: 1017      Pre-dose Vital Signs   Temperature, heart rate, respirations, blood pressure: in a sitting position at rest for at least 5 minutes.    Up to 60 minutes prior to dosing    Vitals:    09/09/24 0900   BP: 115/70   Pulse: 52   Resp: 18   Temp: 36.2 °C (97.2 °F)   SpO2: 99%        Pre-dose 12-Lead ECG   [facility]  Triplicate   Each 1 - 5 minutes apart   Rest supine at least 5 minutes prior   Up to 60 minutes prior to dosing   Contact MD/Provider &  if abnormal from baseline  Rest Time  QTcF Average QTcF   1015 [phone]        Pre-dose Research Correlatives  [facility] lab for processing    Access Type: 3/4\", pak 20g Location: Right Chest Mediport   Refer to [place] Treatment Plan for orders  Time point Specimen Test Volume Draw Time   Pre dose (within 60 mins) ATRN-119/ATRN-157 PK  3 mL  1037        Research Drug Administration   Document medication administration in MAR activity. Document Research specific instructions below.  ATRN-119 Oral Once Daily Dose.   Refer to diet section for guidelines  If dose is vomited, do not re-dose  [facility] will deliver 28 day supply       Infusion-Related Reactions   Infusion Related Reactions   [facility] SOC Hypersensivity Orders      Post-Dose Research Labs  [facility] for Processing     Access Type: 3/4\" Pak 20g Location: Right Chest Mediport   Refer to [place] Treatment Plan for " orders  Time point Specimen Test Volume Draw Time   +15 mins (+/- 5 mins)  ATRN-119/ATRN-157 PK  3 mL  1101   +30 mins (+/- 5 mins)  ATRN-119/ATRN-157 PK  3 mL  1116   +60 mins (+/- 5 mins)  ATRN-119/ATRN-157 PK  3 mL  1145   +2 hours (+/- 15 mins)  ATRN-119/ATRN-157 PK  3 mL  1245   +4 hours (+/- 15 mins)  ATRN-119/ATRN-157 PK  3 mL  1445   +6 hours (+/- 15 mins)  ATRN-119/ATRN-157 PK  3 mL  1644   +8 hours (+/- 15 mins)  ATRN-119/ATRN-157 PK  3 mL  1831        Post-dose Vital Signs    Temperature, Heart Rate, Respiration, Blood Pressure:   after sitting at rest at least 5 minutes prior to obtaining:    +1 hour (+/- 15 mins) - prior to blood draw   Vitals:    09/09/24 1138   BP: 108/69   Pulse: (!) 48   Resp: 18   Temp: 35.8 °C (96.4 °F)   SpO2: 97%      +2 hour (+/- 15 mins) - prior to blood draw and ecg   Vitals:    09/09/24 1234   BP: 108/69   Pulse: (!) 47   Resp: 16   Temp: 35.9 °C (96.6 °F)   SpO2: 96%      +4 hour (+/- 15 mins) - prior to blood draw and ecg   Vitals:    09/09/24 1438   BP: 114/70   Pulse: (!) 48   Resp: 16   Temp: 36.1 °C (97 °F)   SpO2: 97%          Post-dose 12-Lead ECG   DCRU/[employer] 5500 ECG machine  Triplicate   Each 1 - 5 minutes apart   Rest supine at least 5 minutes prior   Up to 60 minutes prior to dosing   Contact MD/Provider &  if abnormal from baseline  Time Point Rest Time QTcF  Average    +2 hours (+/- 15 mins) 1220 [phone]   +4 hours (+/- 15 mins) 1425 416 [phone]   +6 hours (+/- 15 mins) 1610 411 [phone]        Discharge Instructions   Discharge patient to home after study requirements completed.  Confirm patient has ATRN-119 medication diary & bottle.   Remind patient: to bring medication diary & bottle to each visit (full or empty).   Remind patient to return: on Day 2 and that breakfast will be given in clinic prior to   dosing.   Discharge time: 1835   Abdulaziz Lamar RN  09/09/24

## 2024-09-09 NOTE — PROGRESS NOTES
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 she received prior Taxotere and cyclophosphamide , completed 6/2010. S/p adjuvant endocrine therapy.    Diagnosis of metastatic TNBC 3/2023, presenting with back pain and cord compression      weekly paclitaxel from 5/22/23, changed to abraxane after 1st cycle due to anaphylactic type reaction. Discontinued 12/12/23 due to progressive disease.    TDxD from 1/29/24, held for XRT to spine for a few wks and stopped on 6/27/24  s/p XRT to spine on 4/1/24    CURRENT THERAPY  None    HISTORY OF PRESENT ILLNESS    Trang Jones is a 64 y.o. woman here for C2D1 on APREA1Y22 .  She reports that she is feeling well and having no troubles with study medication.      Review of Systems   Constitutional: Negative.  Negative for appetite change, chills, diaphoresis, fatigue and fever.   HENT:  Negative.  Negative for hearing loss and lump/mass.    Eyes: Negative.  Negative for eye problems and icterus.   Respiratory: Negative.  Negative for chest tightness, cough, hemoptysis, shortness of breath and wheezing.    Cardiovascular: Negative.  Negative for chest pain, leg swelling and palpitations.   Gastrointestinal:  Positive for abdominal pain (Occasional transient crampy pain), constipation (Alternating with diarrhea) and diarrhea. Negative for abdominal distention and blood in stool.   Endocrine: Negative for hot flashes.   Genitourinary:  Negative for bladder incontinence, difficulty urinating, dyspareunia, dysuria, frequency and hematuria.    Musculoskeletal:  Negative for arthralgias, gait problem and  myalgias.   Neurological:  Negative for dizziness, extremity weakness, gait problem, headaches, numbness and seizures.   Hematological:  Negative for adenopathy. Does not bruise/bleed easily.   Psychiatric/Behavioral:  Negative for confusion, depression and sleep disturbance. The patient is not nervous/anxious.    All other systems reviewed and are negative.        Past Medical History:  has a past medical history of Breast cancer (Multi), Hypercholesteremia, Metastatic cancer (Multi), and Personal history of irradiation.  Surgical History:   has a past surgical history that includes CT guided percutaneous biopsy bone deep (2023); Mastectomy (Left, ); and Breast surgery.  Social History:   reports that she quit smoking about 21 years ago. Her smoking use included cigarettes. She has been exposed to tobacco smoke. She has never used smokeless tobacco. She reports that she does not currently use alcohol. She reports current drug use. Drug: Marijuana.  Family History:    Family History   Problem Relation Name Age of Onset    Leukemia Father       Family Oncology History:  Cancer-related family history is not on file.      OBJECTIVE    VS / Pain:  BP 94/67 (BP Location: Right arm, Patient Position: Sitting)   Pulse 58   Temp 36.5 °C (97.7 °F) (Oral)   Resp 18   Wt 63 kg (138 lb 14.2 oz)   LMP  (LMP Unknown)   SpO2 99%   BMI 26.43 kg/m²   BSA: 1.64 meters squared     Pain Scale: 3    Performance Status:  The ECOG performance scale today is ECO- Restricted in physically strenuous activity.  Carries out light duty.  Hospital Outpatient Visit on 2024   Component Date Value Ref Range Status    WBC 2024 4.4  4.4 - 11.3 x10*3/uL Final    nRBC 2024 0.0  0.0 - 0.0 /100 WBCs Final    RBC 2024 3.85 (L)  4.00 - 5.20 x10*6/uL Final    Hemoglobin 2024 11.4 (L)  12.0 - 16.0 g/dL Final    Hematocrit 2024 35.3 (L)  36.0 - 46.0 % Final    MCV 2024 92  80 - 100 fL Final    MCH  09/09/2024 29.6  26.0 - 34.0 pg Final    MCHC 09/09/2024 32.3  32.0 - 36.0 g/dL Final    RDW 09/09/2024 13.0  11.5 - 14.5 % Final    Platelets 09/09/2024 164  150 - 450 x10*3/uL Final    Neutrophils % 09/09/2024 64.0  40.0 - 80.0 % Final    Immature Granulocytes %, Automated 09/09/2024 0.2  0.0 - 0.9 % Final    Immature Granulocyte Count (IG) includes promyelocytes, myelocytes and metamyelocytes but does not include bands. Percent differential counts (%) should be interpreted in the context of the absolute cell counts (cells/UL).    Lymphocytes % 09/09/2024 23.4  13.0 - 44.0 % Final    Monocytes % 09/09/2024 8.9  2.0 - 10.0 % Final    Eosinophils % 09/09/2024 2.8  0.0 - 6.0 % Final    Basophils % 09/09/2024 0.7  0.0 - 2.0 % Final    Neutrophils Absolute 09/09/2024 2.79  1.20 - 7.70 x10*3/uL Final    Percent differential counts (%) should be interpreted in the context of the absolute cell counts (cells/uL).    Immature Granulocytes Absolute, Au* 09/09/2024 0.01  0.00 - 0.70 x10*3/uL Final    Lymphocytes Absolute 09/09/2024 1.02 (L)  1.20 - 4.80 x10*3/uL Final    Monocytes Absolute 09/09/2024 0.39  0.10 - 1.00 x10*3/uL Final    Eosinophils Absolute 09/09/2024 0.12  0.00 - 0.70 x10*3/uL Final    Basophils Absolute 09/09/2024 0.03  0.00 - 0.10 x10*3/uL Final    Glucose 09/09/2024 92  74 - 99 mg/dL Final    Sodium 09/09/2024 141  136 - 145 mmol/L Final    Potassium 09/09/2024 4.0  3.5 - 5.3 mmol/L Final    Chloride 09/09/2024 106  98 - 107 mmol/L Final    Bicarbonate 09/09/2024 28  21 - 32 mmol/L Final    Anion Gap 09/09/2024 11  10 - 20 mmol/L Final    Urea Nitrogen 09/09/2024 16  6 - 23 mg/dL Final    Creatinine 09/09/2024 0.70  0.50 - 1.05 mg/dL Final    eGFR 09/09/2024 >90  >60 mL/min/1.73m*2 Final    Calculations of estimated GFR are performed using the 2021 CKD-EPI Study Refit equation without the race variable for the IDMS-Traceable creatinine  "methods.  https://jasn.asnjournals.org/content/early/2021/09/22/ASN.9520792879    Calcium 09/09/2024 8.6  8.6 - 10.6 mg/dL Final    Albumin 09/09/2024 3.9  3.4 - 5.0 g/dL Final    Alkaline Phosphatase 09/09/2024 73  33 - 136 U/L Final    Total Protein 09/09/2024 5.1 (L)  6.4 - 8.2 g/dL Final    AST 09/09/2024 22  9 - 39 U/L Final    Bilirubin, Total 09/09/2024 0.4  0.0 - 1.2 mg/dL Final    ALT 09/09/2024 18  7 - 45 U/L Final    Patients treated with Sulfasalazine may generate falsely decreased results for ALT.    Amylase 09/09/2024 31  29 - 103 U/L Final    aPTT 09/09/2024 31  27 - 38 seconds Final    D-Dimer Non VTE, Quant (ng/mL FEU) 09/09/2024 770 (H)  <=500 ng/mL FEU Final    Haptoglobin 09/09/2024 91  30 - 200 mg/dL Final    LDH 09/09/2024 205  84 - 246 U/L Final    Magnesium 09/09/2024 2.31  1.60 - 2.40 mg/dL Final    Phosphorus 09/09/2024 3.8  2.5 - 4.9 mg/dL Final    The performance characteristics of phosphorus testing in heparinized plasma have been validated by the individual  laboratory site where testing is performed. Testing on heparinized plasma is not approved by the FDA; however, such approval is not necessary.    Protime 09/09/2024 10.7  9.8 - 12.8 seconds Final    INR 09/09/2024 1.0  0.9 - 1.1 Final    Retic % 09/09/2024 2.0  0.5 - 2.0 % Final    Retic Absolute 09/09/2024 0.077  0.018 - 0.083 x10*6/uL Final    Reticulocyte Hemoglobin 09/09/2024 32  28 - 38 pg Final    Immature Retic fraction 09/09/2024 16.0  <=16.0 % Final    Reticulocytes are measured based on a fluorescent technique. The IRF, or immature reticulocyte fraction, is the percent of reticulocytes that show medium (MFR) or high (HFR) fluorescence.  This value can be used to assess the relative maturity of the reticulocyte population in response to anemia. The \"shift reticulocytes\" are not measured by this technique, eliminating the need for their correction in the reticulocyte index.    Uric Acid 09/09/2024 4.2  2.3 - 6.7 mg/dL " Final    Venipuncture immediately after or during the administration of Metamizole may lead to falsely low results. Testing should be performed immediately  prior to Metamizole dosing.       Physical Exam  Constitutional:       General: She is not in acute distress.     Appearance: She is not ill-appearing, toxic-appearing or diaphoretic.   HENT:      Nose: No congestion or rhinorrhea.      Mouth/Throat:      Pharynx: No posterior oropharyngeal erythema.   Cardiovascular:      Rate and Rhythm: Normal rate and regular rhythm.      Pulses: Normal pulses.      Heart sounds: Normal heart sounds. No murmur heard.     No gallop.   Pulmonary:      Breath sounds: Normal breath sounds.   Abdominal:      General: There is no distension.      Palpations: There is no mass.      Tenderness: There is no abdominal tenderness.   Musculoskeletal:         General: No swelling.      Cervical back: No rigidity.      Right lower leg: No edema.      Left lower leg: No edema.   Lymphadenopathy:      Cervical: No cervical adenopathy.   Skin:     General: Skin is warm.      Coloration: Skin is not cyanotic.      Findings: No bruising, ecchymosis or erythema.   Neurological:      General: No focal deficit present.      Mental Status: She is oriented to person, place, and time.      Cranial Nerves: Cranial nerves 2-12 are intact.      Motor: Motor function is intact.   Psychiatric:         Attention and Perception: Attention and perception normal.         Mood and Affect: Mood and affect normal.         Behavior: Behavior normal.         Thought Content: Thought content normal.         Judgment: Judgment normal.             Diagnostic Results       IMPRESSION/PLAN      1. 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  -Adverse events and safety labs are within protocol defined limits.  Proceed with planned C2D1 tx today.    2. Pain neoplasm related. Following with supportive onc.  Much better now. S/p xrt      Ajit  Madhavi MSN, APRN-CNP  Clinical Trials Unit  Phase I Oncology   [email or web address]   Ph. [phone]

## 2024-09-11 ENCOUNTER — APPOINTMENT (OUTPATIENT)
Dept: INTEGRATIVE MEDICINE | Facility: CLINIC | Age: 64
End: 2024-09-11

## 2024-09-11 DIAGNOSIS — M79.672 FOOT PAIN, LEFT: ICD-10-CM

## 2024-09-11 DIAGNOSIS — K59.00 CONSTIPATION: Primary | ICD-10-CM

## 2024-09-11 DIAGNOSIS — R19.7 DIARRHEA: ICD-10-CM

## 2024-09-11 PROCEDURE — 97139 UNLISTED THERAPEUTIC PX: CPT | Performed by: ACUPUNCTURIST

## 2024-09-11 ASSESSMENT — PAIN SCALES - GENERAL: PAINLEVEL_OUTOF10: 1

## 2024-09-11 NOTE — PROGRESS NOTES
Acupuncture Visit:     Subjective   Patient ID: Trang Jones is a 64 y.o. female who presents for No chief complaint on file.  Reported only issue is left sided plantar pain which improved from last tx.  She also continues to have alternating constipation and diarrhea  Previous:  Pt reported thigh pain has improved.  She has alternating constipation diarrhea with intermittent stomach cramping and left sided plantar pain        Session Information  Is this acupuncture treatment being billed to the patient's insurance company: No  Visit Type: Follow-up visit    Pre-treatment Assessment  Pain Score: 3  Anxiety Level (0-10): 1  Stress Level (0-10): 2  Coping Level (0-10): 8  Depression Level (0-10): 0  Fatigue Level (0-10): 3  Nausea Level (0-10): 0  Wellbeing Level (0-10): 8    Review of Systems         Provider reviewed plan for the acupuncture session, precautions and contraindications. Patient/guardian/hospital staff has given consent to treat with full understanding of what to expect during the session. Before acupuncture began, provider explained to the patient to communicate at any time if the procedure was causing discomfort past their tolerance level. Patient agreed to advise acupuncturist. The acupuncturist counseled the patient on the risks of acupuncture treatment including pain, infection, bleeding, and no relief of pain. The patient was positioned comfortably. There was no evidence of infection at the site of needle insertions.    Objective   Physical Exam         Treatment Plan  Treatment Goals: Constipation reduction, Stress reduction, Anxiety reduction, Fatigue reduction    Acupuncture Treatment  Body Points - Left: k6, 2, ub 63, soleusx2, lr 3,  Body Points - Bilateral: st qi,x1, sp 6  Body Points - Right: li 4  Needle Count In: 11  Needle Count Out: 11  Needle Retention Time (min): 30 minutes  Total Face to Face Time (min): 25 minutes         Post-treatment Assessment  0-10 (Numeric) Pain  Score: 1  Anxiety Level (0-10): 1  Stress Level (0-10): 1  Coping Level (0-10): 9  Depression Level (0-10): 0  Fatigue Level (0-10): 2  Nausea Level (0-10): 0  Wellbeing Level (0-10): 9    Assessment/Plan

## 2024-09-11 NOTE — RESEARCH NOTES
Research Note Follow Up Visit       Trang Jones is here today for C2D1 follow up on [id number].  Follow up procedures completed per protocol. Patient is aware of continued follow up plan.  Labs and AE's were within protocol acceptable parameters.  Patient treated without incident and discharged in good condition. All protocol driven activities were completed.        Education Documentation  Supportive Medications, taught by ERROL Rodriguez at 9/11/2024  8:32 AM.  Learner: Patient  Readiness: Eager  Method: Explanation  Response: Verbalizes Understanding    Education Comments  No comments found.

## 2024-09-11 NOTE — ADDENDUM NOTE
Encounter addended by: KELSEA Rodriguez-CNP on: 9/11/2024 8:35 AM   Actions taken: Patient Education assessment filed, Patient Education documented on, Patient Education resolved, Clinical Note Signed

## 2024-09-11 NOTE — ADDENDUM NOTE
Encounter addended by: Roberta Harden RN on: 9/11/2024 4:38 PM   Actions taken: Charge Capture section accepted

## 2024-09-16 ENCOUNTER — EDUCATION (OUTPATIENT)
Dept: HEMATOLOGY/ONCOLOGY | Facility: HOSPITAL | Age: 64
End: 2024-09-16

## 2024-09-16 ENCOUNTER — HOSPITAL ENCOUNTER (OUTPATIENT)
Dept: RESEARCH | Facility: HOSPITAL | Age: 64
Discharge: HOME | End: 2024-09-16
Payer: COMMERCIAL

## 2024-09-16 VITALS
BODY MASS INDEX: 26.05 KG/M2 | SYSTOLIC BLOOD PRESSURE: 102 MMHG | OXYGEN SATURATION: 98 % | RESPIRATION RATE: 16 BRPM | DIASTOLIC BLOOD PRESSURE: 65 MMHG | TEMPERATURE: 97.3 F | WEIGHT: 136.91 LBS | HEART RATE: 53 BPM

## 2024-09-16 DIAGNOSIS — C50.911 CARCINOMA OF RIGHT BREAST METASTATIC TO BONE (MULTI): ICD-10-CM

## 2024-09-16 DIAGNOSIS — C41.9 MALIGNANT NEOPLASM OF BONE WITH METASTASES (MULTI): ICD-10-CM

## 2024-09-16 DIAGNOSIS — C79.51 CARCINOMA OF RIGHT BREAST METASTATIC TO BONE (MULTI): ICD-10-CM

## 2024-09-16 LAB
ALBUMIN SERPL BCP-MCNC: 3.9 G/DL (ref 3.4–5)
ALP SERPL-CCNC: 85 U/L (ref 33–136)
ALT SERPL W P-5'-P-CCNC: 19 U/L (ref 7–45)
AMYLASE SERPL-CCNC: 32 U/L (ref 29–103)
ANION GAP SERPL CALC-SCNC: 14 MMOL/L (ref 10–20)
APPEARANCE UR: CLEAR
APTT PPP: 34 SECONDS (ref 27–38)
AST SERPL W P-5'-P-CCNC: 23 U/L (ref 9–39)
BASOPHILS # BLD AUTO: 0.02 X10*3/UL (ref 0–0.1)
BASOPHILS NFR BLD AUTO: 0.4 %
BILIRUB SERPL-MCNC: 0.5 MG/DL (ref 0–1.2)
BILIRUB UR STRIP.AUTO-MCNC: NEGATIVE MG/DL
BUN SERPL-MCNC: 15 MG/DL (ref 6–23)
CALCIUM SERPL-MCNC: 9.5 MG/DL (ref 8.6–10.6)
CHLORIDE SERPL-SCNC: 104 MMOL/L (ref 98–107)
CO2 SERPL-SCNC: 28 MMOL/L (ref 21–32)
COLOR UR: YELLOW
CREAT SERPL-MCNC: 0.78 MG/DL (ref 0.5–1.05)
D DIMER PPP FEU-MCNC: 943 NG/ML FEU
EGFRCR SERPLBLD CKD-EPI 2021: 86 ML/MIN/1.73M*2
EOSINOPHIL # BLD AUTO: 0.17 X10*3/UL (ref 0–0.7)
EOSINOPHIL NFR BLD AUTO: 3.1 %
ERYTHROCYTE [DISTWIDTH] IN BLOOD BY AUTOMATED COUNT: 12.8 % (ref 11.5–14.5)
GLUCOSE SERPL-MCNC: 107 MG/DL (ref 74–99)
GLUCOSE UR STRIP.AUTO-MCNC: NORMAL MG/DL
HAPTOGLOB SERPL NEPH-MCNC: 157 MG/DL (ref 30–200)
HCT VFR BLD AUTO: 36.2 % (ref 36–46)
HGB BLD-MCNC: 11.9 G/DL (ref 12–16)
HGB RETIC QN: 30 PG (ref 28–38)
IMM GRANULOCYTES # BLD AUTO: 0.01 X10*3/UL (ref 0–0.7)
IMM GRANULOCYTES NFR BLD AUTO: 0.2 % (ref 0–0.9)
IMMATURE RETIC FRACTION: 18.6 %
INR PPP: 0.9 (ref 0.9–1.1)
KETONES UR STRIP.AUTO-MCNC: NEGATIVE MG/DL
LDH SERPL L TO P-CCNC: 200 U/L (ref 84–246)
LEUKOCYTE ESTERASE UR QL STRIP.AUTO: NEGATIVE
LYMPHOCYTES # BLD AUTO: 0.93 X10*3/UL (ref 1.2–4.8)
LYMPHOCYTES NFR BLD AUTO: 17 %
MAGNESIUM SERPL-MCNC: 2.06 MG/DL (ref 1.6–2.4)
MCH RBC QN AUTO: 29.5 PG (ref 26–34)
MCHC RBC AUTO-ENTMCNC: 32.9 G/DL (ref 32–36)
MCV RBC AUTO: 90 FL (ref 80–100)
MONOCYTES # BLD AUTO: 0.42 X10*3/UL (ref 0.1–1)
MONOCYTES NFR BLD AUTO: 7.7 %
MUCOUS THREADS #/AREA URNS AUTO: ABNORMAL /LPF
NEUTROPHILS # BLD AUTO: 3.91 X10*3/UL (ref 1.2–7.7)
NEUTROPHILS NFR BLD AUTO: 71.6 %
NITRITE UR QL STRIP.AUTO: NEGATIVE
NRBC BLD-RTO: 0 /100 WBCS (ref 0–0)
PH UR STRIP.AUTO: 5.5 [PH]
PHOSPHATE SERPL-MCNC: 4.5 MG/DL (ref 2.5–4.9)
PLATELET # BLD AUTO: 186 X10*3/UL (ref 150–450)
POTASSIUM SERPL-SCNC: 4.1 MMOL/L (ref 3.5–5.3)
PROT SERPL-MCNC: 5.4 G/DL (ref 6.4–8.2)
PROT UR STRIP.AUTO-MCNC: NORMAL MG/DL
PROTHROMBIN TIME: 10.6 SECONDS (ref 9.8–12.8)
RBC # BLD AUTO: 4.03 X10*6/UL (ref 4–5.2)
RBC # UR STRIP.AUTO: NEGATIVE /UL
RBC #/AREA URNS AUTO: ABNORMAL /HPF
RETICS #: 0.09 X10*6/UL (ref 0.02–0.08)
RETICS/RBC NFR AUTO: 2.2 % (ref 0.5–2)
SODIUM SERPL-SCNC: 142 MMOL/L (ref 136–145)
SP GR UR STRIP.AUTO: 1.03
URATE SERPL-MCNC: 4.5 MG/DL (ref 2.3–6.7)
UROBILINOGEN UR STRIP.AUTO-MCNC: NORMAL MG/DL
WBC # BLD AUTO: 5.5 X10*3/UL (ref 4.4–11.3)
WBC #/AREA URNS AUTO: ABNORMAL /HPF

## 2024-09-16 PROCEDURE — 84550 ASSAY OF BLOOD/URIC ACID: CPT | Performed by: INTERNAL MEDICINE

## 2024-09-16 PROCEDURE — 85610 PROTHROMBIN TIME: CPT | Performed by: INTERNAL MEDICINE

## 2024-09-16 PROCEDURE — 80053 COMPREHEN METABOLIC PANEL: CPT | Performed by: INTERNAL MEDICINE

## 2024-09-16 PROCEDURE — 83615 LACTATE (LD) (LDH) ENZYME: CPT | Performed by: INTERNAL MEDICINE

## 2024-09-16 PROCEDURE — 83735 ASSAY OF MAGNESIUM: CPT | Performed by: INTERNAL MEDICINE

## 2024-09-16 PROCEDURE — 81001 URINALYSIS AUTO W/SCOPE: CPT | Performed by: INTERNAL MEDICINE

## 2024-09-16 PROCEDURE — 85045 AUTOMATED RETICULOCYTE COUNT: CPT | Performed by: INTERNAL MEDICINE

## 2024-09-16 PROCEDURE — 85025 COMPLETE CBC W/AUTO DIFF WBC: CPT | Performed by: INTERNAL MEDICINE

## 2024-09-16 PROCEDURE — 93005 ELECTROCARDIOGRAM TRACING: CPT | Mod: DCRU

## 2024-09-16 PROCEDURE — 82150 ASSAY OF AMYLASE: CPT | Performed by: INTERNAL MEDICINE

## 2024-09-16 PROCEDURE — 84100 ASSAY OF PHOSPHORUS: CPT | Performed by: INTERNAL MEDICINE

## 2024-09-16 PROCEDURE — 83010 ASSAY OF HAPTOGLOBIN QUANT: CPT | Performed by: INTERNAL MEDICINE

## 2024-09-16 PROCEDURE — 2500000004 HC RX 250 GENERAL PHARMACY W/ HCPCS (ALT 636 FOR OP/ED)

## 2024-09-16 PROCEDURE — 85379 FIBRIN DEGRADATION QUANT: CPT | Performed by: INTERNAL MEDICINE

## 2024-09-16 PROCEDURE — 85730 THROMBOPLASTIN TIME PARTIAL: CPT | Performed by: INTERNAL MEDICINE

## 2024-09-16 RX ORDER — HEPARIN 100 UNIT/ML
500 SYRINGE INTRAVENOUS AS NEEDED
Status: DISCONTINUED | OUTPATIENT
Start: 2024-09-16 | End: 2024-09-17 | Stop reason: HOSPADM

## 2024-09-16 RX ORDER — DIPHENHYDRAMINE HYDROCHLORIDE 50 MG/ML
50 INJECTION INTRAMUSCULAR; INTRAVENOUS AS NEEDED
Status: DISCONTINUED | OUTPATIENT
Start: 2024-09-16 | End: 2024-09-17 | Stop reason: HOSPADM

## 2024-09-16 RX ORDER — HEPARIN 100 UNIT/ML
500 SYRINGE INTRAVENOUS AS NEEDED
OUTPATIENT
Start: 2024-09-16

## 2024-09-16 RX ORDER — EPINEPHRINE 1 MG/ML
0.3 INJECTION INTRAMUSCULAR; INTRAVENOUS; SUBCUTANEOUS EVERY 5 MIN PRN
Status: DISCONTINUED | OUTPATIENT
Start: 2024-09-16 | End: 2024-09-17 | Stop reason: HOSPADM

## 2024-09-16 RX ORDER — ALBUTEROL SULFATE 0.83 MG/ML
3 SOLUTION RESPIRATORY (INHALATION) AS NEEDED
Status: DISCONTINUED | OUTPATIENT
Start: 2024-09-16 | End: 2024-09-17 | Stop reason: HOSPADM

## 2024-09-16 RX ORDER — HEPARIN SODIUM,PORCINE/PF 10 UNIT/ML
50 SYRINGE (ML) INTRAVENOUS AS NEEDED
OUTPATIENT
Start: 2024-09-16

## 2024-09-16 RX ORDER — FAMOTIDINE 10 MG/ML
20 INJECTION INTRAVENOUS ONCE AS NEEDED
Status: DISCONTINUED | OUTPATIENT
Start: 2024-09-16 | End: 2024-09-17 | Stop reason: HOSPADM

## 2024-09-16 ASSESSMENT — ENCOUNTER SYMPTOMS
SHORTNESS OF BREATH: 0
CARDIOVASCULAR NEGATIVE: 1
PALPITATIONS: 0
FEVER: 0
SCLERAL ICTERUS: 0
NERVOUS/ANXIOUS: 0
DIARRHEA: 1
FATIGUE: 0
FREQUENCY: 0
SEIZURES: 0
ABDOMINAL PAIN: 1
HEADACHES: 0
ABDOMINAL DISTENTION: 0
DYSURIA: 0
CONSTITUTIONAL NEGATIVE: 1
RESPIRATORY NEGATIVE: 1
CONSTIPATION: 1
CHILLS: 0
EXTREMITY WEAKNESS: 0
DIFFICULTY URINATING: 0
ADENOPATHY: 0
CONFUSION: 0
LEG SWELLING: 0
DIAPHORESIS: 0
EYES NEGATIVE: 1
APPETITE CHANGE: 0
MYALGIAS: 0
BRUISES/BLEEDS EASILY: 0
HEMATURIA: 0
COUGH: 0
DEPRESSION: 0
WHEEZING: 0
ARTHRALGIAS: 0
CHEST TIGHTNESS: 0
SLEEP DISTURBANCE: 0
HOT FLASHES: 0
EYE PROBLEMS: 0
BLOOD IN STOOL: 0
NUMBNESS: 0
HEMOPTYSIS: 0
DIZZINESS: 0

## 2024-09-16 ASSESSMENT — PAIN SCALES - GENERAL: PAINLEVEL: 0-NO PAIN

## 2024-09-16 NOTE — ADDENDUM NOTE
Encounter addended by: Fabiola Andrea RN on: 9/16/2024 2:18 PM   Actions taken: Charge Capture section accepted

## 2024-09-16 NOTE — RESEARCH NOTES
Research Note Non-Treatment Day    Trang Jones is here today. Patient is on [id number]. Today is C2D8. Procedures completed per protocol. AE's and con-meds reviewed with patient. Patient is aware of treatment plan.      Education Documentation  Diarrhea, taught by Donte Encarnacion RN at 9/16/2024 10:52 AM.  Learner: Significant Other, Patient  Readiness: Eager  Method: Explanation  Response: Verbalizes Understanding    Treatment Plan and Schedule, taught by Donte Encarnacion RN at 9/16/2024 10:52 AM.  Learner: Significant Other, Patient  Readiness: Eager  Method: Explanation  Response: Verbalizes Understanding    General Medication Information, taught by Donte Encarnacion RN at 9/16/2024 10:52 AM.  Learner: Significant Other, Patient  Readiness: Eager  Method: Explanation  Response: Verbalizes Understanding    Supportive Medications, taught by Donte Encarnacion RN at 9/16/2024 10:52 AM.  Learner: Significant Other, Patient  Readiness: Eager  Method: Explanation  Response: Verbalizes Understanding    Education Comments  No comments found.

## 2024-09-16 NOTE — RESEARCH NOTES
[facility]><[id number]><C1D8 AND C2D8>  DCRU NURSING VISIT NOTE  Study Name: TRACY 1Y22  IRB#: [id number]  DCRU#: DCRU # [id number]  Protocol Version Dated: V5 3.7.24  PI: James Ram M.D    Time point: C1D8 or C2D8  Cycle 2    Encounter Date: 09/16/2024  Encounter Time:  8:00 AM EDT  Encounter Department: [facility]     #1 Phone Pager   Richard Encarnacion   [phone] [address]      Study Regimen and Dosing   Refer to [place] Treatment Plan for orders  Part 1_Expansion - Advanced Solid Tumors with DNA damage response mutations, failed at least one prior standard of care therapy and must be capable of oral administration of study medication.  Cycle = 28 days.   ATRN-119 administered oral daily at least 1 hour prior to a meal      Dietary Guidelines   Regular      Admission and Prior to Starting Study Activities   Notify [facility] of patient arrival.  Complete DCRU and [facility] Admission/Visit Note.  Obtain weight in kilograms - with shoes off and heavy items removed.   Insert one peripheral IV line or access mediport for sample collection procedures (flush line with 5 - 10 mL normal saline following each blood draw).     Study Specific Instructions and Documentation   LABS  VITALS  ECG   RESEARCH CORRELATIVES  STUDY MEDICATION  DISCHARGE     Safety Labs    Refer to [place] Treatment Plan for orders     Safety Parameters and Special Instructions   ATRN-119 is a PARP inhibitor.   Potential Side Effects/Adverse Events: nausea, vomiting, myelosuppression, myalgia, hypertension, hypomagnesemia, dysgeusia, dyspepsia, insomnia, headache.      Subjective   Trang Jones is a 64 y.o. female and is here for a Research clinical visit.    Visit Provider: [id number], [facility] ROOM 6 [facility]     Allergies:   Allergies   Allergen Reactions    Olanzapine Headache, Dizziness and Drowsiness    Paclitaxel Other     Scratchy throat, erythema face, back pain       Objective   Vital Signs:    Vitals:    09/16/24 0809  09/16/24 1002   BP: 107/72 102/65   Pulse: 70 53   Resp: 16 16   Temp: 36.3 °C (97.3 °F) 36.3 °C (97.3 °F)   TempSrc: Oral Oral   SpO2: 97% 98%   Weight: 62.1 kg (136 lb 14.5 oz)    PainSc: 0-No pain        Physical Exam     ASSESSMENT and PLAN:  Problem List Items Addressed This Visit          Hematology and Neoplasia    Malignant neoplasm of bone with metastases (Multi)    Relevant Medications    heparin flush 100 unit/mL syringe 500 Units    Study APREA1Y22 ATRN-119 100 mg capsule 800 mg (Completed)    sodium chloride 0.9 % bolus 500 mL    dextrose 5 % in water (D5W) bolus    diphenhydrAMINE (BENADryl) injection 50 mg    methylPREDNISolone sod succinate (SOLU-Medrol) 40 mg/mL injection 40 mg    famotidine PF (Pepcid) injection 20 mg    EPINEPHrine (Adrenalin) injection 0.3 mg    albuterol 2.5 mg /3 mL (0.083 %) nebulizer solution 3 mL    Other Relevant Orders    CBC and Auto Differential (Completed)    Comprehensive metabolic panel (Completed)    Amylase (Completed)    APTT (Completed)    D-dimer, Quantitative (Completed)    Haptoglobin (Completed)    Lactate dehydrogenase (Completed)    Magnesium (Completed)    Phosphorus (Completed)    Protime-INR (Completed)    Reticulocytes (Completed)    Uric Acid (Completed)    Urinalysis with Reflex Microscopic (Completed)    Microscopic Only, Urine (Completed)    Adult diet Regular    Treatment Conditions (Completed)    Provider Communication - APREA1Y22 (Completed)    Research Triplicate ECG (Completed)    Nursing Communication - Hypersensitivity Management, Moderate (Completed)    Nursing Communication - Hypersensitivity Management, Severe (Completed)    Nursing Communication - Respiratory Management (Completed)    Pulse oximetry, continuous (Completed)    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)    Relevant Medications    Study [id number] ATRN-119 100 mg capsule 800 mg (Completed)    sodium chloride 0.9 % bolus 500 mL    dextrose 5 % in water (D5W) bolus     diphenhydrAMINE (BENADryl) injection 50 mg    methylPREDNISolone sod succinate (SOLU-Medrol) 40 mg/mL injection 40 mg    famotidine PF (Pepcid) injection 20 mg    EPINEPHrine (Adrenalin) injection 0.3 mg    albuterol 2.5 mg /3 mL (0.083 %) nebulizer solution 3 mL    Other Relevant Orders    CBC and Auto Differential (Completed)    Comprehensive metabolic panel (Completed)    Amylase (Completed)    APTT (Completed)    D-dimer, Quantitative (Completed)    Haptoglobin (Completed)    Lactate dehydrogenase (Completed)    Magnesium (Completed)    Phosphorus (Completed)    Protime-INR (Completed)    Reticulocytes (Completed)    Uric Acid (Completed)    Urinalysis with Reflex Microscopic (Completed)    Microscopic Only, Urine (Completed)    Adult diet Regular    Treatment Conditions (Completed)    Provider Communication - [id number] (Completed)    Research Triplicate ECG (Completed)    Nursing Communication - Hypersensitivity Management, Moderate (Completed)    Nursing Communication - Hypersensitivity Management, Severe (Completed)    Nursing Communication - Respiratory Management (Completed)    Pulse oximetry, continuous (Completed)        Medications as of the completion of today's visit:  Current Outpatient Medications   Medication Sig Dispense Refill    atorvastatin (Lipitor) 10 mg tablet Take 1 tablet (10 mg) by mouth once daily.      calcium carbonate (CALCIUM 600 ORAL) Take 600 mg by mouth once daily.      cholecalciferol, vitD3,/vit K2 (vitamin D3-vitamin K2) 125 mcg (5,000 unit)-100 mcg capsule Take by mouth.      gabapentin (Neurontin) 300 mg capsule Take 1 capsule (300 mg) by mouth once daily in the morning AND 2 capsules (600 mg) once daily at bedtime. 270 capsule 3    lidocaine-prilocaine (Emla) 2.5-2.5 % cream apply topically to MEDIPORT SITE 45 MINUTES BEFORE ACCESSING PORT, USE COVER WITH BANDAID      MAGNESIUM ORAL Take 400 mg by mouth.      ondansetron ODT (Zofran-ODT) 4 mg disintegrating tablet Take 1  tablet (4 mg) by mouth every 8 hours if needed for nausea or vomiting.      prochlorperazine (Compazine) 10 mg tablet Take 1 tablet (10 mg) by mouth every 6 hours if needed for nausea or vomiting.      propranolol (Inderal) 40 mg tablet Take 1 tablet (40 mg) by mouth once daily.      rizatriptan MLT (Maxalt-MLT) 5 mg disintegrating tablet take 1 tablet by mouth if needed MAY REPEAT IN 2 HOURS IF NEEDED      Study [id number] ATRN-119 100 mg capsule Take 8 capsules (800 mg total) by mouth once daily for 28 days.  Swallow whole. Take at least 1 hour prior to a meal. Do not eat grapefruit or drink grapefruit juice. 224 capsule 0    zinc glycinate 30 mg capsule Take by mouth.       Current Facility-Administered Medications   Medication Dose Route Frequency Provider Last Rate Last Admin    albuterol 2.5 mg /3 mL (0.083 %) nebulizer solution 3 mL  3 mL nebulization PRN Bebeto Tolbert MD        dextrose 5 % in water (D5W) bolus  500 mL intravenous PRN Bebeto Tolbetr MD        diphenhydrAMINE (BENADryl) injection 50 mg  50 mg intravenous PRN Bebeto Tolbert MD        EPINEPHrine (Adrenalin) injection 0.3 mg  0.3 mg intramuscular q5 min PRN Bebeto Tolbert MD        famotidine PF (Pepcid) injection 20 mg  20 mg intravenous Once PRN Bebeto Tolbert MD        heparin flush 100 unit/mL syringe 500 Units  500 Units intra-catheter PRN Ajit Hendrickson APRN-CNP   500 Units at 09/16/24 1037    methylPREDNISolone sod succinate (SOLU-Medrol) 40 mg/mL injection 40 mg  40 mg intravenous PRN Bebeto Tolbert MD        perflutren lipid microspheres (Definity) injection 0.5-10 mL of dilution  0.5-10 mL of dilution intravenous Once Israel Whalen MD        sodium chloride 0.9 % bolus 500 mL  500 mL intravenous PRN Bebeto Tolbert MD           Administrations This Visit       heparin flush 100 unit/mL syringe 500 Units       Admin Date  09/16/2024 Action  Given Dose  500 Units Route  intra-catheter Documented By  Fabiola  NICOLE Andrea              Study [id number] ATRN-119 100 mg capsule 800 mg       Admin Date  09/16/2024 Action  Given Dose  800 mg Route  oral Documented By  Fabiola Andrea RN                    Orders placed during today's visit:  Orders Placed This Encounter   Procedures    CBC and Auto Differential     Standing Status:   Standing     Number of Occurrences:   1     Order Specific Question:   Release result to MyChart     Answer:   Immediate [1]    Comprehensive metabolic panel     Standing Status:   Standing     Number of Occurrences:   1     Order Specific Question:   Release result to MyChart     Answer:   Immediate [1]    Amylase     Standing Status:   Standing     Number of Occurrences:   1     Order Specific Question:   Release result to MyChart     Answer:   Immediate [1]    APTT     Standing Status:   Standing     Number of Occurrences:   1     Order Specific Question:   Release result to MyChart     Answer:   Immediate [1]    D-dimer, Quantitative     Standing Status:   Standing     Number of Occurrences:   1     Order Specific Question:   Release result to MyChart     Answer:   Immediate [1]    Haptoglobin     Standing Status:   Standing     Number of Occurrences:   1     Order Specific Question:   Release result to MyChart     Answer:   Immediate [1]    Lactate dehydrogenase     Standing Status:   Standing     Number of Occurrences:   1     Order Specific Question:   Release result to MyChart     Answer:   Immediate [1]    Magnesium     Standing Status:   Standing     Number of Occurrences:   1     Order Specific Question:   Release result to MyChart     Answer:   Immediate [1]    Phosphorus     Standing Status:   Standing     Number of Occurrences:   1     Order Specific Question:   Release result to MyChart     Answer:   Immediate [1]    Protime-INR     Standing Status:   Standing     Number of Occurrences:   1     Order Specific Question:   Release result to MyChart     Answer:   Immediate [1]     Reticulocytes     Standing Status:   Standing     Number of Occurrences:   1     Order Specific Question:   Release result to MyChart     Answer:   Immediate [1]    Uric Acid     Standing Status:   Standing     Number of Occurrences:   1     Order Specific Question:   Release result to MyChart     Answer:   Immediate [1]    Urinalysis with Reflex Microscopic     Standing Status:   Standing     Number of Occurrences:   1     Order Specific Question:   Release result to MyChart     Answer:   Immediate [1]    Microscopic Only, Urine     Standing Status:   Standing     Number of Occurrences:   1     Order Specific Question:   Release result to MyChart     Answer:   Immediate [1]    Adult diet Regular     Standing Status:   Standing     Number of Occurrences:   1     Order Specific Question:   Diet type     Answer:   Regular    Treatment Conditions     Hold treatment and notify provider if:  · Adverse Event GREATER THAN OR EQUAL TO Grade 2     Standing Status:   Standing     Number of Occurrences:   1    Provider Communication - [id number]     · Cohort 1            · ATRN-119 50 mg daily  · Cohort 2            · ATRN-119 100 mg daily  · Cohort 3            · ATRN-119 200 mg daily  · Cohort 4            · ATRN-119 350 mg daily  · Cohort 5            · ATRN-119 550 mg daily  · Cohort 6            · ATRN-119 800 mg daily  · Cohort 7            · ATRN-119 1100 mg daily  · Cohort 8            · ATRN-119 1300 mg daily     Standing Status:   Standing     Number of Occurrences:   1    Research Triplicate ECG     Refer to study SmartPhrase for instructions and documentation of the research triplicate ECG.     Standing Status:   Standing     Number of Occurrences:   1    Nursing Communication - Hypersensitivity Management, Moderate     Flushing, rash, pruritus, dyspnea, chest discomfort, back pain, angioedema, SBP LESS THAN 90 mmHg, and/or change in mental status above or below patient's baseline. In the event of moderate or severe  hypersensitivity reaction to any medication:   Stop infusion.  Assess vital signs, pulse oximetry, nursing assessment, and patient complaints.  Administer medications per Hypersensitivity Reaction Medication Protocol.   Notify physician.     Standing Status:   Standing     Number of Occurrences:   1    Nursing Communication - Hypersensitivity Management, Severe     Worsening of moderate reaction symptoms, SBP LESS THAN 80 mmHg, and/or respiratory distress (respirations GREATER THAN 40 breaths per minute, wheezing, life-threatening symptoms). In the event of moderate or severe hypersensitivity reaction to any medication:   Stop infusion.  Assess vital signs, pulse oximetry, nursing assessment, and patient complaints.  Administer medications per Hypersensitivity Reaction Medication Protocol.   Notify physician.     Standing Status:   Standing     Number of Occurrences:   1    Nursing Communication - Respiratory Management     Oxygen via nasal cannula at 4 L per minute for respiratory rate GREATER THAN OR EQUAL TO to 28 breaths/minute and/or wheezing. Pulse Oximetry continuous monitoring.     Standing Status:   Standing     Number of Occurrences:   1    Pulse oximetry, continuous     Continuous monitoring to maintain SPO2 GREATER THAN 90%     Standing Status:   Standing     Number of Occurrences:   1        [id number] - Study Of ATRN-119 In Patients With Advanced Solid Tumors    Patient has no adverse events documented in the Research Adverse Events activity.      Criteria to Treat   DCRU RN reviewed and meets eligibility per treatment plan   Time team notified: 0943   DCRU RN notifies study team to review eligibility and approval before dosing procedures  Time team approves: 0943      Pre-dose Vital Signs   Temperature, heart rate, respirations, blood pressure: in a sitting position at rest for at least 5 minutes.    Up to 60 minutes prior to dosing     Pre-dose vital signs obtained at 1002; see chart above and/or  flowsheet above    Pre-dose 12-Lead ECG   vBrandU/Naked 5500 ECG machine  Triplicate   Each 1 - 5 minutes apart   Rest supine at least 5 minutes prior   Up to 60 minutes prior to dosing   Contact MD/Provider &  if abnormal from baseline  Rest Time  QTcF Average QTcF   [id number] 410  (at 1013) 415  (at 1015) 402  (at 1017) 409        Pre-dose Research Correlatives  Deliver to DCRU/[facility] lab for processing    Access Type: NA Location: NA   Refer to [place] Treatment Plan for orders  Time point Cycle  Specimen Test Volume  Draw Time   Pre dose (within 60 mins) C1 only  ATRN-119/ATRN-157 PK  3 mL NA        Research Drug Administration   Document medication administration in MAR activity. Document Research specific instructions below.  ATRN-119 mg Oral Once Daily Dose.   Administer at least 1 hour prior to a meal.   If dose is vomited, do not re-dose.  Dispense from patients supply.   ATRN-119 administered at 1025    Infusion-Related Reactions   Infusion Related Reactions   [facility] Hypersensivity Orders      Discharge Instructions   Discharge after study requirements completed.   Confirm patient has ATRN-119 medication diary & bottle.   Remind patient to document each dose in diary including missed doses   Remind patient: to bring medication diary & bottle to each visit (full or empty)   Remind patient to return: on Day 15 for predose PK, ecg, vitals and treatment.  Discharge time: 1040     Fabiola Flowers RN  09/16/24

## 2024-09-17 ENCOUNTER — OFFICE VISIT (OUTPATIENT)
Dept: PALLIATIVE MEDICINE | Facility: CLINIC | Age: 64
End: 2024-09-17
Payer: COMMERCIAL

## 2024-09-17 VITALS
RESPIRATION RATE: 16 BRPM | WEIGHT: 137.79 LBS | OXYGEN SATURATION: 98 % | SYSTOLIC BLOOD PRESSURE: 104 MMHG | BODY MASS INDEX: 26.22 KG/M2 | HEART RATE: 73 BPM | DIASTOLIC BLOOD PRESSURE: 66 MMHG | TEMPERATURE: 96.4 F

## 2024-09-17 DIAGNOSIS — M79.2 NEUROPATHIC PAIN: ICD-10-CM

## 2024-09-17 DIAGNOSIS — G89.3 CANCER RELATED PAIN: Primary | ICD-10-CM

## 2024-09-17 DIAGNOSIS — Z51.5 PALLIATIVE CARE ENCOUNTER: ICD-10-CM

## 2024-09-17 PROCEDURE — 99213 OFFICE O/P EST LOW 20 MIN: CPT | Performed by: NURSE PRACTITIONER

## 2024-09-17 RX ORDER — MORPHINE SULFATE 15 MG/1
15 TABLET, FILM COATED, EXTENDED RELEASE ORAL 3 TIMES DAILY
Qty: 90 TABLET | Refills: 0 | Status: SHIPPED | OUTPATIENT
Start: 2024-09-17 | End: 2024-10-17

## 2024-09-17 ASSESSMENT — PATIENT HEALTH QUESTIONNAIRE - PHQ9
SUM OF ALL RESPONSES TO PHQ9 QUESTIONS 1 AND 2: 0
2. FEELING DOWN, DEPRESSED OR HOPELESS: NOT AT ALL
1. LITTLE INTEREST OR PLEASURE IN DOING THINGS: NOT AT ALL

## 2024-09-17 ASSESSMENT — ENCOUNTER SYMPTOMS
DEPRESSION: 0
LOSS OF SENSATION IN FEET: 0
OCCASIONAL FEELINGS OF UNSTEADINESS: 0

## 2024-09-17 ASSESSMENT — PAIN SCALES - GENERAL: PAINLEVEL: 0-NO PAIN

## 2024-09-17 NOTE — PROGRESS NOTES
"  SUPPORTIVE AND PALLIATIVE ONCOLOGY OUTPATIENT FOLLOW-UP      SERVICE DATE: 9/17/2024    Cancer History  Newly diagnosed triple negative breast CA (with bone mets and cord compression)  -s/p L mastectomy in 2010: stage II ER+/HER2- breast CA  -s/p 4 cycles docetaxel/cyclophosphamide chemo  -took tamoxifen x 7 yrs, completed in 0560-5970  -presented to ED on 3/30/23 with severe back pain  -MRI spine showed cord compression, admitted at that time   -s/p 1 fx RT to spine on 5/4/23  -plan to start treatment with weekly paclitaxel and q21day pembro  -started treatment 5/22/23, changed to abraxane after C1 d/t anaphylactic type reaction  -CT CAP (11/14/23): Overall similar appearance of diffuse osseous metastatic disease. New subcentimeter lesion in the right hepatic lobe. Question  metastatic disease. Otherwise similar appearance of small  hypodensities throughout the liver.  -plan to stop abraxane and change therapy to TDxD, scheduled to start on 1/29/24  -treatment held for XRT to hip/spine 4/1/24  -stopped tx with TDxD on 6/27/24  -plan to start Aprea clinical trial in August 2024     Med Onc: Dr. Tolbert  Integrative Medicine: Dr. Sandoval    Subjective   HISTORY OF PRESENT ILLNESS: Trang Jones is a 64 y.o. female  with PMHx of HLD, and triple negative breast CA.     She presents to supportive oncology for follow up for pain and symptom management.     Presents for follow up alone today. Feels pain is well controlled, reports pain in L leg is \"lessening.\" Taking MSER 15mg tid, hasn't required PRN oxycodone in some time, takes PRN ibuprofen a few times a week. Continues on gabapentin bid. Bowels are \"hit or miss,\" she goes back and forth between diarrhea and constipation. No N/V/ reflux. Appetite is good, best in the morning, weight is stable. Mood is stable. Sleeping well, takes THC PRN. Energy levels are good.     Pain Assessment:  Pain Score: 0-No pain  Location:    Education:      Symptom " Assessment:  Pain:none  Headache: none  Dizziness:none  Lack of energy: none  Difficulty sleeping: none  Worrying: none  Anxiety: none  Depression: none  Pain in mouth/swallowing: none  Dry mouth: none  Taste changes: none  Shortness of breath: none  Lack of appetite: a little   Nausea: none  Vomiting: none  Constipation: a little  Diarrhea: a little  Sore muscles: none  Numbness or tingling in hands/feet/other: a little  Weight loss: none      Information obtained from: interview of patient  ______________________________________________________________________        Objective                PHYSICAL EXAMINATION   Vital Signs:   Vital signs reviewed  Vitals:    09/17/24 1303   BP: 104/66   Pulse: 73   Resp: 16   Temp: 35.8 °C (96.4 °F)   SpO2: 98%     Pain Score:  0     Physical Exam  Vitals reviewed.   Constitutional:       Appearance: Normal appearance.   HENT:      Head: Normocephalic.   Cardiovascular:      Rate and Rhythm: Normal rate.   Pulmonary:      Effort: Pulmonary effort is normal.   Abdominal:      Palpations: Abdomen is soft.   Musculoskeletal:         General: Normal range of motion.   Skin:     General: Skin is warm and dry.   Neurological:      General: No focal deficit present.      Mental Status: She is alert and oriented to person, place, and time.   Psychiatric:         Mood and Affect: Mood normal.         Judgment: Judgment normal.         ASSESSMENT/PLAN    Pain: upper back around shoulder blades, radiates to front of chest; dull ache, shooting with sudden movement    Pain is: cancer related pain and acute on chronic  Type: somatic and neuropathic  Pain control: well-controlled  Home regimen:   -continue Morphine Sulfate Contin 15mg tid. Rx sent for fill on 9/24.  -continue oxycodone 10mg every 4hrs PRN. No Rx needed today.   -continue gabapentin 300mg in the morning and 600mg at night. Refills available.   -continue ibuprofen PRN   -continue to follow with Dr. Sandoval/ Agustin Dietrich for  acupuncture/ reiki   -continue medical THC PRN   Intolerances/previously tried:     Opioid Use  Medication Management:   - OARRS report reviewed with no aberrant behavior; consistent with  prescriptions/records and patient history  - .  Overdose Risk Score 200.   This has been discussed with patient.   - We will continue to closely monitor the patient for signs of prescription misuse including UDS, OARRS review and subjective reports at each visit.  - no concurrent benzodiazepine use   - I am a provider who either is or has consulted and collaborated with a provider certified in Hospice and Palliative Medicine and have conducted a face-face visit and examination for this patient.  - Routine Urine Drug Screen completed 5/9/23 appropriately positive for opioids and negative for illicit substances  - Controlled Substance Agreement completed 5/9/23  - Specifically discussed that controlled substance prescriptions will only be provided by our group as outlined in the completed agreement  - Prescribed naloxone previously prescribed  - Red Flags: none     Bowels: at risk for OIC  -educated pt on importance of maintaining daily BM  -continue daily miralax PRN  -continue senna/colace PRN      N/V/reflux:   -continue zofran 4mg ODT q8hrs PRN   -continue compazine 10mg q6hrs PRN.   -self discontinued olanzapine 5mg at bedtime during treatment week  -self discontinued prilosec 40mg daily during treatment week  -continue migraine medication PRN      Sleep: hx of insomnia prior to cancer dx   -recently restarted medical THC PRN   -encouraged pt to maintain regular sleep/wake cycle  -plan for better pain control to aid in improved sleep, see pain section above      Mood: improving    -discussed adding medication to help with mood, pt would like to hold off for now     Medical Decision Making/ GOC:   -social work referral to assist with LW/ POA paperwork        Next Follow-Up Visit:  Return to clinic in 3 months      Signature and billing  Medical complexity was low level due to due to complexity of problems, extensive data review, and high risk of management/treatment.  Time was spent on the following: Prep Time, Time Directly with Patient/Family/Caregiver, Documentation Time. Total time spent: 25 mins      Data  Diagnostic tests and information reviewed for today's visit:  Most recent labs         Some elements copied from my note on 8/5/24, the elements have been updated and all reflect current decision making from today, 9/17/2024.      Plan of Care discussed with: Patient    SIGNATURE: KELSEA Garg-CNP    Contact information:  Supportive and Palliative Oncology  Monday-Friday 8 AM-5 PM  Phone:  [phone], press option #5, then option #1.   Or Epic Secure Chat

## 2024-09-17 NOTE — PROGRESS NOTES
SUPPORTIVE AND PALLIATIVE ONCOLOGY OUTPATIENT FOLLOW-UP      SERVICE DATE: 7/1/2024    Virtual or Telephone Consent    A telephone visit (audio only) between the patient (at the originating site) and the provider (at the distant site) was utilized to provide this telehealth service.   Verbal consent was requested and obtained from Trang Jones on this date, 09/17/24 for a telehealth visit.      Cancer History  Newly diagnosed triple negative breast CA (with bone mets and cord compression)  -s/p L mastectomy in 2010: stage II ER+/HER2- breast CA  -s/p 4 cycles docetaxel/cyclophosphamide chemo  -took tamoxifen x 7 yrs, completed in [id number]  -presented to ED on 3/30/23 with severe back pain  -MRI spine showed cord compression, admitted at that time   -s/p 1 fx RT to spine on 5/4/23  -plan to start treatment with weekly paclitaxel and q21day pembro  -started treatment 5/22/23, changed to abraxane after C1 d/t anaphylactic type reaction  -CT CAP (11/14/23): Overall similar appearance of diffuse osseous metastatic disease. New subcentimeter lesion in the right hepatic lobe. Question  metastatic disease. Otherwise similar appearance of small  hypodensities throughout the liver.  -plan to stop abraxane and change therapy to TDxD, scheduled to start on 1/29/24  -treatment held for XRT to hip/spine 4/1/24  -stopped tx with TDxD on 6/27/24  -plan to start Aprea clinical trial in August 2024    Med Onc: Dr. Tolbert  Integrative Medicine: Dr. Sandoval       Subjective   HISTORY OF PRESENT ILLNESS: Trang Jones is a 62 yo female with PMHx of HLD, scoliosis and newly diagnosed triple negative breast CA. She received XRT to the spine on 5/4 and will begin chemo treatment next week.     She presents to supportive oncology for follow up for pain and symptom management.     Spoke with pt on the phone for follow up. Doing okay with pain, taking MSER tid, has not required oxycodone in some time. Continues on gabapentin bid.  "Moving bowels daily. No N/V, occasional indigestion, takes Tums. Appetite is fine, better in the morning. Mood is okay, a lot of emotional \"ups and downs\" lately in deciding next line of treatment. Sleep is hit or miss, takes THC gummies PRN. Energy levels are pretty good, recently went up to [place] for vacation with friends.      Pain Assessment:  Pain Score: 2  Location: L femur    Symptom Assessment:  Pain:a little  Headache: none  Dizziness:none  Lack of energy: none  Difficulty sleeping: none  Worrying: none  Anxiety: none  Depression: none  Pain in mouth/swallowing: none  Dry mouth: none  Taste changes: none  Shortness of breath: none  Lack of appetite: none   Nausea: none  Vomiting: none  Constipation: none  Diarrhea: none  Sore muscles: a little  Numbness or tingling in hands/feet/other: none  Weight loss: none      Information obtained from: interview of patient  ______________________________________________________________________        Objective       {If you would like to pull in Lab results for the last 24 hours, type .[id number] :99}  {If you would like to pull in Imaging results, type .imgrslt :99}       PHYSICAL EXAMINATION not performed d/t phone visit  Vital Signs:   Vital signs reviewed  Vitals:    09/17/24 1303   BP: 104/66   Pulse: 73   Resp: 16   Temp: 35.8 °C (96.4 °F)   SpO2: 98%         Pain Score:  2        ASSESSMENT/PLAN    Pain: upper back around shoulder blades, radiates to front of chest; dull ache, shooting with sudden movement    Pain is: cancer related pain and acute on chronic  Type: somatic and neuropathic  Pain control: well-controlled  Home regimen:   -continue Morphine Sulfate Contin 15mg tid. Rx sent for fill on 8/25.  -continue oxycodone 10mg every 4hrs PRN. Rx sent for fill on 8/22.  -continue gabapentin 300mg in the morning and 600mg at night. Refills available.   -continue ibuprofen PRN   -continue to follow with Dr. Sandoval/ Agustin Dietrich for acupuncture/ reiki "   -recently discontinued medical THC PRN upon recommendation from Dr. Sandoval   Intolerances/previously tried:    Opioid Use  Medication Management:   - OARRS report reviewed with no aberrant behavior; consistent with  prescriptions/records and patient history  - MED 45.  Overdose Risk Score 190.   This has been discussed with patient.   - We will continue to closely monitor the patient for signs of prescription misuse including UDS, OARRS review and subjective reports at each visit.  - no concurrent benzodiazepine use   - I am a provider who either is or has consulted and collaborated with a provider certified in Hospice and Palliative Medicine and have conducted a face-face visit and examination for this patient.  - Routine Urine Drug Screen completed 5/9/23 appropriately positive for opioids and negative for illicit substances  - Controlled Substance Agreement completed 5/9/23  - Specifically discussed that controlled substance prescriptions will only be provided by our group as outlined in the completed agreement  - Prescribed naloxone previously prescribed  - Red Flags: none    Bowels: at risk for OIC  -educated pt on importance of maintaining daily BM  -continue daily miralax PRN  -continue senna/colace PRN     N/V/reflux:   -continue zofran 4mg ODT q8hrs PRN   -continue compazine 10mg q6hrs PRN.   -continue olanzapine 5mg at bedtime during treatment week  -continue prilosec 40mg daily during treatment week  -continue migraine medication PRN     Sleep: hx of insomnia prior to cancer dx   -recently restarted medical THC PRN   -encouraged pt to maintain regular sleep/wake cycle  -plan for better pain control to aid in improved sleep, see pain section above     Mood: improving    -discussed adding medication to help with mood, pt would like to hold off for now    Medical Decision Making/ GOC:   -social work referral to assist with LW/ POA paperwork        Next Follow-Up Visit:  Return to clinic in 6 weeks      Signature and billing  Medical complexity was low level due to due to complexity of problems, extensive data review, and high risk of management/treatment.  Time was spent on the following: Prep Time, Time Directly with Patient/Family/Caregiver, Documentation Time. Total time spent: 20 mins             Some elements copied from my note on 7/1/24, the elements have been updated and all reflect current decision making from today, 5/20/2024.      Plan of Care discussed with: Patient    SIGNATURE: KELSEA Garg-CNP    Contact information:  [facility]  Monday-Friday 8 AM-5 PM  Phone:  [phone], press option #5, then option #1.   Or Epic Secure Chat

## 2024-09-18 ENCOUNTER — APPOINTMENT (OUTPATIENT)
Dept: INTEGRATIVE MEDICINE | Facility: CLINIC | Age: 64
End: 2024-09-18
Payer: COMMERCIAL

## 2024-09-18 DIAGNOSIS — M79.672 FOOT PAIN, LEFT: Primary | ICD-10-CM

## 2024-09-18 DIAGNOSIS — R51.9 HEADACHE: ICD-10-CM

## 2024-09-18 DIAGNOSIS — K59.00 CONSTIPATION: ICD-10-CM

## 2024-09-18 DIAGNOSIS — R19.7 DIARRHEA: ICD-10-CM

## 2024-09-18 PROCEDURE — 97139 UNLISTED THERAPEUTIC PX: CPT | Performed by: ACUPUNCTURIST

## 2024-09-18 ASSESSMENT — PAIN SCALES - GENERAL: PAINLEVEL_OUTOF10: 2

## 2024-09-18 NOTE — PROGRESS NOTES
Acupuncture Visit:     Subjective   Patient ID: Trang Jones is a 64 y.o. female who presents for No chief complaint on file.  Seeking continued support for left side foot pain related to plantar fascitis and diarrhea/constipation associated with cancer tx.  Active frontal h/a        Session Information  Is this acupuncture treatment being billed to the patient's insurance company: No  Visit Type: Follow-up visit  Medical History Reviewed: I have reviewed pertinent medical history in EHR, and no contraindications are present to provide treatment    Pre-treatment Assessment  Pain Score: 3  Anxiety Level (0-10): 2  Stress Level (0-10): 3  Coping Level (0-10): 8  Depression Level (0-10): 0  Fatigue Level (0-10): 5  Nausea Level (0-10): 0  Wellbeing Level (0-10): 7    Review of Systems         Provider reviewed plan for the acupuncture session, precautions and contraindications. Patient/guardian/hospital staff has given consent to treat with full understanding of what to expect during the session. Before acupuncture began, provider explained to the patient to communicate at any time if the procedure was causing discomfort past their tolerance level. Patient agreed to advise acupuncturist. The acupuncturist counseled the patient on the risks of acupuncture treatment including pain, infection, bleeding, and no relief of pain. The patient was positioned comfortably. There was no evidence of infection at the site of needle insertions.    Objective   Physical Exam              Acupuncture Treatment  Patient Position: Seated and reclining  Body Points - Left: aida 5, lr4, heel pt  Body Points - Bilateral: st qi x2, sp 3.2, ub 2 yuyao, k6  Body Points - Right: yin isaacs  Needle Count In: 16  Needle Count Out: 16  Needle Retention Time (min): 25 minutes  Total Face to Face Time (min): 25 minutes              Assessment/Plan

## 2024-09-18 NOTE — ADDENDUM NOTE
Encounter addended by: Roberta Harden RN on: 9/17/2024 10:40 PM   Actions taken: Charge Capture section accepted

## 2024-09-23 ENCOUNTER — HOSPITAL ENCOUNTER (OUTPATIENT)
Dept: RESEARCH | Facility: HOSPITAL | Age: 64
Discharge: HOME | End: 2024-09-23
Payer: COMMERCIAL

## 2024-09-23 VITALS
HEART RATE: 49 BPM | WEIGHT: 138.01 LBS | SYSTOLIC BLOOD PRESSURE: 108 MMHG | OXYGEN SATURATION: 98 % | RESPIRATION RATE: 16 BRPM | TEMPERATURE: 98.1 F | DIASTOLIC BLOOD PRESSURE: 71 MMHG | BODY MASS INDEX: 26.26 KG/M2

## 2024-09-23 DIAGNOSIS — C50.911 CARCINOMA OF RIGHT BREAST METASTATIC TO BONE (MULTI): ICD-10-CM

## 2024-09-23 DIAGNOSIS — C79.51 CARCINOMA OF RIGHT BREAST METASTATIC TO BONE (MULTI): ICD-10-CM

## 2024-09-23 DIAGNOSIS — C41.9 MALIGNANT NEOPLASM OF BONE WITH METASTASES (MULTI): ICD-10-CM

## 2024-09-23 LAB
ALBUMIN SERPL BCP-MCNC: 3.9 G/DL (ref 3.4–5)
ALP SERPL-CCNC: 101 U/L (ref 33–136)
ALT SERPL W P-5'-P-CCNC: 18 U/L (ref 7–45)
AMYLASE SERPL-CCNC: 29 U/L (ref 29–103)
ANION GAP SERPL CALC-SCNC: 14 MMOL/L (ref 10–20)
APPEARANCE UR: CLEAR
APTT PPP: 45 SECONDS (ref 27–38)
AST SERPL W P-5'-P-CCNC: 28 U/L (ref 9–39)
BASOPHILS # BLD AUTO: 0.03 X10*3/UL (ref 0–0.1)
BASOPHILS NFR BLD AUTO: 0.6 %
BILIRUB SERPL-MCNC: 0.3 MG/DL (ref 0–1.2)
BILIRUB UR STRIP.AUTO-MCNC: NEGATIVE MG/DL
BUN SERPL-MCNC: 18 MG/DL (ref 6–23)
CALCIUM SERPL-MCNC: 9.3 MG/DL (ref 8.6–10.6)
CHLORIDE SERPL-SCNC: 104 MMOL/L (ref 98–107)
CO2 SERPL-SCNC: 28 MMOL/L (ref 21–32)
COLOR UR: NORMAL
CREAT SERPL-MCNC: 0.71 MG/DL (ref 0.5–1.05)
D DIMER PPP FEU-MCNC: 2005 NG/ML FEU
EGFRCR SERPLBLD CKD-EPI 2021: >90 ML/MIN/1.73M*2
EOSINOPHIL # BLD AUTO: 0.12 X10*3/UL (ref 0–0.7)
EOSINOPHIL NFR BLD AUTO: 2.5 %
ERYTHROCYTE [DISTWIDTH] IN BLOOD BY AUTOMATED COUNT: 12.7 % (ref 11.5–14.5)
GLUCOSE SERPL-MCNC: 92 MG/DL (ref 74–99)
GLUCOSE UR STRIP.AUTO-MCNC: NORMAL MG/DL
HAPTOGLOB SERPL NEPH-MCNC: 162 MG/DL (ref 30–200)
HCT VFR BLD AUTO: 35.6 % (ref 36–46)
HGB BLD-MCNC: 11.7 G/DL (ref 12–16)
HGB RETIC QN: 31 PG (ref 28–38)
IMM GRANULOCYTES # BLD AUTO: 0.02 X10*3/UL (ref 0–0.7)
IMM GRANULOCYTES NFR BLD AUTO: 0.4 % (ref 0–0.9)
IMMATURE RETIC FRACTION: 16.5 %
INR PPP: 0.9 (ref 0.9–1.1)
KETONES UR STRIP.AUTO-MCNC: NEGATIVE MG/DL
LDH SERPL L TO P-CCNC: 269 U/L (ref 84–246)
LEUKOCYTE ESTERASE UR QL STRIP.AUTO: NEGATIVE
LYMPHOCYTES # BLD AUTO: 1.26 X10*3/UL (ref 1.2–4.8)
LYMPHOCYTES NFR BLD AUTO: 26 %
MAGNESIUM SERPL-MCNC: 2.19 MG/DL (ref 1.6–2.4)
MCH RBC QN AUTO: 29.7 PG (ref 26–34)
MCHC RBC AUTO-ENTMCNC: 32.9 G/DL (ref 32–36)
MCV RBC AUTO: 90 FL (ref 80–100)
MONOCYTES # BLD AUTO: 0.45 X10*3/UL (ref 0.1–1)
MONOCYTES NFR BLD AUTO: 9.3 %
NEUTROPHILS # BLD AUTO: 2.97 X10*3/UL (ref 1.2–7.7)
NEUTROPHILS NFR BLD AUTO: 61.2 %
NITRITE UR QL STRIP.AUTO: NEGATIVE
NRBC BLD-RTO: 0 /100 WBCS (ref 0–0)
PH UR STRIP.AUTO: 6 [PH]
PHOSPHATE SERPL-MCNC: 4.7 MG/DL (ref 2.5–4.9)
PLATELET # BLD AUTO: 210 X10*3/UL (ref 150–450)
POTASSIUM SERPL-SCNC: 4 MMOL/L (ref 3.5–5.3)
PROT SERPL-MCNC: 5.6 G/DL (ref 6.4–8.2)
PROT UR STRIP.AUTO-MCNC: NEGATIVE MG/DL
PROTHROMBIN TIME: 10.3 SECONDS (ref 9.8–12.8)
RBC # BLD AUTO: 3.94 X10*6/UL (ref 4–5.2)
RBC # UR STRIP.AUTO: NEGATIVE /UL
RETICS #: 0.07 X10*6/UL (ref 0.02–0.08)
RETICS/RBC NFR AUTO: 1.7 % (ref 0.5–2)
SODIUM SERPL-SCNC: 142 MMOL/L (ref 136–145)
SP GR UR STRIP.AUTO: 1.02
URATE SERPL-MCNC: 4.8 MG/DL (ref 2.3–6.7)
UROBILINOGEN UR STRIP.AUTO-MCNC: NORMAL MG/DL
WBC # BLD AUTO: 4.9 X10*3/UL (ref 4.4–11.3)

## 2024-09-23 PROCEDURE — 2500000004 HC RX 250 GENERAL PHARMACY W/ HCPCS (ALT 636 FOR OP/ED): Performed by: NURSE PRACTITIONER

## 2024-09-23 PROCEDURE — 81003 URINALYSIS AUTO W/O SCOPE: CPT | Performed by: INTERNAL MEDICINE

## 2024-09-23 PROCEDURE — 83615 LACTATE (LD) (LDH) ENZYME: CPT | Performed by: INTERNAL MEDICINE

## 2024-09-23 PROCEDURE — 80053 COMPREHEN METABOLIC PANEL: CPT | Performed by: INTERNAL MEDICINE

## 2024-09-23 PROCEDURE — 85025 COMPLETE CBC W/AUTO DIFF WBC: CPT | Performed by: INTERNAL MEDICINE

## 2024-09-23 PROCEDURE — 84550 ASSAY OF BLOOD/URIC ACID: CPT | Performed by: INTERNAL MEDICINE

## 2024-09-23 PROCEDURE — 84100 ASSAY OF PHOSPHORUS: CPT | Performed by: INTERNAL MEDICINE

## 2024-09-23 PROCEDURE — 85045 AUTOMATED RETICULOCYTE COUNT: CPT | Performed by: INTERNAL MEDICINE

## 2024-09-23 PROCEDURE — 2500000004 HC RX 250 GENERAL PHARMACY W/ HCPCS (ALT 636 FOR OP/ED)

## 2024-09-23 PROCEDURE — 83010 ASSAY OF HAPTOGLOBIN QUANT: CPT | Performed by: INTERNAL MEDICINE

## 2024-09-23 PROCEDURE — 93005 ELECTROCARDIOGRAM TRACING: CPT | Mod: DCRU

## 2024-09-23 PROCEDURE — 82150 ASSAY OF AMYLASE: CPT | Performed by: INTERNAL MEDICINE

## 2024-09-23 PROCEDURE — 96413 CHEMO IV INFUSION 1 HR: CPT

## 2024-09-23 PROCEDURE — 83735 ASSAY OF MAGNESIUM: CPT | Performed by: INTERNAL MEDICINE

## 2024-09-23 PROCEDURE — 85730 THROMBOPLASTIN TIME PARTIAL: CPT | Performed by: INTERNAL MEDICINE

## 2024-09-23 PROCEDURE — 85379 FIBRIN DEGRADATION QUANT: CPT | Performed by: INTERNAL MEDICINE

## 2024-09-23 PROCEDURE — 85610 PROTHROMBIN TIME: CPT | Performed by: INTERNAL MEDICINE

## 2024-09-23 RX ORDER — DIPHENHYDRAMINE HYDROCHLORIDE 50 MG/ML
50 INJECTION INTRAMUSCULAR; INTRAVENOUS AS NEEDED
OUTPATIENT
Start: 2024-10-06

## 2024-09-23 RX ORDER — HEPARIN SODIUM,PORCINE/PF 10 UNIT/ML
50 SYRINGE (ML) INTRAVENOUS AS NEEDED
OUTPATIENT
Start: 2024-09-23

## 2024-09-23 RX ORDER — DIPHENHYDRAMINE HYDROCHLORIDE 50 MG/ML
50 INJECTION INTRAMUSCULAR; INTRAVENOUS AS NEEDED
Status: DISCONTINUED | OUTPATIENT
Start: 2024-09-23 | End: 2024-09-24 | Stop reason: HOSPADM

## 2024-09-23 RX ORDER — HEPARIN 100 UNIT/ML
500 SYRINGE INTRAVENOUS AS NEEDED
OUTPATIENT
Start: 2024-09-23

## 2024-09-23 RX ORDER — EPINEPHRINE 0.3 MG/.3ML
0.3 INJECTION SUBCUTANEOUS EVERY 5 MIN PRN
OUTPATIENT
Start: 2024-10-06

## 2024-09-23 RX ORDER — ALBUTEROL SULFATE 0.83 MG/ML
3 SOLUTION RESPIRATORY (INHALATION) AS NEEDED
OUTPATIENT
Start: 2024-10-06

## 2024-09-23 RX ORDER — FAMOTIDINE 10 MG/ML
20 INJECTION INTRAVENOUS ONCE AS NEEDED
OUTPATIENT
Start: 2024-10-06

## 2024-09-23 RX ORDER — ALBUTEROL SULFATE 0.83 MG/ML
3 SOLUTION RESPIRATORY (INHALATION) AS NEEDED
Status: DISCONTINUED | OUTPATIENT
Start: 2024-09-23 | End: 2024-09-24 | Stop reason: HOSPADM

## 2024-09-23 RX ORDER — HEPARIN SODIUM,PORCINE/PF 10 UNIT/ML
50 SYRINGE (ML) INTRAVENOUS AS NEEDED
Status: DISCONTINUED | OUTPATIENT
Start: 2024-09-23 | End: 2024-09-24 | Stop reason: HOSPADM

## 2024-09-23 RX ORDER — FAMOTIDINE 10 MG/ML
20 INJECTION INTRAVENOUS ONCE AS NEEDED
Status: DISCONTINUED | OUTPATIENT
Start: 2024-09-23 | End: 2024-09-24 | Stop reason: HOSPADM

## 2024-09-23 RX ORDER — EPINEPHRINE 0.3 MG/.3ML
0.3 INJECTION SUBCUTANEOUS EVERY 5 MIN PRN
Status: DISCONTINUED | OUTPATIENT
Start: 2024-09-23 | End: 2024-09-24 | Stop reason: HOSPADM

## 2024-09-23 RX ORDER — HEPARIN 100 UNIT/ML
500 SYRINGE INTRAVENOUS AS NEEDED
Status: DISCONTINUED | OUTPATIENT
Start: 2024-09-23 | End: 2024-09-24 | Stop reason: HOSPADM

## 2024-09-23 NOTE — RESEARCH NOTES
RSH><[id number]><C1D15 AND C2D15>  DCRU NURSING VISIT NOTE  Study Name: TRACY 1Y22  IRB#: [id number]  DCRU#: DCRU # [id number]  Protocol Version Dated: V5 3.7.24  PI: James Ram M.D    Time point: C1D15 or C2D15  Cycle 2     Encounter Date: 09/23/2024  Encounter Time:  8:00 AM EDT  Encounter Department: [facility] HEMATOLOGY AND ONCOLOGY     #1 Phone Pager   Richard Encarnacion   [phone] [address]      Study Regimen and Dosing   Refer to [place] Treatment Plan for orders  Part 1_Expansion - Advanced Solid Tumors with DNA damage response mutations, failed at least one prior standard of care therapy and must be capable of oral administration of study medication.  Cycle = 28 days.   ATRN-119 administered oral daily at least 1 hour prior to a meal      Dietary Guidelines   Regular      Admission and Prior to Starting Study Activities   Notify [facility] of patient arrival.  Complete DCRU and EPIC Admission/Visit Note.  Obtain weight in kilograms - with shoes off and heavy items removed.   Insert one peripheral IV line or access mediport for sample collection procedures (flush line with 5 - 10 mL normal saline following each blood draw).     Study Specific Instructions and Documentation   LABS  VITALS  ECG   RESEARCH CORRELATIVES  STUDY MEDICATION  DISCHARGE     Safety Labs    Refer to [place] Treatment Plan for orders     Safety Parameters and Special Instructions   ATRN-119 is a PARP inhibitor.   Potential Side Effects/Adverse Events: nausea, vomiting, myelosuppression, myalgia, hypertension, hypomagnesemia, dysgeusia, dyspepsia, insomnia, headache.      Subjective   Trang Jones is a 64 y.o. female and is here for a Research clinical visit.    Visit Provider: Roberta Harden RN     Allergies:   Allergies   Allergen Reactions    Olanzapine Headache, Dizziness and Drowsiness    Paclitaxel Other     Scratchy throat, erythema face, back pain       Objective   Vital Signs:    Vitals:    09/23/24 0807  09/23/24 1008   BP: 109/70 108/71   Pulse: 62 (!) 49  Comment: Ajit Hendrickson CNP aware. No interventions needed.   Resp: 16 16   Temp: 36.9 °C (98.4 °F) 36.7 °C (98.1 °F)   TempSrc: Temporal Temporal   SpO2: 96% 98%   Weight: 62.6 kg (138 lb 0.1 oz)        Physical Exam     ASSESSMENT and PLAN:  Problem List Items Addressed This Visit          Hematology and Neoplasia    Malignant neoplasm of bone with metastases (Multi)    Relevant Medications    heparin flush 10 unit/mL syringe 50 Units    heparin flush 100 unit/mL syringe 500 Units    alteplase (Cathflo Activase) injection 2 mg    Study [id number] ATRN-119 100 mg capsule 800 mg (Completed)    sodium chloride 0.9 % bolus 500 mL    dextrose 5 % in water (D5W) bolus    diphenhydrAMINE (BENADryl) injection 50 mg    methylPREDNISolone sod succinate (SOLU-Medrol) 40 mg/mL injection 40 mg    famotidine PF (Pepcid) injection 20 mg    EPINEPHrine (Epipen) injection syringe 0.3 mg    albuterol 2.5 mg /3 mL (0.083 %) nebulizer solution 3 mL    Other Relevant Orders    CBC and Auto Differential (Completed)    Comprehensive metabolic panel (Completed)    Amylase (Completed)    APTT (Completed)    D-dimer, Quantitative (Completed)    Haptoglobin (Completed)    Lactate dehydrogenase (Completed)    Magnesium (Completed)    Phosphorus (Completed)    Protime-INR (Completed)    Reticulocytes (Completed)    Uric Acid (Completed)    Nursing Communication - Vascular Access (Completed)    Venous Access, CVAD    CENTRAL VENOUS LINE DRESSING CHANGE - ADULT    Insert peripheral IV    Convert IV to saline lock    Adult diet Regular    Treatment Conditions (Completed)    Provider Communication - [id number] (Completed)    Urinalysis with Reflex Microscopic (Completed)    Research Triplicate ECG (Completed)    Nursing Communication - Hypersensitivity Management, Moderate (Completed)    Nursing Communication - Hypersensitivity Management, Severe (Completed)    Nursing Communication - Respiratory  Management (Completed)    Pulse oximetry, continuous (Completed)    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)    Relevant Medications    sodium chloride 0.9 % bolus 500 mL (Completed)    zoledronic acid (Zometa) 4 mg in dextrose 5% 115 mL IV (Completed)    Study [id number] ATRN-119 100 mg capsule 800 mg (Completed)    sodium chloride 0.9 % bolus 500 mL    dextrose 5 % in water (D5W) bolus    diphenhydrAMINE (BENADryl) injection 50 mg    methylPREDNISolone sod succinate (SOLU-Medrol) 40 mg/mL injection 40 mg    famotidine PF (Pepcid) injection 20 mg    EPINEPHrine (Epipen) injection syringe 0.3 mg    albuterol 2.5 mg /3 mL (0.083 %) nebulizer solution 3 mL    Other Relevant Orders    CBC and Auto Differential (Completed)    Comprehensive metabolic panel (Completed)    Amylase (Completed)    APTT (Completed)    D-dimer, Quantitative (Completed)    Haptoglobin (Completed)    Lactate dehydrogenase (Completed)    Magnesium (Completed)    Phosphorus (Completed)    Protime-INR (Completed)    Reticulocytes (Completed)    Uric Acid (Completed)    Adult diet Regular    Treatment Conditions (Completed)    Provider Communication - [id number] (Completed)    Urinalysis with Reflex Microscopic (Completed)    Treatment Conditions (Completed)    Provider Communication - Zoledronic Acid (Completed)    Nursing Communication - Calcium Correction in Hypoalbuminemia (Completed)    Research Triplicate ECG (Completed)    Nursing Communication - Hypersensitivity Management, Moderate (Completed)    Nursing Communication - Hypersensitivity Management, Severe (Completed)    Nursing Communication - Respiratory Management (Completed)    Pulse oximetry, continuous (Completed)        Medications as of the completion of today's visit:  Current Outpatient Medications   Medication Sig Dispense Refill    calcium carbonate (CALCIUM 600 ORAL) Take 600 mg by mouth once daily.      cholecalciferol, vitD3,/vit K2 (vitamin D3-vitamin K2) 125 mcg  (5,000 unit)-100 mcg capsule Take by mouth.      gabapentin (Neurontin) 300 mg capsule Take 1 capsule (300 mg) by mouth once daily in the morning AND 2 capsules (600 mg) once daily at bedtime. 270 capsule 3    lidocaine-prilocaine (Emla) 2.5-2.5 % cream apply topically to MEDIPORT SITE 45 MINUTES BEFORE ACCESSING PORT, USE COVER WITH BANDAID      MAGNESIUM ORAL Take 400 mg by mouth.      morphine CR (MS Contin) 15 mg 12 hr tablet Take 1 tablet (15 mg) by mouth 3 times a day. Do not crush, chew, or split. 90 tablet 0    ondansetron ODT (Zofran-ODT) 4 mg disintegrating tablet Take 1 tablet (4 mg) by mouth every 8 hours if needed for nausea or vomiting.      prochlorperazine (Compazine) 10 mg tablet Take 1 tablet (10 mg) by mouth every 6 hours if needed for nausea or vomiting.      propranolol (Inderal) 40 mg tablet Take 1 tablet (40 mg) by mouth once daily.      rizatriptan MLT (Maxalt-MLT) 5 mg disintegrating tablet take 1 tablet by mouth if needed MAY REPEAT IN 2 HOURS IF NEEDED      Study [id number] ATRN-119 100 mg capsule Take 8 capsules (800 mg total) by mouth once daily for 28 days.  Swallow whole. Take at least 1 hour prior to a meal. Do not eat grapefruit or drink grapefruit juice. 224 capsule 0    zinc glycinate 30 mg capsule Take by mouth.       Current Facility-Administered Medications   Medication Dose Route Frequency Provider Last Rate Last Admin    albuterol 2.5 mg /3 mL (0.083 %) nebulizer solution 3 mL  3 mL nebulization PRN Bebeto Tolbert MD        alteplase (Cathflo Activase) injection 2 mg  2 mg intra-catheter PRN Ajit Hendrickson, APRN-CNP        dextrose 5 % in water (D5W) bolus  500 mL intravenous PRN Bebeto Tolbert MD        diphenhydrAMINE (BENADryl) injection 50 mg  50 mg intravenous PRN Bebeto Tolbert MD        EPINEPHrine (Epipen) injection syringe 0.3 mg  0.3 mg intramuscular q5 min PRN Bebeto Tolbert MD        famotidine PF (Pepcid) injection 20 mg  20 mg intravenous Once PRN  Bebeto Tolbert MD        heparin flush 10 unit/mL syringe 50 Units  50 Units intra-catheter PRN ERROL Rodriguez        heparin flush 100 unit/mL syringe 500 Units  500 Units intra-catheter PRN ERROL Rodriguez   500 Units at 09/23/24 1110    methylPREDNISolone sod succinate (SOLU-Medrol) 40 mg/mL injection 40 mg  40 mg intravenous PRN Bebeto Tolbert MD        perflutren lipid microspheres (Definity) injection 0.5-10 mL of dilution  0.5-10 mL of dilution intravenous Once Israel Whalen MD        sodium chloride 0.9 % bolus 500 mL  500 mL intravenous PRN Bebeto Tolbert MD           Administrations This Visit       heparin flush 100 unit/mL syringe 500 Units       Admin Date  09/23/2024 Action  Given Dose  500 Units Route  intra-catheter Documented By  Roberta Harden RN              sodium chloride 0.9 % bolus 500 mL       Admin Date  09/23/2024 Action  New Bag Dose  500 mL Rate  999 mL/hr Route  intravenous Documented By  Roberta Harden RN              Study [id number] ATRN-119 100 mg capsule 800 mg       Admin Date  09/23/2024 Action  Given Dose  800 mg Route  oral Documented By  Roberta Harden RN              zoledronic acid (Zometa) 4 mg in dextrose 5% 115 mL IV       Admin Date  09/23/2024 Action  New Bag Dose  4 mg Rate  230 mL/hr Route  intravenous Documented By  Roberta Harden RN                    Orders placed during today's visit:  Orders Placed This Encounter   Procedures    CBC and Auto Differential     Standing Status:   Standing     Number of Occurrences:   1     Order Specific Question:   Release result to MyChart     Answer:   Immediate [1]    Comprehensive metabolic panel     Standing Status:   Standing     Number of Occurrences:   1     Order Specific Question:   Release result to MyChart     Answer:   Immediate [1]    Amylase     Standing Status:   Standing     Number of Occurrences:   1     Order Specific Question:   Release result to MyChart      Answer:   Immediate [1]    APTT     Standing Status:   Standing     Number of Occurrences:   1     Order Specific Question:   Release result to MyChart     Answer:   Immediate [1]    D-dimer, Quantitative     Standing Status:   Standing     Number of Occurrences:   1     Order Specific Question:   Release result to MyChart     Answer:   Immediate [1]    Haptoglobin     Standing Status:   Standing     Number of Occurrences:   1     Order Specific Question:   Release result to MyChart     Answer:   Immediate [1]    Lactate dehydrogenase     Standing Status:   Standing     Number of Occurrences:   1     Order Specific Question:   Release result to MyChart     Answer:   Immediate [1]    Magnesium     Standing Status:   Standing     Number of Occurrences:   1     Order Specific Question:   Release result to MyChart     Answer:   Immediate [1]    Phosphorus     Standing Status:   Standing     Number of Occurrences:   1     Order Specific Question:   Release result to MyChart     Answer:   Immediate [1]    Protime-INR     Standing Status:   Standing     Number of Occurrences:   1     Order Specific Question:   Release result to MyChart     Answer:   Immediate [1]    Reticulocytes     Standing Status:   Standing     Number of Occurrences:   1     Order Specific Question:   Release result to MyChart     Answer:   Immediate [1]    Uric Acid     Standing Status:   Standing     Number of Occurrences:   1     Order Specific Question:   Release result to MyChart     Answer:   Immediate [1]    Urinalysis with Reflex Microscopic     Standing Status:   Standing     Number of Occurrences:   1     Order Specific Question:   Release result to MyChart     Answer:   Immediate [1]    Adult diet Regular     Standing Status:   Standing     Number of Occurrences:   1     Order Specific Question:   Diet type     Answer:   Regular    Nursing Communication - Vascular Access     Refer to the vascular access device resource page and the following  policies for additional information on line insertion, maintenance and removal.    CP-148 - Central Vascular Access Device Insertion, Maintenance, and Removal, Adult  NP-28 - Midline Cathter Maintenance & Removal, Adult  NP-34 - High-flow Catheters, (e.g. Hemodialysis, Apheresis, and Continuous Renal Replacement Therapy [CRRT]), Care and Utilization, Adult  NP-39 - Peripheral Intravenous Catheter (PIV) Insertion, Maintenance, Blood Collection & Removal - Adult     Standing Status:   Standing     Number of Occurrences:   1    CENTRAL VENOUS LINE DRESSING CHANGE - ADULT     Standing Status:   Standing     Number of Occurrences:   1    Treatment Conditions     Hold treatment and notify provider if:  · Adverse Event GREATER THAN OR EQUAL TO Grade 2     Standing Status:   Standing     Number of Occurrences:   1    Provider Communication - [id number]     · Cohort 1            · ATRN-119 50 mg daily  · Cohort 2            · ATRN-119 100 mg daily  · Cohort 3            · ATRN-119 200 mg daily  · Cohort 4            · ATRN-119 350 mg daily  · Cohort 5            · ATRN-119 550 mg daily  · Cohort 6            · ATRN-119 800 mg daily  · Cohort 7            · ATRN-119 1100 mg daily  · Cohort 8            · ATRN-119 1300 mg daily     Standing Status:   Standing     Number of Occurrences:   1    Treatment Conditions     Hold treatment and notify provider if:  · Corrected Serum Calcium LESS THAN 8.0 mg/dL OR Ionized Calcium LESS THAN 1.1 mmol/L  · Serum Creatinine increase GREATER THAN OR EQUAL TO 0.5 mg/dL in patients with normal baseline (Serum Creatinine LESS THAN 1.4 mg/dL)  · Serum Creatinine increase GREATER THAN OR EQUAL TO 1 mg/dL in patients with abnormal baseline (Serum Creatinine GREATER THAN OR EQUAL TO 1.4 mg/dL)  · Recent (within 4 weeks) or planned invasive dental procedure     Standing Status:   Standing     Number of Occurrences:   1     Order Specific Question:   Baseline Serum Creatinine (in mg/dL):     Answer:    1.2    Provider Communication - Zoledronic Acid     Calculate CrCl using actual body weight    Dose Modification Guidelines - Dosage adjustment prior to first dose only.      CrCl GREATER THAN 60 mL/minute: 4 mg   CrCl 50 to 60 mL/minute: Reduce dose to 3.5 mg   CrCl 40 to 49 mL/minute: Reduce dose to 3.3 mg   CrCl 30 to 39 mL/minute: Reduce dose to 3 mg   CrCl LESS THAN 30 mL/minute: Use is not recommended.    Subsequent doses are not adjusted based on CrCl, only hold for changes in serum creatinine GREATER THAN OR EQUAL TO 0.5 mg/dL in patients with normal baseline (serum creatinine LESS THAN 1.4 mg/dL), changes in serum creatinine GREATER THAN OR EQUAL TO 1 mg/dL in patients with abnormal baseline (serum creatinine GREATER THAN OR EQUAL TO 1.4 mg/dL), or as recommended by provider.     Standing Status:   Standing     Number of Occurrences:   1    Nursing Communication - Calcium Correction in Hypoalbuminemia     Corrected Serum Calcium = [0.8 x (4 - serum albumin)] + serum calcium   To be utilized only when serum albumin is LESS THAN 4 g/dL     Standing Status:   Standing     Number of Occurrences:   1    Research Triplicate ECG     Refer to study SmartPhrase for instructions and documentation of the research triplicate ECG.     Standing Status:   Standing     Number of Occurrences:   1    Nursing Communication - Hypersensitivity Management, Moderate     Flushing, rash, pruritus, dyspnea, chest discomfort, back pain, angioedema, SBP LESS THAN 90 mmHg, and/or change in mental status above or below patient's baseline. In the event of moderate or severe hypersensitivity reaction to any medication:   Stop infusion.  Assess vital signs, pulse oximetry, nursing assessment, and patient complaints.  Administer medications per Hypersensitivity Reaction Medication Protocol.   Notify physician.     Standing Status:   Standing     Number of Occurrences:   1    Nursing Communication - Hypersensitivity Management, Severe      Worsening of moderate reaction symptoms, SBP LESS THAN 80 mmHg, and/or respiratory distress (respirations GREATER THAN 40 breaths per minute, wheezing, life-threatening symptoms). In the event of moderate or severe hypersensitivity reaction to any medication:   Stop infusion.  Assess vital signs, pulse oximetry, nursing assessment, and patient complaints.  Administer medications per Hypersensitivity Reaction Medication Protocol.   Notify physician.     Standing Status:   Standing     Number of Occurrences:   1    Nursing Communication - Respiratory Management     Oxygen via nasal cannula at 4 L per minute for respiratory rate GREATER THAN OR EQUAL TO to 28 breaths/minute and/or wheezing. Pulse Oximetry continuous monitoring.     Standing Status:   Standing     Number of Occurrences:   1    Pulse oximetry, continuous     Continuous monitoring to maintain SPO2 GREATER THAN 90%     Standing Status:   Standing     Number of Occurrences:   1    Venous Access, CVAD     Standing Status:   Standing     Number of Occurrences:   1    Insert peripheral IV     Obtain venous access for treatment via PIV if no CVAD access or if unable to obtain blood return from CVAD.     Standing Status:   Standing     Number of Occurrences:   1    Convert IV to saline lock     Only if venous access is required over consecutive days. Peripheral catheter can remain in place until completion of last consecutive day infusion, unless IV-related complications are encountered.     Standing Status:   Standing     Number of Occurrences:   1        APREA1Y22 - Study Of ATRN-119 In Patients With Advanced Solid Tumors    Patient has no adverse events documented in the Research Adverse Events activity.      Criteria to Treat   DCRU RN reviewed and meets eligibility per treatment plan   Time team notified: 1003   DCRU RN notifies study team to review eligibility and approval before dosing procedures  Time team approves: 1003      Pre-dose Vital Signs    Temperature, heart rate, respirations, blood pressure: in a sitting position at rest for at least 5 minutes.    Up to 60 minutes prior to dosing       Pre-dose 12-Lead ECG   DCRU/[facility] 5500 ECG machine  Triplicate   Each 1 - 5 minutes apart   Rest supine at least 5 minutes prior   Up to 60 minutes prior to dosing   Contact MD/Provider &  if abnormal from baseline  Rest Time  QTcF Average QTcF   [phone]        Pre-dose Research Correlatives  Deliver to DCRU/TRP lab for processing    Access Type: N/a Location: N/a   Refer to [place] Treatment Plan for orders  Time point Cycle  Specimen Test Volume  Draw Time   Pre dose (within 60 mins) C1 only  ATRN-119/ATRN-157 PK  3 mL N/a        Research Drug Administration   Document medication administration in MAR activity. Document Research specific instructions below.  ATRN-119 mg Oral Once Daily Dose.   Administer at least 1 hour prior to a meal.   If dose is vomited, do not re-dose.  Dispense from patients supply.     Infusion-Related Reactions   Infusion Related Reactions   [facility] Hypersensivity Orders      Discharge Instructions   Discharge after study requirements completed.   Confirm patient has ATRN-119 medication diary & bottle.   Remind patient to document each dose in diary including missed doses   Remind patient: to bring medication diary & bottle to each visit (full or empty)   Remind patient to return: on C2D1 for predose PK, ecg, vitals and treatment.  Discharge time: 1114      Roberta Harden RN  09/23/24

## 2024-09-24 NOTE — ADDENDUM NOTE
Encounter addended by: Roberta Harden RN on: 9/24/2024 11:56 AM   Actions taken: Clinical Note Signed, Flowsheet accepted

## 2024-09-24 NOTE — SIGNIFICANT EVENT

## 2024-09-25 ENCOUNTER — APPOINTMENT (OUTPATIENT)
Dept: INTEGRATIVE MEDICINE | Facility: CLINIC | Age: 64
End: 2024-09-25
Payer: COMMERCIAL

## 2024-09-25 DIAGNOSIS — M72.2 PLANTAR FASCIITIS: ICD-10-CM

## 2024-09-25 DIAGNOSIS — C50.911 CARCINOMA OF RIGHT BREAST METASTATIC TO BONE (MULTI): Primary | ICD-10-CM

## 2024-09-25 DIAGNOSIS — M79.652 PAIN OF LEFT THIGH: ICD-10-CM

## 2024-09-25 DIAGNOSIS — M79.10 MYALGIA: ICD-10-CM

## 2024-09-25 DIAGNOSIS — C79.51 METASTATIC CANCER TO SPINE (MULTI): ICD-10-CM

## 2024-09-25 DIAGNOSIS — C79.51 CARCINOMA OF RIGHT BREAST METASTATIC TO BONE (MULTI): Primary | ICD-10-CM

## 2024-09-25 DIAGNOSIS — R10.9 STOMACH CRAMPS: ICD-10-CM

## 2024-09-25 PROCEDURE — 97140 MANUAL THERAPY 1/> REGIONS: CPT | Performed by: HOSPITALIST

## 2024-09-25 ASSESSMENT — PAIN SCALES - GENERAL: PAINLEVEL_OUTOF10: 3

## 2024-09-25 NOTE — PROGRESS NOTES
Massage Therapy Visit:     Trang Jones was referred by Dr. Sandoval.    Condition of Client Subjective :  Patient ID: Trang Jones is a 64 y.o. female who presents for a 50 minute Fran Therapy.  Patient stated that she had been treated for left breast cancer.  She stated that it has metastasized to the bone.  I explained that this would be a very gentle treatment.  I worked very closely with her. She stated that her has neck, shoulder, back and feet  discomfort. Most of her discomfort is on the left side.  She stated that she has plantar fasciitis in the left heel area.   Patient stated that she did notice an improvement with her muscle discomfort.  She was able to relax.       Session Information  Visit Type: New patient  Description of present complaint: Discomfort, Muscle tension      Objective   Pre-treatment Assessment  Arrival Mode: Ambulatory  Pain Score: 3  Anxiety Level (0-10): 1  Stress Level (0-10): 2  Coping Level (0-10): 8  Depression Level (0-10): 0  Fatigue Level (0-10): 5  Nausea Level (0-10): 0  Wellbeing Level (0-10): 2      Actions Assessment/Plan :  Provider reviewed plan for the massage session, precautions and contraindications. Patient/guardian/hospital staff has given consent to treat with full understanding of what to expect during the session. Before massage therapy began, provider explained to the patient to communicate at any time if the pressure was causing discomfort past their tolerance level. Patient agreed to advise therapist.    Massage Treatment  Patient Position: Table  Positioning Assistance: Did not need assistance  Massage Technique: Relaxation massage  Pressure Scale: 1 - Light pressure, 2 - Mild pressure    Response:  Post-treatment Assessment  Patient Noted Improvement of the Following Symptoms: Muscle tension  0-10 (Numeric) Pain Score: 3  Anxiety Level (0-10): 1  Stress Level (0-10): 2  Coping Level (0-10): 8  Depression Level (0-10): 0  Fatigue Level (0-10):  5  Nausea Level (0-10): 0  Wellbeing Level (0-10): 2    Evaluation:   Patient will follow up as needed.

## 2024-10-03 ENCOUNTER — HOSPITAL ENCOUNTER (OUTPATIENT)
Dept: RADIOLOGY | Facility: HOSPITAL | Age: 64
Discharge: HOME | End: 2024-10-03
Payer: COMMERCIAL

## 2024-10-03 DIAGNOSIS — C50.919 MALIGNANT NEOPLASM OF FEMALE BREAST, UNSPECIFIED ESTROGEN RECEPTOR STATUS, UNSPECIFIED LATERALITY, UNSPECIFIED SITE OF BREAST: ICD-10-CM

## 2024-10-03 DIAGNOSIS — Z00.6 EXAM FOR CLINICAL TRIAL: ICD-10-CM

## 2024-10-03 PROCEDURE — 2500000004 HC RX 250 GENERAL PHARMACY W/ HCPCS (ALT 636 FOR OP/ED)

## 2024-10-03 PROCEDURE — 2550000001 HC RX 255 CONTRASTS: Performed by: INTERNAL MEDICINE

## 2024-10-03 PROCEDURE — 74177 CT ABD & PELVIS W/CONTRAST: CPT

## 2024-10-03 RX ORDER — HEPARIN 100 UNIT/ML
500 SYRINGE INTRAVENOUS AS NEEDED
Status: DISCONTINUED | OUTPATIENT
Start: 2024-10-03 | End: 2024-10-04 | Stop reason: HOSPADM

## 2024-10-03 RX ORDER — HEPARIN 100 UNIT/ML
SYRINGE INTRAVENOUS
Status: COMPLETED
Start: 2024-10-03 | End: 2024-10-03

## 2024-10-03 NOTE — ADDENDUM NOTE
Encounter addended by: Roberta Harden RN on: 10/2/2024 8:31 PM   Actions taken: Charge Capture section accepted

## 2024-10-04 DIAGNOSIS — Z00.6 EXAMINATION OF PARTICIPANT IN CLINICAL TRIAL: Primary | ICD-10-CM

## 2024-10-04 DIAGNOSIS — C50.919 MALIGNANT NEOPLASM OF FEMALE BREAST, UNSPECIFIED ESTROGEN RECEPTOR STATUS, UNSPECIFIED LATERALITY, UNSPECIFIED SITE OF BREAST: ICD-10-CM

## 2024-10-04 RX ORDER — DIPHENHYDRAMINE HYDROCHLORIDE 50 MG/ML
50 INJECTION INTRAMUSCULAR; INTRAVENOUS AS NEEDED
Status: CANCELLED | OUTPATIENT
Start: 2024-10-07

## 2024-10-04 RX ORDER — ALBUTEROL SULFATE 0.83 MG/ML
3 SOLUTION RESPIRATORY (INHALATION) AS NEEDED
Status: CANCELLED | OUTPATIENT
Start: 2024-10-07

## 2024-10-04 RX ORDER — EPINEPHRINE 0.3 MG/.3ML
0.3 INJECTION SUBCUTANEOUS EVERY 5 MIN PRN
Status: CANCELLED | OUTPATIENT
Start: 2024-10-07

## 2024-10-04 RX ORDER — FAMOTIDINE 10 MG/ML
20 INJECTION INTRAVENOUS ONCE AS NEEDED
Status: CANCELLED | OUTPATIENT
Start: 2024-10-07

## 2024-10-07 ENCOUNTER — TELEPHONE (OUTPATIENT)
Dept: HEMATOLOGY/ONCOLOGY | Facility: HOSPITAL | Age: 64
End: 2024-10-07
Payer: COMMERCIAL

## 2024-10-07 ENCOUNTER — EDUCATION (OUTPATIENT)
Dept: HEMATOLOGY/ONCOLOGY | Facility: HOSPITAL | Age: 64
End: 2024-10-07

## 2024-10-07 ENCOUNTER — HOSPITAL ENCOUNTER (OUTPATIENT)
Dept: RESEARCH | Facility: HOSPITAL | Age: 64
Discharge: HOME | End: 2024-10-07
Payer: COMMERCIAL

## 2024-10-07 ENCOUNTER — OFFICE VISIT (OUTPATIENT)
Dept: HEMATOLOGY/ONCOLOGY | Facility: HOSPITAL | Age: 64
End: 2024-10-07
Payer: COMMERCIAL

## 2024-10-07 ENCOUNTER — APPOINTMENT (OUTPATIENT)
Dept: HEMATOLOGY/ONCOLOGY | Facility: CLINIC | Age: 64
End: 2024-10-07
Payer: COMMERCIAL

## 2024-10-07 VITALS
OXYGEN SATURATION: 100 % | SYSTOLIC BLOOD PRESSURE: 123 MMHG | HEART RATE: 58 BPM | DIASTOLIC BLOOD PRESSURE: 75 MMHG | BODY MASS INDEX: 26.3 KG/M2 | RESPIRATION RATE: 16 BRPM | WEIGHT: 138.23 LBS | TEMPERATURE: 97.5 F

## 2024-10-07 VITALS
SYSTOLIC BLOOD PRESSURE: 123 MMHG | RESPIRATION RATE: 16 BRPM | BODY MASS INDEX: 26.3 KG/M2 | DIASTOLIC BLOOD PRESSURE: 75 MMHG | WEIGHT: 138.23 LBS | OXYGEN SATURATION: 100 % | HEART RATE: 58 BPM | TEMPERATURE: 97.5 F

## 2024-10-07 DIAGNOSIS — C41.9 MALIGNANT NEOPLASM OF BONE WITH METASTASES (MULTI): Primary | ICD-10-CM

## 2024-10-07 DIAGNOSIS — C79.51 CARCINOMA OF RIGHT BREAST METASTATIC TO BONE (MULTI): ICD-10-CM

## 2024-10-07 DIAGNOSIS — C41.9 MALIGNANT NEOPLASM OF BONE WITH METASTASES (MULTI): ICD-10-CM

## 2024-10-07 DIAGNOSIS — C50.911 CARCINOMA OF RIGHT BREAST METASTATIC TO BONE (MULTI): Primary | ICD-10-CM

## 2024-10-07 DIAGNOSIS — C79.51 CARCINOMA OF RIGHT BREAST METASTATIC TO BONE (MULTI): Primary | ICD-10-CM

## 2024-10-07 DIAGNOSIS — C50.911 CARCINOMA OF RIGHT BREAST METASTATIC TO BONE (MULTI): ICD-10-CM

## 2024-10-07 DIAGNOSIS — C78.7 METASTASES TO THE LIVER (MULTI): ICD-10-CM

## 2024-10-07 LAB
ALBUMIN SERPL BCP-MCNC: 4 G/DL (ref 3.4–5)
ALP SERPL-CCNC: 105 U/L (ref 33–136)
ALT SERPL W P-5'-P-CCNC: 21 U/L (ref 7–45)
AMYLASE SERPL-CCNC: 37 U/L (ref 29–103)
ANION GAP SERPL CALC-SCNC: 9 MMOL/L (ref 10–20)
APPEARANCE UR: CLEAR
APTT PPP: 44 SECONDS (ref 27–38)
AST SERPL W P-5'-P-CCNC: 32 U/L (ref 9–39)
BASOPHILS # BLD AUTO: 0.01 X10*3/UL (ref 0–0.1)
BASOPHILS NFR BLD AUTO: 0.3 %
BILIRUB SERPL-MCNC: 0.4 MG/DL (ref 0–1.2)
BILIRUB UR STRIP.AUTO-MCNC: NEGATIVE MG/DL
BUN SERPL-MCNC: 15 MG/DL (ref 6–23)
CALCIUM SERPL-MCNC: 8.8 MG/DL (ref 8.6–10.6)
CANCER AG27-29 SERPL-ACNC: 463.5 U/ML (ref 0–38.6)
CHLORIDE SERPL-SCNC: 106 MMOL/L (ref 98–107)
CO2 SERPL-SCNC: 29 MMOL/L (ref 21–32)
COLOR UR: NORMAL
CREAT SERPL-MCNC: 0.59 MG/DL (ref 0.5–1.05)
D DIMER PPP FEU-MCNC: 1791 NG/ML FEU
EGFRCR SERPLBLD CKD-EPI 2021: >90 ML/MIN/1.73M*2
EOSINOPHIL # BLD AUTO: 0.12 X10*3/UL (ref 0–0.7)
EOSINOPHIL NFR BLD AUTO: 3.1 %
ERYTHROCYTE [DISTWIDTH] IN BLOOD BY AUTOMATED COUNT: 12.6 % (ref 11.5–14.5)
GLUCOSE SERPL-MCNC: 100 MG/DL (ref 74–99)
GLUCOSE UR STRIP.AUTO-MCNC: NORMAL MG/DL
HAPTOGLOB SERPL NEPH-MCNC: 129 MG/DL (ref 30–200)
HCT VFR BLD AUTO: 34.9 % (ref 36–46)
HGB BLD-MCNC: 11.6 G/DL (ref 12–16)
HGB RETIC QN: 31 PG (ref 28–38)
IMM GRANULOCYTES # BLD AUTO: 0.02 X10*3/UL (ref 0–0.7)
IMM GRANULOCYTES NFR BLD AUTO: 0.5 % (ref 0–0.9)
IMMATURE RETIC FRACTION: 16.7 %
INR PPP: 1 (ref 0.9–1.1)
KETONES UR STRIP.AUTO-MCNC: NEGATIVE MG/DL
LDH SERPL L TO P-CCNC: 303 U/L (ref 84–246)
LEUKOCYTE ESTERASE UR QL STRIP.AUTO: NEGATIVE
LYMPHOCYTES # BLD AUTO: 1.14 X10*3/UL (ref 1.2–4.8)
LYMPHOCYTES NFR BLD AUTO: 29.4 %
MAGNESIUM SERPL-MCNC: 2.3 MG/DL (ref 1.6–2.4)
MCH RBC QN AUTO: 29.4 PG (ref 26–34)
MCHC RBC AUTO-ENTMCNC: 33.2 G/DL (ref 32–36)
MCV RBC AUTO: 89 FL (ref 80–100)
MONOCYTES # BLD AUTO: 0.4 X10*3/UL (ref 0.1–1)
MONOCYTES NFR BLD AUTO: 10.3 %
NEUTROPHILS # BLD AUTO: 2.19 X10*3/UL (ref 1.2–7.7)
NEUTROPHILS NFR BLD AUTO: 56.4 %
NITRITE UR QL STRIP.AUTO: NEGATIVE
NRBC BLD-RTO: 0 /100 WBCS (ref 0–0)
PH UR STRIP.AUTO: 6 [PH]
PHOSPHATE SERPL-MCNC: 3.9 MG/DL (ref 2.5–4.9)
PLATELET # BLD AUTO: 180 X10*3/UL (ref 150–450)
POTASSIUM SERPL-SCNC: 4.2 MMOL/L (ref 3.5–5.3)
PROT SERPL-MCNC: 5.7 G/DL (ref 6.4–8.2)
PROT UR STRIP.AUTO-MCNC: NEGATIVE MG/DL
PROTHROMBIN TIME: 10.7 SECONDS (ref 9.8–12.8)
RBC # BLD AUTO: 3.94 X10*6/UL (ref 4–5.2)
RBC # UR STRIP.AUTO: NEGATIVE /UL
RETICS #: 0.07 X10*6/UL (ref 0.02–0.08)
RETICS/RBC NFR AUTO: 1.9 % (ref 0.5–2)
SODIUM SERPL-SCNC: 140 MMOL/L (ref 136–145)
SP GR UR STRIP.AUTO: 1.01
URATE SERPL-MCNC: 3.6 MG/DL (ref 2.3–6.7)
UROBILINOGEN UR STRIP.AUTO-MCNC: NORMAL MG/DL
WBC # BLD AUTO: 3.9 X10*3/UL (ref 4.4–11.3)

## 2024-10-07 PROCEDURE — 80053 COMPREHEN METABOLIC PANEL: CPT

## 2024-10-07 PROCEDURE — 83615 LACTATE (LD) (LDH) ENZYME: CPT

## 2024-10-07 PROCEDURE — 99215 OFFICE O/P EST HI 40 MIN: CPT | Performed by: INTERNAL MEDICINE

## 2024-10-07 PROCEDURE — 93005 ELECTROCARDIOGRAM TRACING: CPT | Mod: DCRU

## 2024-10-07 PROCEDURE — 86300 IMMUNOASSAY TUMOR CA 15-3: CPT

## 2024-10-07 PROCEDURE — 81003 URINALYSIS AUTO W/O SCOPE: CPT

## 2024-10-07 PROCEDURE — 83010 ASSAY OF HAPTOGLOBIN QUANT: CPT

## 2024-10-07 PROCEDURE — 85730 THROMBOPLASTIN TIME PARTIAL: CPT

## 2024-10-07 PROCEDURE — 84550 ASSAY OF BLOOD/URIC ACID: CPT

## 2024-10-07 PROCEDURE — 83735 ASSAY OF MAGNESIUM: CPT

## 2024-10-07 PROCEDURE — 84100 ASSAY OF PHOSPHORUS: CPT

## 2024-10-07 PROCEDURE — 82150 ASSAY OF AMYLASE: CPT

## 2024-10-07 PROCEDURE — 85379 FIBRIN DEGRADATION QUANT: CPT

## 2024-10-07 PROCEDURE — 85025 COMPLETE CBC W/AUTO DIFF WBC: CPT

## 2024-10-07 PROCEDURE — 85610 PROTHROMBIN TIME: CPT

## 2024-10-07 PROCEDURE — 85045 AUTOMATED RETICULOCYTE COUNT: CPT

## 2024-10-07 RX ORDER — ALBUTEROL SULFATE 0.83 MG/ML
3 SOLUTION RESPIRATORY (INHALATION) AS NEEDED
OUTPATIENT
Start: 2024-10-21

## 2024-10-07 RX ORDER — FAMOTIDINE 10 MG/ML
20 INJECTION INTRAVENOUS ONCE AS NEEDED
OUTPATIENT
Start: 2024-10-28

## 2024-10-07 RX ORDER — ACETAMINOPHEN 325 MG/1
650 TABLET ORAL ONCE
OUTPATIENT
Start: 2024-10-21

## 2024-10-07 RX ORDER — PROCHLORPERAZINE EDISYLATE 5 MG/ML
10 INJECTION INTRAMUSCULAR; INTRAVENOUS EVERY 6 HOURS PRN
OUTPATIENT
Start: 2024-10-28

## 2024-10-07 RX ORDER — PALONOSETRON 0.05 MG/ML
0.25 INJECTION, SOLUTION INTRAVENOUS ONCE
OUTPATIENT
Start: 2024-10-28

## 2024-10-07 RX ORDER — ATROPINE SULFATE 0.4 MG/ML
0.4 INJECTION, SOLUTION ENDOTRACHEAL; INTRAMEDULLARY; INTRAMUSCULAR; INTRAVENOUS; SUBCUTANEOUS
OUTPATIENT
Start: 2024-10-21

## 2024-10-07 RX ORDER — FAMOTIDINE 10 MG/ML
20 INJECTION INTRAVENOUS ONCE AS NEEDED
OUTPATIENT
Start: 2024-10-21

## 2024-10-07 RX ORDER — DIPHENHYDRAMINE HYDROCHLORIDE 50 MG/ML
50 INJECTION INTRAMUSCULAR; INTRAVENOUS AS NEEDED
OUTPATIENT
Start: 2024-10-21

## 2024-10-07 RX ORDER — DIPHENHYDRAMINE HCL 25 MG
25 CAPSULE ORAL ONCE
OUTPATIENT
Start: 2024-10-28

## 2024-10-07 RX ORDER — DIPHENHYDRAMINE HCL 25 MG
25 CAPSULE ORAL ONCE
OUTPATIENT
Start: 2024-10-21

## 2024-10-07 RX ORDER — PROCHLORPERAZINE MALEATE 10 MG
10 TABLET ORAL EVERY 6 HOURS PRN
OUTPATIENT
Start: 2024-10-28

## 2024-10-07 RX ORDER — EPINEPHRINE 0.3 MG/.3ML
0.3 INJECTION SUBCUTANEOUS EVERY 5 MIN PRN
OUTPATIENT
Start: 2024-10-28

## 2024-10-07 RX ORDER — FAMOTIDINE 10 MG/ML
20 INJECTION INTRAVENOUS ONCE
OUTPATIENT
Start: 2024-10-28

## 2024-10-07 RX ORDER — PROCHLORPERAZINE EDISYLATE 5 MG/ML
10 INJECTION INTRAMUSCULAR; INTRAVENOUS EVERY 6 HOURS PRN
OUTPATIENT
Start: 2024-10-21

## 2024-10-07 RX ORDER — EPINEPHRINE 0.3 MG/.3ML
0.3 INJECTION SUBCUTANEOUS EVERY 5 MIN PRN
OUTPATIENT
Start: 2024-10-21

## 2024-10-07 RX ORDER — DIPHENHYDRAMINE HYDROCHLORIDE 50 MG/ML
50 INJECTION INTRAMUSCULAR; INTRAVENOUS AS NEEDED
OUTPATIENT
Start: 2024-10-28

## 2024-10-07 RX ORDER — ACETAMINOPHEN 325 MG/1
650 TABLET ORAL ONCE
OUTPATIENT
Start: 2024-10-28

## 2024-10-07 RX ORDER — ALBUTEROL SULFATE 0.83 MG/ML
3 SOLUTION RESPIRATORY (INHALATION) AS NEEDED
OUTPATIENT
Start: 2024-10-28

## 2024-10-07 RX ORDER — ATROPINE SULFATE 0.4 MG/ML
0.4 INJECTION, SOLUTION ENDOTRACHEAL; INTRAMEDULLARY; INTRAMUSCULAR; INTRAVENOUS; SUBCUTANEOUS
OUTPATIENT
Start: 2024-10-28

## 2024-10-07 RX ORDER — PALONOSETRON 0.05 MG/ML
0.25 INJECTION, SOLUTION INTRAVENOUS ONCE
OUTPATIENT
Start: 2024-10-21

## 2024-10-07 RX ORDER — FAMOTIDINE 10 MG/ML
20 INJECTION INTRAVENOUS ONCE
OUTPATIENT
Start: 2024-10-21

## 2024-10-07 RX ORDER — PROCHLORPERAZINE MALEATE 10 MG
10 TABLET ORAL EVERY 6 HOURS PRN
OUTPATIENT
Start: 2024-10-21

## 2024-10-07 ASSESSMENT — ENCOUNTER SYMPTOMS
SEIZURES: 0
WHEEZING: 0
APPETITE CHANGE: 0
EYES NEGATIVE: 1
ARTHRALGIAS: 0
FATIGUE: 0
NUMBNESS: 0
CHILLS: 0
SCLERAL ICTERUS: 0
HEADACHES: 0
HEMOPTYSIS: 0
RESPIRATORY NEGATIVE: 1
NERVOUS/ANXIOUS: 0
FEVER: 0
NAUSEA: 1
CONSTIPATION: 0
DIZZINESS: 0
CARDIOVASCULAR NEGATIVE: 1
EXTREMITY WEAKNESS: 0
COUGH: 0
BRUISES/BLEEDS EASILY: 0
SHORTNESS OF BREATH: 0
ADENOPATHY: 0
CONSTITUTIONAL NEGATIVE: 1
MYALGIAS: 0
PALPITATIONS: 0
HOT FLASHES: 0
CONFUSION: 0
DIAPHORESIS: 0
SLEEP DISTURBANCE: 0
DYSURIA: 0
BACK PAIN: 1
FREQUENCY: 0
ABDOMINAL PAIN: 0
BLOOD IN STOOL: 0
HEMATURIA: 0
DIFFICULTY URINATING: 0
EYE PROBLEMS: 0
DEPRESSION: 0
CHEST TIGHTNESS: 0
DIARRHEA: 0
ABDOMINAL DISTENTION: 0
LEG SWELLING: 0

## 2024-10-07 ASSESSMENT — PAIN SCALES - GENERAL: PAINLEVEL: 0-NO PAIN

## 2024-10-07 NOTE — PATIENT INSTRUCTIONS
Bloodwork in 2 wks  Tentatively start trodelvy November 12 if no clinical trial is available  Genetic counseling referral

## 2024-10-07 NOTE — TELEPHONE ENCOUNTER
RN called patient and informed her Dr. Tolbert said she is ok to start tx on 10/21. Dates updated and scheduling notified.

## 2024-10-07 NOTE — PROGRESS NOTES
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 she received prior Taxotere and cyclophosphamide , completed 6/2010. S/p adjuvant endocrine therapy.    Diagnosis of metastatic TNBC 3/2023, presenting with back pain and cord compression      weekly paclitaxel from 5/22/23, changed to abraxane after 1st cycle due to anaphylactic type reaction. Discontinued 12/12/23 due to progressive disease.    TDxD from 1/29/24, held for XRT to spine for a few wks and stopped on 6/27/24  s/p XRT to spine on 4/1/24    CURRENT THERAPY  [id number] clincial trial    HISTORY OF PRESENT ILLNESS    Trang Jones is a 64 y.o. woman here for end of treatment visit. CT scans from Friday show disease progression in liver and stable disease in bone. No increased pain. No jaundice.          Review of Systems   Constitutional: Negative.  Negative for appetite change, chills, diaphoresis, fatigue and fever.   HENT:  Negative.  Negative for hearing loss and lump/mass.    Eyes: Negative.  Negative for eye problems and icterus.   Respiratory: Negative.  Negative for chest tightness, cough, hemoptysis, shortness of breath and wheezing.    Cardiovascular: Negative.  Negative for chest pain, leg swelling and palpitations.   Gastrointestinal:  Positive for nausea. Negative for abdominal distention, abdominal pain, blood in stool, constipation and diarrhea.   Endocrine: Negative for hot flashes.   Genitourinary:  Negative for bladder incontinence, difficulty urinating, dyspareunia, dysuria, frequency and hematuria.    Musculoskeletal:  Positive for back pain.  Negative for arthralgias, gait problem and myalgias.        ++hip pain   Neurological:  Negative for dizziness, extremity weakness, gait problem, headaches, numbness and seizures.   Hematological:  Negative for adenopathy. Does not bruise/bleed easily.   Psychiatric/Behavioral:  Negative for confusion, depression and sleep disturbance. The patient is not nervous/anxious.    All other systems reviewed and are negative.        Past Medical History:  has a past medical history of Breast cancer (Multi), Hypercholesteremia, Metastatic cancer (Multi), and Personal history of irradiation.  Surgical History:   has a past surgical history that includes CT guided percutaneous biopsy bone deep (2023); Mastectomy (Left, ); and Breast surgery.  Social History:   reports that she quit smoking about 21 years ago. Her smoking use included cigarettes. She has been exposed to tobacco smoke. She has never used smokeless tobacco. She reports that she does not currently use alcohol. She reports current drug use. Drug: Marijuana.  Family History:    Family History   Problem Relation Name Age of Onset    Leukemia Father       Family Oncology History:  Cancer-related family history is not on file.      OBJECTIVE    VS / Pain:  /75 (BP Location: Right arm, Patient Position: Sitting) Comment: Per [facility] visit  Pulse 58 Comment: Per [facility] visit  Temp 36.4 °C (97.5 °F) (Oral) Comment: Per [facility] visit  Resp 16   Wt 62.7 kg (138 lb 3.7 oz) Comment: Per [facility] visit  LMP  (LMP Unknown)   SpO2 100% Comment: Per [facility] visit  BMI 26.30 kg/m²   BSA: 1.64 meters squared   Pain Scale: 3    Performance Status:  The ECOG performance scale today is ECO- Restricted in physically strenuous activity.  Carries out light duty.    Physical Exam  Constitutional:       General: She is not in acute distress.     Appearance: She is not ill-appearing, toxic-appearing or diaphoretic.   HENT:      Nose: No congestion or rhinorrhea.       Mouth/Throat:      Pharynx: No posterior oropharyngeal erythema.   Cardiovascular:      Rate and Rhythm: Normal rate and regular rhythm.      Pulses: Normal pulses.      Heart sounds: Normal heart sounds. No murmur heard.     No gallop.   Pulmonary:      Breath sounds: Normal breath sounds.   Abdominal:      General: There is no distension.      Palpations: There is no mass.      Tenderness: There is no abdominal tenderness.   Musculoskeletal:         General: No swelling.      Cervical back: No rigidity.      Right lower leg: No edema.      Left lower leg: No edema.   Lymphadenopathy:      Cervical: No cervical adenopathy.   Skin:     General: Skin is warm.      Coloration: Skin is not cyanotic.      Findings: No bruising, ecchymosis or erythema.   Neurological:      General: No focal deficit present.      Mental Status: She is oriented to person, place, and time.      Cranial Nerves: Cranial nerves 2-12 are intact.      Motor: Motor function is intact.   Psychiatric:         Attention and Perception: Attention and perception normal.         Mood and Affect: Mood and affect normal.         Behavior: Behavior normal.         Thought Content: Thought content normal.         Judgment: Judgment normal.             Diagnostic Results     === 10/03/24 ===    CT CHEST ABDOMEN PELVIS W IV CONTRAST    - Impression -  CHEST:  1.  Interval mild decrease in the posterior paraspinal postradiation  changes in the bilateral lungs.    ABDOMEN-PELVIS:  1.  Interval increase in the tumor burden with increase in the number  and size of multiple hepatic metastatic lesions.  2. No significant oval change in the extensive sclerotic osseous  metastatic disease in the axial and appendicular skeleton.  3. Other chronic and incidental findings as described above.      MACRO:  None    Signed by: Ceferino Arredondo 10/5/2024 3:03 PM  Dictation workstation:   [id number]   LABORATORY/PATHOLOGY DATA    Hospital Outpatient Visit on 10/07/2024    Component Date Value Ref Range Status    CA 27.29 10/07/2024 463.5 (H)  0.0 - 38.6 U/mL Final    WBC 10/07/2024 3.9 (L)  4.4 - 11.3 x10*3/uL Final    nRBC 10/07/2024 0.0  0.0 - 0.0 /100 WBCs Final    RBC 10/07/2024 3.94 (L)  4.00 - 5.20 x10*6/uL Final    Hemoglobin 10/07/2024 11.6 (L)  12.0 - 16.0 g/dL Final    Hematocrit 10/07/2024 34.9 (L)  36.0 - 46.0 % Final    MCV 10/07/2024 89  80 - 100 fL Final    MCH 10/07/2024 29.4  26.0 - 34.0 pg Final    MCHC 10/07/2024 33.2  32.0 - 36.0 g/dL Final    RDW 10/07/2024 12.6  11.5 - 14.5 % Final    Platelets 10/07/2024 180  150 - 450 x10*3/uL Final    Neutrophils % 10/07/2024 56.4  40.0 - 80.0 % Final    Immature Granulocytes %, Automated 10/07/2024 0.5  0.0 - 0.9 % Final    Lymphocytes % 10/07/2024 29.4  13.0 - 44.0 % Final    Monocytes % 10/07/2024 10.3  2.0 - 10.0 % Final    Eosinophils % 10/07/2024 3.1  0.0 - 6.0 % Final    Basophils % 10/07/2024 0.3  0.0 - 2.0 % Final    Neutrophils Absolute 10/07/2024 2.19  1.20 - 7.70 x10*3/uL Final    Immature Granulocytes Absolute, Au* 10/07/2024 0.02  0.00 - 0.70 x10*3/uL Final    Lymphocytes Absolute 10/07/2024 1.14 (L)  1.20 - 4.80 x10*3/uL Final    Monocytes Absolute 10/07/2024 0.40  0.10 - 1.00 x10*3/uL Final    Eosinophils Absolute 10/07/2024 0.12  0.00 - 0.70 x10*3/uL Final    Basophils Absolute 10/07/2024 0.01  0.00 - 0.10 x10*3/uL Final    Glucose 10/07/2024 100 (H)  74 - 99 mg/dL Final    Sodium 10/07/2024 140  136 - 145 mmol/L Final    Potassium 10/07/2024 4.2  3.5 - 5.3 mmol/L Final    Chloride 10/07/2024 106  98 - 107 mmol/L Final    Bicarbonate 10/07/2024 29  21 - 32 mmol/L Final    Anion Gap 10/07/2024 9 (L)  10 - 20 mmol/L Final    Urea Nitrogen 10/07/2024 15  6 - 23 mg/dL Final    Creatinine 10/07/2024 0.59  0.50 - 1.05 mg/dL Final    eGFR 10/07/2024 >90  >60 mL/min/1.73m*2 Final    Calcium 10/07/2024 8.8  8.6 - 10.6 mg/dL Final    Albumin 10/07/2024 4.0  3.4 - 5.0 g/dL Final    Alkaline Phosphatase 10/07/2024 105   33 - 136 U/L Final    Total Protein 10/07/2024 5.7 (L)  6.4 - 8.2 g/dL Final    AST 10/07/2024 32  9 - 39 U/L Final    Bilirubin, Total 10/07/2024 0.4  0.0 - 1.2 mg/dL Final    ALT 10/07/2024 21  7 - 45 U/L Final    Amylase 10/07/2024 37  29 - 103 U/L Final    aPTT 10/07/2024 44 (H)  27 - 38 seconds Final    D-Dimer Non VTE, Quant (ng/mL FEU) 10/07/2024 1,791 (H)  <=500 ng/mL FEU Final    Haptoglobin 10/07/2024 129  30 - 200 mg/dL Final    LDH 10/07/2024 303 (H)  84 - 246 U/L Final    Magnesium 10/07/2024 2.30  1.60 - 2.40 mg/dL Final    Phosphorus 10/07/2024 3.9  2.5 - 4.9 mg/dL Final    Protime 10/07/2024 10.7  9.8 - 12.8 seconds Final    INR 10/07/2024 1.0  0.9 - 1.1 Final    Retic % 10/07/2024 1.9  0.5 - 2.0 % Final    Retic Absolute 10/07/2024 0.075  0.018 - 0.083 x10*6/uL Final    Reticulocyte Hemoglobin 10/07/2024 31  28 - 38 pg Final    Immature Retic fraction 10/07/2024 16.7 (H)  <=16.0 % Final    Uric Acid 10/07/2024 3.6  2.3 - 6.7 mg/dL Final    Color, Urine 10/07/2024 Light-Yellow  Light-Yellow, Yellow, Dark-Yellow Final    Appearance, Urine 10/07/2024 Clear  Clear Final    Specific Gravity, Urine 10/07/2024 1.008  1.005 - 1.035 Final    pH, Urine 10/07/2024 6.0  5.0, 5.5, 6.0, 6.5, 7.0, 7.5, 8.0 Final    Protein, Urine 10/07/2024 NEGATIVE  NEGATIVE, 10 (TRACE), 20 (TRACE) mg/dL Final    Glucose, Urine 10/07/2024 Normal  Normal mg/dL Final    Blood, Urine 10/07/2024 NEGATIVE  NEGATIVE Final    Ketones, Urine 10/07/2024 NEGATIVE  NEGATIVE mg/dL Final    Bilirubin, Urine 10/07/2024 NEGATIVE  NEGATIVE Final    Urobilinogen, Urine 10/07/2024 Normal  Normal mg/dL Final    Nitrite, Urine 10/07/2024 NEGATIVE  NEGATIVE Final    Leukocyte Esterase, Urine 10/07/2024 NEGATIVE  NEGATIVE Final   Hospital Outpatient Visit on 09/23/2024   Component Date Value Ref Range Status    WBC 09/23/2024 4.9  4.4 - 11.3 x10*3/uL Final    nRBC 09/23/2024 0.0  0.0 - 0.0 /100 WBCs Final    RBC 09/23/2024 3.94 (L)  4.00 - 5.20  x10*6/uL Final    Hemoglobin 09/23/2024 11.7 (L)  12.0 - 16.0 g/dL Final    Hematocrit 09/23/2024 35.6 (L)  36.0 - 46.0 % Final    MCV 09/23/2024 90  80 - 100 fL Final    MCH 09/23/2024 29.7  26.0 - 34.0 pg Final    MCHC 09/23/2024 32.9  32.0 - 36.0 g/dL Final    RDW 09/23/2024 12.7  11.5 - 14.5 % Final    Platelets 09/23/2024 210  150 - 450 x10*3/uL Final    Neutrophils % 09/23/2024 61.2  40.0 - 80.0 % Final    Immature Granulocytes %, Automated 09/23/2024 0.4  0.0 - 0.9 % Final    Lymphocytes % 09/23/2024 26.0  13.0 - 44.0 % Final    Monocytes % 09/23/2024 9.3  2.0 - 10.0 % Final    Eosinophils % 09/23/2024 2.5  0.0 - 6.0 % Final    Basophils % 09/23/2024 0.6  0.0 - 2.0 % Final    Neutrophils Absolute 09/23/2024 2.97  1.20 - 7.70 x10*3/uL Final    Immature Granulocytes Absolute, Au* 09/23/2024 0.02  0.00 - 0.70 x10*3/uL Final    Lymphocytes Absolute 09/23/2024 1.26  1.20 - 4.80 x10*3/uL Final    Monocytes Absolute 09/23/2024 0.45  0.10 - 1.00 x10*3/uL Final    Eosinophils Absolute 09/23/2024 0.12  0.00 - 0.70 x10*3/uL Final    Basophils Absolute 09/23/2024 0.03  0.00 - 0.10 x10*3/uL Final    Glucose 09/23/2024 92  74 - 99 mg/dL Final    Sodium 09/23/2024 142  136 - 145 mmol/L Final    Potassium 09/23/2024 4.0  3.5 - 5.3 mmol/L Final    Chloride 09/23/2024 104  98 - 107 mmol/L Final    Bicarbonate 09/23/2024 28  21 - 32 mmol/L Final    Anion Gap 09/23/2024 14  10 - 20 mmol/L Final    Urea Nitrogen 09/23/2024 18  6 - 23 mg/dL Final    Creatinine 09/23/2024 0.71  0.50 - 1.05 mg/dL Final    eGFR 09/23/2024 >90  >60 mL/min/1.73m*2 Final    Calcium 09/23/2024 9.3  8.6 - 10.6 mg/dL Final    Albumin 09/23/2024 3.9  3.4 - 5.0 g/dL Final    Alkaline Phosphatase 09/23/2024 101  33 - 136 U/L Final    Total Protein 09/23/2024 5.6 (L)  6.4 - 8.2 g/dL Final    AST 09/23/2024 28  9 - 39 U/L Final    Bilirubin, Total 09/23/2024 0.3  0.0 - 1.2 mg/dL Final    ALT 09/23/2024 18  7 - 45 U/L Final    Amylase 09/23/2024 29  29 - 103 U/L  Final    aPTT 09/23/2024 45 (H)  27 - 38 seconds Final    D-Dimer Non VTE, Quant (ng/mL FEU) 09/23/2024 2,005 (H)  <=500 ng/mL FEU Final    Haptoglobin 09/23/2024 162  30 - 200 mg/dL Final    LDH 09/23/2024 269 (H)  84 - 246 U/L Final    Magnesium 09/23/2024 2.19  1.60 - 2.40 mg/dL Final    Phosphorus 09/23/2024 4.7  2.5 - 4.9 mg/dL Final    Protime 09/23/2024 10.3  9.8 - 12.8 seconds Final    INR 09/23/2024 0.9  0.9 - 1.1 Final    Retic % 09/23/2024 1.7  0.5 - 2.0 % Final    Retic Absolute 09/23/2024 0.069  0.018 - 0.083 x10*6/uL Final    Reticulocyte Hemoglobin 09/23/2024 31  28 - 38 pg Final    Immature Retic fraction 09/23/2024 16.5 (H)  <=16.0 % Final    Uric Acid 09/23/2024 4.8  2.3 - 6.7 mg/dL Final    Color, Urine 09/23/2024 Light-Yellow  Light-Yellow, Yellow, Dark-Yellow Final    Appearance, Urine 09/23/2024 Clear  Clear Final    Specific Gravity, Urine 09/23/2024 1.020  1.005 - 1.035 Final    pH, Urine 09/23/2024 6.0  5.0, 5.5, 6.0, 6.5, 7.0, 7.5, 8.0 Final    Protein, Urine 09/23/2024 NEGATIVE  NEGATIVE, 10 (TRACE), 20 (TRACE) mg/dL Final    Glucose, Urine 09/23/2024 Normal  Normal mg/dL Final    Blood, Urine 09/23/2024 NEGATIVE  NEGATIVE Final    Ketones, Urine 09/23/2024 NEGATIVE  NEGATIVE mg/dL Final    Bilirubin, Urine 09/23/2024 NEGATIVE  NEGATIVE Final    Urobilinogen, Urine 09/23/2024 Normal  Normal mg/dL Final    Nitrite, Urine 09/23/2024 NEGATIVE  NEGATIVE Final    Leukocyte Esterase, Urine 09/23/2024 NEGATIVE  NEGATIVE Final   Hospital Outpatient Visit on 09/16/2024   Component Date Value Ref Range Status    WBC 09/16/2024 5.5  4.4 - 11.3 x10*3/uL Final    nRBC 09/16/2024 0.0  0.0 - 0.0 /100 WBCs Final    RBC 09/16/2024 4.03  4.00 - 5.20 x10*6/uL Final    Hemoglobin 09/16/2024 11.9 (L)  12.0 - 16.0 g/dL Final    Hematocrit 09/16/2024 36.2  36.0 - 46.0 % Final    MCV 09/16/2024 90  80 - 100 fL Final    MCH 09/16/2024 29.5  26.0 - 34.0 pg Final    MCHC 09/16/2024 32.9  32.0 - 36.0 g/dL Final     RDW 09/16/2024 12.8  11.5 - 14.5 % Final    Platelets 09/16/2024 186  150 - 450 x10*3/uL Final    Neutrophils % 09/16/2024 71.6  40.0 - 80.0 % Final    Immature Granulocytes %, Automated 09/16/2024 0.2  0.0 - 0.9 % Final    Lymphocytes % 09/16/2024 17.0  13.0 - 44.0 % Final    Monocytes % 09/16/2024 7.7  2.0 - 10.0 % Final    Eosinophils % 09/16/2024 3.1  0.0 - 6.0 % Final    Basophils % 09/16/2024 0.4  0.0 - 2.0 % Final    Neutrophils Absolute 09/16/2024 3.91  1.20 - 7.70 x10*3/uL Final    Immature Granulocytes Absolute, Au* 09/16/2024 0.01  0.00 - 0.70 x10*3/uL Final    Lymphocytes Absolute 09/16/2024 0.93 (L)  1.20 - 4.80 x10*3/uL Final    Monocytes Absolute 09/16/2024 0.42  0.10 - 1.00 x10*3/uL Final    Eosinophils Absolute 09/16/2024 0.17  0.00 - 0.70 x10*3/uL Final    Basophils Absolute 09/16/2024 0.02  0.00 - 0.10 x10*3/uL Final    Glucose 09/16/2024 107 (H)  74 - 99 mg/dL Final    Sodium 09/16/2024 142  136 - 145 mmol/L Final    Potassium 09/16/2024 4.1  3.5 - 5.3 mmol/L Final    Chloride 09/16/2024 104  98 - 107 mmol/L Final    Bicarbonate 09/16/2024 28  21 - 32 mmol/L Final    Anion Gap 09/16/2024 14  10 - 20 mmol/L Final    Urea Nitrogen 09/16/2024 15  6 - 23 mg/dL Final    Creatinine 09/16/2024 0.78  0.50 - 1.05 mg/dL Final    eGFR 09/16/2024 86  >60 mL/min/1.73m*2 Final    Calcium 09/16/2024 9.5  8.6 - 10.6 mg/dL Final    Albumin 09/16/2024 3.9  3.4 - 5.0 g/dL Final    Alkaline Phosphatase 09/16/2024 85  33 - 136 U/L Final    Total Protein 09/16/2024 5.4 (L)  6.4 - 8.2 g/dL Final    AST 09/16/2024 23  9 - 39 U/L Final    Bilirubin, Total 09/16/2024 0.5  0.0 - 1.2 mg/dL Final    ALT 09/16/2024 19  7 - 45 U/L Final    Amylase 09/16/2024 32  29 - 103 U/L Final    aPTT 09/16/2024 34  27 - 38 seconds Final    D-Dimer Non VTE, Quant (ng/mL FEU) 09/16/2024 943 (H)  <=500 ng/mL FEU Final    Haptoglobin 09/16/2024 157  30 - 200 mg/dL Final    LDH 09/16/2024 200  84 - 246 U/L Final    Magnesium 09/16/2024 2.06   1.60 - 2.40 mg/dL Final    Phosphorus 09/16/2024 4.5  2.5 - 4.9 mg/dL Final    Protime 09/16/2024 10.6  9.8 - 12.8 seconds Final    INR 09/16/2024 0.9  0.9 - 1.1 Final    Retic % 09/16/2024 2.2 (H)  0.5 - 2.0 % Final    Retic Absolute 09/16/2024 0.089 (H)  0.018 - 0.083 x10*6/uL Final    Reticulocyte Hemoglobin 09/16/2024 30  28 - 38 pg Final    Immature Retic fraction 09/16/2024 18.6 (H)  <=16.0 % Final    Uric Acid 09/16/2024 4.5  2.3 - 6.7 mg/dL Final    Color, Urine 09/16/2024 Yellow  Light-Yellow, Yellow, Dark-Yellow Final    Appearance, Urine 09/16/2024 Clear  Clear Final    Specific Gravity, Urine 09/16/2024 1.028  1.005 - 1.035 Final    pH, Urine 09/16/2024 5.5  5.0, 5.5, 6.0, 6.5, 7.0, 7.5, 8.0 Final    Protein, Urine 09/16/2024 10 (TRACE)  NEGATIVE, 10 (TRACE), 20 (TRACE) mg/dL Final    Glucose, Urine 09/16/2024 Normal  Normal mg/dL Final    Blood, Urine 09/16/2024 NEGATIVE  NEGATIVE Final    Ketones, Urine 09/16/2024 NEGATIVE  NEGATIVE mg/dL Final    Bilirubin, Urine 09/16/2024 NEGATIVE  NEGATIVE Final    Urobilinogen, Urine 09/16/2024 Normal  Normal mg/dL Final    Nitrite, Urine 09/16/2024 NEGATIVE  NEGATIVE Final    Leukocyte Esterase, Urine 09/16/2024 NEGATIVE  NEGATIVE Final    WBC, Urine 09/16/2024 6-10 (A)  1-5, NONE /HPF Final    RBC, Urine 09/16/2024 NONE  NONE, 1-2, 3-5 /HPF Final    Mucus, Urine 09/16/2024 2+  Reference range not established. /LPF Final   Hospital Outpatient Visit on 09/09/2024   Component Date Value Ref Range Status    CA 27.29 09/09/2024 223.7 (H)  0.0 - 38.6 U/mL Final    WBC 09/09/2024 4.4  4.4 - 11.3 x10*3/uL Final    nRBC 09/09/2024 0.0  0.0 - 0.0 /100 WBCs Final    RBC 09/09/2024 3.85 (L)  4.00 - 5.20 x10*6/uL Final    Hemoglobin 09/09/2024 11.4 (L)  12.0 - 16.0 g/dL Final    Hematocrit 09/09/2024 35.3 (L)  36.0 - 46.0 % Final    MCV 09/09/2024 92  80 - 100 fL Final    MCH 09/09/2024 29.6  26.0 - 34.0 pg Final    MCHC 09/09/2024 32.3  32.0 - 36.0 g/dL Final    RDW  09/09/2024 13.0  11.5 - 14.5 % Final    Platelets 09/09/2024 164  150 - 450 x10*3/uL Final    Neutrophils % 09/09/2024 64.0  40.0 - 80.0 % Final    Immature Granulocytes %, Automated 09/09/2024 0.2  0.0 - 0.9 % Final    Lymphocytes % 09/09/2024 23.4  13.0 - 44.0 % Final    Monocytes % 09/09/2024 8.9  2.0 - 10.0 % Final    Eosinophils % 09/09/2024 2.8  0.0 - 6.0 % Final    Basophils % 09/09/2024 0.7  0.0 - 2.0 % Final    Neutrophils Absolute 09/09/2024 2.79  1.20 - 7.70 x10*3/uL Final    Immature Granulocytes Absolute, Au* 09/09/2024 0.01  0.00 - 0.70 x10*3/uL Final    Lymphocytes Absolute 09/09/2024 1.02 (L)  1.20 - 4.80 x10*3/uL Final    Monocytes Absolute 09/09/2024 0.39  0.10 - 1.00 x10*3/uL Final    Eosinophils Absolute 09/09/2024 0.12  0.00 - 0.70 x10*3/uL Final    Basophils Absolute 09/09/2024 0.03  0.00 - 0.10 x10*3/uL Final    Glucose 09/09/2024 92  74 - 99 mg/dL Final    Sodium 09/09/2024 141  136 - 145 mmol/L Final    Potassium 09/09/2024 4.0  3.5 - 5.3 mmol/L Final    Chloride 09/09/2024 106  98 - 107 mmol/L Final    Bicarbonate 09/09/2024 28  21 - 32 mmol/L Final    Anion Gap 09/09/2024 11  10 - 20 mmol/L Final    Urea Nitrogen 09/09/2024 16  6 - 23 mg/dL Final    Creatinine 09/09/2024 0.70  0.50 - 1.05 mg/dL Final    eGFR 09/09/2024 >90  >60 mL/min/1.73m*2 Final    Calcium 09/09/2024 8.6  8.6 - 10.6 mg/dL Final    Albumin 09/09/2024 3.9  3.4 - 5.0 g/dL Final    Alkaline Phosphatase 09/09/2024 73  33 - 136 U/L Final    Total Protein 09/09/2024 5.1 (L)  6.4 - 8.2 g/dL Final    AST 09/09/2024 22  9 - 39 U/L Final    Bilirubin, Total 09/09/2024 0.4  0.0 - 1.2 mg/dL Final    ALT 09/09/2024 18  7 - 45 U/L Final    Amylase 09/09/2024 31  29 - 103 U/L Final    aPTT 09/09/2024 31  27 - 38 seconds Final    D-Dimer Non VTE, Quant (ng/mL FEU) 09/09/2024 770 (H)  <=500 ng/mL FEU Final    Haptoglobin 09/09/2024 91  30 - 200 mg/dL Final    LDH 09/09/2024 205  84 - 246 U/L Final    Magnesium 09/09/2024 2.31  1.60 -  2.40 mg/dL Final    Phosphorus 09/09/2024 3.8  2.5 - 4.9 mg/dL Final    Protime 09/09/2024 10.7  9.8 - 12.8 seconds Final    INR 09/09/2024 1.0  0.9 - 1.1 Final    Retic % 09/09/2024 2.0  0.5 - 2.0 % Final    Retic Absolute 09/09/2024 0.077  0.018 - 0.083 x10*6/uL Final    Reticulocyte Hemoglobin 09/09/2024 32  28 - 38 pg Final    Immature Retic fraction 09/09/2024 16.0  <=16.0 % Final    Uric Acid 09/09/2024 4.2  2.3 - 6.7 mg/dL Final    Color, Urine 09/09/2024 Light-Yellow  Light-Yellow, Yellow, Dark-Yellow Final    Appearance, Urine 09/09/2024 Clear  Clear Final    Specific Gravity, Urine 09/09/2024 1.018  1.005 - 1.035 Final    pH, Urine 09/09/2024 7.0  5.0, 5.5, 6.0, 6.5, 7.0, 7.5, 8.0 Final    Protein, Urine 09/09/2024 NEGATIVE  NEGATIVE, 10 (TRACE), 20 (TRACE) mg/dL Final    Glucose, Urine 09/09/2024 Normal  Normal mg/dL Final    Blood, Urine 09/09/2024 NEGATIVE  NEGATIVE Final    Ketones, Urine 09/09/2024 NEGATIVE  NEGATIVE mg/dL Final    Bilirubin, Urine 09/09/2024 NEGATIVE  NEGATIVE Final    Urobilinogen, Urine 09/09/2024 Normal  Normal mg/dL Final    Nitrite, Urine 09/09/2024 NEGATIVE  NEGATIVE Final    Leukocyte Esterase, Urine 09/09/2024 25 Tamara/µL (A)  NEGATIVE Final    WBC, Urine 09/09/2024 1-5  1-5, NONE /HPF Final    RBC, Urine 09/09/2024 1-2  NONE, 1-2, 3-5 /HPF Final    Mucus, Urine 09/09/2024 FEW  Reference range not established. /LPF Final    Extra Tube 09/09/2024 Hold for add-ons.   Final    Extra Tube 09/09/2024 Hold for add-ons.   Final    Extra Tube 09/09/2024 Hold for add-ons.   Final    Extra Tube 09/09/2024 Hold for add-ons.   Final    Extra Tube 09/09/2024 Hold for add-ons.   Final    Extra Tube 09/09/2024 Hold for add-ons.   Final    Extra Tube 09/09/2024 Hold for add-ons.   Final    Extra Tube 09/09/2024 Hold for add-ons.   Final      Lab Results   Component Value Date    LABCA2 463.5 (H) 10/07/2024    LABCA2 223.7 (H) 09/09/2024    LABCA2 100.0 (H) 08/12/2024    LABCA2 56.5 (H)  06/27/2024    LABCA2 55.6 (H) 06/03/2024             IMPRESSION/PLAN      1. 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 Clear evidence of progressive disease in liver. Will come off study today. Plan is to look for phase 1 trial here at  or at [facility]. If no trial available then will treat with sacituzumab dose reduced at 8 mg/kg.    2. Pain neoplasm related. Following with supportive onc.  Much better now. S/p xrt        We discussed the clinical significance of diagnosis, goals of care and treatment plan in detail.  I personally spent over half of a total 45 minutes face to face with the patient in counseling and discussion and/or coordination of care as described above.         -------------------------------------------------------------------------------------------------------------------------------  Bebeto Tolbert MD  Director of Breast Cancer Medical Oncology Research Program   [facility]  Professor of Medicine  [address], [address]  [place] [id number]  Phone: [phone]  [email or web address]@\[email or web address]\"".[facility]

## 2024-10-07 NOTE — RESEARCH NOTES
Research Note EOT Visit    Trang Jones is here today for an EOT visit on [id number].   EOT procedures completed per protocol. AE's and con-meds reviewed with patient.   Follow up plan discussed with patient.    Patient discontinued treatment due to:  [x]    Disease Progression  []   Worsening AEs  []   Physician Decision  []   Patient Decision  []  Consent Withdrawal  []   Completed per Protocol  []   Other: <please explain>    Name of drug discontinued: <insert drug name>  Date of last dose: <insert date>        Education Documentation  Treatment Plan and Schedule, taught by Donte Encarnacion RN at 10/7/2024  1:32 PM.  Learner: Significant Other, Patient  Readiness: Eager  Method: Explanation  Response: Verbalizes Understanding    General Medication Information, taught by Donte Encarnacion RN at 10/7/2024  1:32 PM.  Learner: Significant Other, Patient  Readiness: Eager  Method: Explanation  Response: Verbalizes Understanding    Supportive Medications, taught by Donte Encarnacion RN at 10/7/2024  1:32 PM.  Learner: Significant Other, Patient  Readiness: Eager  Method: Explanation  Response: Verbalizes Understanding    Education Comments  No comments found.

## 2024-10-07 NOTE — RESEARCH NOTES
RSH><[id number]><EOT>    DCRU NURSING VISIT NOTE  Study Name: APRHARISH 1Y22  IRB#: [id number]  DCRU#: DCRU # [id number]  Protocol Version Dated: V5 3.7.24  PI: James Ram M.D    Time point: Final Visit    Encounter Date: 10/07/2024  Encounter Time:  8:00 AM EDT  Encounter Department: [facility] HEMATOLOGY AND ONCOLOGY     #1     Phone Pager   Richard Encarnacion  [phone] [address]      Study Regimen and Dosing   Part 1_Expansion, Part 1 Expansion, Part 2 - - Advanced Solid Tumors with DNA damage response mutations, failed at least one prior standard of care therapy and must be capable of oral administration of study medication.  Cycle = 28 days      Admission and Prior to Starting Study Activities   Notify  when patient arrives to unit.  Complete DCRU/[facility] intake form in EMR.  Obtain weight in kilograms - with shoes off & heavy items removed.  Perform venipuncture or access mediport/central line (if available) for sample collection procedures.     Study Specific Instructions and Documentation   WEIGHT  VITALS  ECG  LABS  DISCHARGE     Subjective   Trang Jones is a 64 y.o. female and is here for a Research clinical visit.    Study ID# [id number]    Visit Provider: Donte Encarnacion RN     Allergies:   Allergies   Allergen Reactions    Olanzapine Headache, Dizziness and Drowsiness    Paclitaxel Other     Scratchy throat, erythema face, back pain       Objective   Vital Signs:    Vitals:    10/07/24 0811   BP: 123/75   Pulse: 58   Resp: 16   Temp: 36.4 °C (97.5 °F)   TempSrc: Oral   SpO2: 100%   Weight: 62.7 kg (138 lb 3.7 oz)   PainSc: 0-No pain       Physical Exam     ASSESSMENT and PLAN:  Problem List Items Addressed This Visit          Hematology and Neoplasia    Malignant neoplasm of bone with metastases (Multi)    Relevant Orders    Clinic Appointment Request    Infusion Appointment Request    Cancer Antigen 27-29    CBC and Auto Differential (Completed)    Comprehensive  metabolic panel    Amylase    APTT (Completed)    D-dimer, Quantitative (Completed)    Haptoglobin    Lactate dehydrogenase    Magnesium    Phosphorus    Protime-INR (Completed)    Reticulocytes (Completed)    Uric Acid    Urinalysis with Reflex Microscopic (Completed)    Cancer Antigen 27-29    CBC and Auto Differential (Completed)    Comprehensive metabolic panel    Amylase    APTT (Completed)    D-dimer, Quantitative (Completed)    Haptoglobin    Lactate dehydrogenase    Magnesium    Phosphorus    Protime-INR (Completed)    Reticulocytes (Completed)    Uric Acid    Urinalysis with Reflex Microscopic (Completed)    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    Relevant Orders    Clinic Appointment Request    Infusion Appointment Request    Cancer Antigen 27-29    CBC and Auto Differential (Completed)    Comprehensive metabolic panel    Amylase    APTT (Completed)    D-dimer, Quantitative (Completed)    Haptoglobin    Lactate dehydrogenase    Magnesium    Phosphorus    Protime-INR (Completed)    Reticulocytes (Completed)    Uric Acid    Urinalysis with Reflex Microscopic (Completed)    Cancer Antigen 27-29    CBC and Auto Differential (Completed)    Comprehensive metabolic panel    Amylase    APTT (Completed)    D-dimer, Quantitative (Completed)    Haptoglobin    Lactate dehydrogenase    Magnesium    Phosphorus    Protime-INR (Completed)    Reticulocytes (Completed)    Uric Acid    Urinalysis with Reflex Microscopic (Completed)        Medications as of the completion of today's visit:  Current Outpatient Medications   Medication Sig Dispense Refill    calcium carbonate (CALCIUM 600 ORAL) Take 600 mg by mouth once daily.      cholecalciferol, vitD3,/vit K2 (vitamin D3-vitamin K2) 125 mcg (5,000 unit)-100 mcg capsule Take by mouth.      gabapentin (Neurontin) 300 mg capsule Take 1 capsule (300 mg) by mouth once daily in the morning AND 2 capsules (600 mg) once daily at bedtime. 270 capsule 3     lidocaine-prilocaine (Emla) 2.5-2.5 % cream apply topically to MEDIPORT SITE 45 MINUTES BEFORE ACCESSING PORT, USE COVER WITH BANDAID      MAGNESIUM ORAL Take 400 mg by mouth.      morphine CR (MS Contin) 15 mg 12 hr tablet Take 1 tablet (15 mg) by mouth 3 times a day. Do not crush, chew, or split. 90 tablet 0    ondansetron ODT (Zofran-ODT) 4 mg disintegrating tablet Take 1 tablet (4 mg) by mouth every 8 hours if needed for nausea or vomiting.      prochlorperazine (Compazine) 10 mg tablet Take 1 tablet (10 mg) by mouth every 6 hours if needed for nausea or vomiting.      propranolol (Inderal) 40 mg tablet Take 0.5 tablets (20 mg) by mouth once daily. Patient states per ordering provider instruction      rizatriptan MLT (Maxalt-MLT) 5 mg disintegrating tablet take 1 tablet by mouth if needed MAY REPEAT IN 2 HOURS IF NEEDED      Study [id number] ATRN-119 100 mg capsule Take 8 capsules (800 mg total) by mouth once daily for 28 days.  Swallow whole. Take at least 1 hour prior to a meal. Do not eat grapefruit or drink grapefruit juice. 224 capsule 0    zinc glycinate 30 mg capsule Take by mouth.       Current Facility-Administered Medications   Medication Dose Route Frequency Provider Last Rate Last Admin    perflutren lipid microspheres (Definity) injection 0.5-10 mL of dilution  0.5-10 mL of dilution intravenous Once Israel Whalen MD               Orders placed during today's visit:  Orders Placed This Encounter   Procedures    Cancer Antigen 27-29     Standing Status:   Future     Number of Occurrences:   1     Standing Expiration Date:   10/7/2025     Order Specific Question:   Release result to [facility]     Answer:   Immediate [1]    CBC and Auto Differential     Standing Status:   Future     Number of Occurrences:   1     Standing Expiration Date:   10/7/2025     Order Specific Question:   Release result to [facility]     Answer:   Immediate [1]    Comprehensive metabolic panel     Standing Status:   Future     Number  of Occurrences:   1     Standing Expiration Date:   10/7/2025     Order Specific Question:   Release result to MyChart     Answer:   Immediate [1]    Amylase     Standing Status:   Future     Number of Occurrences:   1     Standing Expiration Date:   10/7/2025     Order Specific Question:   Release result to MyChart     Answer:   Immediate [1]    APTT     Standing Status:   Future     Number of Occurrences:   1     Standing Expiration Date:   10/7/2025     Order Specific Question:   Release result to MyChart     Answer:   Immediate [1]    D-dimer, Quantitative     Standing Status:   Future     Number of Occurrences:   1     Standing Expiration Date:   10/7/2025     Order Specific Question:   Release result to MyChart     Answer:   Immediate [1]    Haptoglobin     Standing Status:   Future     Number of Occurrences:   1     Standing Expiration Date:   10/7/2025     Order Specific Question:   Release result to MyChart     Answer:   Immediate [1]    Lactate dehydrogenase     Standing Status:   Future     Number of Occurrences:   1     Standing Expiration Date:   10/7/2025     Order Specific Question:   Release result to MyChart     Answer:   Immediate [1]    Magnesium     Standing Status:   Future     Number of Occurrences:   1     Standing Expiration Date:   10/7/2025     Order Specific Question:   Release result to MyChart     Answer:   Immediate [1]    Phosphorus     Standing Status:   Future     Number of Occurrences:   1     Standing Expiration Date:   10/7/2025     Order Specific Question:   Release result to MyChart     Answer:   Immediate [1]    Protime-INR     Standing Status:   Future     Number of Occurrences:   1     Standing Expiration Date:   10/7/2025     Order Specific Question:   Release result to MyChart     Answer:   Immediate [1]    Reticulocytes     Standing Status:   Future     Number of Occurrences:   1     Standing Expiration Date:   10/7/2025     Order Specific Question:   Release result to  MyChart     Answer:   Immediate [1]    Uric Acid     Standing Status:   Future     Number of Occurrences:   1     Standing Expiration Date:   10/7/2025     Order Specific Question:   Release result to MyChart     Answer:   Immediate [1]    Urinalysis with Reflex Microscopic     Standing Status:   Future     Number of Occurrences:   1     Standing Expiration Date:   10/7/2025     Order Specific Question:   Release result to MyChart     Answer:   Immediate [1]    Cancer Antigen 27-29     Standing Status:   Standing     Number of Occurrences:   1     Order Specific Question:   Release result to MyChart     Answer:   Immediate [1]    CBC and Auto Differential     Standing Status:   Standing     Number of Occurrences:   1     Order Specific Question:   Release result to MyChart     Answer:   Immediate [1]    Comprehensive metabolic panel     Standing Status:   Standing     Number of Occurrences:   1     Order Specific Question:   Release result to MyChart     Answer:   Immediate [1]    Amylase     Standing Status:   Standing     Number of Occurrences:   1     Order Specific Question:   Release result to MyChart     Answer:   Immediate [1]    APTT     Standing Status:   Standing     Number of Occurrences:   1     Order Specific Question:   Release result to MyChart     Answer:   Immediate [1]    D-dimer, Quantitative     Standing Status:   Standing     Number of Occurrences:   1     Order Specific Question:   Release result to MyChart     Answer:   Immediate [1]    Haptoglobin     Standing Status:   Standing     Number of Occurrences:   1     Order Specific Question:   Release result to MyChart     Answer:   Immediate [1]    Lactate dehydrogenase     Standing Status:   Standing     Number of Occurrences:   1     Order Specific Question:   Release result to MyChart     Answer:   Immediate [1]    Magnesium     Standing Status:   Standing     Number of Occurrences:   1     Order Specific Question:   Release result to MyChart      Answer:   Immediate [1]    Phosphorus     Standing Status:   Standing     Number of Occurrences:   1     Order Specific Question:   Release result to MyChart     Answer:   Immediate [1]    Protime-INR     Standing Status:   Standing     Number of Occurrences:   1     Order Specific Question:   Release result to MyChart     Answer:   Immediate [1]    Reticulocytes     Standing Status:   Standing     Number of Occurrences:   1     Order Specific Question:   Release result to MyChart     Answer:   Immediate [1]    Uric Acid     Standing Status:   Standing     Number of Occurrences:   1     Order Specific Question:   Release result to MyChart     Answer:   Immediate [1]    Urinalysis with Reflex Microscopic     Standing Status:   Standing     Number of Occurrences:   1     Order Specific Question:   Release result to MyChart     Answer:   Immediate [1]        [id number] - Study Of ATRN-119 In Patients With Advanced Solid Tumors    Patient has no adverse events documented in the Research Adverse Events activity.        Vital Signs   Temperature, heart rate, respirations, blood pressure: in a sitting position at rest for at least 5 minutes.     See vital signs in chart above and or flowsheet    12-Lead ECG   DCRU/[employer] 5500 ECG machine  Triplicate   Each 1 - 5 minutes apart   Rest supine at least 5 minutes prior   Rest Time  QTcF Average QTcF   5 minutes 405  08:24:00 403  08:25:00 401  08:26:01 403        Safety Labs    Refer to [employer] for orders   Safety labs drawn at 0840 via right chest medi-port    Discharge Instructions   Discharge patient to home after study requirements completed.   Remind patient to return  per    Discharge time: 0846     Fabiola Flowers RN  10/07/24

## 2024-10-09 ENCOUNTER — APPOINTMENT (OUTPATIENT)
Dept: INTEGRATIVE MEDICINE | Facility: CLINIC | Age: 64
End: 2024-10-09

## 2024-10-10 ENCOUNTER — HOSPITAL ENCOUNTER (OUTPATIENT)
Dept: RADIOLOGY | Facility: CLINIC | Age: 64
End: 2024-10-10
Payer: COMMERCIAL

## 2024-10-10 ENCOUNTER — APPOINTMENT (OUTPATIENT)
Dept: RADIOLOGY | Facility: CLINIC | Age: 64
End: 2024-10-10
Payer: COMMERCIAL

## 2024-10-11 ENCOUNTER — TELEPHONE (OUTPATIENT)
Dept: HEMATOLOGY/ONCOLOGY | Facility: HOSPITAL | Age: 64
End: 2024-10-11
Payer: COMMERCIAL

## 2024-10-11 NOTE — TELEPHONE ENCOUNTER
Patient called in requesting copies of latest imaging scans for possible clinical trial at [facility]. Provided her with medical records phone number as well as radiology imaging requests phone number. Patient appreciative of call. Message sent to team as [facility].

## 2024-10-13 NOTE — PATIENT INSTRUCTIONS
Patient Name: Dalila Pike  : 1968    MRN: [id number]                              Today's Date: 10/13/2024       Admit Date: 10/7/2024    Visit Dx:     ICD-10-CM ICD-9-CM   1. Dilated cbd, acquired  K83.8 576.8   2. Gastritis  K29.70 535.50   3. Cholangitis  K83.09 576.1     Patient Active Problem List   Diagnosis    Acute CVA (cerebrovascular accident)    Alcohol use    Tobacco use    Obesity (BMI 30-39.9)    HTN (hypertension)    Dyslipidemia    History of seizures    PAD (peripheral artery disease)    Oropharyngeal dysphagia    Acute UTI (urinary tract infection)    Hypertensive emergency    Single episode of loss of consciousness without head trauma    RUQ pain    Dilated cbd, acquired    Cholangitis     Past Medical History:   Diagnosis Date    Acute CVA (cerebrovascular accident) 2024    Alcohol use 2024    Dyslipidemia 2024    Obesity (BMI 30-39.9) 2024    PAD (peripheral artery disease) 2024    Tobacco use 2024     Past Surgical History:   Procedure Laterality Date    ENDOSCOPY N/A 10/8/2024    Procedure: ESOPHAGOGASTRODUODENOSCOPY;  Surgeon: Elie Sanders MD;  Location:  [facility];  Service: Gastroenterology;  Laterality: N/A;    ERCP N/A 10/8/2024    Procedure: ENDOSCOPIC RETROGRADE CHOLANGIOPANCREATOGRAPHY WITH STENT PLACEMENT;  Surgeon: Elie Sanders MD;  Location:  [facility];  Service: Gastroenterology;  Laterality: N/A;  SPHINCTEROTOMY @ 1737, 9-12MM AND 12-15MM BALLOON SWEEP TO CBD    INTERVENTIONAL RADIOLOGY PROCEDURE Bilateral 2024    Procedure: Carotid Cervical Angiogram;  Surgeon: Jamaal Saini MD;  Location:  [facility];  Service: Interventional Radiology;  Laterality: Bilateral;      General Information       Row Name 10/13/24 1730          Physical Therapy Time and Intention    Document Type therapy note (daily note)  -DM     Mode of Treatment physical therapy  -DM       Row Name 10/13/24 1739           Please call us at [phone] option 5 then option 2 with any questions or concerns   Ct guided biopsy of liver lesion    Consider phase 1 clinical trial    Bloodwork today    Will arrange mobile [facility] for hereditary brca testing if we don't get results back from prior test     General Information    Existing Precautions/Restrictions fall;brace worn when out of bed;other (see comments);seizures  10-8: ERCP w/ stent; sz prec d/t ETOH;Recent R MCA isch CVA w/ resid. LUE weakness;Uses LUE sling; LLE walking boot (WBAT)when OOB  -DM               User Key  (r) = Recorded By, (t) = Taken By, (c) = Cosigned By      Initials Name Provider Type    DM Divina Maciel, PT Physical Therapist                   Mobility       Row Name 10/13/24 1731          Bed Mobility    Bed Mobility rolling right;scooting/bridging;supine-sit  -DM     Rolling Right [place] (Bed Mobility) independent  -DM     Scooting/Bridging [place] (Bed Mobility) modified independence  -DM     Supine-Sit [place] (Bed Mobility) modified independence  -DM     Assistive Device (Bed Mobility) bed rails;head of bed elevated  -DM     Comment, (Bed Mobility) per pt, has 7/10 LUE discomf,but did not want to req. pain med from nsg; once EOB, donned L walking boot w/ min A of PT to fasten lower velcro straps (otherwise SBA to don boot & fasten upper straps),then donned LUE sling w/ min A; instructed in PLB exer  -DM       Row Name 10/13/24 1731          Transfers    Comment, (Transfers) performed LAQ, sitting marches, then STS transf w/ reminder of WBAT LLE  -DM       Row Name 10/13/24 1731          Sit-Stand Transfer    Sit-Stand [place] (Transfers) contact guard;verbal cues  PT init supp. LUE until pt had it completely seated in sling  -DM     Assistive Device (Sit-Stand Transfers) --  decl.SPC or use of R Bedrail  -DM       Row Name 10/13/24 1731          Gait/Stairs (Locomotion)    [place] Level (Gait) verbal cues;contact guard  Agreed to gt in room, but decl. kenney d/t long walk earlier  -DM     Assistive Device (Gait) --  decl.AD  -DM     Distance in Feet (Gait) 20  -DM     Deviations/Abnormal Patterns (Gait) bilateral deviations;base of support, narrow;shea decreased;stride length decreased  Decr step  length;wearing L boot  -DM     Left Sided Gait Deviations forward flexed posture;heel strike decreased  Gazing@ floor freq & shifting LUE position in sling  -DM     Comment, (Gait/Stairs) Cues for incr step length & ROBBI, trunk ext/focusing at chair, & PLB; HR 88  -DM               User Key  (r) = Recorded By, (t) = Taken By, (c) = Cosigned By      Initials Name Provider Type    DM Divina Maciel, PT Physical Therapist                   Obj/Interventions       Row Name 10/13/24 1731          Motor Skills    Therapeutic Exercise hip;knee;ankle  BUE per OT  -DM       Row Name 10/13/24 1731          Hip (Therapeutic Exercise)    Hip (Therapeutic Exercise) AROM (active range of motion);isometric exercises  -DM     Hip AROM (Therapeutic Exercise) bilateral;flexion;extension;aBduction;aDduction;external rotation;internal rotation;sitting;10 repetitions  -DM     Hip Isometrics (Therapeutic Exercise) bilateral;gluteal sets;sitting;10 repetitions  -DM       Row Name 10/13/24 1731          Knee (Therapeutic Exercise)    Knee (Therapeutic Exercise) AROM (active range of motion);isometric exercises  -DM     Knee AROM (Therapeutic Exercise) bilateral;flexion;extension;SAQ (short arc quad);SLR (straight leg raise);LAQ (long arc quad);heel slides;sitting;10 repetitions  Init min A for LLE KTC to supp L heel up off of surface, then pt performed final 5 reps indep  -DM     Knee Isometrics (Therapeutic Exercise) bilateral;quad sets;sitting;10 repetitions;other (see comments)  Also 5 reps R HS sets (def. w/ LLE d/t keeping pressure off L heel)  -DM       Row Name 10/13/24 1731          Ankle (Therapeutic Exercise)    Ankle (Therapeutic Exercise) AROM (active range of motion)  -DM     Ankle AROM (Therapeutic Exercise) right;dorsiflexion;plantarflexion;sitting;10 repetitions;other (see comments)  AC  -DM       Row Name 10/13/24 1731          Balance    Static Sitting Balance independent  -DM     Dynamic Sitting Balance independent  -DM      Position, Sitting Balance unsupported;sitting in chair;sitting edge of bed  -DM     Static Standing Balance standby assist  -DM     Dynamic Standing Balance verbal cues;contact guard  -DM     Position/Device Used, Standing Balance unsupported;other (see comments)  init min A of PT to supp.LUE, then pt.supp. w/ sling in place  -DM     Balance Interventions sitting;standing;static;dynamic;weight shifting activity  -DM               User Key  (r) = Recorded By, (t) = Taken By, (c) = Cosigned By      Initials Name Provider Type    DM Divina Maciel, PT Physical Therapist                   Goals/Plan    No documentation.                  Clinical Impression       Row Name 10/13/24 1731          Pain    Pretreatment Pain Rating 7/10  -DM     Posttreatment Pain Rating 7/10  -DM     Pain Location extremity  -DM     Pain Side/Orientation left;upper  -DM     Pain Management Interventions positioning techniques utilized  -DM     Response to Pain Interventions activity participation with tolerable pain  -DM     Pain Intervention(s) Repositioned;Rest;Elevated  -DM       Row Name 10/13/24 1731          Plan of Care Review    Plan of Care Reviewed With patient  -DM     Progress no change  -DM     Outcome Evaluation Pt. amb 20 ft w/ CGA, WBAT LLE (wearing LUE sling, & L walking boot, but decl. AD) & performed LE ther exer EOB & UIC, but def. UE exer d/t pain & earlier OT session. Noted HR 88, desat 96%, & LUE pain 7/10.  -DM       Row Name 10/13/24 1731          Vital Signs    Pre Systolic BP Rehab 130  -DM     Pre Treatment Diastolic BP 83  -DM     Pretreatment Heart Rate (beats/min) 88  -DM     Pre SpO2 (%) 96  -DM     O2 Delivery Pre Treatment room air  -DM     O2 Delivery Intra Treatment room air  -DM     O2 Delivery Post Treatment room air  -DM     Pre Patient Position Supine  -DM     Intra Patient Position Standing  -DM     Post Patient Position Sitting  -DM       Row Name 10/13/24 1731          Positioning and  Restraints    Pre-Treatment Position in bed  -DM     Post Treatment Position chair  -DM     In Chair notified nsg;reclined;call light within reach;encouraged to call for assist;exit alarm on;LUE elevated;waffle cushion;legs elevated  -DM       Row Name 10/13/24 1731          Plan of Care Review    Plan of Care Reviewed With patient  -DM               User Key  (r) = Recorded By, (t) = Taken By, (c) = Cosigned By      Initials Name Provider Type    Divina Cottrell, PT Physical Therapist                   Outcome Measures       Row Name 10/13/24 1731          How much help from another person do you currently need...    Turning from your back to your side while in flat bed without using bedrails? 4  -DM     Moving from lying on back to sitting on the side of a flat bed without bedrails? 4  -DM     Moving to and from a bed to a chair (including a wheelchair)? 3  -DM     Standing up from a chair using your arms (e.g., wheelchair, bedside chair)? 3  -DM     Climbing 3-5 steps with a railing? 3  -DM     To walk in hospital room? 3  -DM     AM-PAC 6 Clicks Score (PT) 20  -DM     Highest Level of Mobility Goal 6 --> Walk 10 steps or more  -DM       Row Name 10/13/24 1731 10/13/24 1107       Functional Assessment    Outcome Measure Options AM-PAC 6 Clicks Basic Mobility (PT)  -DM AM-PAC 6 Clicks Daily Activity (OT)  -CS              User Key  (r) = Recorded By, (t) = Taken By, (c) = Cosigned By      Initials Name Provider Type    Divina Cottrell, PT Physical Therapist    Ignacio Leon, OT Occupational Therapist                                 Physical Therapy Education       Title: PT OT SLP Therapies (In Progress)       Topic: Physical Therapy (In Progress)       Point: Mobility training (In Progress)       Learning Progress Summary            Patient Acceptance, E,D, NR by DM at 10/13/2024 1805    Acceptance, E,TB, VU by ES at 10/10/2024 1510   Family Acceptance, E,TB, VU by ES at 10/10/2024 1510                       Point: Home exercise program (In Progress)       Learning Progress Summary            Patient Acceptance, E,D, NR by DM at 10/13/2024 1805                      Point: Body mechanics (In Progress)       Learning Progress Summary            Patient Acceptance, E,D, NR by DM at 10/13/2024 1805    Acceptance, E,TB, VU by ES at 10/10/2024 1510   Family Acceptance, E,TB, VU by ES at 10/10/2024 1510                      Point: Precautions (In Progress)       Learning Progress Summary            Patient Acceptance, E,D, NR by DM at 10/13/2024 1805    Acceptance, E,TB, VU by ES at 10/10/2024 1510   Family Acceptance, E,TB, VU by ES at 10/10/2024 1510                                      User Key       Initials Effective Dates Name Provider Type Discipline    DM 02/03/23 -  Divina Maciel, PT Physical Therapist PT    ES 08/11/22 -  Karie Vaca, MASTER Physical Therapist PT                  PT Recommendation and Plan     Plan of Care Reviewed With: patient  Progress: no change  Outcome Evaluation: Pt. amb 20 ft w/ CGA, WBAT LLE (wearing LUE sling, & L walking boot, but decl. AD) & performed LE ther exer EOB & UIC, but def. UE exer d/t pain & earlier OT session. Noted HR 88, desat 96%, & LUE pain 7/10.     Time Calculation:         PT Charges       Row Name 10/13/24 1806             Time Calculation    Start Time 1731  -DM      PT Received On 10/13/24  -DM      PT Goal Re-Cert Due Date 10/20/24  -DM         Time Calculation- PT    Total Timed Code Minutes- PT 24 minute(s)  -DM         Timed Charges    05962 - PT Therapeutic Exercise Minutes 14  -DM      50651 - Gait Training Minutes  10  -DM         Total Minutes    Timed Charges Total Minutes 24  -DM       Total Minutes 24  -DM                User Key  (r) = Recorded By, (t) = Taken By, (c) = Cosigned By      Initials Name Provider Type    DM Divina Maciel, PT Physical Therapist                  Therapy Charges for Today       Code Description Service Date  Service Provider Modifiers Qty    [id number]  PT THER PROC EA 15 MIN 10/13/2024 Divina Maciel, PT GP 1    [id number] HC GAIT TRAINING EA 15 MIN 10/13/2024 Divina Maciel, PT GP 1            PT G-Codes  Outcome Measure Options: AM-PAC 6 Clicks Basic Mobility (PT)  AM-PAC 6 Clicks Score (PT): 20  AM-PAC 6 Clicks Score (OT): 16       Divina Maciel, PT  10/13/2024

## 2024-10-16 ENCOUNTER — APPOINTMENT (OUTPATIENT)
Dept: INTEGRATIVE MEDICINE | Facility: CLINIC | Age: 64
End: 2024-10-16

## 2024-10-21 ENCOUNTER — APPOINTMENT (OUTPATIENT)
Dept: HEMATOLOGY/ONCOLOGY | Facility: CLINIC | Age: 64
End: 2024-10-21
Payer: COMMERCIAL

## 2024-10-22 ENCOUNTER — TELEPHONE (OUTPATIENT)
Dept: HEMATOLOGY/ONCOLOGY | Facility: CLINIC | Age: 64
End: 2024-10-22

## 2024-10-22 ENCOUNTER — TELEPHONE (OUTPATIENT)
Dept: ADMISSION | Facility: HOSPITAL | Age: 64
End: 2024-10-22
Payer: COMMERCIAL

## 2024-10-22 DIAGNOSIS — G89.3 CANCER RELATED PAIN: ICD-10-CM

## 2024-10-22 RX ORDER — MORPHINE SULFATE 15 MG/1
15 TABLET, FILM COATED, EXTENDED RELEASE ORAL 3 TIMES DAILY
Qty: 90 TABLET | Refills: 0 | Status: SHIPPED | OUTPATIENT
Start: 2024-10-22 | End: 2024-11-22 | Stop reason: SDUPTHER

## 2024-10-22 NOTE — TELEPHONE ENCOUNTER
Pt called- she was pursuing a clinical trial at [facility], but she was randomized into the [facility] so would receive Eribulin. She said Dr. Tolbert was going to put her on Trodelvy. She would like to speak to him directly to discuss which route to choose. Message sent to the team.

## 2024-10-23 ENCOUNTER — APPOINTMENT (OUTPATIENT)
Dept: INTEGRATIVE MEDICINE | Facility: CLINIC | Age: 64
End: 2024-10-23

## 2024-10-23 DIAGNOSIS — C79.51 CARCINOMA OF RIGHT BREAST METASTATIC TO BONE (MULTI): ICD-10-CM

## 2024-10-23 DIAGNOSIS — M72.2 PLANTAR FASCIITIS: Primary | ICD-10-CM

## 2024-10-23 DIAGNOSIS — C50.911 CARCINOMA OF RIGHT BREAST METASTATIC TO BONE (MULTI): ICD-10-CM

## 2024-10-23 PROCEDURE — 97139 UNLISTED THERAPEUTIC PX: CPT | Performed by: ACUPUNCTURIST

## 2024-10-23 ASSESSMENT — PAIN SCALES - GENERAL: PAINLEVEL_OUTOF10: 3

## 2024-10-23 NOTE — PROGRESS NOTES
Acupuncture Visit:     Subjective   Patient ID: Trang Jones is a 64 y.o. female who presents for No chief complaint on file.  Reported that she has been having a lot of emotional ups and downs pertaining to treatment and availability of clinical trials to her.  Plantar fasciitis  had improved but flared with hike on Monday which made it difficult for her to walk following days.  Previous:  Seeking continued support for left side foot pain related to plantar fascitis and diarrhea/constipation associated with cancer tx.  Active frontal h/a        Session Information  Is this acupuncture treatment being billed to the patient's insurance company: No  Visit Type: Follow-up visit    Pre-treatment Assessment  Pain Score: 3  Anxiety Level (0-10): 2  Stress Level (0-10): 2  Coping Level (0-10): 6  Depression Level (0-10): 1  Fatigue Level (0-10): 4  Nausea Level (0-10): 0  Wellbeing Level (0-10): 7    Review of Systems         Provider reviewed plan for the acupuncture session, precautions and contraindications. Patient/guardian/hospital staff has given consent to treat with full understanding of what to expect during the session. Before acupuncture began, provider explained to the patient to communicate at any time if the procedure was causing discomfort past their tolerance level. Patient agreed to advise acupuncturist. The acupuncturist counseled the patient on the risks of acupuncture treatment including pain, infection, bleeding, and no relief of pain. The patient was positioned comfortably. There was no evidence of infection at the site of needle insertions.    Objective   Physical Exam    Acupuncture Physical Exam  Tongue Color: Pale body  Tongue Shape: Puffy, Scalloped edges  Pulse: Weak, Deep    Treatment Plan  Treatment Goals: Fatigue reduction, Anxiety reduction, Relaxation, Stress reduction, Pain management    Acupuncture Treatment  Patient Position: Seated and reclining  Needle Guage: 40 guage /.16/ Red  lashawnn  Body Points - Left: kd 2, ub 63, 60, heart of sole, dlpfcx2, st 36, yin isaacs  Body Points - Bilateral: sp 6  Body Points - Right: buddha palm  Needle Count In: 13  Needle Count Out: 13  Needle Retention Time (min): 25 minutes  Total Face to Face Time (min): 25 minutes         Post-treatment Assessment  0-10 (Numeric) Pain Score: 3  Anxiety Level (0-10): 2  Stress Level (0-10): 1  Coping Level (0-10): 8  Depression Level (0-10): 2  Fatigue Level (0-10): 3  Nausea Level (0-10): 0  Wellbeing Level (0-10): 8    Assessment/Plan

## 2024-10-28 ENCOUNTER — INFUSION (OUTPATIENT)
Dept: HEMATOLOGY/ONCOLOGY | Facility: CLINIC | Age: 64
End: 2024-10-28
Payer: COMMERCIAL

## 2024-10-28 ENCOUNTER — APPOINTMENT (OUTPATIENT)
Dept: HEMATOLOGY/ONCOLOGY | Facility: CLINIC | Age: 64
End: 2024-10-28
Payer: COMMERCIAL

## 2024-10-28 VITALS
DIASTOLIC BLOOD PRESSURE: 73 MMHG | HEIGHT: 61 IN | OXYGEN SATURATION: 98 % | WEIGHT: 137.7 LBS | RESPIRATION RATE: 16 BRPM | BODY MASS INDEX: 26 KG/M2 | TEMPERATURE: 97.7 F | SYSTOLIC BLOOD PRESSURE: 114 MMHG | HEART RATE: 64 BPM

## 2024-10-28 DIAGNOSIS — C50.911 CARCINOMA OF RIGHT BREAST METASTATIC TO BONE (MULTI): ICD-10-CM

## 2024-10-28 DIAGNOSIS — C79.51 CARCINOMA OF RIGHT BREAST METASTATIC TO BONE (MULTI): Primary | ICD-10-CM

## 2024-10-28 DIAGNOSIS — C41.9 MALIGNANT NEOPLASM OF BONE WITH METASTASES (MULTI): ICD-10-CM

## 2024-10-28 DIAGNOSIS — C50.911 CARCINOMA OF RIGHT BREAST METASTATIC TO BONE (MULTI): Primary | ICD-10-CM

## 2024-10-28 DIAGNOSIS — C79.51 CARCINOMA OF RIGHT BREAST METASTATIC TO BONE (MULTI): ICD-10-CM

## 2024-10-28 LAB
ALBUMIN SERPL BCP-MCNC: 3.8 G/DL (ref 3.4–5)
ALP SERPL-CCNC: 141 U/L (ref 33–136)
ALT SERPL W P-5'-P-CCNC: 25 U/L (ref 7–45)
ANION GAP SERPL CALC-SCNC: 11 MMOL/L (ref 10–20)
AST SERPL W P-5'-P-CCNC: 52 U/L (ref 9–39)
BASOPHILS # BLD AUTO: 0.03 X10*3/UL (ref 0–0.1)
BASOPHILS NFR BLD AUTO: 0.6 %
BILIRUB SERPL-MCNC: 0.4 MG/DL (ref 0–1.2)
BUN SERPL-MCNC: 14 MG/DL (ref 6–23)
CALCIUM SERPL-MCNC: 8.8 MG/DL (ref 8.6–10.3)
CANCER AG27-29 SERPL-ACNC: 783.5 U/ML (ref 0–38.6)
CHLORIDE SERPL-SCNC: 103 MMOL/L (ref 98–107)
CO2 SERPL-SCNC: 30 MMOL/L (ref 21–32)
CREAT SERPL-MCNC: 0.75 MG/DL (ref 0.5–1.05)
EGFRCR SERPLBLD CKD-EPI 2021: 90 ML/MIN/1.73M*2
EOSINOPHIL # BLD AUTO: 0.13 X10*3/UL (ref 0–0.7)
EOSINOPHIL NFR BLD AUTO: 2.5 %
ERYTHROCYTE [DISTWIDTH] IN BLOOD BY AUTOMATED COUNT: 12.9 % (ref 11.5–14.5)
GLUCOSE SERPL-MCNC: 105 MG/DL (ref 74–99)
HBV CORE AB SER QL: NONREACTIVE
HBV SURFACE AB SER-ACNC: <3.1 MIU/ML
HBV SURFACE AG SERPL QL IA: NONREACTIVE
HCT VFR BLD AUTO: 34.9 % (ref 36–46)
HGB BLD-MCNC: 11.4 G/DL (ref 12–16)
IMM GRANULOCYTES # BLD AUTO: 0.05 X10*3/UL (ref 0–0.7)
IMM GRANULOCYTES NFR BLD AUTO: 1 % (ref 0–0.9)
LYMPHOCYTES # BLD AUTO: 1.02 X10*3/UL (ref 1.2–4.8)
LYMPHOCYTES NFR BLD AUTO: 19.6 %
MCH RBC QN AUTO: 28.9 PG (ref 26–34)
MCHC RBC AUTO-ENTMCNC: 32.7 G/DL (ref 32–36)
MCV RBC AUTO: 88 FL (ref 80–100)
MONOCYTES # BLD AUTO: 0.52 X10*3/UL (ref 0.1–1)
MONOCYTES NFR BLD AUTO: 10 %
NEUTROPHILS # BLD AUTO: 3.45 X10*3/UL (ref 1.2–7.7)
NEUTROPHILS NFR BLD AUTO: 66.3 %
PLATELET # BLD AUTO: 176 X10*3/UL (ref 150–450)
POTASSIUM SERPL-SCNC: 3.8 MMOL/L (ref 3.5–5.3)
PROT SERPL-MCNC: 6 G/DL (ref 6.4–8.2)
RBC # BLD AUTO: 3.95 X10*6/UL (ref 4–5.2)
SODIUM SERPL-SCNC: 140 MMOL/L (ref 136–145)
WBC # BLD AUTO: 5.2 X10*3/UL (ref 4.4–11.3)

## 2024-10-28 PROCEDURE — 86300 IMMUNOASSAY TUMOR CA 15-3: CPT | Mod: GEALAB

## 2024-10-28 PROCEDURE — 2500000004 HC RX 250 GENERAL PHARMACY W/ HCPCS (ALT 636 FOR OP/ED): Performed by: INTERNAL MEDICINE

## 2024-10-28 PROCEDURE — 85025 COMPLETE CBC W/AUTO DIFF WBC: CPT

## 2024-10-28 PROCEDURE — 87340 HEPATITIS B SURFACE AG IA: CPT | Mod: GEALAB

## 2024-10-28 PROCEDURE — 86704 HEP B CORE ANTIBODY TOTAL: CPT | Mod: GEALAB

## 2024-10-28 PROCEDURE — 96413 CHEMO IV INFUSION 1 HR: CPT

## 2024-10-28 PROCEDURE — 86706 HEP B SURFACE ANTIBODY: CPT | Mod: GEALAB

## 2024-10-28 PROCEDURE — 96415 CHEMO IV INFUSION ADDL HR: CPT

## 2024-10-28 PROCEDURE — 96367 TX/PROPH/DG ADDL SEQ IV INF: CPT

## 2024-10-28 PROCEDURE — 2500000001 HC RX 250 WO HCPCS SELF ADMINISTERED DRUGS (ALT 637 FOR MEDICARE OP): Performed by: INTERNAL MEDICINE

## 2024-10-28 PROCEDURE — 96375 TX/PRO/DX INJ NEW DRUG ADDON: CPT | Mod: INF

## 2024-10-28 PROCEDURE — 84520 ASSAY OF UREA NITROGEN: CPT

## 2024-10-28 PROCEDURE — 2500000004 HC RX 250 GENERAL PHARMACY W/ HCPCS (ALT 636 FOR OP/ED)

## 2024-10-28 RX ORDER — PROCHLORPERAZINE EDISYLATE 5 MG/ML
10 INJECTION INTRAMUSCULAR; INTRAVENOUS EVERY 6 HOURS PRN
Status: DISCONTINUED | OUTPATIENT
Start: 2024-10-28 | End: 2024-10-28 | Stop reason: HOSPADM

## 2024-10-28 RX ORDER — PALONOSETRON 0.05 MG/ML
0.25 INJECTION, SOLUTION INTRAVENOUS ONCE
Status: COMPLETED | OUTPATIENT
Start: 2024-10-28 | End: 2024-10-28

## 2024-10-28 RX ORDER — HEPARIN SODIUM,PORCINE/PF 10 UNIT/ML
50 SYRINGE (ML) INTRAVENOUS AS NEEDED
OUTPATIENT
Start: 2024-10-28

## 2024-10-28 RX ORDER — ACETAMINOPHEN 325 MG/1
650 TABLET ORAL ONCE
Status: COMPLETED | OUTPATIENT
Start: 2024-10-28 | End: 2024-10-28

## 2024-10-28 RX ORDER — DIPHENHYDRAMINE HYDROCHLORIDE 50 MG/ML
50 INJECTION INTRAMUSCULAR; INTRAVENOUS AS NEEDED
Status: DISCONTINUED | OUTPATIENT
Start: 2024-10-28 | End: 2024-10-28 | Stop reason: HOSPADM

## 2024-10-28 RX ORDER — FAMOTIDINE 10 MG/ML
20 INJECTION INTRAVENOUS ONCE AS NEEDED
Status: DISCONTINUED | OUTPATIENT
Start: 2024-10-28 | End: 2024-10-28 | Stop reason: HOSPADM

## 2024-10-28 RX ORDER — DEXAMETHASONE 4 MG/1
12 TABLET ORAL ONCE
Status: COMPLETED | OUTPATIENT
Start: 2024-10-28 | End: 2024-10-28

## 2024-10-28 RX ORDER — ALBUTEROL SULFATE 0.83 MG/ML
3 SOLUTION RESPIRATORY (INHALATION) AS NEEDED
Status: DISCONTINUED | OUTPATIENT
Start: 2024-10-28 | End: 2024-10-28 | Stop reason: HOSPADM

## 2024-10-28 RX ORDER — DIPHENHYDRAMINE HCL 25 MG
25 CAPSULE ORAL ONCE
Status: COMPLETED | OUTPATIENT
Start: 2024-10-28 | End: 2024-10-28

## 2024-10-28 RX ORDER — FAMOTIDINE 10 MG/ML
20 INJECTION INTRAVENOUS ONCE
Status: COMPLETED | OUTPATIENT
Start: 2024-10-28 | End: 2024-10-28

## 2024-10-28 RX ORDER — HEPARIN 100 UNIT/ML
500 SYRINGE INTRAVENOUS AS NEEDED
Status: DISCONTINUED | OUTPATIENT
Start: 2024-10-28 | End: 2024-10-28 | Stop reason: HOSPADM

## 2024-10-28 RX ORDER — PROCHLORPERAZINE MALEATE 10 MG
10 TABLET ORAL EVERY 6 HOURS PRN
Status: DISCONTINUED | OUTPATIENT
Start: 2024-10-28 | End: 2024-10-28 | Stop reason: HOSPADM

## 2024-10-28 RX ORDER — ATROPINE SULFATE 0.4 MG/ML
0.4 INJECTION, SOLUTION ENDOTRACHEAL; INTRAMEDULLARY; INTRAMUSCULAR; INTRAVENOUS; SUBCUTANEOUS
Status: DISCONTINUED | OUTPATIENT
Start: 2024-10-28 | End: 2024-10-28 | Stop reason: HOSPADM

## 2024-10-28 RX ORDER — HEPARIN 100 UNIT/ML
500 SYRINGE INTRAVENOUS AS NEEDED
OUTPATIENT
Start: 2024-10-28

## 2024-10-28 RX ORDER — DEXAMETHASONE 4 MG/1
12 TABLET ORAL ONCE
Status: CANCELLED | OUTPATIENT
Start: 2024-11-04 | End: 2024-11-04

## 2024-10-28 RX ORDER — EPINEPHRINE 0.3 MG/.3ML
0.3 INJECTION SUBCUTANEOUS EVERY 5 MIN PRN
Status: DISCONTINUED | OUTPATIENT
Start: 2024-10-28 | End: 2024-10-28 | Stop reason: HOSPADM

## 2024-10-28 ASSESSMENT — PAIN SCALES - GENERAL: PAINLEVEL_OUTOF10: 4

## 2024-10-30 ENCOUNTER — APPOINTMENT (OUTPATIENT)
Dept: HEMATOLOGY/ONCOLOGY | Facility: CLINIC | Age: 64
End: 2024-10-30
Payer: COMMERCIAL

## 2024-11-01 NOTE — ADDENDUM NOTE
Encounter addended by: Roberta Harden RN on: 11/1/2024 10:54 AM   Actions taken: Charge Capture section accepted

## 2024-11-04 ENCOUNTER — INFUSION (OUTPATIENT)
Dept: HEMATOLOGY/ONCOLOGY | Facility: CLINIC | Age: 64
End: 2024-11-04
Payer: COMMERCIAL

## 2024-11-04 VITALS
WEIGHT: 138.45 LBS | HEART RATE: 68 BPM | SYSTOLIC BLOOD PRESSURE: 118 MMHG | OXYGEN SATURATION: 97 % | DIASTOLIC BLOOD PRESSURE: 75 MMHG | BODY MASS INDEX: 25.74 KG/M2 | RESPIRATION RATE: 17 BRPM | TEMPERATURE: 96.3 F

## 2024-11-04 DIAGNOSIS — C41.9 MALIGNANT NEOPLASM OF BONE WITH METASTASES (MULTI): ICD-10-CM

## 2024-11-04 DIAGNOSIS — C79.51 CARCINOMA OF RIGHT BREAST METASTATIC TO BONE (MULTI): ICD-10-CM

## 2024-11-04 DIAGNOSIS — C50.911 CARCINOMA OF RIGHT BREAST METASTATIC TO BONE (MULTI): ICD-10-CM

## 2024-11-04 LAB
BASOPHILS # BLD AUTO: 0.02 X10*3/UL (ref 0–0.1)
BASOPHILS NFR BLD AUTO: 0.5 %
EOSINOPHIL # BLD AUTO: 0.12 X10*3/UL (ref 0–0.7)
EOSINOPHIL NFR BLD AUTO: 3.2 %
ERYTHROCYTE [DISTWIDTH] IN BLOOD BY AUTOMATED COUNT: 12.8 % (ref 11.5–14.5)
HCT VFR BLD AUTO: 33.9 % (ref 36–46)
HGB BLD-MCNC: 11.3 G/DL (ref 12–16)
IMM GRANULOCYTES # BLD AUTO: 0.03 X10*3/UL (ref 0–0.7)
IMM GRANULOCYTES NFR BLD AUTO: 0.8 % (ref 0–0.9)
LYMPHOCYTES # BLD AUTO: 1.23 X10*3/UL (ref 1.2–4.8)
LYMPHOCYTES NFR BLD AUTO: 32.5 %
MCH RBC QN AUTO: 29.2 PG (ref 26–34)
MCHC RBC AUTO-ENTMCNC: 33.3 G/DL (ref 32–36)
MCV RBC AUTO: 88 FL (ref 80–100)
MONOCYTES # BLD AUTO: 0.42 X10*3/UL (ref 0.1–1)
MONOCYTES NFR BLD AUTO: 11.1 %
NEUTROPHILS # BLD AUTO: 1.97 X10*3/UL (ref 1.2–7.7)
NEUTROPHILS NFR BLD AUTO: 51.9 %
PLATELET # BLD AUTO: 211 X10*3/UL (ref 150–450)
RBC # BLD AUTO: 3.87 X10*6/UL (ref 4–5.2)
WBC # BLD AUTO: 3.8 X10*3/UL (ref 4.4–11.3)

## 2024-11-04 PROCEDURE — 2500000004 HC RX 250 GENERAL PHARMACY W/ HCPCS (ALT 636 FOR OP/ED): Performed by: INTERNAL MEDICINE

## 2024-11-04 PROCEDURE — 96375 TX/PRO/DX INJ NEW DRUG ADDON: CPT | Mod: INF

## 2024-11-04 PROCEDURE — 2500000001 HC RX 250 WO HCPCS SELF ADMINISTERED DRUGS (ALT 637 FOR MEDICARE OP): Performed by: INTERNAL MEDICINE

## 2024-11-04 PROCEDURE — 2500000004 HC RX 250 GENERAL PHARMACY W/ HCPCS (ALT 636 FOR OP/ED)

## 2024-11-04 PROCEDURE — 96413 CHEMO IV INFUSION 1 HR: CPT

## 2024-11-04 PROCEDURE — 85025 COMPLETE CBC W/AUTO DIFF WBC: CPT

## 2024-11-04 PROCEDURE — 96367 TX/PROPH/DG ADDL SEQ IV INF: CPT

## 2024-11-04 RX ORDER — ATROPINE SULFATE 0.4 MG/ML
0.4 INJECTION, SOLUTION ENDOTRACHEAL; INTRAMEDULLARY; INTRAMUSCULAR; INTRAVENOUS; SUBCUTANEOUS
OUTPATIENT
Start: 2024-12-09

## 2024-11-04 RX ORDER — PALONOSETRON 0.05 MG/ML
0.25 INJECTION, SOLUTION INTRAVENOUS ONCE
Status: COMPLETED | OUTPATIENT
Start: 2024-11-04 | End: 2024-11-04

## 2024-11-04 RX ORDER — PALONOSETRON 0.05 MG/ML
0.25 INJECTION, SOLUTION INTRAVENOUS ONCE
OUTPATIENT
Start: 2024-12-16

## 2024-11-04 RX ORDER — FAMOTIDINE 10 MG/ML
20 INJECTION INTRAVENOUS ONCE
OUTPATIENT
Start: 2024-11-18

## 2024-11-04 RX ORDER — PROCHLORPERAZINE EDISYLATE 5 MG/ML
10 INJECTION INTRAMUSCULAR; INTRAVENOUS EVERY 6 HOURS PRN
OUTPATIENT
Start: 2024-12-16

## 2024-11-04 RX ORDER — ALBUTEROL SULFATE 0.83 MG/ML
3 SOLUTION RESPIRATORY (INHALATION) AS NEEDED
OUTPATIENT
Start: 2024-12-16

## 2024-11-04 RX ORDER — DIPHENHYDRAMINE HCL 25 MG
25 CAPSULE ORAL ONCE
OUTPATIENT
Start: 2024-12-16

## 2024-11-04 RX ORDER — DIPHENHYDRAMINE HCL 25 MG
25 CAPSULE ORAL ONCE
OUTPATIENT
Start: 2024-12-30

## 2024-11-04 RX ORDER — PROCHLORPERAZINE MALEATE 10 MG
10 TABLET ORAL EVERY 6 HOURS PRN
OUTPATIENT
Start: 2024-12-09

## 2024-11-04 RX ORDER — ACETAMINOPHEN 325 MG/1
650 TABLET ORAL ONCE
OUTPATIENT
Start: 2024-11-25

## 2024-11-04 RX ORDER — ACETAMINOPHEN 325 MG/1
650 TABLET ORAL ONCE
OUTPATIENT
Start: 2024-11-18

## 2024-11-04 RX ORDER — ACETAMINOPHEN 325 MG/1
650 TABLET ORAL ONCE
OUTPATIENT
Start: 2025-01-06

## 2024-11-04 RX ORDER — EPINEPHRINE 0.3 MG/.3ML
0.3 INJECTION SUBCUTANEOUS EVERY 5 MIN PRN
OUTPATIENT
Start: 2024-12-09

## 2024-11-04 RX ORDER — DEXAMETHASONE 4 MG/1
12 TABLET ORAL ONCE
Status: COMPLETED | OUTPATIENT
Start: 2024-11-04 | End: 2024-11-04

## 2024-11-04 RX ORDER — FAMOTIDINE 10 MG/ML
20 INJECTION INTRAVENOUS ONCE
OUTPATIENT
Start: 2024-12-30

## 2024-11-04 RX ORDER — DEXAMETHASONE 4 MG/1
12 TABLET ORAL ONCE
OUTPATIENT
Start: 2024-12-09 | End: 2024-12-09

## 2024-11-04 RX ORDER — DEXAMETHASONE 4 MG/1
12 TABLET ORAL ONCE
OUTPATIENT
Start: 2025-01-06 | End: 2025-01-06

## 2024-11-04 RX ORDER — DIPHENHYDRAMINE HCL 25 MG
25 CAPSULE ORAL ONCE
OUTPATIENT
Start: 2024-12-09

## 2024-11-04 RX ORDER — DEXAMETHASONE 4 MG/1
12 TABLET ORAL ONCE
OUTPATIENT
Start: 2024-12-30 | End: 2024-12-30

## 2024-11-04 RX ORDER — PROCHLORPERAZINE EDISYLATE 5 MG/ML
10 INJECTION INTRAMUSCULAR; INTRAVENOUS EVERY 6 HOURS PRN
OUTPATIENT
Start: 2024-11-25

## 2024-11-04 RX ORDER — ATROPINE SULFATE 0.4 MG/ML
0.4 INJECTION, SOLUTION ENDOTRACHEAL; INTRAMEDULLARY; INTRAMUSCULAR; INTRAVENOUS; SUBCUTANEOUS
OUTPATIENT
Start: 2024-11-25

## 2024-11-04 RX ORDER — FAMOTIDINE 10 MG/ML
20 INJECTION INTRAVENOUS ONCE
OUTPATIENT
Start: 2024-12-16

## 2024-11-04 RX ORDER — ATROPINE SULFATE 0.4 MG/ML
0.4 INJECTION, SOLUTION ENDOTRACHEAL; INTRAMEDULLARY; INTRAMUSCULAR; INTRAVENOUS; SUBCUTANEOUS
OUTPATIENT
Start: 2025-01-06

## 2024-11-04 RX ORDER — EPINEPHRINE 0.3 MG/.3ML
0.3 INJECTION SUBCUTANEOUS EVERY 5 MIN PRN
OUTPATIENT
Start: 2024-12-16

## 2024-11-04 RX ORDER — ALBUTEROL SULFATE 0.83 MG/ML
3 SOLUTION RESPIRATORY (INHALATION) AS NEEDED
OUTPATIENT
Start: 2024-12-09

## 2024-11-04 RX ORDER — FAMOTIDINE 10 MG/ML
20 INJECTION INTRAVENOUS ONCE
OUTPATIENT
Start: 2024-12-09

## 2024-11-04 RX ORDER — FAMOTIDINE 10 MG/ML
20 INJECTION INTRAVENOUS ONCE
OUTPATIENT
Start: 2024-11-25

## 2024-11-04 RX ORDER — ALBUTEROL SULFATE 0.83 MG/ML
3 SOLUTION RESPIRATORY (INHALATION) AS NEEDED
OUTPATIENT
Start: 2025-01-06

## 2024-11-04 RX ORDER — PROCHLORPERAZINE MALEATE 10 MG
10 TABLET ORAL EVERY 6 HOURS PRN
OUTPATIENT
Start: 2024-12-30

## 2024-11-04 RX ORDER — FAMOTIDINE 10 MG/ML
20 INJECTION INTRAVENOUS ONCE
OUTPATIENT
Start: 2025-01-06

## 2024-11-04 RX ORDER — PALONOSETRON 0.05 MG/ML
0.25 INJECTION, SOLUTION INTRAVENOUS ONCE
OUTPATIENT
Start: 2024-12-30

## 2024-11-04 RX ORDER — ALBUTEROL SULFATE 0.83 MG/ML
3 SOLUTION RESPIRATORY (INHALATION) AS NEEDED
OUTPATIENT
Start: 2024-11-18

## 2024-11-04 RX ORDER — ALBUTEROL SULFATE 0.83 MG/ML
3 SOLUTION RESPIRATORY (INHALATION) AS NEEDED
OUTPATIENT
Start: 2024-12-30

## 2024-11-04 RX ORDER — FAMOTIDINE 10 MG/ML
20 INJECTION INTRAVENOUS ONCE AS NEEDED
OUTPATIENT
Start: 2024-12-09

## 2024-11-04 RX ORDER — ALBUTEROL SULFATE 0.83 MG/ML
3 SOLUTION RESPIRATORY (INHALATION) AS NEEDED
OUTPATIENT
Start: 2024-11-25

## 2024-11-04 RX ORDER — DEXAMETHASONE 4 MG/1
12 TABLET ORAL ONCE
OUTPATIENT
Start: 2024-12-16 | End: 2024-12-16

## 2024-11-04 RX ORDER — ATROPINE SULFATE 0.4 MG/ML
0.4 INJECTION, SOLUTION ENDOTRACHEAL; INTRAMEDULLARY; INTRAMUSCULAR; INTRAVENOUS; SUBCUTANEOUS
OUTPATIENT
Start: 2024-12-16

## 2024-11-04 RX ORDER — EPINEPHRINE 0.3 MG/.3ML
0.3 INJECTION SUBCUTANEOUS EVERY 5 MIN PRN
OUTPATIENT
Start: 2024-11-25

## 2024-11-04 RX ORDER — PROCHLORPERAZINE MALEATE 10 MG
10 TABLET ORAL EVERY 6 HOURS PRN
OUTPATIENT
Start: 2024-12-16

## 2024-11-04 RX ORDER — PALONOSETRON 0.05 MG/ML
0.25 INJECTION, SOLUTION INTRAVENOUS ONCE
OUTPATIENT
Start: 2025-01-06

## 2024-11-04 RX ORDER — PROCHLORPERAZINE MALEATE 10 MG
10 TABLET ORAL EVERY 6 HOURS PRN
OUTPATIENT
Start: 2024-11-18

## 2024-11-04 RX ORDER — PROCHLORPERAZINE EDISYLATE 5 MG/ML
10 INJECTION INTRAMUSCULAR; INTRAVENOUS EVERY 6 HOURS PRN
OUTPATIENT
Start: 2024-12-09

## 2024-11-04 RX ORDER — EPINEPHRINE 0.3 MG/.3ML
0.3 INJECTION SUBCUTANEOUS EVERY 5 MIN PRN
OUTPATIENT
Start: 2025-01-06

## 2024-11-04 RX ORDER — PROCHLORPERAZINE MALEATE 10 MG
10 TABLET ORAL EVERY 6 HOURS PRN
OUTPATIENT
Start: 2024-11-25

## 2024-11-04 RX ORDER — ATROPINE SULFATE 0.4 MG/ML
0.4 INJECTION, SOLUTION ENDOTRACHEAL; INTRAMEDULLARY; INTRAMUSCULAR; INTRAVENOUS; SUBCUTANEOUS
Status: DISCONTINUED | OUTPATIENT
Start: 2024-11-04 | End: 2024-11-04 | Stop reason: HOSPADM

## 2024-11-04 RX ORDER — PROCHLORPERAZINE MALEATE 10 MG
10 TABLET ORAL EVERY 6 HOURS PRN
Status: DISCONTINUED | OUTPATIENT
Start: 2024-11-04 | End: 2024-11-04 | Stop reason: HOSPADM

## 2024-11-04 RX ORDER — PROCHLORPERAZINE EDISYLATE 5 MG/ML
10 INJECTION INTRAMUSCULAR; INTRAVENOUS EVERY 6 HOURS PRN
OUTPATIENT
Start: 2024-11-18

## 2024-11-04 RX ORDER — ACETAMINOPHEN 325 MG/1
650 TABLET ORAL ONCE
OUTPATIENT
Start: 2024-12-16

## 2024-11-04 RX ORDER — DIPHENHYDRAMINE HYDROCHLORIDE 50 MG/ML
50 INJECTION INTRAMUSCULAR; INTRAVENOUS AS NEEDED
OUTPATIENT
Start: 2024-12-16

## 2024-11-04 RX ORDER — HEPARIN SODIUM,PORCINE/PF 10 UNIT/ML
50 SYRINGE (ML) INTRAVENOUS AS NEEDED
Status: DISCONTINUED | OUTPATIENT
Start: 2024-11-04 | End: 2024-11-04 | Stop reason: HOSPADM

## 2024-11-04 RX ORDER — EPINEPHRINE 0.3 MG/.3ML
0.3 INJECTION SUBCUTANEOUS EVERY 5 MIN PRN
OUTPATIENT
Start: 2024-12-30

## 2024-11-04 RX ORDER — ALBUTEROL SULFATE 0.83 MG/ML
3 SOLUTION RESPIRATORY (INHALATION) AS NEEDED
Status: DISCONTINUED | OUTPATIENT
Start: 2024-11-04 | End: 2024-11-04 | Stop reason: HOSPADM

## 2024-11-04 RX ORDER — DIPHENHYDRAMINE HCL 25 MG
25 CAPSULE ORAL ONCE
Status: COMPLETED | OUTPATIENT
Start: 2024-11-04 | End: 2024-11-04

## 2024-11-04 RX ORDER — DIPHENHYDRAMINE HYDROCHLORIDE 50 MG/ML
50 INJECTION INTRAMUSCULAR; INTRAVENOUS AS NEEDED
OUTPATIENT
Start: 2024-12-30

## 2024-11-04 RX ORDER — DIPHENHYDRAMINE HYDROCHLORIDE 50 MG/ML
50 INJECTION INTRAMUSCULAR; INTRAVENOUS AS NEEDED
OUTPATIENT
Start: 2024-11-18

## 2024-11-04 RX ORDER — DIPHENHYDRAMINE HYDROCHLORIDE 50 MG/ML
50 INJECTION INTRAMUSCULAR; INTRAVENOUS AS NEEDED
OUTPATIENT
Start: 2024-12-09

## 2024-11-04 RX ORDER — PALONOSETRON 0.05 MG/ML
0.25 INJECTION, SOLUTION INTRAVENOUS ONCE
OUTPATIENT
Start: 2024-12-09

## 2024-11-04 RX ORDER — FAMOTIDINE 10 MG/ML
20 INJECTION INTRAVENOUS ONCE
Status: COMPLETED | OUTPATIENT
Start: 2024-11-04 | End: 2024-11-04

## 2024-11-04 RX ORDER — FAMOTIDINE 10 MG/ML
20 INJECTION INTRAVENOUS ONCE AS NEEDED
OUTPATIENT
Start: 2024-12-16

## 2024-11-04 RX ORDER — ACETAMINOPHEN 325 MG/1
650 TABLET ORAL ONCE
OUTPATIENT
Start: 2024-12-30

## 2024-11-04 RX ORDER — ATROPINE SULFATE 0.4 MG/ML
0.4 INJECTION, SOLUTION ENDOTRACHEAL; INTRAMEDULLARY; INTRAMUSCULAR; INTRAVENOUS; SUBCUTANEOUS
OUTPATIENT
Start: 2024-12-30

## 2024-11-04 RX ORDER — HEPARIN 100 UNIT/ML
500 SYRINGE INTRAVENOUS AS NEEDED
OUTPATIENT
Start: 2024-11-04

## 2024-11-04 RX ORDER — DIPHENHYDRAMINE HYDROCHLORIDE 50 MG/ML
50 INJECTION INTRAMUSCULAR; INTRAVENOUS AS NEEDED
Status: DISCONTINUED | OUTPATIENT
Start: 2024-11-04 | End: 2024-11-04 | Stop reason: HOSPADM

## 2024-11-04 RX ORDER — FAMOTIDINE 10 MG/ML
20 INJECTION INTRAVENOUS ONCE AS NEEDED
OUTPATIENT
Start: 2024-12-30

## 2024-11-04 RX ORDER — PALONOSETRON 0.05 MG/ML
0.25 INJECTION, SOLUTION INTRAVENOUS ONCE
OUTPATIENT
Start: 2024-11-18

## 2024-11-04 RX ORDER — DEXAMETHASONE 4 MG/1
12 TABLET ORAL ONCE
OUTPATIENT
Start: 2024-11-18 | End: 2024-11-18

## 2024-11-04 RX ORDER — ATROPINE SULFATE 0.4 MG/ML
0.4 INJECTION, SOLUTION ENDOTRACHEAL; INTRAMEDULLARY; INTRAMUSCULAR; INTRAVENOUS; SUBCUTANEOUS
OUTPATIENT
Start: 2024-11-18

## 2024-11-04 RX ORDER — HEPARIN 100 UNIT/ML
500 SYRINGE INTRAVENOUS AS NEEDED
Status: DISCONTINUED | OUTPATIENT
Start: 2024-11-04 | End: 2024-11-04 | Stop reason: HOSPADM

## 2024-11-04 RX ORDER — EPINEPHRINE 0.3 MG/.3ML
0.3 INJECTION SUBCUTANEOUS EVERY 5 MIN PRN
Status: DISCONTINUED | OUTPATIENT
Start: 2024-11-04 | End: 2024-11-04 | Stop reason: HOSPADM

## 2024-11-04 RX ORDER — DIPHENHYDRAMINE HYDROCHLORIDE 50 MG/ML
50 INJECTION INTRAMUSCULAR; INTRAVENOUS AS NEEDED
OUTPATIENT
Start: 2025-01-06

## 2024-11-04 RX ORDER — DIPHENHYDRAMINE HCL 25 MG
25 CAPSULE ORAL ONCE
OUTPATIENT
Start: 2024-11-18

## 2024-11-04 RX ORDER — PALONOSETRON 0.05 MG/ML
0.25 INJECTION, SOLUTION INTRAVENOUS ONCE
OUTPATIENT
Start: 2024-11-25

## 2024-11-04 RX ORDER — FAMOTIDINE 10 MG/ML
20 INJECTION INTRAVENOUS ONCE AS NEEDED
OUTPATIENT
Start: 2024-11-18

## 2024-11-04 RX ORDER — PROCHLORPERAZINE EDISYLATE 5 MG/ML
10 INJECTION INTRAMUSCULAR; INTRAVENOUS EVERY 6 HOURS PRN
OUTPATIENT
Start: 2025-01-06

## 2024-11-04 RX ORDER — PROCHLORPERAZINE EDISYLATE 5 MG/ML
10 INJECTION INTRAMUSCULAR; INTRAVENOUS EVERY 6 HOURS PRN
OUTPATIENT
Start: 2024-12-30

## 2024-11-04 RX ORDER — DIPHENHYDRAMINE HYDROCHLORIDE 50 MG/ML
50 INJECTION INTRAMUSCULAR; INTRAVENOUS AS NEEDED
OUTPATIENT
Start: 2024-11-25

## 2024-11-04 RX ORDER — PROCHLORPERAZINE MALEATE 10 MG
10 TABLET ORAL EVERY 6 HOURS PRN
OUTPATIENT
Start: 2025-01-06

## 2024-11-04 RX ORDER — DEXAMETHASONE 4 MG/1
12 TABLET ORAL ONCE
OUTPATIENT
Start: 2024-11-25 | End: 2024-11-25

## 2024-11-04 RX ORDER — FAMOTIDINE 10 MG/ML
20 INJECTION INTRAVENOUS ONCE AS NEEDED
Status: DISCONTINUED | OUTPATIENT
Start: 2024-11-04 | End: 2024-11-04 | Stop reason: HOSPADM

## 2024-11-04 RX ORDER — DIPHENHYDRAMINE HCL 25 MG
25 CAPSULE ORAL ONCE
OUTPATIENT
Start: 2025-01-06

## 2024-11-04 RX ORDER — EPINEPHRINE 0.3 MG/.3ML
0.3 INJECTION SUBCUTANEOUS EVERY 5 MIN PRN
OUTPATIENT
Start: 2024-11-18

## 2024-11-04 RX ORDER — FAMOTIDINE 10 MG/ML
20 INJECTION INTRAVENOUS ONCE AS NEEDED
OUTPATIENT
Start: 2025-01-06

## 2024-11-04 RX ORDER — ACETAMINOPHEN 325 MG/1
650 TABLET ORAL ONCE
Status: COMPLETED | OUTPATIENT
Start: 2024-11-04 | End: 2024-11-04

## 2024-11-04 RX ORDER — ACETAMINOPHEN 325 MG/1
650 TABLET ORAL ONCE
OUTPATIENT
Start: 2024-12-09

## 2024-11-04 RX ORDER — DIPHENHYDRAMINE HCL 25 MG
25 CAPSULE ORAL ONCE
OUTPATIENT
Start: 2024-11-25

## 2024-11-04 RX ORDER — FAMOTIDINE 10 MG/ML
20 INJECTION INTRAVENOUS ONCE AS NEEDED
OUTPATIENT
Start: 2024-11-25

## 2024-11-04 RX ORDER — HEPARIN SODIUM,PORCINE/PF 10 UNIT/ML
50 SYRINGE (ML) INTRAVENOUS AS NEEDED
OUTPATIENT
Start: 2024-11-04

## 2024-11-04 RX ORDER — PROCHLORPERAZINE EDISYLATE 5 MG/ML
10 INJECTION INTRAMUSCULAR; INTRAVENOUS EVERY 6 HOURS PRN
Status: DISCONTINUED | OUTPATIENT
Start: 2024-11-04 | End: 2024-11-04 | Stop reason: HOSPADM

## 2024-11-04 ASSESSMENT — PAIN SCALES - GENERAL: PAINLEVEL_OUTOF10: 5

## 2024-11-04 NOTE — SIGNIFICANT EVENT
11/04/24 0946   Prechemo Checklist   Has the patient been in the hospital, ED, or urgent care since last date of service Yes   Chemo/Immuno Consent Completed and Signed Yes   Protocol/Indications Verified Yes   Confirmed to previous date/time of medication Yes   Compared to previous dose Yes   All medications are dated accurately Yes   Pregnancy Test Negative Not applicable   Parameters Met Yes   BSA/Weight-Height Verified Yes   Dose Calculations Verified (current, total, cumulative) Yes

## 2024-11-13 ENCOUNTER — TELEMEDICINE (OUTPATIENT)
Dept: HEMATOLOGY/ONCOLOGY | Facility: CLINIC | Age: 64
End: 2024-11-13
Payer: COMMERCIAL

## 2024-11-13 DIAGNOSIS — C41.9 MALIGNANT NEOPLASM OF BONE WITH METASTASES (MULTI): Primary | ICD-10-CM

## 2024-11-13 DIAGNOSIS — C79.51 CARCINOMA OF RIGHT BREAST METASTATIC TO BONE (MULTI): ICD-10-CM

## 2024-11-13 DIAGNOSIS — C50.911 CARCINOMA OF RIGHT BREAST METASTATIC TO BONE (MULTI): ICD-10-CM

## 2024-11-13 NOTE — PATIENT INSTRUCTIONS
Please call us at [phone] option 5 then option 2 with any questions or concerns   Follow-up with faisal on 12/24  CT scan the week before  Continue with Trodelvy

## 2024-11-14 ASSESSMENT — ENCOUNTER SYMPTOMS
SHORTNESS OF BREATH: 0
DYSURIA: 0
EYE PROBLEMS: 0
NUMBNESS: 0
ARTHRALGIAS: 0
ADENOPATHY: 0
DEPRESSION: 0
LEG SWELLING: 0
SEIZURES: 0
CONSTIPATION: 0
DIARRHEA: 0
ABDOMINAL PAIN: 0
DIZZINESS: 0
RESPIRATORY NEGATIVE: 1
ABDOMINAL DISTENTION: 0
CHEST TIGHTNESS: 0
DIFFICULTY URINATING: 0
SLEEP DISTURBANCE: 0
COUGH: 0
HOT FLASHES: 0
MYALGIAS: 0
PALPITATIONS: 0
FATIGUE: 0
EXTREMITY WEAKNESS: 0
CONSTITUTIONAL NEGATIVE: 1
HEMATURIA: 0
BLOOD IN STOOL: 0
HEMOPTYSIS: 0
NAUSEA: 1
FREQUENCY: 0
CONFUSION: 0
BACK PAIN: 1
HEADACHES: 0
DIAPHORESIS: 0
EYES NEGATIVE: 1
BRUISES/BLEEDS EASILY: 0
CARDIOVASCULAR NEGATIVE: 1
CHILLS: 0
WHEEZING: 0
SCLERAL ICTERUS: 0
FEVER: 0
NERVOUS/ANXIOUS: 0
APPETITE CHANGE: 0

## 2024-11-14 NOTE — PROGRESS NOTES
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 she received prior Taxotere and cyclophosphamide , completed 6/2010. S/p adjuvant endocrine therapy.    Diagnosis of metastatic TNBC 3/2023, presenting with back pain and cord compression      weekly paclitaxel from 5/22/23, changed to abraxane after 1st cycle due to anaphylactic type reaction. Discontinued 12/12/23 due to progressive disease.    TDxD from 1/29/24, held for XRT to spine for a few wks and stopped on 6/27/24  s/p XRT to spine on 4/1/24    [id number] clincial trial from 8/26/24-9/23/24    CURRENT THERAPY  Sacizutumab 8 mg/kg since 10/28/24 days 1 and 8     HISTORY OF PRESENT ILLNESS    Trang Jones is a 64 y.o. woman here for follow up Tolerating sacituzumab well. No diarrhea.          Review of Systems   Constitutional: Negative.  Negative for appetite change, chills, diaphoresis, fatigue and fever.   HENT:  Negative.  Negative for hearing loss and lump/mass.    Eyes: Negative.  Negative for eye problems and icterus.   Respiratory: Negative.  Negative for chest tightness, cough, hemoptysis, shortness of breath and wheezing.    Cardiovascular: Negative.  Negative for chest pain, leg swelling and palpitations.   Gastrointestinal:  Positive for nausea. Negative for abdominal distention, abdominal pain, blood in stool, constipation and diarrhea.   Endocrine: Negative for hot flashes.   Genitourinary:  Negative for bladder incontinence, difficulty urinating, dyspareunia, dysuria, frequency and hematuria.    Musculoskeletal:  Positive for back pain. Negative for  arthralgias, gait problem and myalgias.        ++hip pain   Neurological:  Negative for dizziness, extremity weakness, gait problem, headaches, numbness and seizures.   Hematological:  Negative for adenopathy. Does not bruise/bleed easily.   Psychiatric/Behavioral:  Negative for confusion, depression and sleep disturbance. The patient is not nervous/anxious.    All other systems reviewed and are negative.        Past Medical History:  has a past medical history of Breast cancer (Multi), Hypercholesteremia, Metastatic cancer (Multi), and Personal history of irradiation.  Surgical History:   has a past surgical history that includes CT guided percutaneous biopsy bone deep (2023); Mastectomy (Left, 2010); and Breast surgery.  Social History:   reports that she quit smoking about 21 years ago. Her smoking use included cigarettes. She has been exposed to tobacco smoke. She has never used smokeless tobacco. She reports that she does not currently use alcohol. She reports current drug use. Drug: Marijuana.  Family History:    Family History   Problem Relation Name Age of Onset    Leukemia Father       Family Oncology History:  Cancer-related family history is not on file.      OBJECTIVE    VS / Pain:  LMP  (LMP Unknown)   BSA: There is no height or weight on file to calculate BSA.   Pain Scale: 3    Performance Status:  The ECOG performance scale today is ECO- Restricted in physically strenuous activity.  Carries out light duty.    Physical Exam  Constitutional:       General: She is not in acute distress.     Appearance: She is not ill-appearing, toxic-appearing or diaphoretic.   HENT:      Nose: No congestion or rhinorrhea.      Mouth/Throat:      Pharynx: No posterior oropharyngeal erythema.   Cardiovascular:      Rate and Rhythm: Normal rate and regular rhythm.      Pulses: Normal pulses.      Heart sounds: Normal heart sounds. No murmur heard.     No gallop.   Pulmonary:      Breath sounds: Normal breath  sounds.   Abdominal:      General: There is no distension.      Palpations: There is no mass.      Tenderness: There is no abdominal tenderness.   Musculoskeletal:         General: No swelling.      Cervical back: No rigidity.      Right lower leg: No edema.      Left lower leg: No edema.   Lymphadenopathy:      Cervical: No cervical adenopathy.   Skin:     General: Skin is warm.      Coloration: Skin is not cyanotic.      Findings: No bruising, ecchymosis or erythema.   Neurological:      General: No focal deficit present.      Mental Status: She is oriented to person, place, and time.      Cranial Nerves: Cranial nerves 2-12 are intact.      Motor: Motor function is intact.   Psychiatric:         Attention and Perception: Attention and perception normal.         Mood and Affect: Mood and affect normal.         Behavior: Behavior normal.         Thought Content: Thought content normal.         Judgment: Judgment normal.             Diagnostic Results     === 10/03/24 ===    CT CHEST ABDOMEN PELVIS W IV CONTRAST    - Impression -  CHEST:  1.  Interval mild decrease in the posterior paraspinal postradiation  changes in the bilateral lungs.    ABDOMEN-PELVIS:  1.  Interval increase in the tumor burden with increase in the number  and size of multiple hepatic metastatic lesions.  2. No significant oval change in the extensive sclerotic osseous  metastatic disease in the axial and appendicular skeleton.  3. Other chronic and incidental findings as described above.      MACRO:  None    Signed by: Ceferino Arredondo 10/5/2024 3:03 PM  Dictation workstation:   [id number]   LABORATORY/PATHOLOGY DATA    Infusion on 11/04/2024   Component Date Value Ref Range Status    WBC 11/04/2024 3.8 (L)  4.4 - 11.3 x10*3/uL Final    RBC 11/04/2024 3.87 (L)  4.00 - 5.20 x10*6/uL Final    Hemoglobin 11/04/2024 11.3 (L)  12.0 - 16.0 g/dL Final    Hematocrit 11/04/2024 33.9 (L)  36.0 - 46.0 % Final    MCV 11/04/2024 88  80 - 100 fL Final     MCH 11/04/2024 29.2  26.0 - 34.0 pg Final    MCHC 11/04/2024 33.3  32.0 - 36.0 g/dL Final    RDW 11/04/2024 12.8  11.5 - 14.5 % Final    Platelets 11/04/2024 211  150 - 450 x10*3/uL Final    Neutrophils % 11/04/2024 51.9  40.0 - 80.0 % Final    Immature Granulocytes %, Automated 11/04/2024 0.8  0.0 - 0.9 % Final    Lymphocytes % 11/04/2024 32.5  13.0 - 44.0 % Final    Monocytes % 11/04/2024 11.1  2.0 - 10.0 % Final    Eosinophils % 11/04/2024 3.2  0.0 - 6.0 % Final    Basophils % 11/04/2024 0.5  0.0 - 2.0 % Final    Neutrophils Absolute 11/04/2024 1.97  1.20 - 7.70 x10*3/uL Final    Immature Granulocytes Absolute, Au* 11/04/2024 0.03  0.00 - 0.70 x10*3/uL Final    Lymphocytes Absolute 11/04/2024 1.23  1.20 - 4.80 x10*3/uL Final    Monocytes Absolute 11/04/2024 0.42  0.10 - 1.00 x10*3/uL Final    Eosinophils Absolute 11/04/2024 0.12  0.00 - 0.70 x10*3/uL Final    Basophils Absolute 11/04/2024 0.02  0.00 - 0.10 x10*3/uL Final   Infusion on 10/28/2024   Component Date Value Ref Range Status    Hepatitis B Surface AB 10/28/2024 <3.1  <10.0 mIU/mL Final    Hepatitis B Core AB- Total 10/28/2024 Nonreactive  Nonreactive Final    Hepatitis B Surface AG 10/28/2024 Nonreactive  Nonreactive Final    Glucose 10/28/2024 105 (H)  74 - 99 mg/dL Final    Sodium 10/28/2024 140  136 - 145 mmol/L Final    Potassium 10/28/2024 3.8  3.5 - 5.3 mmol/L Final    Chloride 10/28/2024 103  98 - 107 mmol/L Final    Bicarbonate 10/28/2024 30  21 - 32 mmol/L Final    Anion Gap 10/28/2024 11  10 - 20 mmol/L Final    Urea Nitrogen 10/28/2024 14  6 - 23 mg/dL Final    Creatinine 10/28/2024 0.75  0.50 - 1.05 mg/dL Final    eGFR 10/28/2024 90  >60 mL/min/1.73m*2 Final    Calcium 10/28/2024 8.8  8.6 - 10.3 mg/dL Final    Albumin 10/28/2024 3.8  3.4 - 5.0 g/dL Final    Alkaline Phosphatase 10/28/2024 141 (H)  33 - 136 U/L Final    Total Protein 10/28/2024 6.0 (L)  6.4 - 8.2 g/dL Final    AST 10/28/2024 52 (H)  9 - 39 U/L Final    Bilirubin, Total  10/28/2024 0.4  0.0 - 1.2 mg/dL Final    ALT 10/28/2024 25  7 - 45 U/L Final    WBC 10/28/2024 5.2  4.4 - 11.3 x10*3/uL Final    RBC 10/28/2024 3.95 (L)  4.00 - 5.20 x10*6/uL Final    Hemoglobin 10/28/2024 11.4 (L)  12.0 - 16.0 g/dL Final    Hematocrit 10/28/2024 34.9 (L)  36.0 - 46.0 % Final    MCV 10/28/2024 88  80 - 100 fL Final    MCH 10/28/2024 28.9  26.0 - 34.0 pg Final    MCHC 10/28/2024 32.7  32.0 - 36.0 g/dL Final    RDW 10/28/2024 12.9  11.5 - 14.5 % Final    Platelets 10/28/2024 176  150 - 450 x10*3/uL Final    Neutrophils % 10/28/2024 66.3  40.0 - 80.0 % Final    Immature Granulocytes %, Automated 10/28/2024 1.0 (H)  0.0 - 0.9 % Final    Lymphocytes % 10/28/2024 19.6  13.0 - 44.0 % Final    Monocytes % 10/28/2024 10.0  2.0 - 10.0 % Final    Eosinophils % 10/28/2024 2.5  0.0 - 6.0 % Final    Basophils % 10/28/2024 0.6  0.0 - 2.0 % Final    Neutrophils Absolute 10/28/2024 3.45  1.20 - 7.70 x10*3/uL Final    Immature Granulocytes Absolute, Au* 10/28/2024 0.05  0.00 - 0.70 x10*3/uL Final    Lymphocytes Absolute 10/28/2024 1.02 (L)  1.20 - 4.80 x10*3/uL Final    Monocytes Absolute 10/28/2024 0.52  0.10 - 1.00 x10*3/uL Final    Eosinophils Absolute 10/28/2024 0.13  0.00 - 0.70 x10*3/uL Final    Basophils Absolute 10/28/2024 0.03  0.00 - 0.10 x10*3/uL Final    CA 27.29 10/28/2024 783.5 (H)  0.0 - 38.6 U/mL Final   Hospital Outpatient Visit on 10/07/2024   Component Date Value Ref Range Status    CA 27.29 10/07/2024 463.5 (H)  0.0 - 38.6 U/mL Final    WBC 10/07/2024 3.9 (L)  4.4 - 11.3 x10*3/uL Final    nRBC 10/07/2024 0.0  0.0 - 0.0 /100 WBCs Final    RBC 10/07/2024 3.94 (L)  4.00 - 5.20 x10*6/uL Final    Hemoglobin 10/07/2024 11.6 (L)  12.0 - 16.0 g/dL Final    Hematocrit 10/07/2024 34.9 (L)  36.0 - 46.0 % Final    MCV 10/07/2024 89  80 - 100 fL Final    MCH 10/07/2024 29.4  26.0 - 34.0 pg Final    MCHC 10/07/2024 33.2  32.0 - 36.0 g/dL Final    RDW 10/07/2024 12.6  11.5 - 14.5 % Final    Platelets 10/07/2024  180  150 - 450 x10*3/uL Final    Neutrophils % 10/07/2024 56.4  40.0 - 80.0 % Final    Immature Granulocytes %, Automated 10/07/2024 0.5  0.0 - 0.9 % Final    Lymphocytes % 10/07/2024 29.4  13.0 - 44.0 % Final    Monocytes % 10/07/2024 10.3  2.0 - 10.0 % Final    Eosinophils % 10/07/2024 3.1  0.0 - 6.0 % Final    Basophils % 10/07/2024 0.3  0.0 - 2.0 % Final    Neutrophils Absolute 10/07/2024 2.19  1.20 - 7.70 x10*3/uL Final    Immature Granulocytes Absolute, Au* 10/07/2024 0.02  0.00 - 0.70 x10*3/uL Final    Lymphocytes Absolute 10/07/2024 1.14 (L)  1.20 - 4.80 x10*3/uL Final    Monocytes Absolute 10/07/2024 0.40  0.10 - 1.00 x10*3/uL Final    Eosinophils Absolute 10/07/2024 0.12  0.00 - 0.70 x10*3/uL Final    Basophils Absolute 10/07/2024 0.01  0.00 - 0.10 x10*3/uL Final    Glucose 10/07/2024 100 (H)  74 - 99 mg/dL Final    Sodium 10/07/2024 140  136 - 145 mmol/L Final    Potassium 10/07/2024 4.2  3.5 - 5.3 mmol/L Final    Chloride 10/07/2024 106  98 - 107 mmol/L Final    Bicarbonate 10/07/2024 29  21 - 32 mmol/L Final    Anion Gap 10/07/2024 9 (L)  10 - 20 mmol/L Final    Urea Nitrogen 10/07/2024 15  6 - 23 mg/dL Final    Creatinine 10/07/2024 0.59  0.50 - 1.05 mg/dL Final    eGFR 10/07/2024 >90  >60 mL/min/1.73m*2 Final    Calcium 10/07/2024 8.8  8.6 - 10.6 mg/dL Final    Albumin 10/07/2024 4.0  3.4 - 5.0 g/dL Final    Alkaline Phosphatase 10/07/2024 105  33 - 136 U/L Final    Total Protein 10/07/2024 5.7 (L)  6.4 - 8.2 g/dL Final    AST 10/07/2024 32  9 - 39 U/L Final    Bilirubin, Total 10/07/2024 0.4  0.0 - 1.2 mg/dL Final    ALT 10/07/2024 21  7 - 45 U/L Final    Amylase 10/07/2024 37  29 - 103 U/L Final    aPTT 10/07/2024 44 (H)  27 - 38 seconds Final    D-Dimer Non VTE, Quant (ng/mL FEU) 10/07/2024 1,791 (H)  <=500 ng/mL FEU Final    Haptoglobin 10/07/2024 129  30 - 200 mg/dL Final    LDH 10/07/2024 303 (H)  84 - 246 U/L Final    Magnesium 10/07/2024 2.30  1.60 - 2.40 mg/dL Final    Phosphorus 10/07/2024  3.9  2.5 - 4.9 mg/dL Final    Protime 10/07/2024 10.7  9.8 - 12.8 seconds Final    INR 10/07/2024 1.0  0.9 - 1.1 Final    Retic % 10/07/2024 1.9  0.5 - 2.0 % Final    Retic Absolute 10/07/2024 0.075  0.018 - 0.083 x10*6/uL Final    Reticulocyte Hemoglobin 10/07/2024 31  28 - 38 pg Final    Immature Retic fraction 10/07/2024 16.7 (H)  <=16.0 % Final    Uric Acid 10/07/2024 3.6  2.3 - 6.7 mg/dL Final    Color, Urine 10/07/2024 Light-Yellow  Light-Yellow, Yellow, Dark-Yellow Final    Appearance, Urine 10/07/2024 Clear  Clear Final    Specific Gravity, Urine 10/07/2024 1.008  1.005 - 1.035 Final    pH, Urine 10/07/2024 6.0  5.0, 5.5, 6.0, 6.5, 7.0, 7.5, 8.0 Final    Protein, Urine 10/07/2024 NEGATIVE  NEGATIVE, 10 (TRACE), 20 (TRACE) mg/dL Final    Glucose, Urine 10/07/2024 Normal  Normal mg/dL Final    Blood, Urine 10/07/2024 NEGATIVE  NEGATIVE Final    Ketones, Urine 10/07/2024 NEGATIVE  NEGATIVE mg/dL Final    Bilirubin, Urine 10/07/2024 NEGATIVE  NEGATIVE Final    Urobilinogen, Urine 10/07/2024 Normal  Normal mg/dL Final    Nitrite, Urine 10/07/2024 NEGATIVE  NEGATIVE Final    Leukocyte Esterase, Urine 10/07/2024 NEGATIVE  NEGATIVE Final      Lab Results   Component Value Date    LABCA2 783.5 (H) 10/28/2024    LABCA2 463.5 (H) 10/07/2024    LABCA2 223.7 (H) 09/09/2024    LABCA2 100.0 (H) 08/12/2024    LABCA2 56.5 (H) 06/27/2024             IMPRESSION/PLAN      1. 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 Prior clear evidence of progressive disease in liver. Treat with sacituzumab dose reduced at 8 mg/kg. RTC on 12/24/24 with CT on 12/20/24    2. Pain neoplasm related. Following with supportive onc.  Much better now. S/p xrt        We discussed the clinical significance of diagnosis, goals of care and treatment plan in detail.  I personally spent over half of a total 45 minutes face to face with the patient in counseling and discussion and/or coordination of care as described above.          -------------------------------------------------------------------------------------------------------------------------------  Bebeto Tolbert MD  Director of Breast Cancer Medical Oncology Research Program   [facility]  Professor of Medicine  [place] [id number],  [address]  [place] [address] [id number]  Phone: [phone]  [email or web address]

## 2024-11-18 ENCOUNTER — INFUSION (OUTPATIENT)
Dept: HEMATOLOGY/ONCOLOGY | Facility: CLINIC | Age: 64
End: 2024-11-18
Payer: COMMERCIAL

## 2024-11-18 ENCOUNTER — SOCIAL WORK (OUTPATIENT)
Dept: CASE MANAGEMENT | Facility: HOSPITAL | Age: 64
End: 2024-11-18
Payer: COMMERCIAL

## 2024-11-18 ENCOUNTER — APPOINTMENT (OUTPATIENT)
Dept: HEMATOLOGY/ONCOLOGY | Facility: HOSPITAL | Age: 64
End: 2024-11-18
Payer: COMMERCIAL

## 2024-11-18 VITALS
RESPIRATION RATE: 16 BRPM | OXYGEN SATURATION: 95 % | BODY MASS INDEX: 25.37 KG/M2 | WEIGHT: 136.47 LBS | SYSTOLIC BLOOD PRESSURE: 113 MMHG | TEMPERATURE: 97.2 F | HEART RATE: 72 BPM | DIASTOLIC BLOOD PRESSURE: 76 MMHG

## 2024-11-18 DIAGNOSIS — C79.51 CARCINOMA OF RIGHT BREAST METASTATIC TO BONE (MULTI): ICD-10-CM

## 2024-11-18 DIAGNOSIS — C50.911 CARCINOMA OF RIGHT BREAST METASTATIC TO BONE (MULTI): ICD-10-CM

## 2024-11-18 DIAGNOSIS — C41.9 MALIGNANT NEOPLASM OF BONE WITH METASTASES (MULTI): ICD-10-CM

## 2024-11-18 LAB
ALBUMIN SERPL BCP-MCNC: 4 G/DL (ref 3.4–5)
ALP SERPL-CCNC: 237 U/L (ref 33–136)
ALT SERPL W P-5'-P-CCNC: 35 U/L (ref 7–45)
ANION GAP SERPL CALC-SCNC: 13 MMOL/L (ref 10–20)
AST SERPL W P-5'-P-CCNC: 43 U/L (ref 9–39)
BASOPHILS # BLD AUTO: 0.01 X10*3/UL (ref 0–0.1)
BASOPHILS NFR BLD AUTO: 0.2 %
BILIRUB SERPL-MCNC: 0.4 MG/DL (ref 0–1.2)
BUN SERPL-MCNC: 16 MG/DL (ref 6–23)
CALCIUM SERPL-MCNC: 9 MG/DL (ref 8.6–10.3)
CANCER AG27-29 SERPL-ACNC: 892.8 U/ML (ref 0–38.6)
CHLORIDE SERPL-SCNC: 104 MMOL/L (ref 98–107)
CO2 SERPL-SCNC: 28 MMOL/L (ref 21–32)
CREAT SERPL-MCNC: 0.75 MG/DL (ref 0.5–1.05)
EGFRCR SERPLBLD CKD-EPI 2021: 90 ML/MIN/1.73M*2
EOSINOPHIL # BLD AUTO: 0.09 X10*3/UL (ref 0–0.7)
EOSINOPHIL NFR BLD AUTO: 2.2 %
ERYTHROCYTE [DISTWIDTH] IN BLOOD BY AUTOMATED COUNT: 14.4 % (ref 11.5–14.5)
GLUCOSE SERPL-MCNC: 92 MG/DL (ref 74–99)
HCT VFR BLD AUTO: 35 % (ref 36–46)
HGB BLD-MCNC: 11.4 G/DL (ref 12–16)
IMM GRANULOCYTES # BLD AUTO: 0.01 X10*3/UL (ref 0–0.7)
IMM GRANULOCYTES NFR BLD AUTO: 0.2 % (ref 0–0.9)
LYMPHOCYTES # BLD AUTO: 1.06 X10*3/UL (ref 1.2–4.8)
LYMPHOCYTES NFR BLD AUTO: 26.3 %
MCH RBC QN AUTO: 28.6 PG (ref 26–34)
MCHC RBC AUTO-ENTMCNC: 32.6 G/DL (ref 32–36)
MCV RBC AUTO: 88 FL (ref 80–100)
MONOCYTES # BLD AUTO: 0.44 X10*3/UL (ref 0.1–1)
MONOCYTES NFR BLD AUTO: 10.9 %
NEUTROPHILS # BLD AUTO: 2.42 X10*3/UL (ref 1.2–7.7)
NEUTROPHILS NFR BLD AUTO: 60.2 %
PLATELET # BLD AUTO: 204 X10*3/UL (ref 150–450)
POTASSIUM SERPL-SCNC: 4.1 MMOL/L (ref 3.5–5.3)
PROT SERPL-MCNC: 6 G/DL (ref 6.4–8.2)
RBC # BLD AUTO: 3.98 X10*6/UL (ref 4–5.2)
SODIUM SERPL-SCNC: 141 MMOL/L (ref 136–145)
WBC # BLD AUTO: 4 X10*3/UL (ref 4.4–11.3)

## 2024-11-18 PROCEDURE — 96367 TX/PROPH/DG ADDL SEQ IV INF: CPT

## 2024-11-18 PROCEDURE — 86300 IMMUNOASSAY TUMOR CA 15-3: CPT | Mod: GEALAB

## 2024-11-18 PROCEDURE — 84075 ASSAY ALKALINE PHOSPHATASE: CPT

## 2024-11-18 PROCEDURE — 96413 CHEMO IV INFUSION 1 HR: CPT

## 2024-11-18 PROCEDURE — 2500000004 HC RX 250 GENERAL PHARMACY W/ HCPCS (ALT 636 FOR OP/ED): Performed by: INTERNAL MEDICINE

## 2024-11-18 PROCEDURE — 85025 COMPLETE CBC W/AUTO DIFF WBC: CPT

## 2024-11-18 PROCEDURE — 2500000001 HC RX 250 WO HCPCS SELF ADMINISTERED DRUGS (ALT 637 FOR MEDICARE OP): Performed by: INTERNAL MEDICINE

## 2024-11-18 PROCEDURE — 96375 TX/PRO/DX INJ NEW DRUG ADDON: CPT | Mod: INF

## 2024-11-18 PROCEDURE — 2500000004 HC RX 250 GENERAL PHARMACY W/ HCPCS (ALT 636 FOR OP/ED)

## 2024-11-18 RX ORDER — DIPHENHYDRAMINE HCL 25 MG
25 CAPSULE ORAL ONCE
Status: COMPLETED | OUTPATIENT
Start: 2024-11-18 | End: 2024-11-18

## 2024-11-18 RX ORDER — ATROPINE SULFATE 0.4 MG/ML
0.4 INJECTION, SOLUTION ENDOTRACHEAL; INTRAMEDULLARY; INTRAMUSCULAR; INTRAVENOUS; SUBCUTANEOUS
Status: DISCONTINUED | OUTPATIENT
Start: 2024-11-18 | End: 2024-11-18 | Stop reason: HOSPADM

## 2024-11-18 RX ORDER — DEXAMETHASONE 4 MG/1
12 TABLET ORAL ONCE
Status: COMPLETED | OUTPATIENT
Start: 2024-11-18 | End: 2024-11-18

## 2024-11-18 RX ORDER — ALBUTEROL SULFATE 0.83 MG/ML
3 SOLUTION RESPIRATORY (INHALATION) AS NEEDED
Status: DISCONTINUED | OUTPATIENT
Start: 2024-11-18 | End: 2024-11-18 | Stop reason: HOSPADM

## 2024-11-18 RX ORDER — ACETAMINOPHEN 325 MG/1
650 TABLET ORAL ONCE
Status: COMPLETED | OUTPATIENT
Start: 2024-11-18 | End: 2024-11-18

## 2024-11-18 RX ORDER — FAMOTIDINE 10 MG/ML
20 INJECTION INTRAVENOUS ONCE AS NEEDED
Status: COMPLETED | OUTPATIENT
Start: 2024-11-18 | End: 2024-11-18

## 2024-11-18 RX ORDER — FAMOTIDINE 10 MG/ML
20 INJECTION INTRAVENOUS ONCE
Status: DISCONTINUED | OUTPATIENT
Start: 2024-11-18 | End: 2024-11-18 | Stop reason: HOSPADM

## 2024-11-18 RX ORDER — PALONOSETRON 0.05 MG/ML
0.25 INJECTION, SOLUTION INTRAVENOUS ONCE
Status: COMPLETED | OUTPATIENT
Start: 2024-11-18 | End: 2024-11-18

## 2024-11-18 RX ORDER — HEPARIN SODIUM,PORCINE/PF 10 UNIT/ML
50 SYRINGE (ML) INTRAVENOUS AS NEEDED
OUTPATIENT
Start: 2024-11-18

## 2024-11-18 RX ORDER — EPINEPHRINE 0.3 MG/.3ML
0.3 INJECTION SUBCUTANEOUS EVERY 5 MIN PRN
Status: DISCONTINUED | OUTPATIENT
Start: 2024-11-18 | End: 2024-11-18 | Stop reason: HOSPADM

## 2024-11-18 RX ORDER — PROCHLORPERAZINE MALEATE 10 MG
10 TABLET ORAL EVERY 6 HOURS PRN
Status: DISCONTINUED | OUTPATIENT
Start: 2024-11-18 | End: 2024-11-18 | Stop reason: HOSPADM

## 2024-11-18 RX ORDER — DIPHENHYDRAMINE HYDROCHLORIDE 50 MG/ML
50 INJECTION INTRAMUSCULAR; INTRAVENOUS AS NEEDED
Status: DISCONTINUED | OUTPATIENT
Start: 2024-11-18 | End: 2024-11-18 | Stop reason: HOSPADM

## 2024-11-18 RX ORDER — HEPARIN 100 UNIT/ML
500 SYRINGE INTRAVENOUS AS NEEDED
Status: DISCONTINUED | OUTPATIENT
Start: 2024-11-18 | End: 2024-11-18 | Stop reason: HOSPADM

## 2024-11-18 RX ORDER — HEPARIN 100 UNIT/ML
500 SYRINGE INTRAVENOUS AS NEEDED
OUTPATIENT
Start: 2024-11-18

## 2024-11-18 RX ORDER — PROCHLORPERAZINE EDISYLATE 5 MG/ML
10 INJECTION INTRAMUSCULAR; INTRAVENOUS EVERY 6 HOURS PRN
Status: DISCONTINUED | OUTPATIENT
Start: 2024-11-18 | End: 2024-11-18 | Stop reason: HOSPADM

## 2024-11-18 ASSESSMENT — PAIN SCALES - GENERAL: PAINLEVEL_OUTOF10: 0-NO PAIN

## 2024-11-18 NOTE — SIGNIFICANT EVENT

## 2024-11-18 NOTE — PROGRESS NOTES
Infusion RN referred pt to SW regarding resources for wigs and head coverings. Me with pt and her  in infusion room to discuss. Pt reports she was approved for SSDI and has no other SW concerns other than the head coverings. Pt has been using the [place] but she is noticing her hair falling our more recently. Discussed how [facility] offers two free wigs for cancer patients and encouraged her to make an appointment at their wig salon. Also talked about the additional resources [facility] provides including support groups and encouraged pt to reach out to them for additional support. Also talked about self-image concerns with cancer and provided a handout on managing body image concerns while undergoing cancer treatment. Pt became a bit tearful discussing the advanced stage of her cancer; provided supportive counseling and encouraged pt to participate in self-care. Pt denied any other SW needs at this time. Encouraged pt to reach out should additional questions or concerns arise.   Huma Mix, MSW, SALLY

## 2024-11-18 NOTE — PROGRESS NOTES
Trang Jones completed treatment utilizing the cooling cap without any issues, tolerated well and is aware of follow-up appointments. No questions or concerns at this time. Cooling period ends at 1405

## 2024-11-20 ENCOUNTER — APPOINTMENT (OUTPATIENT)
Dept: INTEGRATIVE MEDICINE | Facility: CLINIC | Age: 64
End: 2024-11-20
Payer: COMMERCIAL

## 2024-11-20 DIAGNOSIS — C79.51 METASTATIC CANCER TO SPINE (MULTI): ICD-10-CM

## 2024-11-20 DIAGNOSIS — M72.2 PLANTAR FASCIITIS: ICD-10-CM

## 2024-11-20 DIAGNOSIS — C50.911 CARCINOMA OF RIGHT BREAST METASTATIC TO BONE (MULTI): ICD-10-CM

## 2024-11-20 DIAGNOSIS — M79.10 MYALGIA: Primary | ICD-10-CM

## 2024-11-20 DIAGNOSIS — C79.51 CARCINOMA OF RIGHT BREAST METASTATIC TO BONE (MULTI): ICD-10-CM

## 2024-11-20 PROCEDURE — 97140 MANUAL THERAPY 1/> REGIONS: CPT | Performed by: HOSPITALIST

## 2024-11-20 ASSESSMENT — PAIN SCALES - GENERAL: PAINLEVEL_OUTOF10: 2

## 2024-11-20 NOTE — PROGRESS NOTES
Massage Therapy Visit:     Trang Jones was self-referred.    Condition of Client Subjective :  Patient ID: Trang Jones is a 64 y.o. female who presents for reason for visit of     Session Information  Visit Type: Follow-up visit  Description of present complaint: Muscle tension, Discomfort    Before providing massage therapy, I discussed the provision of massage therapy services and any pertinent issues related to the patient's status with the patient's nurse. I implemented fall prevention measures including handrails, nonskid socks, and having a call light within reach. Following massage therapy, I provided a report to the patient's nurse.      Objective   Pre-treatment Assessment  Arrival Mode: Ambulatory  Pain Score: 3  Anxiety Level (0-10): 1  Stress Level (0-10): 3  Coping Level (0-10): 7  Depression Level (0-10): 1  Fatigue Level (0-10): 6  Nausea Level (0-10): 0  Wellbeing Level (0-10): 3      Actions Assessment/Plan :  Provider reviewed plan for the massage session, precautions and contraindications. Patient/guardian/hospital staff has given consent to treat with full understanding of what to expect during the session. Before massage therapy began, provider explained to the patient to communicate at any time if the pressure was causing discomfort past their tolerance level. Patient agreed to advise therapist.    Massage Treatment  Patient Position: Table  Positioning Assistance: Did not need assistance  Massage Technique: Relaxation massage  Pressure Scale: 2 - Mild pressure    Response:  Post-treatment Assessment  Patient Noted Improvement of the Following Symptoms: Muscle tension  0-10 (Numeric) Pain Score: 2  Anxiety Level (0-10): 1  Stress Level (0-10): 1  Coping Level (0-10): 8  Depression Level (0-10): 0  Fatigue Level (0-10): 5  Nausea Level (0-10): 0  Wellbeing Level (0-10): 3    Evaluation:

## 2024-11-22 ENCOUNTER — TELEPHONE (OUTPATIENT)
Dept: HEMATOLOGY/ONCOLOGY | Facility: CLINIC | Age: 64
End: 2024-11-22

## 2024-11-22 DIAGNOSIS — G89.3 CANCER RELATED PAIN: ICD-10-CM

## 2024-11-22 RX ORDER — MORPHINE SULFATE 15 MG/1
15 TABLET, FILM COATED, EXTENDED RELEASE ORAL 3 TIMES DAILY
Qty: 90 TABLET | Refills: 0 | Status: SHIPPED | OUTPATIENT
Start: 2024-11-22 | End: 2024-12-17 | Stop reason: SDUPTHER

## 2024-11-25 ENCOUNTER — INFUSION (OUTPATIENT)
Dept: HEMATOLOGY/ONCOLOGY | Facility: CLINIC | Age: 64
End: 2024-11-25
Payer: COMMERCIAL

## 2024-11-25 VITALS
TEMPERATURE: 96.1 F | BODY MASS INDEX: 25.33 KG/M2 | WEIGHT: 136.24 LBS | DIASTOLIC BLOOD PRESSURE: 80 MMHG | OXYGEN SATURATION: 96 % | SYSTOLIC BLOOD PRESSURE: 119 MMHG | RESPIRATION RATE: 17 BRPM | HEART RATE: 70 BPM

## 2024-11-25 DIAGNOSIS — C41.9 MALIGNANT NEOPLASM OF BONE WITH METASTASES (MULTI): ICD-10-CM

## 2024-11-25 DIAGNOSIS — C50.911 CARCINOMA OF RIGHT BREAST METASTATIC TO BONE (MULTI): ICD-10-CM

## 2024-11-25 DIAGNOSIS — C79.51 CARCINOMA OF RIGHT BREAST METASTATIC TO BONE (MULTI): ICD-10-CM

## 2024-11-25 LAB
BASOPHILS # BLD AUTO: 0 X10*3/UL (ref 0–0.1)
BASOPHILS NFR BLD AUTO: 0 %
EOSINOPHIL # BLD AUTO: 0.07 X10*3/UL (ref 0–0.7)
EOSINOPHIL NFR BLD AUTO: 2.2 %
ERYTHROCYTE [DISTWIDTH] IN BLOOD BY AUTOMATED COUNT: 14.5 % (ref 11.5–14.5)
HCT VFR BLD AUTO: 32.8 % (ref 36–46)
HGB BLD-MCNC: 10.8 G/DL (ref 12–16)
HYPOCHROMIA BLD QL SMEAR: NORMAL
IMM GRANULOCYTES # BLD AUTO: 0.03 X10*3/UL (ref 0–0.7)
IMM GRANULOCYTES NFR BLD AUTO: 0.9 % (ref 0–0.9)
LYMPHOCYTES # BLD AUTO: 1.07 X10*3/UL (ref 1.2–4.8)
LYMPHOCYTES NFR BLD AUTO: 33.1 %
MCH RBC QN AUTO: 28.6 PG (ref 26–34)
MCHC RBC AUTO-ENTMCNC: 32.9 G/DL (ref 32–36)
MCV RBC AUTO: 87 FL (ref 80–100)
MONOCYTES # BLD AUTO: 0.46 X10*3/UL (ref 0.1–1)
MONOCYTES NFR BLD AUTO: 14.2 %
NEUTROPHILS # BLD AUTO: 1.6 X10*3/UL (ref 1.2–7.7)
NEUTROPHILS NFR BLD AUTO: 49.6 %
NRBC BLD-RTO: ABNORMAL /100{WBCS}
PLATELET # BLD AUTO: 228 X10*3/UL (ref 150–450)
RBC # BLD AUTO: 3.77 X10*6/UL (ref 4–5.2)
RBC MORPH BLD: NORMAL
WBC # BLD AUTO: 3.2 X10*3/UL (ref 4.4–11.3)

## 2024-11-25 PROCEDURE — 2500000004 HC RX 250 GENERAL PHARMACY W/ HCPCS (ALT 636 FOR OP/ED): Performed by: INTERNAL MEDICINE

## 2024-11-25 PROCEDURE — 96367 TX/PROPH/DG ADDL SEQ IV INF: CPT

## 2024-11-25 PROCEDURE — 85025 COMPLETE CBC W/AUTO DIFF WBC: CPT

## 2024-11-25 PROCEDURE — 96375 TX/PRO/DX INJ NEW DRUG ADDON: CPT | Mod: INF

## 2024-11-25 PROCEDURE — 96413 CHEMO IV INFUSION 1 HR: CPT

## 2024-11-25 PROCEDURE — 2500000001 HC RX 250 WO HCPCS SELF ADMINISTERED DRUGS (ALT 637 FOR MEDICARE OP): Performed by: INTERNAL MEDICINE

## 2024-11-25 PROCEDURE — 2500000004 HC RX 250 GENERAL PHARMACY W/ HCPCS (ALT 636 FOR OP/ED)

## 2024-11-25 RX ORDER — FAMOTIDINE 10 MG/ML
20 INJECTION INTRAVENOUS ONCE AS NEEDED
Status: DISCONTINUED | OUTPATIENT
Start: 2024-11-25 | End: 2024-11-25 | Stop reason: HOSPADM

## 2024-11-25 RX ORDER — DIPHENHYDRAMINE HYDROCHLORIDE 50 MG/ML
50 INJECTION INTRAMUSCULAR; INTRAVENOUS AS NEEDED
Status: DISCONTINUED | OUTPATIENT
Start: 2024-11-25 | End: 2024-11-25 | Stop reason: HOSPADM

## 2024-11-25 RX ORDER — DIPHENHYDRAMINE HCL 25 MG
25 CAPSULE ORAL ONCE
Status: COMPLETED | OUTPATIENT
Start: 2024-11-25 | End: 2024-11-25

## 2024-11-25 RX ORDER — PALONOSETRON 0.05 MG/ML
0.25 INJECTION, SOLUTION INTRAVENOUS ONCE
Status: COMPLETED | OUTPATIENT
Start: 2024-11-25 | End: 2024-11-25

## 2024-11-25 RX ORDER — ATROPINE SULFATE 0.4 MG/ML
0.4 INJECTION, SOLUTION ENDOTRACHEAL; INTRAMEDULLARY; INTRAMUSCULAR; INTRAVENOUS; SUBCUTANEOUS
Status: DISCONTINUED | OUTPATIENT
Start: 2024-11-25 | End: 2024-11-25 | Stop reason: HOSPADM

## 2024-11-25 RX ORDER — ALBUTEROL SULFATE 0.83 MG/ML
3 SOLUTION RESPIRATORY (INHALATION) AS NEEDED
Status: DISCONTINUED | OUTPATIENT
Start: 2024-11-25 | End: 2024-11-25 | Stop reason: HOSPADM

## 2024-11-25 RX ORDER — PROCHLORPERAZINE MALEATE 10 MG
10 TABLET ORAL EVERY 6 HOURS PRN
Status: DISCONTINUED | OUTPATIENT
Start: 2024-11-25 | End: 2024-11-25 | Stop reason: HOSPADM

## 2024-11-25 RX ORDER — FAMOTIDINE 10 MG/ML
20 INJECTION INTRAVENOUS ONCE
Status: COMPLETED | OUTPATIENT
Start: 2024-11-25 | End: 2024-11-25

## 2024-11-25 RX ORDER — HEPARIN SODIUM,PORCINE/PF 10 UNIT/ML
50 SYRINGE (ML) INTRAVENOUS AS NEEDED
OUTPATIENT
Start: 2024-11-25

## 2024-11-25 RX ORDER — PROCHLORPERAZINE EDISYLATE 5 MG/ML
10 INJECTION INTRAMUSCULAR; INTRAVENOUS EVERY 6 HOURS PRN
Status: DISCONTINUED | OUTPATIENT
Start: 2024-11-25 | End: 2024-11-25 | Stop reason: HOSPADM

## 2024-11-25 RX ORDER — HEPARIN 100 UNIT/ML
500 SYRINGE INTRAVENOUS AS NEEDED
Status: DISCONTINUED | OUTPATIENT
Start: 2024-11-25 | End: 2024-11-25 | Stop reason: HOSPADM

## 2024-11-25 RX ORDER — HEPARIN 100 UNIT/ML
500 SYRINGE INTRAVENOUS AS NEEDED
OUTPATIENT
Start: 2024-11-25

## 2024-11-25 RX ORDER — ACETAMINOPHEN 325 MG/1
650 TABLET ORAL ONCE
Status: COMPLETED | OUTPATIENT
Start: 2024-11-25 | End: 2024-11-25

## 2024-11-25 RX ORDER — DEXAMETHASONE 4 MG/1
12 TABLET ORAL ONCE
Status: COMPLETED | OUTPATIENT
Start: 2024-11-25 | End: 2024-11-25

## 2024-11-25 RX ORDER — EPINEPHRINE 0.3 MG/.3ML
0.3 INJECTION SUBCUTANEOUS EVERY 5 MIN PRN
Status: DISCONTINUED | OUTPATIENT
Start: 2024-11-25 | End: 2024-11-25 | Stop reason: HOSPADM

## 2024-11-25 ASSESSMENT — PAIN SCALES - GENERAL: PAINLEVEL_OUTOF10: 2

## 2024-11-25 NOTE — PROGRESS NOTES
Trang Jones completed treatment utilizing the cooling cap without any issues, tolerated well and is aware of follow-up appointments. No questions or concerns at this time. Cooling period ended at 7005

## 2024-11-25 NOTE — SIGNIFICANT EVENT
11/25/24 0943   Prechemo Checklist   Has the patient been in the hospital, ED, or urgent care since last date of service No   Chemo/Immuno Consent Completed and Signed Yes   Protocol/Indications Verified Yes   Confirmed to previous date/time of medication Yes   Compared to previous dose Yes   All medications are dated accurately Yes   Pregnancy Test Negative No   Parameters Met Yes   BSA/Weight-Height Verified Yes   Dose Calculations Verified (current, total, cumulative) Yes

## 2024-12-02 DIAGNOSIS — C50.911 CARCINOMA OF RIGHT BREAST METASTATIC TO BONE (MULTI): Primary | ICD-10-CM

## 2024-12-02 DIAGNOSIS — C79.51 CARCINOMA OF RIGHT BREAST METASTATIC TO BONE (MULTI): Primary | ICD-10-CM

## 2024-12-02 DIAGNOSIS — C41.9 MALIGNANT NEOPLASM OF BONE WITH METASTASES (MULTI): ICD-10-CM

## 2024-12-04 ENCOUNTER — APPOINTMENT (OUTPATIENT)
Dept: INTEGRATIVE MEDICINE | Facility: CLINIC | Age: 64
End: 2024-12-04

## 2024-12-04 ENCOUNTER — APPOINTMENT (OUTPATIENT)
Dept: INTEGRATIVE MEDICINE | Facility: CLINIC | Age: 64
End: 2024-12-04
Payer: COMMERCIAL

## 2024-12-04 DIAGNOSIS — F43.9 STRESS: Primary | ICD-10-CM

## 2024-12-04 ASSESSMENT — PAIN SCALES - GENERAL: PAINLEVEL_OUTOF10: 2

## 2024-12-04 NOTE — PROGRESS NOTES
Acupuncture Visit:     Subjective   Patient ID: Trang Jones is a 64 y.o. female who presents for No chief complaint on file.  Seeking emotional support related to next steps, none identified, with oncology based strategies with dx.  Reports many emotions and feelings        Session Information  Is this acupuncture treatment being billed to the patient's insurance company: No  Visit Type: Follow-up visit  Medical History Reviewed: I have reviewed pertinent medical history in EHR, and no contraindications are present to provide treatment    Pre-treatment Assessment  Pain Score: 3  Anxiety Level (0-10): 3  Stress Level (0-10): 2  Coping Level (0-10): 7  Depression Level (0-10): 1  Fatigue Level (0-10): 3  Nausea Level (0-10): 0  Wellbeing Level (0-10): 7    Review of Systems         Provider reviewed plan for the acupuncture session, precautions and contraindications. Patient/guardian/hospital staff has given consent to treat with full understanding of what to expect during the session. Before acupuncture began, provider explained to the patient to communicate at any time if the procedure was causing discomfort past their tolerance level. Patient agreed to advise acupuncturist. The acupuncturist counseled the patient on the risks of acupuncture treatment including pain, infection, bleeding, and no relief of pain. The patient was positioned comfortably. There was no evidence of infection at the site of needle insertions.    Objective   Physical Exam         Treatment Plan  Treatment Goals: Anxiety reduction, Mood modification, Coping    Acupuncture Treatment  Patient Position: Seated and reclining  Acupuncture Needling: Yes  Needle Guage: 42 guage /.14/ Lime green seirin, 40 guage /.16/ Red seirin  Body Points - Left: buddha triangle  Body Points - Bilateral: lr 8, st36, sp 4  Body Points - Right: ht 3, yin isaacs  Needle Count In: 11  Needle Count Out: 11  Needle Retention Time (min): 25 minutes  Total Face to  Face Time (min): 25 minutes         Post-treatment Assessment  0-10 (Numeric) Pain Score: 2  Anxiety Level (0-10): 3  Stress Level (0-10): 2  Coping Level (0-10): 8  Depression Level (0-10): 1  Fatigue Level (0-10): 3  Nausea Level (0-10): 0  Wellbeing Level (0-10): 7    Assessment/Plan

## 2024-12-09 ENCOUNTER — INFUSION (OUTPATIENT)
Dept: HEMATOLOGY/ONCOLOGY | Facility: CLINIC | Age: 64
End: 2024-12-09
Payer: COMMERCIAL

## 2024-12-09 VITALS
SYSTOLIC BLOOD PRESSURE: 114 MMHG | BODY MASS INDEX: 25.37 KG/M2 | WEIGHT: 136.47 LBS | HEART RATE: 65 BPM | DIASTOLIC BLOOD PRESSURE: 73 MMHG | TEMPERATURE: 96.1 F | OXYGEN SATURATION: 97 % | RESPIRATION RATE: 17 BRPM

## 2024-12-09 DIAGNOSIS — C50.911 CARCINOMA OF RIGHT BREAST METASTATIC TO BONE (MULTI): ICD-10-CM

## 2024-12-09 DIAGNOSIS — C41.9 MALIGNANT NEOPLASM OF BONE WITH METASTASES (MULTI): ICD-10-CM

## 2024-12-09 DIAGNOSIS — C79.51 CARCINOMA OF RIGHT BREAST METASTATIC TO BONE (MULTI): ICD-10-CM

## 2024-12-09 LAB
ALBUMIN SERPL BCP-MCNC: 3.7 G/DL (ref 3.4–5)
ALP SERPL-CCNC: 273 U/L (ref 33–136)
ALT SERPL W P-5'-P-CCNC: 60 U/L (ref 7–45)
ANION GAP SERPL CALC-SCNC: 11 MMOL/L (ref 10–20)
AST SERPL W P-5'-P-CCNC: 69 U/L (ref 9–39)
BASOPHILS # BLD AUTO: 0.02 X10*3/UL (ref 0–0.1)
BASOPHILS NFR BLD AUTO: 0.4 %
BILIRUB SERPL-MCNC: 0.5 MG/DL (ref 0–1.2)
BUN SERPL-MCNC: 16 MG/DL (ref 6–23)
CALCIUM SERPL-MCNC: 8.4 MG/DL (ref 8.6–10.3)
CANCER AG27-29 SERPL-ACNC: 829.5 U/ML (ref 0–38.6)
CHLORIDE SERPL-SCNC: 102 MMOL/L (ref 98–107)
CO2 SERPL-SCNC: 28 MMOL/L (ref 21–32)
CREAT SERPL-MCNC: 0.73 MG/DL (ref 0.5–1.05)
EGFRCR SERPLBLD CKD-EPI 2021: >90 ML/MIN/1.73M*2
EOSINOPHIL # BLD AUTO: 0.08 X10*3/UL (ref 0–0.7)
EOSINOPHIL NFR BLD AUTO: 1.8 %
ERYTHROCYTE [DISTWIDTH] IN BLOOD BY AUTOMATED COUNT: 15.3 % (ref 11.5–14.5)
GLUCOSE SERPL-MCNC: 93 MG/DL (ref 74–99)
HCT VFR BLD AUTO: 33.2 % (ref 36–46)
HGB BLD-MCNC: 10.8 G/DL (ref 12–16)
IMM GRANULOCYTES # BLD AUTO: 0.04 X10*3/UL (ref 0–0.7)
IMM GRANULOCYTES NFR BLD AUTO: 0.9 % (ref 0–0.9)
LYMPHOCYTES # BLD AUTO: 1.08 X10*3/UL (ref 1.2–4.8)
LYMPHOCYTES NFR BLD AUTO: 23.6 %
MCH RBC QN AUTO: 28.4 PG (ref 26–34)
MCHC RBC AUTO-ENTMCNC: 32.5 G/DL (ref 32–36)
MCV RBC AUTO: 87 FL (ref 80–100)
MONOCYTES # BLD AUTO: 0.54 X10*3/UL (ref 0.1–1)
MONOCYTES NFR BLD AUTO: 11.8 %
NEUTROPHILS # BLD AUTO: 2.81 X10*3/UL (ref 1.2–7.7)
NEUTROPHILS NFR BLD AUTO: 61.5 %
PLATELET # BLD AUTO: 204 X10*3/UL (ref 150–450)
POTASSIUM SERPL-SCNC: 4.3 MMOL/L (ref 3.5–5.3)
PROT SERPL-MCNC: 5.7 G/DL (ref 6.4–8.2)
RBC # BLD AUTO: 3.8 X10*6/UL (ref 4–5.2)
SODIUM SERPL-SCNC: 137 MMOL/L (ref 136–145)
WBC # BLD AUTO: 4.6 X10*3/UL (ref 4.4–11.3)

## 2024-12-09 PROCEDURE — 85025 COMPLETE CBC W/AUTO DIFF WBC: CPT

## 2024-12-09 PROCEDURE — 96367 TX/PROPH/DG ADDL SEQ IV INF: CPT

## 2024-12-09 PROCEDURE — 2500000001 HC RX 250 WO HCPCS SELF ADMINISTERED DRUGS (ALT 637 FOR MEDICARE OP): Performed by: INTERNAL MEDICINE

## 2024-12-09 PROCEDURE — 86300 IMMUNOASSAY TUMOR CA 15-3: CPT | Mod: GEALAB

## 2024-12-09 PROCEDURE — 2500000004 HC RX 250 GENERAL PHARMACY W/ HCPCS (ALT 636 FOR OP/ED)

## 2024-12-09 PROCEDURE — 96413 CHEMO IV INFUSION 1 HR: CPT

## 2024-12-09 PROCEDURE — 80053 COMPREHEN METABOLIC PANEL: CPT

## 2024-12-09 PROCEDURE — 96375 TX/PRO/DX INJ NEW DRUG ADDON: CPT | Mod: INF

## 2024-12-09 PROCEDURE — 2500000004 HC RX 250 GENERAL PHARMACY W/ HCPCS (ALT 636 FOR OP/ED): Performed by: INTERNAL MEDICINE

## 2024-12-09 RX ORDER — ATROPINE SULFATE 0.4 MG/ML
0.4 INJECTION, SOLUTION ENDOTRACHEAL; INTRAMEDULLARY; INTRAMUSCULAR; INTRAVENOUS; SUBCUTANEOUS
Status: DISCONTINUED | OUTPATIENT
Start: 2024-12-09 | End: 2024-12-09 | Stop reason: HOSPADM

## 2024-12-09 RX ORDER — HEPARIN 100 UNIT/ML
500 SYRINGE INTRAVENOUS AS NEEDED
OUTPATIENT
Start: 2024-12-09

## 2024-12-09 RX ORDER — DIPHENHYDRAMINE HYDROCHLORIDE 50 MG/ML
50 INJECTION INTRAMUSCULAR; INTRAVENOUS AS NEEDED
Status: DISCONTINUED | OUTPATIENT
Start: 2024-12-09 | End: 2024-12-09 | Stop reason: HOSPADM

## 2024-12-09 RX ORDER — HEPARIN 100 UNIT/ML
500 SYRINGE INTRAVENOUS AS NEEDED
Status: DISCONTINUED | OUTPATIENT
Start: 2024-12-09 | End: 2024-12-09 | Stop reason: HOSPADM

## 2024-12-09 RX ORDER — ACETAMINOPHEN 325 MG/1
650 TABLET ORAL ONCE
Status: COMPLETED | OUTPATIENT
Start: 2024-12-09 | End: 2024-12-09

## 2024-12-09 RX ORDER — ALBUTEROL SULFATE 0.83 MG/ML
3 SOLUTION RESPIRATORY (INHALATION) AS NEEDED
Status: DISCONTINUED | OUTPATIENT
Start: 2024-12-09 | End: 2024-12-09 | Stop reason: HOSPADM

## 2024-12-09 RX ORDER — HEPARIN SODIUM,PORCINE/PF 10 UNIT/ML
50 SYRINGE (ML) INTRAVENOUS AS NEEDED
OUTPATIENT
Start: 2024-12-09

## 2024-12-09 RX ORDER — DEXAMETHASONE 4 MG/1
12 TABLET ORAL ONCE
Status: COMPLETED | OUTPATIENT
Start: 2024-12-09 | End: 2024-12-09

## 2024-12-09 RX ORDER — PROCHLORPERAZINE MALEATE 10 MG
10 TABLET ORAL EVERY 6 HOURS PRN
Status: DISCONTINUED | OUTPATIENT
Start: 2024-12-09 | End: 2024-12-09 | Stop reason: HOSPADM

## 2024-12-09 RX ORDER — DIPHENHYDRAMINE HCL 25 MG
25 CAPSULE ORAL ONCE
Status: COMPLETED | OUTPATIENT
Start: 2024-12-09 | End: 2024-12-09

## 2024-12-09 RX ORDER — FAMOTIDINE 10 MG/ML
20 INJECTION INTRAVENOUS ONCE
Status: COMPLETED | OUTPATIENT
Start: 2024-12-09 | End: 2024-12-09

## 2024-12-09 RX ORDER — EPINEPHRINE 0.3 MG/.3ML
0.3 INJECTION SUBCUTANEOUS EVERY 5 MIN PRN
Status: DISCONTINUED | OUTPATIENT
Start: 2024-12-09 | End: 2024-12-09 | Stop reason: HOSPADM

## 2024-12-09 RX ORDER — PALONOSETRON 0.05 MG/ML
0.25 INJECTION, SOLUTION INTRAVENOUS ONCE
Status: COMPLETED | OUTPATIENT
Start: 2024-12-09 | End: 2024-12-09

## 2024-12-09 RX ORDER — FAMOTIDINE 10 MG/ML
20 INJECTION INTRAVENOUS ONCE AS NEEDED
Status: DISCONTINUED | OUTPATIENT
Start: 2024-12-09 | End: 2024-12-09 | Stop reason: HOSPADM

## 2024-12-09 RX ORDER — PROCHLORPERAZINE EDISYLATE 5 MG/ML
10 INJECTION INTRAMUSCULAR; INTRAVENOUS EVERY 6 HOURS PRN
Status: DISCONTINUED | OUTPATIENT
Start: 2024-12-09 | End: 2024-12-09 | Stop reason: HOSPADM

## 2024-12-09 ASSESSMENT — PAIN SCALES - GENERAL: PAINLEVEL_OUTOF10: 3

## 2024-12-09 NOTE — SIGNIFICANT EVENT
12/09/24 0838   Prechemo Checklist   Has the patient been in the hospital, ED, or urgent care since last date of service No   Chemo/Immuno Consent Completed and Signed Yes   Protocol/Indications Verified Yes   Confirmed to previous date/time of medication Yes   Compared to previous dose Yes   All medications are dated accurately Yes   Parameters Met Yes   BSA/Weight-Height Verified Yes   Dose Calculations Verified (current, total, cumulative) Yes

## 2024-12-09 NOTE — PROGRESS NOTES
Trang Jones completed Sacituzumab treatment utilizing the cooling cap without any issues, tolerated well and is aware of follow-up appointments. No questions or concerns at this time.

## 2024-12-09 NOTE — PROGRESS NOTES
History of Present Illness:  Trang Jones is a 64 y.o. female with a personal history of breast cancer.  Trang Jones was referred to the Cancer Genetics Clinic at [facility] by Bebeto Tolbert MD. Trang Jones already had genetic testing in 2019 through [facility], which was negative for any pathogenic variants. A variant of uncertain significance (VUS) was found in MSH2.  She presented today to review her prior testing and discuss if any updated testing is needed.    Cancer Medical History:  Personal history of cancer? Yes  Type: Diagnosed March 2023 triple negative breast cancer (PDL1 negative 0, breast cancer) with bone metastases  Age at diagnosis: Initially diagnosed in January 2010, patient was 49. Recurrence in 2023  Summary: Per Dr. Tolbert note on 11/13/24 -     Stage II (T2 N1MIC M0) hormone positive HER-2 negative and premenopausal. she received prior Taxotere and cyclophosphamide , completed 6/2010. S/p adjuvant endocrine therapy.  Diagnosis of metastatic TNBC 3/2023, presenting with back pain and cord compression  weekly paclitaxel from 5/22/23, changed to abraxane after 1st cycle due to anaphylactic type reaction. Discontinued 12/12/23 due to progressive disease.  TDxD from 1/29/24, held for XRT to spine for a few wks and stopped on 6/27/24  s/p XRT to spine on 4/1/24  [id number] clincial trial from 8/26/24-9/23/24  Sacizutumab 8 mg/kg since 10/28/24 days 1 and 8     History of other cancers: No    Prior hereditary cancer (germline) genetic testing? Yes -[facility] in 2019.  Genes analyzed: APC, NADEEN, AXIN2, BARD1, BMPR1A, BRCA1, BRCA2, BRIP1, CDH1, CDK4, CDKN2A, CHEK2, EPCAM (large rearrangement only), HOXB 3 (sequencing only), GALNT12, MLH1, MSH2, MSH3 (excluding repetitive portions of exon 1), MSH6, MUTYH, NBN, NTHL1, PALB2, PMS2, PTEN, RAD51C, RAD51D, RNF43, RPS20, SMAD4, STK11, TP53. Sequencing was performed for select regions of POLE and POLD1, and large  rearrangement analysis was performed for select regions of GREM1 (see technical specifications).        Prior hereditary cancer (germline) genetic testing in family members? No    Cancer screening history:  Mammograms? See above  PAP smear?  Yes, 2023, negative.    Colonoscopy? Yes, most recent at 52yo, records unavailable. No polyps per patient report.  Upper endoscopy? No   Dermatology? Yes, most recent 1 year ago, no concerns for skin cancer    Other cancer screening? No    Reproductive History:  Number of children: 2  Number of pregnancies: 3  Age first birth: 31  Breast feeding? Yes   Menarche (age): 15  Menopause (age): 49  OCP: Yes, for ~<1 years total  HRT:  No    Hysterectomy? No  Oophorectomy? No    Family history:  A 4-generation pedigree was obtained and was significant for the following:     -Father  diagnosed with MDS around 84, and it quickly progressed into leukemia, and he ultimately  from the cancer at 85.   - Sister (72) diagnosed with MDS in her early 60's, now in remission.    - Paternal cousin  of lung cancer. Unknown smoking history but worked at a chemical plant.    Maternal ancestry is Qatari.  Paternal ancestry is Bulgarian. There is no known Ashkenazi Zoroastrianism ancestry. Consanguinity was denied.     Genetic counseling:    Summary  Ms. Jones is a 64 y.o. female with a personal history of breast cancer.    Trang Jones's reported history is concerning for possible hereditary breast cancer. Ms. Jones meets current national (NCCN) criteria for testing of high-penetrance breast cancer susceptibility genes, including BRCA1, BRCA2, CDH1, PALB2, PTEN, STK11, and TP53.  However, Ms. Jones already had comprehensive hereditary cancer genetic testing through [facility] in 2019 which did not identify any pathogenic variants. A variant of uncertain significance (VUS) was found in MSH2.     She presented today to review her prior testing and discuss if any updated testing is needed. Our  discussion is summarized below.    Genetic Counseling Discussion    Ms. Jones is a 64 y.o. female with a personal history of cancer. Based on her family history of cancer, she underwent genetic testing in 2019 via [facility] panel which did not detect any clinically actionable variants. A variant of uncertain significance (VUS) was found in MSH2.      Ms. Jones was referred back to genetics today by Dr. Tolbert for consideration of additional testing for a genetic predisposition to cancer and to discuss Ms. Jones's prior genetic testing results. Ms. Jones reports she has not had genetic counseling.     We reviewed genes and the concept of hereditary cancer. We discussed that most cancers are not due to an inherited genetic susceptibility. However, in about 5-10% of cases, there is an inherited genetic mutation that can make a person more susceptible to developing certain types of cancer. Within families with a genetic predisposition to cancer, we often see certain patterns, such as multiple family members with cancer and cancers occurring in multiple generations. In addition, earlier onset and/or bilateral cancers are suggestive of an inherited predisposition. There also tends to be a clustering of certain types of cancer in these families, such as breast cancer and ovarian cancer.      Her history of early onset breast cancer raises suspicion for an underlying genetic predisposition, however her previous genetic testing was negative for any clinically actionably variants. This testing was done through [facility] in 2019, and analyzed 35 genes associated with increased risk for the various cancer types including breast cancer.      Ms. Jones's testing showed a VUS in MSH2. Trang Jones results DID NOT show any known pathogenic (known harmful, cancer-causing) mutations in any of the genes which were examined. The only finding was a single variant of unknown significance (VUS) in the MSH2 gene which  cannot be used for medical management for you or your family members. The VUS is located at c.1A>C on one copy of her MSH2 genes.   A VUS is a change in the gene from the typical expected result that is not known if it is: (a) harmful and associated with increased cancer risk, or (b) benign and not associated with increased cancer risk.  Over time, as new knowledge is gained, a genetic testing laboratory may be able to reclassify the VUS as either benign or pathogenic.  The reclassification process does not usually happen very quickly, and the process sometimes takes years. More often than not, a VUS is reclassified as benign (or likely benign), but sometimes a VUS is reclassified as pathogenic. A pathogenic gene change is one that could have significant implications for the original patient tested, as well as for their family members. At this time, since this gene change is a VUS, this means that we cannot make any medical management recommendations based on it. We must use Trang Jones's personal and family history to guide their care and the care of their relatives. Trang Jones should follow-up with Genetics in 1-2 years to see if the VUS has been reclassified. Ms. Jones was asked to sign a release form for me to be added to her portal.  This testing covered most genes associated with hereditary breast cancer risk, with the exception of NF1. Gene mutations in NF1 are associated with a condition called Neurofibromatosis type 1. NF1 is a neurocutaneous syndrome characterized by café-au-lait spots and axillary/inguinal freckling, associated with non-cancerous tumors of the nerve tissues. These patients also have an increased risk for breast cancer. Of note, some studies show that beginning at age 50, breast cancer risk in women with NF1 may not significantly differ from that of women in the general population. Ms. Jones denied any personal history of CALM's, neurofibromas, ocular findings (I.e.  Lisch nodules or optic pathway glioma), bone abnormalities (sphenoid dysplasia, tibial bowing, pseudoarthorsis) which would suggest she has NF1, lowering our suspicion for this condition. Ms. Jones declined any futher testing of the NF1 gene.    We discussed the option for updated testing.   We discussed that updated testing can include the NF1 gene, however we have a low degree of suspicion to find a pathogenic mutation in this gene given her clinical history.  We discussed that more recent testing includes RNA analysis, which may offer a small increase in sensitivity and can assist with VUS resolution.    We discussed that there is some genetic testing available for hematological malignancies such as MDS, but it is usually best to start off by testing the affected person to determine if there was a hereditary predisposition or not. Furthermore, we discussed that in the absence of other medical features, our suspicion for a hereditary predisposition for their MDS is lowered, but cannot be excluded. There are some genes, such as DDX41- that can be associated with non-syndromic MDS (and other hematological malignancies like AML). We discussed that there are a few genes related to hematological malignancies that are included on [facility] expanded panel. We also discussed her sister could consider her own genetic testing for hereditary predisposition to MDS.  However, we discussed that further clinical genetic testing would be unlikely to identify a mutation that would not have been detected with her previous testing. We discussed the option of updated testing (>70 genes, with RNA included) - on a self-pay basis ($249).   Ms. Jones declined additional genetic testing at this time.     Ms. Jones's results  were otherwise negative for a pathogenic variant that explains her personal history of breast cancer. These results make a hereditary cancer syndrome less likely, but they do not rule one out completely. We discussed  several possibilities of why Ms. Childresss genetic testing did not detect any pathogenic variants.   It is estimated that a small percentage of gene mutations are not identified by current testing technology. There could also be other genes that may increase the risk for cancer for which there is no testing available at this time.   The cancer in the family may not be due to an inherited risk factor, and instead is sporadic or due to chance.   There may have been a mutation in the family that Ms. Jones did not inherit.     Other Cancer Risks  In the absence of an identifiable genetic risk factor, Ms. Childresss cancer risk are shaped by her personal and family history of cancer.   We discussed that Ms. Jones should follow their primary care providers' recommendations for all other age-related cancer screenings.  We briefly discussed Ms. Jones's sister and father's diagnosis of MDS. Ms. Jones and her family members may be at an increased risk for MDS based on the family history alone, and should talk to their PCP about the family history of cancer.    Implications for Family Members  Ms. Jones was not found to have a pathogenic (disease-causing) genetic alteration in any of the tested genes that she could have passed down to her children. However, if there is a concerning family history of cancer in their father's family, they may still be candidates for genetic testing.   However, although their results were negative, Ms. Childresss female relatives are considered to have an increased risk to develop breast cancer over the general population, based on the family history alone.   They should speak to their healthcare providers about their breast cancer screening protocols, as they may be a candidate for annual breast MRIs, in addition to an annual mammogram and clinical breast exam, if their lifetime risk of breast cancer reaches or exceeds 20%. Breast cancer screening should also begin 10 years younger than the  youngest diagnosis in the family, or age 40, whichever comes first.  This would be 37 for her daughters. They plan to share this information with their relatives.  Her relatives may also have slightly increased risks for other cancers based on the family history, such as MDS. Any cancer surveillance planning for Ms. Jones's family members should involve a discussion with their physicians and an assessment of their personal risk factors. Family members should also be sure that their physicians are aware of all of the cancer diagnoses in the family so that additional cancer surveillance measures, or genetic testing, can be considered, if appropriate.     We encourage Ms. Jones to let us know if other relatives pursue genetic testing and/or if she is able to obtain genetic test results for any relatives, as this information could impact our understanding of cancer risk for her.     PLAN:  Ms. Jones declined additional genetic testing at this time.    2. Our understanding of genetic contribution to cancer diagnoses is always evolving, so there may be additional testing recommended in the future. Ms. Jones can contact us every 1-3 years to determine if there have been any changes since our discussion today, and to check on the status of the VUS identified on her prior testing. She was also encouraged to keep us updated if there are any changes to her family history of cancer.     3. We remain available to Ms. Jones at [phone] if any questions arise regarding information discussed at today's visit.      Cass Marcum MS, [facility]  Licensed Genetic Counselor  [facility]  Ph: [phone]      An virtual (video and audio) between the patient (at the originating site) and provider (at the distant site) was utilized to provide this telehealth service. Verbal consent was requested and obtained from Trang VALENTIN Robert on this date, December 13, 2024 for a telehealth visit.    Time spent with patient: 45  minutes    The patient stated they were located in the [facility] for the visit.

## 2024-12-11 ENCOUNTER — APPOINTMENT (OUTPATIENT)
Dept: INTEGRATIVE MEDICINE | Facility: CLINIC | Age: 64
End: 2024-12-11
Payer: COMMERCIAL

## 2024-12-11 ENCOUNTER — APPOINTMENT (OUTPATIENT)
Dept: INTEGRATIVE MEDICINE | Facility: CLINIC | Age: 64
End: 2024-12-11

## 2024-12-11 DIAGNOSIS — F43.9 STRESS: Primary | ICD-10-CM

## 2024-12-11 ASSESSMENT — PAIN SCALES - GENERAL: PAINLEVEL_OUTOF10: 3

## 2024-12-11 NOTE — PROGRESS NOTES
Acupuncture Visit:     Subjective   Patient ID: Trang Jones is a 64 y.o. female who presents for No chief complaint on file.  Pt noted improved mood and emotional stability from last week.  Seeking continued support for emotional regulation        Session Information  Is this acupuncture treatment being billed to the patient's insurance company: No  Visit Type: Follow-up visit  Medical History Reviewed: I have reviewed pertinent medical history in EHR, and no contraindications are present to provide treatment    Pre-treatment Assessment  Pain Score: 3  Anxiety Level (0-10): 1  Stress Level (0-10): 1  Coping Level (0-10): 7  Depression Level (0-10): 1  Fatigue Level (0-10): 5  Nausea Level (0-10): 0  Wellbeing Level (0-10): 7    Review of Systems         Provider reviewed plan for the acupuncture session, precautions and contraindications. Patient/guardian/hospital staff has given consent to treat with full understanding of what to expect during the session. Before acupuncture began, provider explained to the patient to communicate at any time if the procedure was causing discomfort past their tolerance level. Patient agreed to advise acupuncturist. The acupuncturist counseled the patient on the risks of acupuncture treatment including pain, infection, bleeding, and no relief of pain. The patient was positioned comfortably. There was no evidence of infection at the site of needle insertions.    Objective   Physical Exam         Treatment Plan  Treatment Goals: Mood modification    Acupuncture Treatment  Patient Position: Seated and reclining  Acupuncture Needling: Yes  Needle Guage: 42 guage /.14/ Lime green seirin, 40 guage /.16/ Red seirin  Body Points - Bilateral: st 36, k 6, lr 8  Body Points - Right: buddha triangle, yin isaacs  Needle Count In: 10  Needle Count Out: 10  Needle Retention Time (min): 25 minutes  Total Face to Face Time (min): 25 minutes         Post-treatment Assessment  0-10 (Numeric) Pain  Score: 3  Anxiety Level (0-10): 1  Stress Level (0-10): 1  Coping Level (0-10): 8  Depression Level (0-10): 1  Fatigue Level (0-10): 4  Nausea Level (0-10): 0  Wellbeing Level (0-10): 8    Assessment/Plan

## 2024-12-13 ENCOUNTER — TELEMEDICINE CLINICAL SUPPORT (OUTPATIENT)
Dept: GENETICS | Facility: HOSPITAL | Age: 64
End: 2024-12-13
Payer: COMMERCIAL

## 2024-12-13 DIAGNOSIS — Z83.2 FAMILY HISTORY OF DISEASES OF THE BLOOD AND BLOOD-FORMING ORGANS AND CERTAIN DISORDERS INVOLVING THE IMMUNE MECHANISM: ICD-10-CM

## 2024-12-13 DIAGNOSIS — Z80.6 FAMILY HISTORY OF LEUKEMIA: ICD-10-CM

## 2024-12-13 DIAGNOSIS — Z71.83 ENCOUNTER FOR NONPROCREATIVE GENETIC COUNSELING: Primary | ICD-10-CM

## 2024-12-13 DIAGNOSIS — Z80.1 FAMILY HISTORY OF LUNG CANCER: ICD-10-CM

## 2024-12-13 DIAGNOSIS — C41.9 MALIGNANT NEOPLASM OF BONE WITH METASTASES (MULTI): ICD-10-CM

## 2024-12-13 PROCEDURE — 96040 HC GENETIC COUNSELING, EACH 30 MIN: CPT | Mod: 95

## 2024-12-16 ENCOUNTER — INFUSION (OUTPATIENT)
Dept: HEMATOLOGY/ONCOLOGY | Facility: CLINIC | Age: 64
End: 2024-12-16
Payer: COMMERCIAL

## 2024-12-16 VITALS
BODY MASS INDEX: 25.43 KG/M2 | SYSTOLIC BLOOD PRESSURE: 120 MMHG | DIASTOLIC BLOOD PRESSURE: 76 MMHG | WEIGHT: 136.8 LBS | TEMPERATURE: 97.9 F | RESPIRATION RATE: 16 BRPM | HEART RATE: 71 BPM | OXYGEN SATURATION: 97 %

## 2024-12-16 DIAGNOSIS — C79.51 CARCINOMA OF RIGHT BREAST METASTATIC TO BONE (MULTI): ICD-10-CM

## 2024-12-16 DIAGNOSIS — C50.911 CARCINOMA OF RIGHT BREAST METASTATIC TO BONE (MULTI): ICD-10-CM

## 2024-12-16 DIAGNOSIS — C41.9 MALIGNANT NEOPLASM OF BONE WITH METASTASES (MULTI): ICD-10-CM

## 2024-12-16 LAB
BASOPHILS # BLD AUTO: 0.01 X10*3/UL (ref 0–0.1)
BASOPHILS NFR BLD AUTO: 0.3 %
EOSINOPHIL # BLD AUTO: 0.11 X10*3/UL (ref 0–0.7)
EOSINOPHIL NFR BLD AUTO: 3.2 %
ERYTHROCYTE [DISTWIDTH] IN BLOOD BY AUTOMATED COUNT: 15.6 % (ref 11.5–14.5)
HCT VFR BLD AUTO: 32.5 % (ref 36–46)
HGB BLD-MCNC: 10.5 G/DL (ref 12–16)
IMM GRANULOCYTES # BLD AUTO: 0.05 X10*3/UL (ref 0–0.7)
IMM GRANULOCYTES NFR BLD AUTO: 1.5 % (ref 0–0.9)
LYMPHOCYTES # BLD AUTO: 1 X10*3/UL (ref 1.2–4.8)
LYMPHOCYTES NFR BLD AUTO: 29.1 %
MCH RBC QN AUTO: 28.5 PG (ref 26–34)
MCHC RBC AUTO-ENTMCNC: 32.3 G/DL (ref 32–36)
MCV RBC AUTO: 88 FL (ref 80–100)
MONOCYTES # BLD AUTO: 0.41 X10*3/UL (ref 0.1–1)
MONOCYTES NFR BLD AUTO: 11.9 %
NEUTROPHILS # BLD AUTO: 1.86 X10*3/UL (ref 1.2–7.7)
NEUTROPHILS NFR BLD AUTO: 54 %
PLATELET # BLD AUTO: 206 X10*3/UL (ref 150–450)
RBC # BLD AUTO: 3.69 X10*6/UL (ref 4–5.2)
WBC # BLD AUTO: 3.4 X10*3/UL (ref 4.4–11.3)

## 2024-12-16 PROCEDURE — 85025 COMPLETE CBC W/AUTO DIFF WBC: CPT

## 2024-12-16 PROCEDURE — 96367 TX/PROPH/DG ADDL SEQ IV INF: CPT

## 2024-12-16 PROCEDURE — 96413 CHEMO IV INFUSION 1 HR: CPT

## 2024-12-16 PROCEDURE — 96375 TX/PRO/DX INJ NEW DRUG ADDON: CPT | Mod: INF

## 2024-12-16 PROCEDURE — 2500000004 HC RX 250 GENERAL PHARMACY W/ HCPCS (ALT 636 FOR OP/ED): Performed by: INTERNAL MEDICINE

## 2024-12-16 PROCEDURE — 2500000001 HC RX 250 WO HCPCS SELF ADMINISTERED DRUGS (ALT 637 FOR MEDICARE OP): Performed by: INTERNAL MEDICINE

## 2024-12-16 PROCEDURE — 2500000004 HC RX 250 GENERAL PHARMACY W/ HCPCS (ALT 636 FOR OP/ED)

## 2024-12-16 PROCEDURE — 2500000004 HC RX 250 GENERAL PHARMACY W/ HCPCS (ALT 636 FOR OP/ED): Mod: JZ | Performed by: INTERNAL MEDICINE

## 2024-12-16 RX ORDER — HEPARIN 100 UNIT/ML
500 SYRINGE INTRAVENOUS AS NEEDED
OUTPATIENT
Start: 2024-12-16

## 2024-12-16 RX ORDER — ACETAMINOPHEN 325 MG/1
650 TABLET ORAL ONCE
Status: COMPLETED | OUTPATIENT
Start: 2024-12-16 | End: 2024-12-16

## 2024-12-16 RX ORDER — DIPHENHYDRAMINE HYDROCHLORIDE 50 MG/ML
50 INJECTION INTRAMUSCULAR; INTRAVENOUS AS NEEDED
Status: DISCONTINUED | OUTPATIENT
Start: 2024-12-16 | End: 2024-12-16 | Stop reason: HOSPADM

## 2024-12-16 RX ORDER — EPINEPHRINE 0.3 MG/.3ML
0.3 INJECTION SUBCUTANEOUS EVERY 5 MIN PRN
Status: DISCONTINUED | OUTPATIENT
Start: 2024-12-16 | End: 2024-12-16 | Stop reason: HOSPADM

## 2024-12-16 RX ORDER — FAMOTIDINE 10 MG/ML
20 INJECTION INTRAVENOUS ONCE
Status: COMPLETED | OUTPATIENT
Start: 2024-12-16 | End: 2024-12-16

## 2024-12-16 RX ORDER — ALBUTEROL SULFATE 0.83 MG/ML
3 SOLUTION RESPIRATORY (INHALATION) AS NEEDED
Status: DISCONTINUED | OUTPATIENT
Start: 2024-12-16 | End: 2024-12-16 | Stop reason: HOSPADM

## 2024-12-16 RX ORDER — PALONOSETRON 0.05 MG/ML
0.25 INJECTION, SOLUTION INTRAVENOUS ONCE
Status: COMPLETED | OUTPATIENT
Start: 2024-12-16 | End: 2024-12-16

## 2024-12-16 RX ORDER — HEPARIN SODIUM,PORCINE/PF 10 UNIT/ML
50 SYRINGE (ML) INTRAVENOUS AS NEEDED
OUTPATIENT
Start: 2024-12-16

## 2024-12-16 RX ORDER — PROCHLORPERAZINE MALEATE 10 MG
10 TABLET ORAL EVERY 6 HOURS PRN
Status: DISCONTINUED | OUTPATIENT
Start: 2024-12-16 | End: 2024-12-16 | Stop reason: HOSPADM

## 2024-12-16 RX ORDER — FAMOTIDINE 10 MG/ML
20 INJECTION INTRAVENOUS ONCE AS NEEDED
Status: DISCONTINUED | OUTPATIENT
Start: 2024-12-16 | End: 2024-12-16 | Stop reason: HOSPADM

## 2024-12-16 RX ORDER — DIPHENHYDRAMINE HCL 25 MG
25 CAPSULE ORAL ONCE
Status: COMPLETED | OUTPATIENT
Start: 2024-12-16 | End: 2024-12-16

## 2024-12-16 RX ORDER — ATROPINE SULFATE 0.4 MG/ML
0.4 INJECTION, SOLUTION ENDOTRACHEAL; INTRAMEDULLARY; INTRAMUSCULAR; INTRAVENOUS; SUBCUTANEOUS
Status: DISCONTINUED | OUTPATIENT
Start: 2024-12-16 | End: 2024-12-16 | Stop reason: HOSPADM

## 2024-12-16 RX ORDER — PROCHLORPERAZINE EDISYLATE 5 MG/ML
10 INJECTION INTRAMUSCULAR; INTRAVENOUS EVERY 6 HOURS PRN
Status: DISCONTINUED | OUTPATIENT
Start: 2024-12-16 | End: 2024-12-16 | Stop reason: HOSPADM

## 2024-12-16 RX ORDER — HEPARIN 100 UNIT/ML
500 SYRINGE INTRAVENOUS AS NEEDED
Status: DISCONTINUED | OUTPATIENT
Start: 2024-12-16 | End: 2024-12-16 | Stop reason: HOSPADM

## 2024-12-16 RX ORDER — DEXAMETHASONE 4 MG/1
12 TABLET ORAL ONCE
Status: COMPLETED | OUTPATIENT
Start: 2024-12-16 | End: 2024-12-16

## 2024-12-16 ASSESSMENT — PAIN SCALES - GENERAL: PAINLEVEL_OUTOF10: 3

## 2024-12-16 NOTE — PROGRESS NOTES
Trang Jones completed D8C3 of Sacituzumab govitecan treatment using the Paxman cooling cap, without any issues, tolerated well and is aware of follow-up appointments. No questions or concerns at this time.

## 2024-12-16 NOTE — SIGNIFICANT EVENT
12/16/24 0937   Prechemo Checklist   Has the patient been in the hospital, ED, or urgent care since last date of service No   Chemo/Immuno Consent Completed and Signed Yes   Protocol/Indications Verified Yes   Confirmed to previous date/time of medication Yes   Compared to previous dose Yes  (Dose changed to 540mg on D1C3)   All medications are dated accurately Yes   Pregnancy Test Negative Not applicable   Parameters Met Yes   BSA/Weight-Height Verified Yes   Dose Calculations Verified (current, total, cumulative) Yes

## 2024-12-17 ENCOUNTER — TELEPHONE (OUTPATIENT)
Dept: PALLIATIVE MEDICINE | Facility: CLINIC | Age: 64
End: 2024-12-17
Payer: COMMERCIAL

## 2024-12-17 ENCOUNTER — OFFICE VISIT (OUTPATIENT)
Dept: PALLIATIVE MEDICINE | Facility: CLINIC | Age: 64
End: 2024-12-17
Payer: COMMERCIAL

## 2024-12-17 VITALS
TEMPERATURE: 98.1 F | WEIGHT: 139 LBS | BODY MASS INDEX: 25.84 KG/M2 | OXYGEN SATURATION: 96 % | SYSTOLIC BLOOD PRESSURE: 108 MMHG | RESPIRATION RATE: 16 BRPM | DIASTOLIC BLOOD PRESSURE: 69 MMHG | HEART RATE: 82 BPM

## 2024-12-17 DIAGNOSIS — C50.911 CARCINOMA OF RIGHT BREAST METASTATIC TO BONE (MULTI): ICD-10-CM

## 2024-12-17 DIAGNOSIS — Z79.891 ENCOUNTER FOR MONITORING OPIOID MAINTENANCE THERAPY: ICD-10-CM

## 2024-12-17 DIAGNOSIS — C79.51 CARCINOMA OF RIGHT BREAST METASTATIC TO BONE (MULTI): ICD-10-CM

## 2024-12-17 DIAGNOSIS — Z51.81 ENCOUNTER FOR MONITORING OPIOID MAINTENANCE THERAPY: ICD-10-CM

## 2024-12-17 DIAGNOSIS — G89.3 CANCER RELATED PAIN: ICD-10-CM

## 2024-12-17 DIAGNOSIS — Z51.5 PALLIATIVE CARE ENCOUNTER: Primary | ICD-10-CM

## 2024-12-17 DIAGNOSIS — F41.9 ANXIETY: ICD-10-CM

## 2024-12-17 DIAGNOSIS — M79.2 NEUROPATHIC PAIN: ICD-10-CM

## 2024-12-17 PROCEDURE — 99215 OFFICE O/P EST HI 40 MIN: CPT | Performed by: NURSE PRACTITIONER

## 2024-12-17 RX ORDER — MORPHINE SULFATE 15 MG/1
15 TABLET, FILM COATED, EXTENDED RELEASE ORAL 3 TIMES DAILY
Qty: 90 TABLET | Refills: 0 | Status: SHIPPED | OUTPATIENT
Start: 2024-12-17 | End: 2025-01-16

## 2024-12-17 RX ORDER — LORAZEPAM 0.5 MG/1
0.5 TABLET ORAL 2 TIMES DAILY PRN
Qty: 60 TABLET | Refills: 3 | Status: SHIPPED | OUTPATIENT
Start: 2024-12-17 | End: 2025-01-16

## 2024-12-17 ASSESSMENT — PAIN SCALES - GENERAL: PAINLEVEL_OUTOF10: 3

## 2024-12-17 NOTE — PROGRESS NOTES
"  SUPPORTIVE AND PALLIATIVE ONCOLOGY OUTPATIENT FOLLOW-UP      SERVICE DATE: 12/17/2024    Cancer History  Newly diagnosed triple negative breast CA (with bone mets and cord compression)  -s/p L mastectomy in 2010: stage II ER+/HER2- breast CA  -s/p 4 cycles docetaxel/cyclophosphamide chemo  -took tamoxifen x 7 yrs, completed in [id number]  -presented to ED on 3/30/23 with severe back pain  -MRI spine showed cord compression, admitted at that time   -s/p 1 fx RT to spine on 5/4/23  -plan to start treatment with weekly paclitaxel and q21day pembro  -started treatment 5/22/23, changed to abraxane after C1 d/t anaphylactic type reaction  -CT CAP (11/14/23): Overall similar appearance of diffuse osseous metastatic disease. New subcentimeter lesion in the right hepatic lobe. Question  metastatic disease. Otherwise similar appearance of small  hypodensities throughout the liver.  -plan to stop abraxane and change therapy to TDxD, scheduled to start on 1/29/24  -treatment held for XRT to hip/spine 4/1/24  -stopped tx with TDxD on 6/27/24  -plan to start [facility] clinical trial in August 2024  -started sacituzumab govitecan 10/28/24  -CT scheduled for 12/20/24     Med Onc: Dr. Tolbert  Integrative Medicine: Dr. Sandoval    Subjective   HISTORY OF PRESENT ILLNESS: Trang Jones is a 64 y.o. female  with PMHx of HLD, and triple negative breast CA.     She presents to supportive oncology for follow up for pain and symptom management.     Presents for follow up alone today. Pain slowly increasing throughout her spine, and between shoulder blades, describes as \"weird pain.\" Taking ibuprofen almost every day, and oxycodone PRN. Moving bowels daily. No N/V, has indigestion/reflux all day when on treatment, takes prilosec PRN. Appetite is good early in the morning, tastes having changed, craving sweets. Mood is stable, appropriately anxious about upcoming scans, \"having grandchildren around helps.\" Sleeping well with THC and " medication. Energy levels depend on where she is in cycle, the busier she keeps, the more energy she has.     Pain Assessment:  Pain Score:   3  Location: Generalized  Education:      Symptom Assessment:  Pain:a little  Headache: none  Dizziness:none  Lack of energy: a little  Difficulty sleeping: none  Worrying: a little  Anxiety: a little  Depression: a little  Pain in mouth/swallowing: none  Dry mouth: none  Taste changes: somewhat  Shortness of breath: none  Lack of appetite: a little   Nausea: none  Vomiting: none  Constipation: a little  Diarrhea: a little  Sore muscles: none  Numbness or tingling in hands/feet/other: a little  Weight loss: none      Information obtained from: interview of patient  ______________________________________________________________________        Objective                PHYSICAL EXAMINATION   Vital Signs:   Vital signs reviewed  Vitals:    12/17/24 1306   BP: 108/69   Pulse: 82   Resp: 16   Temp: 36.7 °C (98.1 °F)   SpO2: 96%     Pain Score:  3     Physical Exam  Vitals reviewed.   Constitutional:       Appearance: Normal appearance.   HENT:      Head: Normocephalic.   Cardiovascular:      Rate and Rhythm: Normal rate.   Pulmonary:      Effort: Pulmonary effort is normal.   Abdominal:      Palpations: Abdomen is soft.   Musculoskeletal:         General: Normal range of motion.   Skin:     General: Skin is warm and dry.   Neurological:      General: No focal deficit present.      Mental Status: She is alert and oriented to person, place, and time.   Psychiatric:         Mood and Affect: Mood normal.         Judgment: Judgment normal.       ASSESSMENT/PLAN    Pain: upper back around shoulder blades, radiates to front of chest; dull ache, shooting with sudden movement    Pain is: cancer related pain and acute on chronic  Type: somatic and neuropathic  Pain control: well-controlled  Home regimen:   -continue Morphine Sulfate Contin 15mg tid. Rx sent for fill on 12/21.  -continue  oxycodone 10mg every 4hrs PRN. No Rx needed today.   -continue gabapentin 300mg in the morning and 600mg at night. Refills available.   -continue ibuprofen PRN   -continue to follow with Dr. Sandoval/ Agustin Dietrich for acupuncture/ reiki   -continue medical THC PRN   Intolerances/previously tried:     Opioid Use  Medication Management:   - OARRS report reviewed with no aberrant behavior; consistent with  prescriptions/records and patient history  - MED 45.  Overdose Risk Score 190.   This has been discussed with patient.   - We will continue to closely monitor the patient for signs of prescription misuse including UDS, OARRS review and subjective reports at each visit.  - no concurrent benzodiazepine use   - I am a provider who either is or has consulted and collaborated with a provider certified in Hospice and Palliative Medicine and have conducted a face-face visit and examination for this patient.  - Routine Urine Drug Screen completed 5/9/23 appropriately positive for opioids and negative for illicit substances  - Controlled Substance Agreement completed 5/9/23  - Specifically discussed that controlled substance prescriptions will only be provided by our group as outlined in the completed agreement  - Prescribed naloxone previously prescribed  - Red Flags: none     Bowels: at risk for OIC  -educated pt on importance of maintaining daily BM  -continue daily miralax PRN  -continue senna/colace PRN      N/V/reflux:   -continue zofran 4mg ODT q8hrs PRN   -continue compazine 10mg q6hrs PRN.   -self discontinued olanzapine 5mg at bedtime during treatment week  -self discontinued prilosec 40mg daily during treatment week  -continue migraine medication PRN      Sleep: hx of insomnia prior to cancer dx   -recently restarted medical THC PRN   -encouraged pt to maintain regular sleep/wake cycle  -plan for better pain control to aid in improved sleep, see pain section above      Mood: improving    -discussed adding  medication to help with mood, pt would like to hold off for now  -start ativan 0.5mg bid PRN. Rx sent today.   -potentially interested in counseling resources     Medical Decision Making/ GOC:   -social work referral to assist with LW/ POA paperwork        Next Follow-Up Visit:  Return to clinic in 6 weeks     Signature and billing  Medical complexity was high level due to due to complexity of problems, extensive data review, and high risk of management/treatment.  Time was spent on the following: Prep Time, Time Directly with Patient/Family/Caregiver, Documentation Time. Total time spent: 40 mins      Data  Diagnostic tests and information reviewed for today's visit:  Most recent labs         Some elements copied from my note on 8/5/24, the elements have been updated and all reflect current decision making from today, 12/17/2024.      Plan of Care discussed with: Patient    SIGNATURE: KELSEA Garg-CNP    Contact information:  Supportive and Palliative Oncology  Monday-Friday 8 AM-5 PM  Phone:  [phone], press option #5, then option #1.   Or Epic Secure Chat

## 2024-12-18 ENCOUNTER — APPOINTMENT (OUTPATIENT)
Dept: INTEGRATIVE MEDICINE | Facility: CLINIC | Age: 64
End: 2024-12-18

## 2024-12-18 DIAGNOSIS — F43.9 STRESS: Primary | ICD-10-CM

## 2024-12-18 DIAGNOSIS — G89.29 CHRONIC PAIN: ICD-10-CM

## 2024-12-18 ASSESSMENT — PAIN SCALES - GENERAL: PAINLEVEL_OUTOF10: 3

## 2024-12-18 NOTE — PROGRESS NOTES
Acupuncture Visit:     Subjective   Patient ID: Trang Jones is a 64 y.o. female who presents for No chief complaint on file.  Seeking continued support for chronic body pain, emotional support and stress             Pre-treatment Assessment  Pain Score: 3  Anxiety Level (0-10): 3  Stress Level (0-10): 3  Coping Level (0-10): 7  Depression Level (0-10): 3  Fatigue Level (0-10): 4  Nausea Level (0-10): 0  Wellbeing Level (0-10): 7    Review of Systems         Provider reviewed plan for the acupuncture session, precautions and contraindications. Patient/guardian/hospital staff has given consent to treat with full understanding of what to expect during the session. Before acupuncture began, provider explained to the patient to communicate at any time if the procedure was causing discomfort past their tolerance level. Patient agreed to advise acupuncturist. The acupuncturist counseled the patient on the risks of acupuncture treatment including pain, infection, bleeding, and no relief of pain. The patient was positioned comfortably. There was no evidence of infection at the site of needle insertions.    Objective   Physical Exam         Treatment Plan  Treatment Goals: Mood modification, Stress reduction, Pain management    Acupuncture Treatment  Patient Position: Seated and reclining  Acupuncture Needling: Yes  Needle Guage: 40 guage /.16/ Red seirin, 42 guage /.14/ Lime green seirin  Body Points - Left: ub 62, li4, ht 7, kd7  Body Points - Bilateral: sp 4  Body Points - Right: buddha triangle, yin isaacs, si 3, lr 3  Needle Count In: 12  Needle Count Out: 12  Needle Retention Time (min): 25 minutes  Total Face to Face Time (min): 25 minutes         Post-treatment Assessment  0-10 (Numeric) Pain Score: 3  Anxiety Level (0-10): 2  Stress Level (0-10): 2  Coping Level (0-10): 7  Depression Level (0-10): 2  Fatigue Level (0-10): 3  Nausea Level (0-10): 0  Wellbeing Level (0-10): 7    Assessment/Plan

## 2024-12-20 ENCOUNTER — HOSPITAL ENCOUNTER (OUTPATIENT)
Dept: RADIOLOGY | Facility: HOSPITAL | Age: 64
Discharge: HOME | End: 2024-12-20
Payer: COMMERCIAL

## 2024-12-20 DIAGNOSIS — C41.9 MALIGNANT NEOPLASM OF BONE WITH METASTASES (MULTI): ICD-10-CM

## 2024-12-20 PROCEDURE — 2500000004 HC RX 250 GENERAL PHARMACY W/ HCPCS (ALT 636 FOR OP/ED): Performed by: RADIOLOGY

## 2024-12-20 PROCEDURE — 74177 CT ABD & PELVIS W/CONTRAST: CPT

## 2024-12-20 PROCEDURE — 2550000001 HC RX 255 CONTRASTS: Performed by: INTERNAL MEDICINE

## 2024-12-20 RX ORDER — HEPARIN 100 UNIT/ML
500 SYRINGE INTRAVENOUS AS NEEDED
Status: DISCONTINUED | OUTPATIENT
Start: 2024-12-20 | End: 2024-12-21 | Stop reason: HOSPADM

## 2024-12-23 ENCOUNTER — TELEPHONE (OUTPATIENT)
Dept: HEMATOLOGY/ONCOLOGY | Facility: HOSPITAL | Age: 64
End: 2024-12-23
Payer: COMMERCIAL

## 2024-12-23 DIAGNOSIS — C41.9 MALIGNANT NEOPLASM OF BONE WITH METASTASES (MULTI): Primary | ICD-10-CM

## 2024-12-23 NOTE — TELEPHONE ENCOUNTER
Call to patient to review scans as reviewed with Dr Bebeto Tolbert showing Progression. She understands we are looking for phase I trial vs standard of care with Eribulin. Plan for next follow up on 1/15/25. She will additionally reach out to clinical trials at [facility]

## 2024-12-24 ENCOUNTER — APPOINTMENT (OUTPATIENT)
Dept: HEMATOLOGY/ONCOLOGY | Facility: CLINIC | Age: 64
End: 2024-12-24
Payer: COMMERCIAL

## 2024-12-30 ENCOUNTER — APPOINTMENT (OUTPATIENT)
Dept: HEMATOLOGY/ONCOLOGY | Facility: CLINIC | Age: 64
End: 2024-12-30
Payer: COMMERCIAL

## 2025-01-03 ENCOUNTER — TELEPHONE (OUTPATIENT)
Dept: HEMATOLOGY/ONCOLOGY | Facility: CLINIC | Age: 65
End: 2025-01-03

## 2025-01-03 ENCOUNTER — TELEPHONE (OUTPATIENT)
Dept: HEMATOLOGY/ONCOLOGY | Facility: HOSPITAL | Age: 65
End: 2025-01-03
Payer: COMMERCIAL

## 2025-01-03 NOTE — TELEPHONE ENCOUNTER
Returning call for voice mail she left    She has all over fatigue.  Low back, hip, fingers.  She thinks it is bone pain due to mets.  After noon and evening she feels it more in her upper spine.  She said sometimes right side - knows she has liver mets.    8-9/10     She is on Morphine CR 15 mg 3 times per day.    8 am, 2 pm and 8 pm. But before the 8 pm dose it seems to get worse.  She is taking gabapentin one in the am and twice in the pm.    She does have oxycodone for break thru pain.  (She filled that a couple months ago - had 180 - date was 8/22/24).  She is taking that every other day since it doesn't work great.    She feels like ibuprofen works better than the oxy for break thru.  She is taking ibuprofen 600 mg twice per day.      Her pain is 4-5/10 at the best times.    She describes it as sharp at times (that is her leg pain)  most is a general achiness.    She sees ezequiel shrestha on January 28th.      Please advise what changes can be made to make her more comfortable.    Message sent

## 2025-01-03 NOTE — TELEPHONE ENCOUNTER
Message reviewed with Jennifer Duarte.  She would like patient to increase the MS Contin as follows:    Increase to 30mg tid, but I want her to slowly increase, so start at 15/15/30 for 5 days, if still having a lot of pain, increase to 30/15/30 for 5 days and then 30 tid after that if needed.    Instructions provided to patient, who wrote everything down. She was also instructed to call the office when she is 3 days away from needing a refill. We will send early refill to [facility] in [place] for her.

## 2025-01-06 ENCOUNTER — APPOINTMENT (OUTPATIENT)
Dept: HEMATOLOGY/ONCOLOGY | Facility: CLINIC | Age: 65
End: 2025-01-06
Payer: COMMERCIAL

## 2025-01-08 ENCOUNTER — APPOINTMENT (OUTPATIENT)
Dept: INTEGRATIVE MEDICINE | Facility: CLINIC | Age: 65
End: 2025-01-08
Payer: COMMERCIAL

## 2025-01-08 ENCOUNTER — TELEPHONE (OUTPATIENT)
Dept: GENETICS | Facility: CLINIC | Age: 65
End: 2025-01-08

## 2025-01-08 DIAGNOSIS — F43.9 STRESS: Primary | ICD-10-CM

## 2025-01-08 DIAGNOSIS — G89.29 CHRONIC PAIN: ICD-10-CM

## 2025-01-08 ASSESSMENT — PAIN SCALES - GENERAL: PAINLEVEL_OUTOF10: 4

## 2025-01-08 NOTE — PROGRESS NOTES
Acupuncture Visit:     Subjective   Patient ID: Trang Jones is a 64 y.o. female who presents for No chief complaint on file.  Experienced increase in pain over break.  She has been in contact with palliative care and plan is to increase Rx.    Previous: Seeking continued support for chronic body pain, emotional support and stress             Session Information  Is this acupuncture treatment being billed to the patient's insurance company: No  Visit Type: Follow-up visit  Medical History Reviewed: I have reviewed pertinent medical history in EHR, and no contraindications are present to provide treatment    Pre-treatment Assessment  Pain Score: 5 - Moderate pain  Anxiety Level (0-10): 6  Stress Level (0-10): 5  Coping Level (0-10): 5  Depression Level (0-10): 4  Fatigue Level (0-10): 6  Nausea Level (0-10): 0  Wellbeing Level (0-10): 5    Review of Systems         Provider reviewed plan for the acupuncture session, precautions and contraindications. Patient/guardian/hospital staff has given consent to treat with full understanding of what to expect during the session. Before acupuncture began, provider explained to the patient to communicate at any time if the procedure was causing discomfort past their tolerance level. Patient agreed to advise acupuncturist. The acupuncturist counseled the patient on the risks of acupuncture treatment including pain, infection, bleeding, and no relief of pain. The patient was positioned comfortably. There was no evidence of infection at the site of needle insertions.    Objective   Physical Exam    Acupuncture Physical Exam  Tongue Color: Pale body, Dark purple    Treatment Plan  Treatment Goals: Pain management, Anxiety reduction, Stress reduction, Fatigue reduction, Relaxation, Coping, Wellbeing improvement    Acupuncture Treatment  Patient Position: Seated and reclining  Needle Guage: 40 guage /.16/ Red seirin  Body Points - Bilateral: lr 3, 8, li 4, st 36, yin isaacs, k  3  Needle Count In: 11  Needle Count Out: 11  Needle Retention Time (min): 25 minutes  Total Face to Face Time (min): 25 minutes         Post-treatment Assessment  0-10 (Numeric) Pain Score: 4  Anxiety Level (0-10): 3  Stress Level (0-10): 3  Coping Level (0-10): 6  Depression Level (0-10): 3  Fatigue Level (0-10): 5  Nausea Level (0-10): 0  Wellbeing Level (0-10): 6    Assessment/Plan

## 2025-01-14 NOTE — PROGRESS NOTES
"Seen in a Group Medical Visit, with agreement signed to protect patient privacy and participate in a shared medical visit to focus on assessment and implementing lifestyle medicine interventions to address modifiable risk factors of chronic disease, such as cancer, and to assist with reduction of symptoms. Confidentiality form reviewed and signed and ground rules reviewed. An interactive audio and video telecommunication system which permits real time communications between the patient (at the originating site) and provider (at the distant site) was utilized to provide this telehealth service. Verbal consent was requested and obtained on this date for a telehealth visit.     This is an 8-week intervention of people diagnosed with cancer who have mood changes, stress, fatigue, and/or pain that includes active participation, individual assessments and instruction for home practices to implement lifestyle medicine interventions.    During week 1 of 8, the focus of lifestyle medicine and mindful eating were reviewed. Interventions such as mindful eating practices including culinary demonstration and tasting, \"Eat [place]\", sensory experience, and at-home practices were discussed, and discussion amongst participants were reviewed. During this group medical visit, I was assisted by Safia Guzman, DilciaD and participants participated in active Cancer Wellness Program discussions and group activities.    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     Oncology provider visit - last seen 11/13/24   Integrative medicine provider visit - last seen on 8/26/24     Symptom Managed: Fatigue, anxiety, stress management    Cancer Wellness Program: Week 1 of 8    Subjective: Individual check in with " "provider:    Program Goals:  \"Trying to do everything I can to extend my life\"  \"To be healthy and enjoy my family and grand kids\"       Group Medical Visit:  Review of home practice  Reflections  New concerns  Health topic discussed in group: Mindful eating  Group practice: Connect each person with a partner, text or email once during the week with your partner to check in. Practice mindful eating for one meal each day.   Reviewed home practice assignments for next week      ROS:  no HA, visual symptoms, hearing loss  no SOB, chest pain, palpitations   ROS o/w non contributory, please see HPI    Objective    BSA: There is no height or weight on file to calculate BSA.  There were no vitals taken for this visit.    PHYSICAL EXAM:  GENERAL: alert and appropriate, in no distress, well-hydrated, well-nourished and happy, smiling, interactive  SKIN: no rash noted  HEAD: normocephalic, no abnormality or lesion noted  EYES: no injection and visual acuity is grossly normal  EARS: external ears normal  NOSE: external nose normal without rhinorrhea  NECK: full ROM, no cervical LNs noted  RESPIRATORY: breathing non-labored and no grunting/flaring/retractions  NEUROLOGIC: no obvious deficit    Lab Results   Component Value Date    WBC 3.4 (L) 12/16/2024    HGB 10.5 (L) 12/16/2024    HCT 32.5 (L) 12/16/2024     12/16/2024    ALT 60 (H) 12/09/2024    AST 69 (H) 12/09/2024     12/09/2024    K 4.3 12/09/2024     12/09/2024    CREATININE 0.73 12/09/2024    BUN 16 12/09/2024    CO2 28 12/09/2024    TSH 1.68 05/03/2023    INR 1.0 10/07/2024    HGBA1C 5.5 04/29/2022     Most recent and pertinent imaging reviewed in Epic.     Assessment/Plan   Cancer Staging   No matching staging information was found for the patient.    Symptom Managed: Fatigue, anxiety, stress management, health maintenance    Cancer Wellness Program: Week 1/8    At-home practices reviewed:  -Connect each person with a partner, text or email once " during the week with your partner to check in.   -Practice mindful eating for one meal each day.      Follow up for 8 weekly sessions to assess and treat symptoms through group medical visit.    KELSEA Tobin-CNP   [facility]  Integrative Oncology    60 min visit with face to face counseling in a group setting re: stress management, coping mechanisms including meditation and focus on anxiety  >50% of the visit 60 min was spent in direct face to face care with patient discussing stress management and coping strategies including meditation and mindfulness  Individual face to face check in was done during the visit in front of group. Session led by Jeanine Vega CNP.

## 2025-01-15 ENCOUNTER — ALLIED HEALTH (OUTPATIENT)
Dept: INTEGRATIVE MEDICINE | Facility: CLINIC | Age: 65
End: 2025-01-15
Payer: COMMERCIAL

## 2025-01-15 ENCOUNTER — TELEPHONE (OUTPATIENT)
Dept: HEMATOLOGY/ONCOLOGY | Facility: HOSPITAL | Age: 65
End: 2025-01-15

## 2025-01-15 ENCOUNTER — APPOINTMENT (OUTPATIENT)
Dept: INTEGRATIVE MEDICINE | Facility: CLINIC | Age: 65
End: 2025-01-15
Payer: COMMERCIAL

## 2025-01-15 ENCOUNTER — OFFICE VISIT (OUTPATIENT)
Dept: HEMATOLOGY/ONCOLOGY | Facility: CLINIC | Age: 65
End: 2025-01-15
Payer: COMMERCIAL

## 2025-01-15 ENCOUNTER — ONCOLOGY MEDICATION OUTREACH (OUTPATIENT)
Dept: HEMATOLOGY/ONCOLOGY | Facility: CLINIC | Age: 65
End: 2025-01-15

## 2025-01-15 VITALS
BODY MASS INDEX: 25.25 KG/M2 | WEIGHT: 135.8 LBS | DIASTOLIC BLOOD PRESSURE: 77 MMHG | HEART RATE: 83 BPM | TEMPERATURE: 97.3 F | OXYGEN SATURATION: 95 % | SYSTOLIC BLOOD PRESSURE: 119 MMHG

## 2025-01-15 DIAGNOSIS — Z00.00 HEALTHCARE MAINTENANCE: ICD-10-CM

## 2025-01-15 DIAGNOSIS — G89.29 CHRONIC PAIN: Primary | ICD-10-CM

## 2025-01-15 DIAGNOSIS — F41.9 ANXIETY: Primary | ICD-10-CM

## 2025-01-15 DIAGNOSIS — G89.3 CANCER RELATED PAIN: ICD-10-CM

## 2025-01-15 DIAGNOSIS — R53.83 FATIGUE, UNSPECIFIED TYPE: ICD-10-CM

## 2025-01-15 DIAGNOSIS — C50.911 CARCINOMA OF RIGHT BREAST METASTATIC TO BONE (MULTI): Primary | ICD-10-CM

## 2025-01-15 DIAGNOSIS — C79.51 CARCINOMA OF RIGHT BREAST METASTATIC TO BONE (MULTI): Primary | ICD-10-CM

## 2025-01-15 DIAGNOSIS — C41.9 MALIGNANT NEOPLASM OF BONE WITH METASTASES (MULTI): ICD-10-CM

## 2025-01-15 PROCEDURE — 99215 OFFICE O/P EST HI 40 MIN: CPT | Performed by: INTERNAL MEDICINE

## 2025-01-15 RX ORDER — ONDANSETRON HYDROCHLORIDE 2 MG/ML
8 INJECTION, SOLUTION INTRAVENOUS ONCE
OUTPATIENT
Start: 2025-02-10

## 2025-01-15 RX ORDER — EPINEPHRINE 0.3 MG/.3ML
0.3 INJECTION SUBCUTANEOUS EVERY 5 MIN PRN
OUTPATIENT
Start: 2025-02-10

## 2025-01-15 RX ORDER — ONDANSETRON HYDROCHLORIDE 2 MG/ML
8 INJECTION, SOLUTION INTRAVENOUS ONCE
OUTPATIENT
Start: 2025-01-20

## 2025-01-15 RX ORDER — ALBUTEROL SULFATE 0.83 MG/ML
3 SOLUTION RESPIRATORY (INHALATION) AS NEEDED
OUTPATIENT
Start: 2025-02-17

## 2025-01-15 RX ORDER — FAMOTIDINE 10 MG/ML
20 INJECTION INTRAVENOUS ONCE AS NEEDED
OUTPATIENT
Start: 2025-01-20

## 2025-01-15 RX ORDER — EPINEPHRINE 0.3 MG/.3ML
0.3 INJECTION SUBCUTANEOUS EVERY 5 MIN PRN
OUTPATIENT
Start: 2025-01-20

## 2025-01-15 RX ORDER — ERIBULIN MESYLATE 0.5 MG/ML
1.1 INJECTION INTRAVENOUS ONCE
OUTPATIENT
Start: 2025-02-10

## 2025-01-15 RX ORDER — EPINEPHRINE 0.3 MG/.3ML
0.3 INJECTION SUBCUTANEOUS EVERY 5 MIN PRN
OUTPATIENT
Start: 2025-02-17

## 2025-01-15 RX ORDER — MORPHINE SULFATE 30 MG/1
30 TABLET, FILM COATED, EXTENDED RELEASE ORAL 3 TIMES DAILY
Qty: 90 TABLET | Refills: 0 | Status: SHIPPED | OUTPATIENT
Start: 2025-01-15 | End: 2025-02-14

## 2025-01-15 RX ORDER — PROCHLORPERAZINE EDISYLATE 5 MG/ML
10 INJECTION INTRAMUSCULAR; INTRAVENOUS EVERY 6 HOURS PRN
OUTPATIENT
Start: 2025-02-10

## 2025-01-15 RX ORDER — DIPHENHYDRAMINE HYDROCHLORIDE 50 MG/ML
50 INJECTION INTRAMUSCULAR; INTRAVENOUS AS NEEDED
OUTPATIENT
Start: 2025-02-10

## 2025-01-15 RX ORDER — EPINEPHRINE 0.3 MG/.3ML
0.3 INJECTION SUBCUTANEOUS EVERY 5 MIN PRN
OUTPATIENT
Start: 2025-01-27

## 2025-01-15 RX ORDER — ALBUTEROL SULFATE 0.83 MG/ML
3 SOLUTION RESPIRATORY (INHALATION) AS NEEDED
OUTPATIENT
Start: 2025-01-27

## 2025-01-15 RX ORDER — ALBUTEROL SULFATE 0.83 MG/ML
3 SOLUTION RESPIRATORY (INHALATION) AS NEEDED
OUTPATIENT
Start: 2025-02-10

## 2025-01-15 RX ORDER — FAMOTIDINE 10 MG/ML
20 INJECTION INTRAVENOUS ONCE AS NEEDED
OUTPATIENT
Start: 2025-02-17

## 2025-01-15 RX ORDER — ERIBULIN MESYLATE 0.5 MG/ML
1.1 INJECTION INTRAVENOUS ONCE
OUTPATIENT
Start: 2025-01-20

## 2025-01-15 RX ORDER — PROCHLORPERAZINE MALEATE 10 MG
10 TABLET ORAL EVERY 6 HOURS PRN
OUTPATIENT
Start: 2025-02-10

## 2025-01-15 RX ORDER — ALBUTEROL SULFATE 0.83 MG/ML
3 SOLUTION RESPIRATORY (INHALATION) AS NEEDED
OUTPATIENT
Start: 2025-01-20

## 2025-01-15 RX ORDER — ONDANSETRON HYDROCHLORIDE 2 MG/ML
8 INJECTION, SOLUTION INTRAVENOUS ONCE
OUTPATIENT
Start: 2025-01-27

## 2025-01-15 RX ORDER — PROCHLORPERAZINE EDISYLATE 5 MG/ML
10 INJECTION INTRAMUSCULAR; INTRAVENOUS EVERY 6 HOURS PRN
OUTPATIENT
Start: 2025-02-17

## 2025-01-15 RX ORDER — DIPHENHYDRAMINE HYDROCHLORIDE 50 MG/ML
50 INJECTION INTRAMUSCULAR; INTRAVENOUS AS NEEDED
OUTPATIENT
Start: 2025-02-17

## 2025-01-15 RX ORDER — FAMOTIDINE 10 MG/ML
20 INJECTION INTRAVENOUS ONCE AS NEEDED
OUTPATIENT
Start: 2025-02-10

## 2025-01-15 RX ORDER — ERIBULIN MESYLATE 0.5 MG/ML
1.1 INJECTION INTRAVENOUS ONCE
OUTPATIENT
Start: 2025-01-27

## 2025-01-15 RX ORDER — ZOLEDRONIC ACID 0.04 MG/ML
4 INJECTION, SOLUTION INTRAVENOUS ONCE
OUTPATIENT
Start: 2025-01-15

## 2025-01-15 RX ORDER — ERIBULIN MESYLATE 0.5 MG/ML
1.1 INJECTION INTRAVENOUS ONCE
OUTPATIENT
Start: 2025-02-17

## 2025-01-15 RX ORDER — ONDANSETRON HYDROCHLORIDE 2 MG/ML
8 INJECTION, SOLUTION INTRAVENOUS ONCE
OUTPATIENT
Start: 2025-02-17

## 2025-01-15 RX ORDER — FAMOTIDINE 10 MG/ML
20 INJECTION INTRAVENOUS ONCE AS NEEDED
OUTPATIENT
Start: 2025-01-27

## 2025-01-15 RX ORDER — PROCHLORPERAZINE EDISYLATE 5 MG/ML
10 INJECTION INTRAMUSCULAR; INTRAVENOUS EVERY 6 HOURS PRN
OUTPATIENT
Start: 2025-01-27

## 2025-01-15 RX ORDER — DIPHENHYDRAMINE HYDROCHLORIDE 50 MG/ML
50 INJECTION INTRAMUSCULAR; INTRAVENOUS AS NEEDED
OUTPATIENT
Start: 2025-01-20

## 2025-01-15 RX ORDER — PROCHLORPERAZINE MALEATE 10 MG
10 TABLET ORAL EVERY 6 HOURS PRN
OUTPATIENT
Start: 2025-01-27

## 2025-01-15 RX ORDER — PROCHLORPERAZINE MALEATE 10 MG
10 TABLET ORAL EVERY 6 HOURS PRN
OUTPATIENT
Start: 2025-01-20

## 2025-01-15 RX ORDER — DIPHENHYDRAMINE HYDROCHLORIDE 50 MG/ML
50 INJECTION INTRAMUSCULAR; INTRAVENOUS AS NEEDED
OUTPATIENT
Start: 2025-01-27

## 2025-01-15 RX ORDER — PROCHLORPERAZINE MALEATE 10 MG
10 TABLET ORAL EVERY 6 HOURS PRN
OUTPATIENT
Start: 2025-02-17

## 2025-01-15 RX ORDER — PROCHLORPERAZINE EDISYLATE 5 MG/ML
10 INJECTION INTRAMUSCULAR; INTRAVENOUS EVERY 6 HOURS PRN
OUTPATIENT
Start: 2025-01-20

## 2025-01-15 ASSESSMENT — PAIN SCALES - GENERAL
PAINLEVEL_OUTOF10: 5
PAINLEVEL_OUTOF10: 4

## 2025-01-15 NOTE — PROGRESS NOTES
Reviewed eribulin, dose, frequency, administration, treatment cycle, duration of therapy, and missed doses. Counseled on potential side effects including but not limited to chemotherapy side effects: neutropenia, infection risk, anemia, fatigue, weakness, low energy, thrombocytopenia, bleeding/bruising, n/v, diarrhea, constipation, muscle or joint pain, mucositis, skin rash, and neuropathy. Discussed techniques to mitigate severity of side effects such as blood count checks, temperature checks, electrolyte monitoring, antiemetic use, loperamide use with max dose of 8 tabs per 24 hours, and staying hydrated if having diarrhea.  Provided medication/regimen handout. All questions answered and contact information was given to patient.

## 2025-01-15 NOTE — PATIENT INSTRUCTIONS
Please call us at [phone] option 5 then option 2 with any questions or concerns.    To start Eribulin. We will try Monday at [facility] and to use cold cap and zometa which is due  Virtual visit 2/3

## 2025-01-15 NOTE — PROGRESS NOTES
Acupuncture Visit:     Subjective   Patient ID: Trang Jones is a 64 y.o. female who presents for No chief complaint on file.  Seeking continued support for emotional wellbeing and chronic pain             Pre-treatment Assessment  Pain Score: 5 - Moderate pain  Anxiety Level (0-10): 3  Stress Level (0-10): 4  Coping Level (0-10): 5  Depression Level (0-10): 3  Fatigue Level (0-10): 6  Nausea Level (0-10): 3  Wellbeing Level (0-10): 6    Review of Systems         Provider reviewed plan for the acupuncture session, precautions and contraindications. Patient/guardian/hospital staff has given consent to treat with full understanding of what to expect during the session. Before acupuncture began, provider explained to the patient to communicate at any time if the procedure was causing discomfort past their tolerance level. Patient agreed to advise acupuncturist. The acupuncturist counseled the patient on the risks of acupuncture treatment including pain, infection, bleeding, and no relief of pain. The patient was positioned comfortably. There was no evidence of infection at the site of needle insertions.    Objective   Physical Exam         Treatment Plan  Treatment Goals: Pain management, Wellbeing improvement, Mood modification, Fatigue reduction, Stress reduction, Coping    Acupuncture Treatment  Patient Position: Seated and reclining  Needle Guage: 40 guage /.16/ Red seirin  Body Points - Left: aida 5, lr4, sp 4  Body Points - Bilateral: st 36, immunex2, yin isaacs  Needle Count In: 11  Needle Count Out: 11  Needle Retention Time (min): 25 minutes  Total Face to Face Time (min): 25 minutes         Post-treatment Assessment  0-10 (Numeric) Pain Score: 4  Anxiety Level (0-10): 4  Stress Level (0-10): 3  Coping Level (0-10): 6  Depression Level (0-10): 3  Fatigue Level (0-10): 4  Nausea Level (0-10): 3  Wellbeing Level (0-10): 6    Assessment/Plan

## 2025-01-15 NOTE — TELEPHONE ENCOUNTER
Patient last seen by KELSEA Duarte on 12/17 with plan to continue MSER 15mg TID, but on 1/3 dose was increased to 30mg TID. Follow up visit is scheduled for 1/28. [place] reviewed and no aberrancy noted. Patient last filled MSER 15mg #90/30 days on 12/21. Prescription pended to provider to approve.

## 2025-01-15 NOTE — PATIENT INSTRUCTIONS
At-home practices reviewed:  -Connect each person with a partner, text or email once during the week with your partner to check in.   -Practice mindful eating for one meal each day.       Follow up for 8 weekly sessions to assess and treat symptoms through group medical visit.

## 2025-01-17 ASSESSMENT — ENCOUNTER SYMPTOMS
DIFFICULTY URINATING: 0
EYE PROBLEMS: 0
DEPRESSION: 0
RESPIRATORY NEGATIVE: 1
CARDIOVASCULAR NEGATIVE: 1
ABDOMINAL DISTENTION: 0
LEG SWELLING: 0
FREQUENCY: 0
ARTHRALGIAS: 0
ABDOMINAL PAIN: 0
CONSTIPATION: 0
EXTREMITY WEAKNESS: 0
SLEEP DISTURBANCE: 0
DIAPHORESIS: 0
HOT FLASHES: 0
BLOOD IN STOOL: 0
NERVOUS/ANXIOUS: 0
BRUISES/BLEEDS EASILY: 0
SHORTNESS OF BREATH: 0
HEADACHES: 0
DIZZINESS: 0
NAUSEA: 1
WHEEZING: 0
PALPITATIONS: 0
MYALGIAS: 0
CHEST TIGHTNESS: 0
HEMATURIA: 0
DYSURIA: 0
FEVER: 0
HEMOPTYSIS: 0
APPETITE CHANGE: 0
FATIGUE: 0
SEIZURES: 0
CONFUSION: 0
ADENOPATHY: 0
DIARRHEA: 0
NUMBNESS: 0
EYES NEGATIVE: 1
SCLERAL ICTERUS: 0
CONSTITUTIONAL NEGATIVE: 1
BACK PAIN: 1
CHILLS: 0
COUGH: 0

## 2025-01-17 NOTE — PROGRESS NOTES
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 she received prior Taxotere and cyclophosphamide , completed 6/2010. S/p adjuvant endocrine therapy.    Diagnosis of metastatic TNBC 3/2023, presenting with back pain and cord compression      weekly paclitaxel from 5/22/23, changed to abraxane after 1st cycle due to anaphylactic type reaction. Discontinued 12/12/23 due to progressive disease.    TDxD from 1/29/24, held for XRT to spine for a few wks and stopped on 6/27/24  s/p XRT to spine on 4/1/24    [id number] clincial trial from 8/26/24-9/23/24    CURRENT THERAPY  Sacizutumab 8 mg/kg since 10/28/24 days 1 and 8     HISTORY OF PRESENT ILLNESS    Trang Jones is a 64 y.o. woman here for follow up. Tolerating sacituzumab well. No diarrhea. Unfortunately she's progressing on last CT. Here to discuss next steps. No clinical trials are open for her at this moment unfortunately.          Review of Systems   Constitutional: Negative.  Negative for appetite change, chills, diaphoresis, fatigue and fever.   HENT:  Negative.  Negative for hearing loss and lump/mass.    Eyes: Negative.  Negative for eye problems and icterus.   Respiratory: Negative.  Negative for chest tightness, cough, hemoptysis, shortness of breath and wheezing.    Cardiovascular: Negative.  Negative for chest pain, leg swelling and palpitations.   Gastrointestinal:  Positive for nausea. Negative for abdominal distention, abdominal pain, blood in stool, constipation and diarrhea.   Endocrine: Negative for hot flashes.   Genitourinary:  Negative for bladder  incontinence, difficulty urinating, dyspareunia, dysuria, frequency and hematuria.    Musculoskeletal:  Positive for back pain. Negative for arthralgias, gait problem and myalgias.        ++hip pain   Neurological:  Negative for dizziness, extremity weakness, gait problem, headaches, numbness and seizures.   Hematological:  Negative for adenopathy. Does not bruise/bleed easily.   Psychiatric/Behavioral:  Negative for confusion, depression and sleep disturbance. The patient is not nervous/anxious.    All other systems reviewed and are negative.        Past Medical History:  has a past medical history of Breast cancer (Multi), Hypercholesteremia, Metastatic cancer (Multi), and Personal history of irradiation.  Surgical History:   has a past surgical history that includes CT guided percutaneous biopsy bone deep (2023); Mastectomy (Left, ); and Breast surgery.  Social History:   reports that she quit smoking about 22 years ago. Her smoking use included cigarettes. She has been exposed to tobacco smoke. She has never used smokeless tobacco. She reports that she does not currently use alcohol. She reports current drug use. Drug: Marijuana.  Family History:    Family History   Problem Relation Name Age of Onset    Leukemia Father       Family Oncology History:  Cancer-related family history is not on file.      OBJECTIVE    VS / Pain:  /77 (BP Location: Right arm, Patient Position: Sitting, BP Cuff Size: Small adult)   Pulse 83   Temp 36.3 °C (97.3 °F) (Temporal)   Wt 61.6 kg (135 lb 12.9 oz)   LMP  (LMP Unknown)   SpO2 95%   BMI 25.25 kg/m²   BSA: 1.63 meters squared   Pain Scale: 3    Performance Status:  The ECOG performance scale today is ECO- Restricted in physically strenuous activity.  Carries out light duty.    Physical Exam  Constitutional:       General: She is not in acute distress.     Appearance: She is not ill-appearing, toxic-appearing or diaphoretic.   HENT:      Nose: No  congestion or rhinorrhea.      Mouth/Throat:      Pharynx: No posterior oropharyngeal erythema.   Cardiovascular:      Rate and Rhythm: Normal rate and regular rhythm.      Pulses: Normal pulses.      Heart sounds: Normal heart sounds. No murmur heard.     No gallop.   Pulmonary:      Breath sounds: Normal breath sounds.   Abdominal:      General: There is no distension.      Palpations: There is no mass.      Tenderness: There is no abdominal tenderness.   Musculoskeletal:         General: No swelling.      Cervical back: No rigidity.      Right lower leg: No edema.      Left lower leg: No edema.   Lymphadenopathy:      Cervical: No cervical adenopathy.   Skin:     General: Skin is warm.      Coloration: Skin is not cyanotic.      Findings: No bruising, ecchymosis or erythema.   Neurological:      General: No focal deficit present.      Mental Status: She is oriented to person, place, and time.      Cranial Nerves: Cranial nerves 2-12 are intact.      Motor: Motor function is intact.   Psychiatric:         Attention and Perception: Attention and perception normal.         Mood and Affect: Mood and affect normal.         Behavior: Behavior normal.         Thought Content: Thought content normal.         Judgment: Judgment normal.             Diagnostic Results   === 12/20/24 ===    CT CHEST ABDOMEN PELVIS W IV CONTRAST    - Impression -  CHEST:  1.  Interval appearance of 3 tiny pulmonary nodules measured up to 4  mm. These will be re-evaluated on the follow-up scans.  2. No pleural effusion. No thoracic lymphadenopathy.  3. Stable compression deformity of T5 and T10 vertebral bodies.  Osteoblastic metastasis with no significant change.    ABDOMEN-PELVIS:  1.  Progression of metastatic liver disease as detailed above.  2. No abdominal or pelvic lymphadenopathy. No ascites.  3. Stable dextroscoliosis centered at L2. Osteoblastic metastasis  with no significant change.  4. Stable small dystrophic calcifications in the  uncinate process of  the pancreas.      MACRO:  None    Signed by: Norah Shelby 12/22/2024 2:54 PM  Dictation workstation:   [id number]     LABORATORY/PATHOLOGY DATA    Infusion on 12/16/2024   Component Date Value Ref Range Status    WBC 12/16/2024 3.4 (L)  4.4 - 11.3 x10*3/uL Final    RBC 12/16/2024 3.69 (L)  4.00 - 5.20 x10*6/uL Final    Hemoglobin 12/16/2024 10.5 (L)  12.0 - 16.0 g/dL Final    Hematocrit 12/16/2024 32.5 (L)  36.0 - 46.0 % Final    MCV 12/16/2024 88  80 - 100 fL Final    MCH 12/16/2024 28.5  26.0 - 34.0 pg Final    MCHC 12/16/2024 32.3  32.0 - 36.0 g/dL Final    RDW 12/16/2024 15.6 (H)  11.5 - 14.5 % Final    Platelets 12/16/2024 206  150 - 450 x10*3/uL Final    Neutrophils % 12/16/2024 54.0  40.0 - 80.0 % Final    Immature Granulocytes %, Automated 12/16/2024 1.5 (H)  0.0 - 0.9 % Final    Lymphocytes % 12/16/2024 29.1  13.0 - 44.0 % Final    Monocytes % 12/16/2024 11.9  2.0 - 10.0 % Final    Eosinophils % 12/16/2024 3.2  0.0 - 6.0 % Final    Basophils % 12/16/2024 0.3  0.0 - 2.0 % Final    Neutrophils Absolute 12/16/2024 1.86  1.20 - 7.70 x10*3/uL Final    Immature Granulocytes Absolute, Au* 12/16/2024 0.05  0.00 - 0.70 x10*3/uL Final    Lymphocytes Absolute 12/16/2024 1.00 (L)  1.20 - 4.80 x10*3/uL Final    Monocytes Absolute 12/16/2024 0.41  0.10 - 1.00 x10*3/uL Final    Eosinophils Absolute 12/16/2024 0.11  0.00 - 0.70 x10*3/uL Final    Basophils Absolute 12/16/2024 0.01  0.00 - 0.10 x10*3/uL Final   Infusion on 12/09/2024   Component Date Value Ref Range Status    CA 27.29 12/09/2024 829.5 (H)  0.0 - 38.6 U/mL Final    Glucose 12/09/2024 93  74 - 99 mg/dL Final    Sodium 12/09/2024 137  136 - 145 mmol/L Final    Potassium 12/09/2024 4.3  3.5 - 5.3 mmol/L Final    Chloride 12/09/2024 102  98 - 107 mmol/L Final    Bicarbonate 12/09/2024 28  21 - 32 mmol/L Final    Anion Gap 12/09/2024 11  10 - 20 mmol/L Final    Urea Nitrogen 12/09/2024 16  6 - 23 mg/dL Final    Creatinine 12/09/2024 0.73  0.50  - 1.05 mg/dL Final    eGFR 12/09/2024 >90  >60 mL/min/1.73m*2 Final    Calcium 12/09/2024 8.4 (L)  8.6 - 10.3 mg/dL Final    Albumin 12/09/2024 3.7  3.4 - 5.0 g/dL Final    Alkaline Phosphatase 12/09/2024 273 (H)  33 - 136 U/L Final    Total Protein 12/09/2024 5.7 (L)  6.4 - 8.2 g/dL Final    AST 12/09/2024 69 (H)  9 - 39 U/L Final    Bilirubin, Total 12/09/2024 0.5  0.0 - 1.2 mg/dL Final    ALT 12/09/2024 60 (H)  7 - 45 U/L Final    WBC 12/09/2024 4.6  4.4 - 11.3 x10*3/uL Final    RBC 12/09/2024 3.80 (L)  4.00 - 5.20 x10*6/uL Final    Hemoglobin 12/09/2024 10.8 (L)  12.0 - 16.0 g/dL Final    Hematocrit 12/09/2024 33.2 (L)  36.0 - 46.0 % Final    MCV 12/09/2024 87  80 - 100 fL Final    MCH 12/09/2024 28.4  26.0 - 34.0 pg Final    MCHC 12/09/2024 32.5  32.0 - 36.0 g/dL Final    RDW 12/09/2024 15.3 (H)  11.5 - 14.5 % Final    Platelets 12/09/2024 204  150 - 450 x10*3/uL Final    Neutrophils % 12/09/2024 61.5  40.0 - 80.0 % Final    Immature Granulocytes %, Automated 12/09/2024 0.9  0.0 - 0.9 % Final    Lymphocytes % 12/09/2024 23.6  13.0 - 44.0 % Final    Monocytes % 12/09/2024 11.8  2.0 - 10.0 % Final    Eosinophils % 12/09/2024 1.8  0.0 - 6.0 % Final    Basophils % 12/09/2024 0.4  0.0 - 2.0 % Final    Neutrophils Absolute 12/09/2024 2.81  1.20 - 7.70 x10*3/uL Final    Immature Granulocytes Absolute, Au* 12/09/2024 0.04  0.00 - 0.70 x10*3/uL Final    Lymphocytes Absolute 12/09/2024 1.08 (L)  1.20 - 4.80 x10*3/uL Final    Monocytes Absolute 12/09/2024 0.54  0.10 - 1.00 x10*3/uL Final    Eosinophils Absolute 12/09/2024 0.08  0.00 - 0.70 x10*3/uL Final    Basophils Absolute 12/09/2024 0.02  0.00 - 0.10 x10*3/uL Final      Lab Results   Component Value Date    LABCA2 829.5 (H) 12/09/2024    LABCA2 892.8 (H) 11/18/2024    LABCA2 783.5 (H) 10/28/2024    LABCA2 463.5 (H) 10/07/2024    LABCA2 223.7 (H) 09/09/2024             IMPRESSION/PLAN      1. 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 Clear  evidence of progressive disease in liver. Discontinue sacituzumab and switch to eribulin days 1 and 8 on Monday. Follow-up prior to Cycle 2. Will look for phase 1 trial to prepare for next line of therapy.    2. Pain neoplasm related. Following with supportive onc.  Much better now. S/p xrt        We discussed the clinical significance of diagnosis, goals of care and treatment plan in detail.  I personally spent over half of a total 45 minutes face to face with the patient in counseling and discussion and/or coordination of care as described above.         -------------------------------------------------------------------------------------------------------------------------------  Bebeto Tolbert MD  Director of Breast Cancer Medical Oncology Research Program   [facility]  Professor of Medicine  [facility] [address]  [place] [address]  Phone: [phone]  [email or web address]

## 2025-01-20 ENCOUNTER — APPOINTMENT (OUTPATIENT)
Dept: HEMATOLOGY/ONCOLOGY | Facility: CLINIC | Age: 65
End: 2025-01-20
Payer: COMMERCIAL

## 2025-01-20 ENCOUNTER — INFUSION (OUTPATIENT)
Dept: HEMATOLOGY/ONCOLOGY | Facility: CLINIC | Age: 65
End: 2025-01-20
Payer: COMMERCIAL

## 2025-01-20 VITALS
HEART RATE: 92 BPM | RESPIRATION RATE: 17 BRPM | TEMPERATURE: 97.9 F | WEIGHT: 133.05 LBS | BODY MASS INDEX: 24.74 KG/M2 | SYSTOLIC BLOOD PRESSURE: 117 MMHG | OXYGEN SATURATION: 94 % | DIASTOLIC BLOOD PRESSURE: 77 MMHG

## 2025-01-20 DIAGNOSIS — C41.9 MALIGNANT NEOPLASM OF BONE WITH METASTASES (MULTI): ICD-10-CM

## 2025-01-20 DIAGNOSIS — C50.911 CARCINOMA OF RIGHT BREAST METASTATIC TO BONE (MULTI): ICD-10-CM

## 2025-01-20 DIAGNOSIS — C79.51 CARCINOMA OF RIGHT BREAST METASTATIC TO BONE (MULTI): ICD-10-CM

## 2025-01-20 LAB
ALBUMIN SERPL BCP-MCNC: 3.4 G/DL (ref 3.4–5)
ALP SERPL-CCNC: 418 U/L (ref 33–136)
ALT SERPL W P-5'-P-CCNC: 57 U/L (ref 7–45)
ANION GAP SERPL CALC-SCNC: 12 MMOL/L (ref 10–20)
AST SERPL W P-5'-P-CCNC: 134 U/L (ref 9–39)
BASOPHILS # BLD AUTO: 0.02 X10*3/UL (ref 0–0.1)
BASOPHILS NFR BLD AUTO: 0.4 %
BILIRUB SERPL-MCNC: 0.7 MG/DL (ref 0–1.2)
BUN SERPL-MCNC: 16 MG/DL (ref 6–23)
CALCIUM SERPL-MCNC: 8.9 MG/DL (ref 8.6–10.3)
CANCER AG27-29 SERPL-ACNC: 1542.9 U/ML (ref 0–38.6)
CHLORIDE SERPL-SCNC: 100 MMOL/L (ref 98–107)
CO2 SERPL-SCNC: 29 MMOL/L (ref 21–32)
CREAT SERPL-MCNC: 0.61 MG/DL (ref 0.5–1.05)
EGFRCR SERPLBLD CKD-EPI 2021: >90 ML/MIN/1.73M*2
EOSINOPHIL # BLD AUTO: 0.08 X10*3/UL (ref 0–0.7)
EOSINOPHIL NFR BLD AUTO: 1.8 %
ERYTHROCYTE [DISTWIDTH] IN BLOOD BY AUTOMATED COUNT: 17 % (ref 11.5–14.5)
GLUCOSE SERPL-MCNC: 160 MG/DL (ref 74–99)
HCT VFR BLD AUTO: 30.3 % (ref 36–46)
HGB BLD-MCNC: 9.7 G/DL (ref 12–16)
IMM GRANULOCYTES # BLD AUTO: 0.12 X10*3/UL (ref 0–0.7)
IMM GRANULOCYTES NFR BLD AUTO: 2.7 % (ref 0–0.9)
LYMPHOCYTES # BLD AUTO: 0.83 X10*3/UL (ref 1.2–4.8)
LYMPHOCYTES NFR BLD AUTO: 18.7 %
MCH RBC QN AUTO: 27.8 PG (ref 26–34)
MCHC RBC AUTO-ENTMCNC: 32 G/DL (ref 32–36)
MCV RBC AUTO: 87 FL (ref 80–100)
MONOCYTES # BLD AUTO: 0.37 X10*3/UL (ref 0.1–1)
MONOCYTES NFR BLD AUTO: 8.3 %
NEUTROPHILS # BLD AUTO: 3.03 X10*3/UL (ref 1.2–7.7)
NEUTROPHILS NFR BLD AUTO: 68.1 %
PLATELET # BLD AUTO: 148 X10*3/UL (ref 150–450)
POTASSIUM SERPL-SCNC: 3.7 MMOL/L (ref 3.5–5.3)
PROT SERPL-MCNC: 5.8 G/DL (ref 6.4–8.2)
RBC # BLD AUTO: 3.49 X10*6/UL (ref 4–5.2)
SODIUM SERPL-SCNC: 137 MMOL/L (ref 136–145)
WBC # BLD AUTO: 4.5 X10*3/UL (ref 4.4–11.3)

## 2025-01-20 PROCEDURE — 96367 TX/PROPH/DG ADDL SEQ IV INF: CPT

## 2025-01-20 PROCEDURE — 2500000004 HC RX 250 GENERAL PHARMACY W/ HCPCS (ALT 636 FOR OP/ED): Performed by: INTERNAL MEDICINE

## 2025-01-20 PROCEDURE — 84075 ASSAY ALKALINE PHOSPHATASE: CPT

## 2025-01-20 PROCEDURE — 85025 COMPLETE CBC W/AUTO DIFF WBC: CPT

## 2025-01-20 PROCEDURE — 96375 TX/PRO/DX INJ NEW DRUG ADDON: CPT | Mod: INF

## 2025-01-20 PROCEDURE — 2500000004 HC RX 250 GENERAL PHARMACY W/ HCPCS (ALT 636 FOR OP/ED): Performed by: NURSE PRACTITIONER

## 2025-01-20 PROCEDURE — 96409 CHEMO IV PUSH SNGL DRUG: CPT

## 2025-01-20 PROCEDURE — 2500000004 HC RX 250 GENERAL PHARMACY W/ HCPCS (ALT 636 FOR OP/ED): Mod: JW | Performed by: INTERNAL MEDICINE

## 2025-01-20 PROCEDURE — 86300 IMMUNOASSAY TUMOR CA 15-3: CPT | Mod: GEALAB

## 2025-01-20 PROCEDURE — 2500000004 HC RX 250 GENERAL PHARMACY W/ HCPCS (ALT 636 FOR OP/ED)

## 2025-01-20 RX ORDER — ZOLEDRONIC ACID 0.04 MG/ML
4 INJECTION, SOLUTION INTRAVENOUS ONCE
Status: COMPLETED | OUTPATIENT
Start: 2025-01-20 | End: 2025-01-20

## 2025-01-20 RX ORDER — EPINEPHRINE 0.3 MG/.3ML
0.3 INJECTION SUBCUTANEOUS EVERY 5 MIN PRN
OUTPATIENT
Start: 2025-02-10

## 2025-01-20 RX ORDER — FAMOTIDINE 10 MG/ML
20 INJECTION INTRAVENOUS ONCE AS NEEDED
Status: DISCONTINUED | OUTPATIENT
Start: 2025-01-20 | End: 2025-01-20 | Stop reason: HOSPADM

## 2025-01-20 RX ORDER — EPINEPHRINE 0.3 MG/.3ML
0.3 INJECTION SUBCUTANEOUS EVERY 5 MIN PRN
Status: DISCONTINUED | OUTPATIENT
Start: 2025-01-20 | End: 2025-01-20 | Stop reason: HOSPADM

## 2025-01-20 RX ORDER — HEPARIN 100 UNIT/ML
500 SYRINGE INTRAVENOUS AS NEEDED
Status: DISCONTINUED | OUTPATIENT
Start: 2025-01-20 | End: 2025-01-20 | Stop reason: HOSPADM

## 2025-01-20 RX ORDER — HEPARIN 100 UNIT/ML
500 SYRINGE INTRAVENOUS AS NEEDED
OUTPATIENT
Start: 2025-01-20

## 2025-01-20 RX ORDER — ALBUTEROL SULFATE 0.83 MG/ML
3 SOLUTION RESPIRATORY (INHALATION) AS NEEDED
Status: DISCONTINUED | OUTPATIENT
Start: 2025-01-20 | End: 2025-01-20 | Stop reason: HOSPADM

## 2025-01-20 RX ORDER — DIPHENHYDRAMINE HYDROCHLORIDE 50 MG/ML
50 INJECTION INTRAMUSCULAR; INTRAVENOUS AS NEEDED
OUTPATIENT
Start: 2025-02-10

## 2025-01-20 RX ORDER — PROCHLORPERAZINE EDISYLATE 5 MG/ML
10 INJECTION INTRAMUSCULAR; INTRAVENOUS EVERY 6 HOURS PRN
Status: DISCONTINUED | OUTPATIENT
Start: 2025-01-20 | End: 2025-01-20 | Stop reason: HOSPADM

## 2025-01-20 RX ORDER — HEPARIN SODIUM,PORCINE/PF 10 UNIT/ML
50 SYRINGE (ML) INTRAVENOUS AS NEEDED
OUTPATIENT
Start: 2025-01-20

## 2025-01-20 RX ORDER — ERIBULIN MESYLATE 0.5 MG/ML
1.1 INJECTION INTRAVENOUS ONCE
Status: COMPLETED | OUTPATIENT
Start: 2025-01-20 | End: 2025-01-20

## 2025-01-20 RX ORDER — ALBUTEROL SULFATE 0.83 MG/ML
3 SOLUTION RESPIRATORY (INHALATION) AS NEEDED
OUTPATIENT
Start: 2025-02-10

## 2025-01-20 RX ORDER — ZOLEDRONIC ACID 0.04 MG/ML
4 INJECTION, SOLUTION INTRAVENOUS ONCE
OUTPATIENT
Start: 2025-02-10

## 2025-01-20 RX ORDER — DIPHENHYDRAMINE HYDROCHLORIDE 50 MG/ML
50 INJECTION INTRAMUSCULAR; INTRAVENOUS AS NEEDED
Status: DISCONTINUED | OUTPATIENT
Start: 2025-01-20 | End: 2025-01-20 | Stop reason: HOSPADM

## 2025-01-20 RX ORDER — PROCHLORPERAZINE MALEATE 10 MG
10 TABLET ORAL EVERY 6 HOURS PRN
Status: DISCONTINUED | OUTPATIENT
Start: 2025-01-20 | End: 2025-01-20 | Stop reason: HOSPADM

## 2025-01-20 RX ORDER — ONDANSETRON HYDROCHLORIDE 2 MG/ML
8 INJECTION, SOLUTION INTRAVENOUS ONCE
Status: COMPLETED | OUTPATIENT
Start: 2025-01-20 | End: 2025-01-20

## 2025-01-20 RX ORDER — FAMOTIDINE 10 MG/ML
20 INJECTION INTRAVENOUS ONCE AS NEEDED
OUTPATIENT
Start: 2025-02-10

## 2025-01-20 RX ADMIN — HEPARIN 500 UNITS: 100 SYRINGE at 12:30

## 2025-01-20 RX ADMIN — SODIUM CHLORIDE 500 ML: 9 INJECTION, SOLUTION INTRAVENOUS at 11:08

## 2025-01-20 RX ADMIN — ERIBULIN MESYLATE 1.8 MG: 0.5 INJECTION INTRAVENOUS at 10:54

## 2025-01-20 RX ADMIN — ONDANSETRON 8 MG: 2 INJECTION INTRAMUSCULAR; INTRAVENOUS at 10:25

## 2025-01-20 RX ADMIN — ZOLEDRONIC ACID 4 MG: 0.04 INJECTION, SOLUTION INTRAVENOUS at 11:52

## 2025-01-20 ASSESSMENT — PAIN SCALES - GENERAL: PAINLEVEL_OUTOF10: 5

## 2025-01-20 NOTE — SIGNIFICANT EVENT
01/20/25 1004   Prechemo Checklist   Has the patient been in the hospital, ED, or urgent care since last date of service No   Chemo/Immuno Consent Completed and Signed Yes   Protocol/Indications Verified Yes   Confirmed to previous date/time of medication N/A  (1st dose)   Compared to previous dose N/A  (1st dose)   All medications are dated accurately Yes   Pregnancy Test Negative Not applicable   Parameters Met Yes   BSA/Weight-Height Verified Yes   Dose Calculations Verified (current, total, cumulative) Yes

## 2025-01-20 NOTE — PROGRESS NOTES
Trang Jones completed 1st dose Eribulin treatment using the [employer] Cooling Cap without any issues, tolerated well and is aware of follow-up appointments. No questions or concerns at this time.

## 2025-01-22 ENCOUNTER — ALLIED HEALTH (OUTPATIENT)
Dept: INTEGRATIVE MEDICINE | Facility: CLINIC | Age: 65
End: 2025-01-22
Payer: COMMERCIAL

## 2025-01-22 ENCOUNTER — APPOINTMENT (OUTPATIENT)
Dept: INTEGRATIVE MEDICINE | Facility: CLINIC | Age: 65
End: 2025-01-22
Payer: COMMERCIAL

## 2025-01-22 DIAGNOSIS — R11.0 NAUSEA: ICD-10-CM

## 2025-01-22 DIAGNOSIS — F43.9 STRESS: Primary | ICD-10-CM

## 2025-01-22 DIAGNOSIS — F41.9 ANXIETY: ICD-10-CM

## 2025-01-22 DIAGNOSIS — M79.672 PAIN OF LEFT HEEL: Primary | ICD-10-CM

## 2025-01-22 DIAGNOSIS — F32.A DEPRESSION: ICD-10-CM

## 2025-01-22 DIAGNOSIS — F43.9 STRESS: ICD-10-CM

## 2025-01-22 DIAGNOSIS — G89.29 CHRONIC PAIN: ICD-10-CM

## 2025-01-22 ASSESSMENT — PAIN SCALES - GENERAL: PAINLEVEL_OUTOF10: 4

## 2025-01-22 NOTE — PROGRESS NOTES
"Seen in a Group Medical Visit, with agreement signed to protect patient privacy and participate in a shared medical visit to focus on assessment and implementing lifestyle medicine interventions to address modifiable risk factors of chronic disease, such as cancer, and to assist with reduction of symptoms. Confidentiality form reviewed and signed and ground rules reviewed. An interactive audio and video telecommunication system which permits real time communications between the patient (at the originating site) and provider (at the distant site) was utilized to provide this telehealth service. Verbal consent was requested and obtained on this date for a telehealth visit.     This is an 8-week intervention of people diagnosed with cancer who have mood changes, stress, fatigue, and/or pain that includes active participation, individual assessments and instruction for home practices to implement lifestyle medicine interventions.     During week 2 of 8, the focus of lifestyle medicine and mindful eating were reviewed. Interventions such as: food as medicine, digestive health, gut microbiome, and at-home practices were discussed, and discussion amongst participants were reviewed.     During this group medical visit, I was assisted by Safia Guzman, DilciaD and participants participated in active Cancer Wellness Program discussions and group activities.     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      Oncology provider visit - last seen 11/13/24   Integrative medicine provider visit - last seen on 8/26/24       Symptom Managed: Fatigue, anxiety, stress management, health maintenance, nausea    Cancer Wellness Program: Week 2 of 8    Subjective: Individual check in with provider:  \"This is " "a rough week this week\" - started on a new FDA-approved drug this week, as well as meeting with oncologists regarding her treatment plan   Hoping to extend her life as long as possible     Group Medical Visit:  Review of home practice  Reflections  New concerns  Health topic discussed in group: food as medicine, digestive health, gut microbiome  Group practice: cooking demo   Reviewed home practice assignments for next week    ROS:  no HA, visual symptoms, hearing loss  no SOB, chest pain, palpitations   ROS o/w non contributory, please see HPI    Objective    BSA: There is no height or weight on file to calculate BSA.  There were no vitals taken for this visit.    PHYSICAL EXAM:  GENERAL: alert and appropriate, in no distress, well-hydrated, well-nourished and happy, smiling, interactive  SKIN: no rash noted  HEAD: normocephalic, no abnormality or lesion noted  EYES: no injection and visual acuity is grossly normal  EARS: external ears normal  NOSE: external nose normal without rhinorrhea  NECK: full ROM, no cervical LNs noted  RESPIRATORY: breathing non-labored and no grunting/flaring/retractions  NEUROLOGIC: no obvious deficit    No components found for: \"CBC\"   No components found for: \"CMP\"     Assessment/Plan   Cancer Staging   No matching staging information was found for the patient.    Symptom Managed: Fatigue, anxiety, stress management, health maintenance,    Cancer Wellness Program: Week 2/8    Follow up for 8 weekly sessions to assess and treat symptoms through group medical visit.    At-Home Practices:  - Connect with partner at least once weekly for check-in  - Eat a forkful of fermented food daily for the next two weeks    Jeanine Vega, APRN-[facility]  Integrative Oncology    60 min visit with face to face counseling in a group setting re: stress management, coping mechanisms including meditation and focus on anxiety  >50% of the visit 60 min was spent in direct face to face " care with patient discussing stress management and coping strategies including meditation and mindfulness  Individual face to face check in was done during the visit in front of group. Session led by Jeanine Vega CNP.

## 2025-01-22 NOTE — PROGRESS NOTES
Acupuncture Visit:     Subjective   Patient ID: Trang Jones is a 64 y.o. female who presents for No chief complaint on file.  Seeking support for left sided heel pain, emotional regulation and chromic pain        Session Information  Is this acupuncture treatment being billed to the patient's insurance company: No  Visit Type: Follow-up visit    Pre-treatment Assessment  Pain Score: 5 - Moderate pain  Anxiety Level (0-10): 5  Stress Level (0-10): 4  Coping Level (0-10): 4  Depression Level (0-10): 5  Fatigue Level (0-10): 5  Nausea Level (0-10): 1  Wellbeing Level (0-10): 5    Review of Systems         Provider reviewed plan for the acupuncture session, precautions and contraindications. Patient/guardian/hospital staff has given consent to treat with full understanding of what to expect during the session. Before acupuncture began, provider explained to the patient to communicate at any time if the procedure was causing discomfort past their tolerance level. Patient agreed to advise acupuncturist. The acupuncturist counseled the patient on the risks of acupuncture treatment including pain, infection, bleeding, and no relief of pain. The patient was positioned comfortably. There was no evidence of infection at the site of needle insertions.    Objective   Physical Exam              Acupuncture Treatment  Body Points - Left: yin isaacs, ub 60, k 3, gb 38, sp 6,  Body Points - Bilateral: sp 4, k 6, st 36  Needle Count In: 11  Needle Count Out: 11  Needle Retention Time (min): 25 minutes  Total Face to Face Time (min): 25 minutes         Post-treatment Assessment  Anxiety Level (0-10): 3  Stress Level (0-10): 3  Coping Level (0-10): 7  Depression Level (0-10): 3  Fatigue Level (0-10): 5  Nausea Level (0-10): 0  Wellbeing Level (0-10): 7    Assessment/Plan

## 2025-01-22 NOTE — PATIENT INSTRUCTIONS
At-Home Practices:  - Connect with partner at least once weekly for check-in  - Eat a forkful of fermented food daily for the next two weeks

## 2025-01-27 ENCOUNTER — INFUSION (OUTPATIENT)
Dept: HEMATOLOGY/ONCOLOGY | Facility: CLINIC | Age: 65
End: 2025-01-27
Payer: COMMERCIAL

## 2025-01-27 VITALS
DIASTOLIC BLOOD PRESSURE: 71 MMHG | TEMPERATURE: 96.8 F | HEART RATE: 79 BPM | SYSTOLIC BLOOD PRESSURE: 114 MMHG | RESPIRATION RATE: 18 BRPM | OXYGEN SATURATION: 93 % | WEIGHT: 132.06 LBS | BODY MASS INDEX: 24.55 KG/M2

## 2025-01-27 DIAGNOSIS — C79.51 CARCINOMA OF RIGHT BREAST METASTATIC TO BONE (MULTI): ICD-10-CM

## 2025-01-27 DIAGNOSIS — C41.9 MALIGNANT NEOPLASM OF BONE WITH METASTASES (MULTI): ICD-10-CM

## 2025-01-27 DIAGNOSIS — C50.911 CARCINOMA OF RIGHT BREAST METASTATIC TO BONE (MULTI): ICD-10-CM

## 2025-01-27 LAB
ALBUMIN SERPL BCP-MCNC: 3.4 G/DL (ref 3.4–5)
ALP SERPL-CCNC: 289 U/L (ref 33–136)
ALT SERPL W P-5'-P-CCNC: 36 U/L (ref 7–45)
ANION GAP SERPL CALC-SCNC: 13 MMOL/L (ref 10–20)
AST SERPL W P-5'-P-CCNC: 73 U/L (ref 9–39)
BASOPHILS # BLD AUTO: 0.01 X10*3/UL (ref 0–0.1)
BASOPHILS NFR BLD AUTO: 0.5 %
BILIRUB SERPL-MCNC: 0.6 MG/DL (ref 0–1.2)
BUN SERPL-MCNC: 14 MG/DL (ref 6–23)
CALCIUM SERPL-MCNC: 8.9 MG/DL (ref 8.6–10.3)
CANCER AG27-29 SERPL-ACNC: 1506.1 U/ML (ref 0–38.6)
CHLORIDE SERPL-SCNC: 103 MMOL/L (ref 98–107)
CO2 SERPL-SCNC: 29 MMOL/L (ref 21–32)
CREAT SERPL-MCNC: 0.61 MG/DL (ref 0.5–1.05)
DACRYOCYTES BLD QL SMEAR: NORMAL
EGFRCR SERPLBLD CKD-EPI 2021: >90 ML/MIN/1.73M*2
EOSINOPHIL # BLD AUTO: 0.01 X10*3/UL (ref 0–0.7)
EOSINOPHIL NFR BLD AUTO: 0.5 %
ERYTHROCYTE [DISTWIDTH] IN BLOOD BY AUTOMATED COUNT: 17.1 % (ref 11.5–14.5)
GLUCOSE SERPL-MCNC: 104 MG/DL (ref 74–99)
HCT VFR BLD AUTO: 27.8 % (ref 36–46)
HGB BLD-MCNC: 9 G/DL (ref 12–16)
IMM GRANULOCYTES # BLD AUTO: 0.02 X10*3/UL (ref 0–0.7)
IMM GRANULOCYTES NFR BLD AUTO: 0.9 % (ref 0–0.9)
LYMPHOCYTES # BLD AUTO: 0.99 X10*3/UL (ref 1.2–4.8)
LYMPHOCYTES NFR BLD AUTO: 46.7 %
MCH RBC QN AUTO: 28 PG (ref 26–34)
MCHC RBC AUTO-ENTMCNC: 32.4 G/DL (ref 32–36)
MCV RBC AUTO: 87 FL (ref 80–100)
MONOCYTES # BLD AUTO: 0.46 X10*3/UL (ref 0.1–1)
MONOCYTES NFR BLD AUTO: 21.7 %
NEUTROPHILS # BLD AUTO: 0.63 X10*3/UL (ref 1.2–7.7)
NEUTROPHILS NFR BLD AUTO: 29.7 %
NRBC BLD-RTO: ABNORMAL /100{WBCS}
PLATELET # BLD AUTO: 148 X10*3/UL (ref 150–450)
POLYCHROMASIA BLD QL SMEAR: NORMAL
POTASSIUM SERPL-SCNC: 4 MMOL/L (ref 3.5–5.3)
PROT SERPL-MCNC: 5.7 G/DL (ref 6.4–8.2)
RBC # BLD AUTO: 3.21 X10*6/UL (ref 4–5.2)
RBC MORPH BLD: NORMAL
SODIUM SERPL-SCNC: 141 MMOL/L (ref 136–145)
WBC # BLD AUTO: 2.1 X10*3/UL (ref 4.4–11.3)

## 2025-01-27 PROCEDURE — 2500000004 HC RX 250 GENERAL PHARMACY W/ HCPCS (ALT 636 FOR OP/ED)

## 2025-01-27 PROCEDURE — 86300 IMMUNOASSAY TUMOR CA 15-3: CPT | Mod: GEALAB

## 2025-01-27 PROCEDURE — 85025 COMPLETE CBC W/AUTO DIFF WBC: CPT

## 2025-01-27 PROCEDURE — 36591 DRAW BLOOD OFF VENOUS DEVICE: CPT

## 2025-01-27 PROCEDURE — 80053 COMPREHEN METABOLIC PANEL: CPT

## 2025-01-27 RX ORDER — HEPARIN SODIUM,PORCINE/PF 10 UNIT/ML
50 SYRINGE (ML) INTRAVENOUS AS NEEDED
Status: DISCONTINUED | OUTPATIENT
Start: 2025-01-27 | End: 2025-01-27 | Stop reason: HOSPADM

## 2025-01-27 RX ORDER — HEPARIN 100 UNIT/ML
500 SYRINGE INTRAVENOUS AS NEEDED
Status: DISCONTINUED | OUTPATIENT
Start: 2025-01-27 | End: 2025-01-27 | Stop reason: HOSPADM

## 2025-01-27 RX ORDER — HEPARIN SODIUM,PORCINE/PF 10 UNIT/ML
50 SYRINGE (ML) INTRAVENOUS AS NEEDED
OUTPATIENT
Start: 2025-01-27

## 2025-01-27 RX ORDER — HEPARIN 100 UNIT/ML
500 SYRINGE INTRAVENOUS AS NEEDED
OUTPATIENT
Start: 2025-01-27

## 2025-01-27 RX ADMIN — HEPARIN 500 UNITS: 100 SYRINGE at 10:42

## 2025-01-27 ASSESSMENT — PAIN SCALES - GENERAL: PAINLEVEL_OUTOF10: 6

## 2025-01-27 NOTE — PROGRESS NOTES
Trang Jones presented for D8C1 Eribulin. ANC was 0.63 and outside of parameter to treat. Dr Tolbert made aware and treatment will be held. Ale verbalizes understanding and is aware of follow-up appointments. No questions or concerns at this time.

## 2025-01-28 ENCOUNTER — APPOINTMENT (OUTPATIENT)
Dept: PALLIATIVE MEDICINE | Facility: CLINIC | Age: 65
End: 2025-01-28
Payer: COMMERCIAL

## 2025-01-28 ENCOUNTER — TELEPHONE (OUTPATIENT)
Dept: PALLIATIVE MEDICINE | Facility: HOSPITAL | Age: 65
End: 2025-01-28
Payer: COMMERCIAL

## 2025-01-28 NOTE — PATIENT INSTRUCTIONS
Follow up for 8 weekly sessions to assess and treat symptoms through group medical visit.    At-Home Practices:  - Connect with partner at least once weekly for check-in  - Utilize CGM for 2 weeks (prescription will be sent in)

## 2025-01-28 NOTE — TELEPHONE ENCOUNTER
Call placed to inform Mrs. Jones that Provider Felicia WARE cancelled clinic today. Requested that she call to reschedule  today's appointment. She did not answer. Voice message left for her.

## 2025-01-28 NOTE — PROGRESS NOTES
Seen in a Group Medical Visit, with agreement signed to protect patient privacy and participate in a shared medical visit to focus on assessment and implementing lifestyle medicine interventions to address modifiable risk factors of chronic disease, such as cancer, and to assist with reduction of symptoms. Confidentiality form reviewed and signed and ground rules reviewed. An interactive audio and video telecommunication system which permits real time communications between the patient (at the originating site) and provider (at the distant site) was utilized to provide this telehealth service. Verbal consent was requested and obtained on this date for a telehealth visit.     This is an 8-week intervention of people diagnosed with cancer who have mood changes, stress, fatigue, and/or pain that includes active participation, individual assessments and instruction for home practices to implement lifestyle medicine interventions.     During week 3 of 8, the focus of lifestyle medicine and mindful eating were reviewed. Interventions such as: metabolic health in cancer recovery and treatment, continuous glucose monitor (CGM) utilization, nutrition for metabolic health and wellness, and at-home practices were discussed, and discussion amongst participants were reviewed.     During this group medical visit, I was assisted by Safia Guzman, DilciaD and participants participated in active Cancer Wellness Program discussions and group activities.     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      Oncology provider visit - last seen 1/16/25 (CC)  Integrative medicine provider visit - last seen on 8/26/24     Symptom Managed: Fatigue, anxiety, stress management, health maintenance    Cancer  Wellness Program: Week 3 of 8    Subjective: Individual check in with provider:      Group Medical Visit:  Review of home practice  Reflections  New concerns  Health topic discussed in group: metabolic health in cancer recovery and treatment, continuous glucose monitor (CGM) utilization, nutrition for metabolic health and wellness  Group practice: CGM utilization, cooking demo  Reviewed home practice assignments for next week    ROS:  no HA, visual symptoms, hearing loss  no SOB, chest pain, palpitations   ROS o/w non contributory, please see HPI    Objective    BSA: There is no height or weight on file to calculate BSA.  There were no vitals taken for this visit.    PHYSICAL EXAM:  GENERAL: alert and appropriate, in no distress, well-hydrated, well-nourished and happy, smiling, interactive  SKIN: no rash noted  HEAD: normocephalic, no abnormality or lesion noted  EYES: no injection and visual acuity is grossly normal  EARS: external ears normal  NOSE: external nose normal without rhinorrhea  NECK: full ROM, no cervical LNs noted  RESPIRATORY: breathing non-labored and no grunting/flaring/retractions  NEUROLOGIC: no obvious deficit    Lab Results   Component Value Date    WBC 2.1 (L) 01/27/2025    HGB 9.0 (L) 01/27/2025    HCT 27.8 (L) 01/27/2025     (L) 01/27/2025    ALT 36 01/27/2025    AST 73 (H) 01/27/2025     01/27/2025    K 4.0 01/27/2025     01/27/2025    CREATININE 0.61 01/27/2025    BUN 14 01/27/2025    CO2 29 01/27/2025    TSH 1.68 05/03/2023    INR 1.0 10/07/2024    HGBA1C 5.5 04/29/2022         Assessment/Plan   Cancer Staging   No matching staging information was found for the patient.    Symptom Managed: Fatigue, anxiety, stress management, health maintenance    Cancer Wellness Program: Week 3/8    Follow up for 8 weekly sessions to assess and treat symptoms through group medical visit.    At-Home Practices:  - Connect with partner at least once weekly for check-in  - Utilize CGM for  2 weeks (prescription will be sent in)    KELSEA Tobin-CNP   [facility]  Integrative Oncology    60 min visit with face to face counseling in a group setting re: stress management, coping mechanisms including meditation and focus on anxiety  >50% of the visit 60 min was spent in direct face to face care with patient discussing stress management and coping strategies including meditation and mindfulness  Individual face to face check in was done during the visit in front of group. Session led by Jeanine Vega CNP.

## 2025-01-29 ENCOUNTER — TELEPHONE (OUTPATIENT)
Dept: HEMATOLOGY/ONCOLOGY | Facility: CLINIC | Age: 65
End: 2025-01-29

## 2025-01-29 ENCOUNTER — APPOINTMENT (OUTPATIENT)
Dept: INTEGRATIVE MEDICINE | Facility: CLINIC | Age: 65
End: 2025-01-29
Payer: COMMERCIAL

## 2025-01-29 ENCOUNTER — ALLIED HEALTH (OUTPATIENT)
Dept: INTEGRATIVE MEDICINE | Facility: CLINIC | Age: 65
End: 2025-01-29
Payer: COMMERCIAL

## 2025-01-29 DIAGNOSIS — R11.2 NAUSEA AND VOMITING, UNSPECIFIED VOMITING TYPE: ICD-10-CM

## 2025-01-29 DIAGNOSIS — G89.29 CHRONIC PAIN: Primary | ICD-10-CM

## 2025-01-29 DIAGNOSIS — F43.9 STRESS: Primary | ICD-10-CM

## 2025-01-29 DIAGNOSIS — F41.9 ANXIETY: ICD-10-CM

## 2025-01-29 DIAGNOSIS — E88.819 INSULIN RESISTANCE: ICD-10-CM

## 2025-01-29 DIAGNOSIS — R11.0 NAUSEA: ICD-10-CM

## 2025-01-29 DIAGNOSIS — G89.3 CANCER RELATED PAIN: ICD-10-CM

## 2025-01-29 DIAGNOSIS — Z00.00 HEALTHCARE MAINTENANCE: ICD-10-CM

## 2025-01-29 PROCEDURE — 99215 OFFICE O/P EST HI 40 MIN: CPT

## 2025-01-29 RX ORDER — DEXAMETHASONE 4 MG/1
4 TABLET ORAL EVERY MORNING
Qty: 14 TABLET | Refills: 0 | Status: SHIPPED | OUTPATIENT
Start: 2025-01-29 | End: 2025-02-12

## 2025-01-29 RX ORDER — OLANZAPINE 5 MG/1
5 TABLET ORAL NIGHTLY
Qty: 30 TABLET | Refills: 0 | Status: SHIPPED | OUTPATIENT
Start: 2025-01-29 | End: 2025-01-31

## 2025-01-29 RX ORDER — MORPHINE SULFATE 15 MG/1
15 TABLET, FILM COATED, EXTENDED RELEASE ORAL 3 TIMES DAILY
Qty: 90 TABLET | Refills: 0 | Status: SHIPPED | OUTPATIENT
Start: 2025-01-29 | End: 2025-01-31 | Stop reason: SDUPTHER

## 2025-01-29 ASSESSMENT — PAIN SCALES - GENERAL: PAINLEVEL_OUTOF10: 7

## 2025-01-29 NOTE — PROGRESS NOTES
Acupuncture Visit:     Subjective   Patient ID: Trang Jones is a 64 y.o. female who presents for No chief complaint on file.  Pt reported she felt great post tx.  This week she has experienced increase of pain in ribs and stomach.          Session Information  Is this acupuncture treatment being billed to the patient's insurance company: No  Visit Type: Follow-up visit    Pre-treatment Assessment  Pain Score: 8  Anxiety Level (0-10): 6  Stress Level (0-10): 7  Coping Level (0-10): 2  Depression Level (0-10): 8  Fatigue Level (0-10): 8  Nausea Level (0-10): 4  Wellbeing Level (0-10): 2    Review of Systems         Provider reviewed plan for the acupuncture session, precautions and contraindications. Patient/guardian/hospital staff has given consent to treat with full understanding of what to expect during the session. Before acupuncture began, provider explained to the patient to communicate at any time if the procedure was causing discomfort past their tolerance level. Patient agreed to advise acupuncturist. The acupuncturist counseled the patient on the risks of acupuncture treatment including pain, infection, bleeding, and no relief of pain. The patient was positioned comfortably. There was no evidence of infection at the site of needle insertions.    Objective   Physical Exam              Acupuncture Treatment  Patient Position: Seated and reclining  Needle Guage: 40 guage /.16/ Red seirin  Body Points: With retention  Body Points - Left: yin isaacs, li 4  Body Points - Bilateral: k 6, sp 3.2, st qix1  Body Points - Right: lr 3  Needle Count In: 9  Needle Count Out: 9  Needle Retention Time (min): 25 minutes  Total Face to Face Time (min): 25 minutes         Post-treatment Assessment  0-10 (Numeric) Pain Score: 7  Anxiety Level (0-10): 8  Stress Level (0-10): 8  Coping Level (0-10): 3  Depression Level (0-10): 8  Fatigue Level (0-10): 8  Nausea Level (0-10): 4  Wellbeing Level (0-10): 3    Assessment/Plan

## 2025-01-29 NOTE — TELEPHONE ENCOUNTER
I spoke with Trang. She reports pain in her ribs for the past week or so but previously had some of this. She has vomited in the morning for the past few days. It comes on quickly so she does not have time to take zofran/compazine. She is taking MSER 30mg TID and is not taking oxycodone as ibuprofen works better. She is taking 600mg 2-3/day. She is also on gabapentin 300mg QAM and 600mg QPM. Per KELSEA Duarte, we will increase the MSER to 45mg TID. We will send in dexamethasone 4mg daily x 14 days and olanzapine 5mg at bedtime. Patient verbalized understanding of this plan. We have scheduled a fuv for 1/31.

## 2025-01-31 ENCOUNTER — TELEMEDICINE (OUTPATIENT)
Dept: PALLIATIVE MEDICINE | Facility: CLINIC | Age: 65
End: 2025-01-31
Payer: COMMERCIAL

## 2025-01-31 DIAGNOSIS — Z51.81 ENCOUNTER FOR MONITORING OPIOID MAINTENANCE THERAPY: ICD-10-CM

## 2025-01-31 DIAGNOSIS — T45.1X5A CHEMOTHERAPY INDUCED NAUSEA AND VOMITING: ICD-10-CM

## 2025-01-31 DIAGNOSIS — Z79.891 ENCOUNTER FOR MONITORING OPIOID MAINTENANCE THERAPY: ICD-10-CM

## 2025-01-31 DIAGNOSIS — R11.2 CHEMOTHERAPY INDUCED NAUSEA AND VOMITING: ICD-10-CM

## 2025-01-31 DIAGNOSIS — M79.2 NEUROPATHIC PAIN: ICD-10-CM

## 2025-01-31 DIAGNOSIS — G89.3 CANCER RELATED PAIN: ICD-10-CM

## 2025-01-31 DIAGNOSIS — Z51.5 PALLIATIVE CARE ENCOUNTER: Primary | ICD-10-CM

## 2025-01-31 RX ORDER — MORPHINE SULFATE 15 MG/1
15 TABLET, FILM COATED, EXTENDED RELEASE ORAL 3 TIMES DAILY
Qty: 90 TABLET | Refills: 0 | Status: SHIPPED | OUTPATIENT
Start: 2025-01-31 | End: 2025-03-02

## 2025-01-31 RX ORDER — MORPHINE SULFATE 30 MG/1
30 TABLET, FILM COATED, EXTENDED RELEASE ORAL 3 TIMES DAILY
Qty: 90 TABLET | Refills: 0 | Status: SHIPPED | OUTPATIENT
Start: 2025-01-31 | End: 2025-03-02

## 2025-01-31 RX ORDER — GABAPENTIN 300 MG/1
CAPSULE ORAL
Qty: 270 CAPSULE | Refills: 3 | Status: SHIPPED | OUTPATIENT
Start: 2025-01-31 | End: 2025-05-01

## 2025-01-31 NOTE — PROGRESS NOTES
SUPPORTIVE AND PALLIATIVE ONCOLOGY OUTPATIENT FOLLOW-UP      SERVICE DATE: 1/31/2025    Virtual or Telephone Consent    A telephone visit (audio only) between the patient (at the originating site) and the provider (at the distant site) was utilized to provide this telehealth service.   Verbal consent was requested and obtained from Trang Jones on this date, 01/31/25 for a telehealth visit.      Cancer History  Newly diagnosed triple negative breast CA (with bone mets and cord compression)  -s/p L mastectomy in 2010: stage II ER+/HER2- breast CA  -s/p 4 cycles docetaxel/cyclophosphamide chemo  -took tamoxifen x 7 yrs, completed in [id number]  -presented to ED on 3/30/23 with severe back pain  -MRI spine showed cord compression, admitted at that time   -s/p 1 fx RT to spine on 5/4/23  -plan to start treatment with weekly paclitaxel and q21day pembro  -started treatment 5/22/23, changed to abraxane after C1 d/t anaphylactic type reaction  -CT CAP (11/14/23): Overall similar appearance of diffuse osseous metastatic disease. New subcentimeter lesion in the right hepatic lobe. Question  metastatic disease. Otherwise similar appearance of small  hypodensities throughout the liver.  -plan to stop abraxane and change therapy to TDxD, scheduled to start on 1/29/24  -treatment held for XRT to hip/spine 4/1/24  -stopped tx with TDxD on 6/27/24  -plan to start Aprea clinical trial in August 2024  -started sacituzumab govitecan 10/28/24  -CT scheduled for 12/20/24     Med Onc: Dr. Tolbert  Integrative Medicine: Dr. Sandoval    Subjective   HISTORY OF PRESENT ILLNESS: Trang Jones is a 64 y.o. female  with PMHx of HLD, and triple negative breast CA.     She presents to supportive oncology for follow up for pain and symptom management.     Spoke with pt on the phone for FUV, 2 daughters also present on the call.     Pain Assessment:  Pain Score:    Location:    Education:      Symptom Assessment:  Pain:a  little  Headache: none  Dizziness:none  Lack of energy: a little  Difficulty sleeping: none  Worrying: a little  Anxiety: a little  Depression: a little  Pain in mouth/swallowing: none  Dry mouth: none  Taste changes: somewhat  Shortness of breath: none  Lack of appetite: a little   Nausea: none  Vomiting: none  Constipation: a little  Diarrhea: a little  Sore muscles: none  Numbness or tingling in hands/feet/other: a little  Weight loss: none      Information obtained from: interview of patient  ______________________________________________________________________        Objective                PHYSICAL EXAMINATION   Vital Signs:   Vital signs reviewed  There were no vitals filed for this visit.    Pain Score:  3      ASSESSMENT/PLAN    Pain: upper back around shoulder blades, radiates to front of chest; dull ache, shooting with sudden movement    Pain is: cancer related pain and acute on chronic  Type: somatic and neuropathic  Pain control: well-controlled  Home regimen:   -continue Morphine Sulfate Contin 45mg tid. Rx sent for fill on 2/27 (15mg) and 2/13 (30mg).  -continue oxycodone 10mg every 4hrs PRN. No Rx needed today.   -continue gabapentin 300mg in the morning and 600mg at night. Rx sent for fill on 2/8.    -continue ibuprofen PRN   -continue to follow with Dr. Sandoval/ Agustin Dietrich for acupuncture/ reiki   -continue medical THC PRN   -continue dexamethasone 4mg qAM x14 doses.   Intolerances/previously tried:     Opioid Use  Medication Management:   - OARRS report reviewed with no aberrant behavior; consistent with  prescriptions/records and patient history  - .  Overdose Risk Score 220.   This has been discussed with patient.   - We will continue to closely monitor the patient for signs of prescription misuse including UDS, OARRS review and subjective reports at each visit.  - no concurrent benzodiazepine use   - I am a provider who either is or has consulted and collaborated with a provider  certified in Hospice and Palliative Medicine and have conducted a face-face visit and examination for this patient.  - Routine Urine Drug Screen completed 5/9/23 appropriately positive for opioids and negative for illicit substances  - Controlled Substance Agreement completed 5/9/23  - Specifically discussed that controlled substance prescriptions will only be provided by our group as outlined in the completed agreement  - Prescribed naloxone previously prescribed  - Red Flags: none     Bowels: at risk for OIC  -educated pt on importance of maintaining daily BM  -continue daily miralax PRN  -continue senna/colace PRN      N/V/reflux:   -continue zofran 4mg ODT q8hrs PRN   -continue compazine 10mg q6hrs PRN.   -self discontinued olanzapine 5mg at bedtime during treatment week  -self discontinued prilosec 40mg daily during treatment week  -continue migraine medication PRN   -continue dexamethasone 4mg qAM x14 doses.      Sleep: hx of insomnia prior to cancer dx   -recently restarted medical THC PRN   -encouraged pt to maintain regular sleep/wake cycle  -plan for better pain control to aid in improved sleep, see pain section above      Mood: improving    -discussed adding medication to help with mood, pt would like to hold off for now  -continue ativan 0.5mg bid PRN.  -potentially interested in counseling resources     Medical Decision Making/ GOC:   -social work referral to assist with LW/ POA paperwork        Next Follow-Up Visit:  Return to clinic in 4 weeks     Signature and billing  Medical complexity was moderate level due to due to complexity of problems, extensive data review, and high risk of management/treatment.  Time was spent on the following: Prep Time, Time Directly with Patient/Family/Caregiver, Documentation Time. Total time spent: 30 mins      Data  Diagnostic tests and information reviewed for today's visit:  Most recent labs         Some elements copied from my note on 12/17/24, the elements have  been updated and all reflect current decision making from today, 1/31/2025.      Plan of Care discussed with: Patient    SIGNATURE: Jennifer Duarte, APRN-CNP    Contact information:  Supportive and Palliative Oncology  Monday-Friday 8 AM-5 PM  Phone:  [phone], press option #5, then option #1.   Or Epic Secure Chat

## 2025-01-31 NOTE — ADDENDUM NOTE
Encounter addended by: KELSEA Rodriguez-CNP on: 1/31/2025 3:58 PM   Actions taken: Clinical Note Signed, SmartForm saved, Level of Service modified

## 2025-01-31 NOTE — PROGRESS NOTES
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 she received prior Taxotere and cyclophosphamide , completed 6/2010. S/p adjuvant endocrine therapy.    Diagnosis of metastatic TNBC 3/2023, presenting with back pain and cord compression      weekly paclitaxel from 5/22/23, changed to abraxane after 1st cycle due to anaphylactic type reaction. Discontinued 12/12/23 due to progressive disease.    TDxD from 1/29/24, held for XRT to spine for a few wks and stopped on 6/27/24  s/p XRT to spine on 4/1/24    CURRENT THERAPY  None    HISTORY OF PRESENT ILLNESS    Trang Jones is a 64 y.o. woman here for C2D15 on [id number] .  She reports that she continues to feel well on the study drug.      Review of Systems   Constitutional: Negative.  Negative for appetite change, chills, diaphoresis, fatigue and fever.   HENT:  Negative.  Negative for hearing loss and lump/mass.    Eyes: Negative.  Negative for eye problems and icterus.   Respiratory: Negative.  Negative for chest tightness, cough, hemoptysis, shortness of breath and wheezing.    Cardiovascular: Negative.  Negative for chest pain, leg swelling and palpitations.   Gastrointestinal:  Positive for abdominal pain (Occasional transient crampy pain), constipation (Alternating with diarrhea) and diarrhea. Negative for abdominal distention and blood in stool.   Endocrine: Negative for hot flashes.   Genitourinary:  Negative for bladder incontinence, difficulty urinating, dyspareunia, dysuria, frequency and hematuria.    Musculoskeletal:  Negative for arthralgias, gait problem and myalgias.    Neurological:  Negative for dizziness, extremity weakness, gait problem, headaches, numbness and seizures.   Hematological:  Negative for adenopathy. Does not bruise/bleed easily.   Psychiatric/Behavioral:  Negative for confusion, depression and sleep disturbance. The patient is not nervous/anxious.    All other systems reviewed and are negative.        Past Medical History:  has a past medical history of Breast cancer (Multi), Hypercholesteremia, Metastatic cancer (Multi), and Personal history of irradiation.  Surgical History:   has a past surgical history that includes CT guided percutaneous biopsy bone deep (2023); Mastectomy (Left, ); and Breast surgery.  Social History:   reports that she quit smoking about 22 years ago. Her smoking use included cigarettes. She has been exposed to tobacco smoke. She has never used smokeless tobacco. She reports that she does not currently use alcohol. She reports current drug use. Drug: Marijuana.  Family History:    Family History   Problem Relation Name Age of Onset    Leukemia Father       Family Oncology History:  Cancer-related family history is not on file.      OBJECTIVE    VS / Pain:  /71 (BP Location: Right arm, Patient Position: Sitting)   Pulse (!) 49 Comment: Ajit Hendrickson CNP aware. No interventions needed.  Temp 36.7 °C (98.1 °F) (Temporal)   Resp 16   Wt 62.6 kg (138 lb 0.1 oz)   LMP  (LMP Unknown)   SpO2 98%   BMI 26.26 kg/m²   BSA: 1.64 meters squared     Pain Scale: 3    Performance Status:  The ECOG performance scale today is ECO- Restricted in physically strenuous activity.  Carries out light duty.  Hospital Outpatient Visit on 2024   Component Date Value Ref Range Status    WBC 2024 4.9  4.4 - 11.3 x10*3/uL Final    nRBC 2024 0.0  0.0 - 0.0 /100 WBCs Final    RBC 2024 3.94 (L)  4.00 - 5.20 x10*6/uL Final    Hemoglobin 2024 11.7 (L)  12.0 - 16.0 g/dL Final    Hematocrit 2024 35.6 (L)  36.0 -  46.0 % Final    MCV 09/23/2024 90  80 - 100 fL Final    MCH 09/23/2024 29.7  26.0 - 34.0 pg Final    MCHC 09/23/2024 32.9  32.0 - 36.0 g/dL Final    RDW 09/23/2024 12.7  11.5 - 14.5 % Final    Platelets 09/23/2024 210  150 - 450 x10*3/uL Final    Neutrophils % 09/23/2024 61.2  40.0 - 80.0 % Final    Immature Granulocytes %, Automated 09/23/2024 0.4  0.0 - 0.9 % Final    Immature Granulocyte Count (IG) includes promyelocytes, myelocytes and metamyelocytes but does not include bands. Percent differential counts (%) should be interpreted in the context of the absolute cell counts (cells/UL).    Lymphocytes % 09/23/2024 26.0  13.0 - 44.0 % Final    Monocytes % 09/23/2024 9.3  2.0 - 10.0 % Final    Eosinophils % 09/23/2024 2.5  0.0 - 6.0 % Final    Basophils % 09/23/2024 0.6  0.0 - 2.0 % Final    Neutrophils Absolute 09/23/2024 2.97  1.20 - 7.70 x10*3/uL Final    Percent differential counts (%) should be interpreted in the context of the absolute cell counts (cells/uL).    Immature Granulocytes Absolute, Au* 09/23/2024 0.02  0.00 - 0.70 x10*3/uL Final    Lymphocytes Absolute 09/23/2024 1.26  1.20 - 4.80 x10*3/uL Final    Monocytes Absolute 09/23/2024 0.45  0.10 - 1.00 x10*3/uL Final    Eosinophils Absolute 09/23/2024 0.12  0.00 - 0.70 x10*3/uL Final    Basophils Absolute 09/23/2024 0.03  0.00 - 0.10 x10*3/uL Final    Glucose 09/23/2024 92  74 - 99 mg/dL Final    Sodium 09/23/2024 142  136 - 145 mmol/L Final    Potassium 09/23/2024 4.0  3.5 - 5.3 mmol/L Final    Chloride 09/23/2024 104  98 - 107 mmol/L Final    Bicarbonate 09/23/2024 28  21 - 32 mmol/L Final    Anion Gap 09/23/2024 14  10 - 20 mmol/L Final    Urea Nitrogen 09/23/2024 18  6 - 23 mg/dL Final    Creatinine 09/23/2024 0.71  0.50 - 1.05 mg/dL Final    eGFR 09/23/2024 >90  >60 mL/min/1.73m*2 Final    Calculations of estimated GFR are performed using the 2021 CKD-EPI Study Refit equation without the race variable for the IDMS-Traceable creatinine  "methods.  https://jasn.asnjournals.org/content/early/2021/09/22/ASN.6705871148    Calcium 09/23/2024 9.3  8.6 - 10.6 mg/dL Final    Albumin 09/23/2024 3.9  3.4 - 5.0 g/dL Final    Alkaline Phosphatase 09/23/2024 101  33 - 136 U/L Final    Total Protein 09/23/2024 5.6 (L)  6.4 - 8.2 g/dL Final    AST 09/23/2024 28  9 - 39 U/L Final    Bilirubin, Total 09/23/2024 0.3  0.0 - 1.2 mg/dL Final    ALT 09/23/2024 18  7 - 45 U/L Final    Patients treated with Sulfasalazine may generate falsely decreased results for ALT.    Amylase 09/23/2024 29  29 - 103 U/L Final    aPTT 09/23/2024 45 (H)  27 - 38 seconds Final    D-Dimer Non VTE, Quant (ng/mL FEU) 09/23/2024 2,005 (H)  <=500 ng/mL FEU Final    Haptoglobin 09/23/2024 162  30 - 200 mg/dL Final    LDH 09/23/2024 269 (H)  84 - 246 U/L Final    Magnesium 09/23/2024 2.19  1.60 - 2.40 mg/dL Final    Phosphorus 09/23/2024 4.7  2.5 - 4.9 mg/dL Final    The performance characteristics of phosphorus testing in heparinized plasma have been validated by the individual  laboratory site where testing is performed. Testing on heparinized plasma is not approved by the FDA; however, such approval is not necessary.    Protime 09/23/2024 10.3  9.8 - 12.8 seconds Final    INR 09/23/2024 0.9  0.9 - 1.1 Final    Retic % 09/23/2024 1.7  0.5 - 2.0 % Final    Retic Absolute 09/23/2024 0.069  0.018 - 0.083 x10*6/uL Final    Reticulocyte Hemoglobin 09/23/2024 31  28 - 38 pg Final    Immature Retic fraction 09/23/2024 16.5 (H)  <=16.0 % Final    Reticulocytes are measured based on a fluorescent technique. The IRF, or immature reticulocyte fraction, is the percent of reticulocytes that show medium (MFR) or high (HFR) fluorescence.  This value can be used to assess the relative maturity of the reticulocyte population in response to anemia. The \"shift reticulocytes\" are not measured by this technique, eliminating the need for their correction in the reticulocyte index.    Uric Acid 09/23/2024 4.8  2.3 " - 6.7 mg/dL Final    Venipuncture immediately after or during the administration of Metamizole may lead to falsely low results. Testing should be performed immediately  prior to Metamizole dosing.    Color, Urine 09/23/2024 Light-Yellow  Light-Yellow, Yellow, Dark-Yellow Final    Appearance, Urine 09/23/2024 Clear  Clear Final    Specific Gravity, Urine 09/23/2024 1.020  1.005 - 1.035 Final    pH, Urine 09/23/2024 6.0  5.0, 5.5, 6.0, 6.5, 7.0, 7.5, 8.0 Final    Protein, Urine 09/23/2024 NEGATIVE  NEGATIVE, 10 (TRACE), 20 (TRACE) mg/dL Final    Glucose, Urine 09/23/2024 Normal  Normal mg/dL Final    Blood, Urine 09/23/2024 NEGATIVE  NEGATIVE Final    Ketones, Urine 09/23/2024 NEGATIVE  NEGATIVE mg/dL Final    Bilirubin, Urine 09/23/2024 NEGATIVE  NEGATIVE Final    Urobilinogen, Urine 09/23/2024 Normal  Normal mg/dL Final    Nitrite, Urine 09/23/2024 NEGATIVE  NEGATIVE Final    Leukocyte Esterase, Urine 09/23/2024 NEGATIVE  NEGATIVE Final       Physical Exam  Constitutional:       General: She is not in acute distress.     Appearance: She is not ill-appearing, toxic-appearing or diaphoretic.   HENT:      Nose: No congestion or rhinorrhea.      Mouth/Throat:      Pharynx: No posterior oropharyngeal erythema.   Cardiovascular:      Rate and Rhythm: Normal rate and regular rhythm.      Pulses: Normal pulses.      Heart sounds: Normal heart sounds. No murmur heard.     No gallop.   Pulmonary:      Breath sounds: Normal breath sounds.   Abdominal:      General: There is no distension.      Palpations: There is no mass.      Tenderness: There is no abdominal tenderness.   Musculoskeletal:         General: No swelling.      Cervical back: No rigidity.      Right lower leg: No edema.      Left lower leg: No edema.   Lymphadenopathy:      Cervical: No cervical adenopathy.   Skin:     General: Skin is warm.      Coloration: Skin is not cyanotic.      Findings: No bruising, ecchymosis or erythema.   Neurological:      General:  No focal deficit present.      Mental Status: She is oriented to person, place, and time.      Cranial Nerves: Cranial nerves 2-12 are intact.      Motor: Motor function is intact.   Psychiatric:         Attention and Perception: Attention and perception normal.         Mood and Affect: Mood and affect normal.         Behavior: Behavior normal.         Thought Content: Thought content normal.         Judgment: Judgment normal.             Diagnostic Results       IMPRESSION/PLAN      1. 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  -Adverse events and safety labs are within protocol defined limits.  Proceed with planned C2D15 tx today.    2. Pain neoplasm related. Following with supportive onc.    -States pain in femur continues to feel better      Ajit Hendrickson MSN, APRN-[facility]  Clinical Trials Unit  Phase I Oncology   [email or web address]   Ph. [phone]

## 2025-02-03 RX ORDER — BLOOD-GLUCOSE SENSOR
EACH MISCELLANEOUS
Qty: 1 EACH | Refills: 1 | Status: SHIPPED | OUTPATIENT
Start: 2025-02-03

## 2025-02-05 ENCOUNTER — APPOINTMENT (OUTPATIENT)
Dept: INTEGRATIVE MEDICINE | Facility: CLINIC | Age: 65
End: 2025-02-05
Payer: COMMERCIAL

## 2025-02-10 ENCOUNTER — INFUSION (OUTPATIENT)
Dept: HEMATOLOGY/ONCOLOGY | Facility: CLINIC | Age: 65
End: 2025-02-10
Payer: COMMERCIAL

## 2025-02-10 VITALS
OXYGEN SATURATION: 98 % | HEART RATE: 73 BPM | TEMPERATURE: 96.4 F | BODY MASS INDEX: 24.06 KG/M2 | WEIGHT: 129.41 LBS | RESPIRATION RATE: 17 BRPM | DIASTOLIC BLOOD PRESSURE: 82 MMHG | SYSTOLIC BLOOD PRESSURE: 124 MMHG

## 2025-02-10 DIAGNOSIS — C50.911 CARCINOMA OF RIGHT BREAST METASTATIC TO BONE (MULTI): ICD-10-CM

## 2025-02-10 DIAGNOSIS — C79.51 CARCINOMA OF RIGHT BREAST METASTATIC TO BONE (MULTI): ICD-10-CM

## 2025-02-10 DIAGNOSIS — E88.819 INSULIN RESISTANCE: ICD-10-CM

## 2025-02-10 DIAGNOSIS — C41.9 MALIGNANT NEOPLASM OF BONE WITH METASTASES (MULTI): ICD-10-CM

## 2025-02-10 LAB
ALBUMIN SERPL BCP-MCNC: 3.4 G/DL (ref 3.4–5)
ALP SERPL-CCNC: 405 U/L (ref 33–136)
ALT SERPL W P-5'-P-CCNC: 102 U/L (ref 7–45)
ANION GAP SERPL CALC-SCNC: 12 MMOL/L (ref 10–20)
AST SERPL W P-5'-P-CCNC: 286 U/L (ref 9–39)
BASOPHILS # BLD MANUAL: 0 X10*3/UL (ref 0–0.1)
BASOPHILS NFR BLD MANUAL: 0 %
BILIRUB SERPL-MCNC: 0.7 MG/DL (ref 0–1.2)
BUN SERPL-MCNC: 29 MG/DL (ref 6–23)
CALCIUM SERPL-MCNC: 8.6 MG/DL (ref 8.6–10.3)
CANCER AG27-29 SERPL-ACNC: 1755.9 U/ML (ref 0–38.6)
CHLORIDE SERPL-SCNC: 103 MMOL/L (ref 98–107)
CO2 SERPL-SCNC: 28 MMOL/L (ref 21–32)
CREAT SERPL-MCNC: 0.64 MG/DL (ref 0.5–1.05)
DACRYOCYTES BLD QL SMEAR: ABNORMAL
EGFRCR SERPLBLD CKD-EPI 2021: >90 ML/MIN/1.73M*2
EOSINOPHIL # BLD MANUAL: 0 X10*3/UL (ref 0–0.7)
EOSINOPHIL NFR BLD MANUAL: 0 %
ERYTHROCYTE [DISTWIDTH] IN BLOOD BY AUTOMATED COUNT: 20.7 % (ref 11.5–14.5)
GLUCOSE SERPL-MCNC: 113 MG/DL (ref 74–99)
HCT VFR BLD AUTO: 29.7 % (ref 36–46)
HGB BLD-MCNC: 9.1 G/DL (ref 12–16)
IMM GRANULOCYTES # BLD AUTO: 0.28 X10*3/UL (ref 0–0.7)
IMM GRANULOCYTES NFR BLD AUTO: 3.6 % (ref 0–0.9)
LYMPHOCYTES # BLD MANUAL: 1.19 X10*3/UL (ref 1.2–4.8)
LYMPHOCYTES NFR BLD MANUAL: 15 %
MCH RBC QN AUTO: 28.1 PG (ref 26–34)
MCHC RBC AUTO-ENTMCNC: 30.6 G/DL (ref 32–36)
MCV RBC AUTO: 92 FL (ref 80–100)
METAMYELOCYTES # BLD MANUAL: 0.24 X10*3/UL
METAMYELOCYTES NFR BLD MANUAL: 3 %
MONOCYTES # BLD MANUAL: 0.32 X10*3/UL (ref 0.1–1)
MONOCYTES NFR BLD MANUAL: 4 %
MYELOCYTES # BLD MANUAL: 0.08 X10*3/UL
MYELOCYTES NFR BLD MANUAL: 1 %
NEUTROPHILS # BLD MANUAL: 6.09 X10*3/UL (ref 1.2–7.7)
NEUTS BAND # BLD MANUAL: 0.4 X10*3/UL (ref 0–0.7)
NEUTS BAND NFR BLD MANUAL: 5 %
NEUTS SEG # BLD MANUAL: 5.69 X10*3/UL (ref 1.2–7)
NEUTS SEG NFR BLD MANUAL: 72 %
NRBC BLD-RTO: ABNORMAL /100{WBCS}
PLATELET # BLD AUTO: 104 X10*3/UL (ref 150–450)
POLYCHROMASIA BLD QL SMEAR: ABNORMAL
POTASSIUM SERPL-SCNC: 3.9 MMOL/L (ref 3.5–5.3)
PROT SERPL-MCNC: 5.3 G/DL (ref 6.4–8.2)
RBC # BLD AUTO: 3.24 X10*6/UL (ref 4–5.2)
RBC MORPH BLD: ABNORMAL
SODIUM SERPL-SCNC: 139 MMOL/L (ref 136–145)
TOTAL CELLS COUNTED BLD: 100
WBC # BLD AUTO: 7.9 X10*3/UL (ref 4.4–11.3)

## 2025-02-10 PROCEDURE — 85027 COMPLETE CBC AUTOMATED: CPT

## 2025-02-10 PROCEDURE — 2500000004 HC RX 250 GENERAL PHARMACY W/ HCPCS (ALT 636 FOR OP/ED)

## 2025-02-10 PROCEDURE — 2500000004 HC RX 250 GENERAL PHARMACY W/ HCPCS (ALT 636 FOR OP/ED): Mod: JW | Performed by: INTERNAL MEDICINE

## 2025-02-10 PROCEDURE — 96409 CHEMO IV PUSH SNGL DRUG: CPT

## 2025-02-10 PROCEDURE — 2500000004 HC RX 250 GENERAL PHARMACY W/ HCPCS (ALT 636 FOR OP/ED): Performed by: INTERNAL MEDICINE

## 2025-02-10 PROCEDURE — 96375 TX/PRO/DX INJ NEW DRUG ADDON: CPT | Mod: INF

## 2025-02-10 PROCEDURE — 86300 IMMUNOASSAY TUMOR CA 15-3: CPT | Mod: GEALAB

## 2025-02-10 PROCEDURE — 84075 ASSAY ALKALINE PHOSPHATASE: CPT

## 2025-02-10 PROCEDURE — 85007 BL SMEAR W/DIFF WBC COUNT: CPT

## 2025-02-10 RX ORDER — PROCHLORPERAZINE EDISYLATE 5 MG/ML
10 INJECTION INTRAMUSCULAR; INTRAVENOUS EVERY 6 HOURS PRN
Status: DISCONTINUED | OUTPATIENT
Start: 2025-02-10 | End: 2025-02-10 | Stop reason: HOSPADM

## 2025-02-10 RX ORDER — DIPHENHYDRAMINE HYDROCHLORIDE 50 MG/ML
50 INJECTION INTRAMUSCULAR; INTRAVENOUS AS NEEDED
Status: DISCONTINUED | OUTPATIENT
Start: 2025-02-10 | End: 2025-02-10 | Stop reason: HOSPADM

## 2025-02-10 RX ORDER — ERIBULIN MESYLATE 0.5 MG/ML
1.1 INJECTION INTRAVENOUS ONCE
Status: COMPLETED | OUTPATIENT
Start: 2025-02-10 | End: 2025-02-10

## 2025-02-10 RX ORDER — HEPARIN SODIUM,PORCINE/PF 10 UNIT/ML
50 SYRINGE (ML) INTRAVENOUS AS NEEDED
Status: DISCONTINUED | OUTPATIENT
Start: 2025-02-10 | End: 2025-02-10 | Stop reason: HOSPADM

## 2025-02-10 RX ORDER — FAMOTIDINE 10 MG/ML
20 INJECTION INTRAVENOUS ONCE AS NEEDED
Status: DISCONTINUED | OUTPATIENT
Start: 2025-02-10 | End: 2025-02-10 | Stop reason: HOSPADM

## 2025-02-10 RX ORDER — ALBUTEROL SULFATE 0.83 MG/ML
3 SOLUTION RESPIRATORY (INHALATION) AS NEEDED
Status: DISCONTINUED | OUTPATIENT
Start: 2025-02-10 | End: 2025-02-10 | Stop reason: HOSPADM

## 2025-02-10 RX ORDER — HEPARIN SODIUM,PORCINE/PF 10 UNIT/ML
50 SYRINGE (ML) INTRAVENOUS AS NEEDED
OUTPATIENT
Start: 2025-02-10

## 2025-02-10 RX ORDER — PROCHLORPERAZINE MALEATE 10 MG
10 TABLET ORAL EVERY 6 HOURS PRN
Status: DISCONTINUED | OUTPATIENT
Start: 2025-02-10 | End: 2025-02-10 | Stop reason: HOSPADM

## 2025-02-10 RX ORDER — HEPARIN 100 UNIT/ML
500 SYRINGE INTRAVENOUS AS NEEDED
OUTPATIENT
Start: 2025-02-10

## 2025-02-10 RX ORDER — HEPARIN 100 UNIT/ML
500 SYRINGE INTRAVENOUS AS NEEDED
Status: DISCONTINUED | OUTPATIENT
Start: 2025-02-10 | End: 2025-02-10 | Stop reason: HOSPADM

## 2025-02-10 RX ORDER — ONDANSETRON HYDROCHLORIDE 2 MG/ML
8 INJECTION, SOLUTION INTRAVENOUS ONCE
Status: COMPLETED | OUTPATIENT
Start: 2025-02-10 | End: 2025-02-10

## 2025-02-10 RX ORDER — EPINEPHRINE 0.3 MG/.3ML
0.3 INJECTION SUBCUTANEOUS EVERY 5 MIN PRN
Status: DISCONTINUED | OUTPATIENT
Start: 2025-02-10 | End: 2025-02-10 | Stop reason: HOSPADM

## 2025-02-10 RX ADMIN — ERIBULIN MESYLATE 1.8 MG: 0.5 INJECTION INTRAVENOUS at 12:06

## 2025-02-10 RX ADMIN — ONDANSETRON 8 MG: 2 INJECTION INTRAMUSCULAR; INTRAVENOUS at 11:29

## 2025-02-10 RX ADMIN — HEPARIN 500 UNITS: 100 SYRINGE at 13:25

## 2025-02-10 ASSESSMENT — PAIN SCALES - GENERAL: PAINLEVEL_OUTOF10: 4

## 2025-02-10 NOTE — PROGRESS NOTES
Patient tolerated Eribulin treatment using cooling cap without incident. Gave and reviewed labwork and upcoming treatment schedule with patient and her daughter who both verbalized understanding and denied questions or concerns.    19-Feb-2021

## 2025-02-10 NOTE — PROGRESS NOTES
1125: Stage 1 for pre Cooling    1205: Stage 2 for Cool Cap for chemo IV push.     1225: Stage 3 post cooling

## 2025-02-10 NOTE — SIGNIFICANT EVENT

## 2025-02-12 ENCOUNTER — APPOINTMENT (OUTPATIENT)
Dept: INTEGRATIVE MEDICINE | Facility: CLINIC | Age: 65
End: 2025-02-12

## 2025-02-12 ENCOUNTER — APPOINTMENT (OUTPATIENT)
Dept: INTEGRATIVE MEDICINE | Facility: CLINIC | Age: 65
End: 2025-02-12
Payer: COMMERCIAL

## 2025-02-12 ENCOUNTER — TELEPHONE (OUTPATIENT)
Dept: HEMATOLOGY/ONCOLOGY | Facility: CLINIC | Age: 65
End: 2025-02-12

## 2025-02-12 NOTE — TELEPHONE ENCOUNTER
Spoke with Mr. Jones who accepted an apappointmet for a phone visit on 2/14/25 @ 3pm with Jennifer Duarte NP. He allo asked for assistance in setting up an informational visit with hospice .  He  would like his  daughters to  review the websites for [facility] and [facility] and discuss further with Jennifer on Friday .

## 2025-02-12 NOTE — TELEPHONE ENCOUNTER
Called patient back and we had a conversation. Increasing fatigue on treatment. She would like to come in to see either me or faisal next wk. Will work on arranging an appt for her. Pall med consulted as well

## 2025-02-13 ENCOUNTER — TELEPHONE (OUTPATIENT)
Dept: HEMATOLOGY/ONCOLOGY | Facility: HOSPITAL | Age: 65
End: 2025-02-13
Payer: COMMERCIAL

## 2025-02-13 ENCOUNTER — DOCUMENTATION (OUTPATIENT)
Dept: PALLIATIVE MEDICINE | Facility: HOSPITAL | Age: 65
End: 2025-02-13
Payer: COMMERCIAL

## 2025-02-13 ENCOUNTER — TELEPHONE (OUTPATIENT)
Dept: PALLIATIVE MEDICINE | Facility: HOSPITAL | Age: 65
End: 2025-02-13
Payer: COMMERCIAL

## 2025-02-13 DIAGNOSIS — C50.919 MALIGNANT NEOPLASM OF FEMALE BREAST, UNSPECIFIED ESTROGEN RECEPTOR STATUS, UNSPECIFIED LATERALITY, UNSPECIFIED SITE OF BREAST: Primary | ICD-10-CM

## 2025-02-13 NOTE — TELEPHONE ENCOUNTER
Confirmed with patient  José Miguel- have heard from hospice and they will be coming to the home tomorrow late afternoon

## 2025-02-13 NOTE — TELEPHONE ENCOUNTER
Call to  José Miguel, lengthy conversation regarding Ale's current state. She continues to feels severely fatigued and weak, had a slip in shower this morning and hit her right hip. Is bruised and is trying to decide about ER. We additionally discussed being seen in [facility] today, is not interested at this time     Per  patient does not want chemo on Monday. Would like to complete DNR- Comfort care paperwork today and is considering hospice.    Plans to have virtual visit with supportive oncology tomorrow - did find steroids to be helpful after C1 D1. Would like to keep 2/19 apt with Dr Tolbert on 2/19.

## 2025-02-13 NOTE — TELEPHONE ENCOUNTER
ORIN is aware that  the request is no longer for an informational visit and that the family now desires to begin services. ORIN is meeting with [facility] tomorrow.

## 2025-02-13 NOTE — TELEPHONE ENCOUNTER
1025 Received call from  José Miguel and daughter Rubi- patient in clear thought would like to enroll in hospice. They understand I will get things moving. Currently have not went to ED, patient is resting. They understand to call me if decide to go to ED and I will update ED charge nurse

## 2025-02-14 ENCOUNTER — TELEMEDICINE (OUTPATIENT)
Dept: PALLIATIVE MEDICINE | Facility: CLINIC | Age: 65
End: 2025-02-14
Payer: COMMERCIAL

## 2025-02-14 DIAGNOSIS — Z79.891 ENCOUNTER FOR MONITORING OPIOID MAINTENANCE THERAPY: ICD-10-CM

## 2025-02-14 DIAGNOSIS — R53.1 WEAKNESS: ICD-10-CM

## 2025-02-14 DIAGNOSIS — Z51.5 PALLIATIVE CARE ENCOUNTER: Primary | ICD-10-CM

## 2025-02-14 DIAGNOSIS — M79.2 NEUROPATHIC PAIN: ICD-10-CM

## 2025-02-14 DIAGNOSIS — G89.3 CANCER RELATED PAIN: ICD-10-CM

## 2025-02-14 DIAGNOSIS — Z51.81 ENCOUNTER FOR MONITORING OPIOID MAINTENANCE THERAPY: ICD-10-CM

## 2025-02-14 RX ORDER — MORPHINE SULFATE 30 MG/1
30 TABLET, FILM COATED, EXTENDED RELEASE ORAL 3 TIMES DAILY
Qty: 90 TABLET | Refills: 0 | Status: SHIPPED | OUTPATIENT
Start: 2025-02-14 | End: 2025-03-16

## 2025-02-14 RX ORDER — DEXAMETHASONE 4 MG/1
4 TABLET ORAL EVERY MORNING
Qty: 14 TABLET | Refills: 2 | Status: SHIPPED | OUTPATIENT
Start: 2025-02-14 | End: 2025-02-28

## 2025-02-14 RX ORDER — MORPHINE SULFATE 15 MG/1
15 TABLET, FILM COATED, EXTENDED RELEASE ORAL 3 TIMES DAILY
Qty: 90 TABLET | Refills: 0 | Status: SHIPPED | OUTPATIENT
Start: 2025-02-14 | End: 2025-03-16

## 2025-02-14 NOTE — PROGRESS NOTES
"  SUPPORTIVE AND PALLIATIVE ONCOLOGY OUTPATIENT FOLLOW-UP      SERVICE DATE: 2/14/2025    Virtual or Telephone Consent    A telephone visit (audio only) between the patient (at the originating site) and the provider (at the distant site) was utilized to provide this telehealth service.   Verbal consent was requested and obtained from Trang Jones on this date, 02/14/25 for a telehealth visit.      Cancer History  Newly diagnosed triple negative breast CA (with bone mets and cord compression)  -s/p L mastectomy in 2010: stage II ER+/HER2- breast CA  -s/p 4 cycles docetaxel/cyclophosphamide chemo  -took tamoxifen x 7 yrs, completed in [id number]  -presented to ED on 3/30/23 with severe back pain  -MRI spine showed cord compression, admitted at that time   -s/p 1 fx RT to spine on 5/4/23  -plan to start treatment with weekly paclitaxel and q21day pembro  -started treatment 5/22/23, changed to abraxane after C1 d/t anaphylactic type reaction  -CT CAP (11/14/23): Overall similar appearance of diffuse osseous metastatic disease. New subcentimeter lesion in the right hepatic lobe. Question  metastatic disease. Otherwise similar appearance of small  hypodensities throughout the liver.  -plan to stop abraxane and change therapy to TDxD, scheduled to start on 1/29/24  -treatment held for XRT to hip/spine 4/1/24  -stopped tx with TDxD on 6/27/24  -plan to start Aprea clinical trial in August 2024  -started sacituzumab govitecan 10/28/24  -CT scheduled for 12/20/24  -start Eribulin Jan 2025     Med Onc: Dr. Tolbert/ [facility]  Integrative Medicine: Dr. Snadoval      Subjective   HISTORY OF PRESENT ILLNESS: Trang Jones is a 64 y.o. female  with PMHx of HLD, and triple negative breast CA.     She presents to supportive oncology for follow up for pain and symptom management.     Spoke with pt on the phone for FUV, , José Miguel, and sister, Tori, also present on the call. She is \"feeling okay,\"  feels " "she is \"back to baseline,\" after a few days of extreme fatigue, moderate confusion, and mechanical fall in the shower yesterday. She is a little bit sore from the fall, has some soreness in ribs, otherwise pain well controlled. Continues on MSER 45mg bid, taking ibuprofen PRN. Had a BM this morning after a few days without. No N/V/heartburn. Appetite is up and down. Mood is stable. Sleeping well. Has more energy today, getting around \"with assistance.\" She and family are meeting with hospice this evening.     Pain Assessment:  Pain Score:  0  Location:  \"soreness\" in ribs   Education:      Symptom Assessment:  Pain:none  Headache: none  Dizziness:none  Lack of energy: a little  Difficulty sleeping: none  Worrying: none  Anxiety: none  Depression: none  Pain in mouth/swallowing: none  Dry mouth: none  Taste changes: none  Shortness of breath: none  Lack of appetite: somewhat   Nausea: none  Vomiting: none  Constipation: somewhat  Diarrhea: none  Sore muscles: somewhat  Numbness or tingling in hands/feet/other: a little  Weight loss: none  Weakness: somewhat       Information obtained from: interview of patient, interview of family  ______________________________________________________________________        Objective                PHYSICAL EXAMINATION not performed d/t phone visit   Vital Signs:   Vital signs reviewed  There were no vitals filed for this visit.    Pain Score:  0      ASSESSMENT/PLAN    Pain: upper back around shoulder blades, radiates to front of chest; dull ache, shooting with sudden movement    Pain is: cancer related pain and acute on chronic  Type: somatic and neuropathic  Pain control: well-controlled  Home regimen:   -continue Morphine Sulfate Contin 45mg tid. Rx sent for fill today (30mg) and on 2/26 (15mg).   -continue oxycodone 10mg every 4hrs PRN. Not currently taking this medication, no Rx needed today.   -continue gabapentin 300mg in the morning and 600mg at night.    -continue " ibuprofen PRN   -continue to follow with Dr. Sandoval/ Agustin Dietrich for acupuncture/ reiki   -continue medical THC PRN   -restart dexamethasone 4mg qAM x14 doses. Rx sent today.   Intolerances/previously tried:     Opioid Use  Medication Management:   - OARRS report reviewed with no aberrant behavior; consistent with  prescriptions/records and patient history  - MED 45.  Overdose Risk Score 220.   This has been discussed with patient.   - We will continue to closely monitor the patient for signs of prescription misuse including UDS, OARRS review and subjective reports at each visit.  - no concurrent benzodiazepine use   - I am a provider who either is or has consulted and collaborated with a provider certified in Hospice and Palliative Medicine and have conducted a face-face visit and examination for this patient.  - Routine Urine Drug Screen completed 5/9/23 appropriately positive for opioids and negative for illicit substances  - Controlled Substance Agreement completed 5/9/23  - Specifically discussed that controlled substance prescriptions will only be provided by our group as outlined in the completed agreement  - Prescribed naloxone previously prescribed  - Red Flags: none     Bowels: at risk for OIC  -educated pt on importance of maintaining daily BM  -continue daily miralax PRN  -continue senna/colace PRN      N/V/reflux:   -continue zofran 4mg ODT q8hrs PRN   -continue compazine 10mg q6hrs PRN.   -continue migraine medication PRN   -restart dexamethasone 4mg qAM x14 doses. Rx sent today.   Intolerances/previously tried:  -self discontinued olanzapine 5mg at bedtime during treatment week  -self discontinued prilosec 40mg daily during treatment week     Sleep: hx of insomnia prior to cancer dx   -recently restarted medical THC PRN   -encouraged pt to maintain regular sleep/wake cycle  -plan for better pain control to aid in improved sleep, see pain section above      Mood: improving    -discussed adding  medication to help with mood, pt would like to hold off for now  -continue ativan 0.5mg bid PRN, not currently taking this medication.   -potentially interested in counseling resources    Fatigue/ Weakness  -restart dexamethasone 4mg qAM x14 doses. Rx sent today.      Medical Decision Making/ GOC:   -recently completed [place] DNR form with oncology, comfort measures only.   -meeting with [facility] on 2/14 at 1730.         Next Follow-Up Visit:  Plan to check in next week, follow up PRN.     Signature and billing  Medical complexity was moderate level due to due to complexity of problems, extensive data review, and high risk of management/treatment.  Time was spent on the following: Prep Time, Time Directly with Patient/Family/Caregiver, Documentation Time. Total time spent: 30 mins      Data  Diagnostic tests and information reviewed for today's visit:  Most recent labs         Some elements copied from my note on 1/31/25, the elements have been updated and all reflect current decision making from today, 2/14/2025.      Plan of Care discussed with: Patient, , sister    SIGNATURE: KELSEA Garg-CNP    Contact information:  Supportive and Palliative Oncology  Monday-Friday 8 AM-5 PM  Phone:  [phone], press option #5, then option #1.   Or Epic Secure Chat

## 2025-02-17 ENCOUNTER — APPOINTMENT (OUTPATIENT)
Dept: HEMATOLOGY/ONCOLOGY | Facility: CLINIC | Age: 65
End: 2025-02-17
Payer: COMMERCIAL

## 2025-02-18 ENCOUNTER — TELEPHONE (OUTPATIENT)
Dept: PALLIATIVE MEDICINE | Facility: CLINIC | Age: 65
End: 2025-02-18
Payer: COMMERCIAL

## 2025-02-18 NOTE — TELEPHONE ENCOUNTER
I called [facility] to check if patient had been admitted to their services. They confirmed patient was admitted on 2/14/25. I will reach out to patient today to check in and make sure needs are met.

## 2025-02-18 NOTE — TELEPHONE ENCOUNTER
VM left for patient informing her that we are aware she is admitted on hospice services and they would manage her symptoms at this point going forward. Future appointment cancelled with Jennifer. I provided our call back and asked patient to reach out anytime with questions or concerns.

## 2025-02-19 ENCOUNTER — OFFICE VISIT (OUTPATIENT)
Dept: HEMATOLOGY/ONCOLOGY | Facility: CLINIC | Age: 65
End: 2025-02-19
Payer: COMMERCIAL

## 2025-02-19 ENCOUNTER — APPOINTMENT (OUTPATIENT)
Dept: INTEGRATIVE MEDICINE | Facility: CLINIC | Age: 65
End: 2025-02-19
Payer: COMMERCIAL

## 2025-02-19 ENCOUNTER — APPOINTMENT (OUTPATIENT)
Dept: INTEGRATIVE MEDICINE | Facility: CLINIC | Age: 65
End: 2025-02-19

## 2025-02-19 VITALS
OXYGEN SATURATION: 100 % | TEMPERATURE: 97.3 F | SYSTOLIC BLOOD PRESSURE: 116 MMHG | HEART RATE: 93 BPM | DIASTOLIC BLOOD PRESSURE: 76 MMHG

## 2025-02-19 DIAGNOSIS — C79.51 CARCINOMA OF RIGHT BREAST METASTATIC TO BONE (MULTI): ICD-10-CM

## 2025-02-19 DIAGNOSIS — G89.29 CHRONIC PAIN: Primary | ICD-10-CM

## 2025-02-19 DIAGNOSIS — C41.9 MALIGNANT NEOPLASM OF BONE WITH METASTASES (MULTI): ICD-10-CM

## 2025-02-19 DIAGNOSIS — R53.0 NEOPLASTIC MALIGNANT RELATED FATIGUE: ICD-10-CM

## 2025-02-19 DIAGNOSIS — C50.911 CARCINOMA OF RIGHT BREAST METASTATIC TO BONE (MULTI): ICD-10-CM

## 2025-02-19 PROCEDURE — 99215 OFFICE O/P EST HI 40 MIN: CPT | Performed by: INTERNAL MEDICINE

## 2025-02-19 ASSESSMENT — PAIN SCALES - GENERAL
PAINLEVEL_OUTOF10: 5 - MODERATE PAIN
PAINLEVEL_OUTOF10: 0-NO PAIN

## 2025-02-19 NOTE — PROGRESS NOTES
Acupuncture Visit:     Subjective   Patient ID: Trang Jones is a 64 y.o. female who presents for No chief complaint on file.  Seeking continued support for emotional wellbeing.  She reports sleep has improved and pain is managed.  She walks with walker in part due to feeling of nervousness about falling.  She sustained bruising after a fall in the shower on left lower extremity and right knee area.          Session Information  Is this acupuncture treatment being billed to the patient's insurance company: No  Visit Type: Follow-up visit    Pre-treatment Assessment  Pain Score: 6  Anxiety Level (0-10): 4  Stress Level (0-10): 4  Coping Level (0-10): 6  Depression Level (0-10): 4  Fatigue Level (0-10): 5  Nausea Level (0-10): 3  Wellbeing Level (0-10): 6    Review of Systems         Provider reviewed plan for the acupuncture session, precautions and contraindications. Patient/guardian/hospital staff has given consent to treat with full understanding of what to expect during the session. Before acupuncture began, provider explained to the patient to communicate at any time if the procedure was causing discomfort past their tolerance level. Patient agreed to advise acupuncturist. The acupuncturist counseled the patient on the risks of acupuncture treatment including pain, infection, bleeding, and no relief of pain. The patient was positioned comfortably. There was no evidence of infection at the site of needle insertions.    Objective   Physical Exam         Treatment Plan  Treatment Goals: Pain management, Fatigue reduction, Stress reduction, Anxiety reduction    Acupuncture Treatment  Patient Position: Seated and reclining  Acupuncture Needling: Yes  Needle Guage: 42 guage /.14/ Lime green seirin, 40 guage /.16/ Red seirin  Body Points - Left: buddha triangle, gb 41  Body Points - Bilateral: k 6 yin isaacs  Body Points - Right: sj 5, sp 4  Needle Count In: 9  Needle Count Out: 9  Needle Retention Time (min): 25  minutes  Total Face to Face Time (min): 25 minutes         Post-treatment Assessment  0-10 (Numeric) Pain Score: 5 - Moderate pain  Anxiety Level (0-10): 4  Coping Level (0-10): 6  Depression Level (0-10): 4  Fatigue Level (0-10): 5  Nausea Level (0-10): 3  Wellbeing Level (0-10): 7    Assessment/Plan

## 2025-02-19 NOTE — PATIENT INSTRUCTIONS
Please reach out to us if you need anything   Will hold off on making any future appointments for now

## 2025-02-20 ASSESSMENT — ENCOUNTER SYMPTOMS
DIARRHEA: 0
DIAPHORESIS: 0
HEMATURIA: 0
NERVOUS/ANXIOUS: 0
EYE PROBLEMS: 0
LEG SWELLING: 0
WHEEZING: 0
FREQUENCY: 0
EYES NEGATIVE: 1
ABDOMINAL PAIN: 0
HOT FLASHES: 0
CARDIOVASCULAR NEGATIVE: 1
EXTREMITY WEAKNESS: 0
PALPITATIONS: 0
BRUISES/BLEEDS EASILY: 0
CHILLS: 0
SEIZURES: 0
NAUSEA: 1
FATIGUE: 1
FEVER: 0
APPETITE CHANGE: 0
SHORTNESS OF BREATH: 0
CONFUSION: 0
CHEST TIGHTNESS: 0
RESPIRATORY NEGATIVE: 1
BLOOD IN STOOL: 0
HEMOPTYSIS: 0
COUGH: 0
CONSTIPATION: 0
ARTHRALGIAS: 0
DYSURIA: 0
HEADACHES: 0
SCLERAL ICTERUS: 0
MYALGIAS: 0
ADENOPATHY: 0
BACK PAIN: 1
ABDOMINAL DISTENTION: 0
NUMBNESS: 0
DIZZINESS: 0
DIFFICULTY URINATING: 0
DEPRESSION: 0
SLEEP DISTURBANCE: 0

## 2025-02-20 NOTE — PROGRESS NOTES
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 she received prior Taxotere and cyclophosphamide , completed 6/2010. S/p adjuvant endocrine therapy.    Diagnosis of metastatic TNBC 3/2023, presenting with back pain and cord compression      weekly paclitaxel from 5/22/23, changed to abraxane after 1st cycle due to anaphylactic type reaction. Discontinued 12/12/23 due to progressive disease.    TDxD from 1/29/24, held for XRT to spine for a few wks and stopped on 6/27/24  s/p XRT to spine on 4/1/24    [id number] clincial trial from 8/26/24-9/23/24  Sacizutumab 8 mg/kg from 10/28/24 to 12/16/24 stopped due to progression  Eribulin from 120/25 to 2/10/25 and stopped due to worsening PS    CURRENT THERAPY  Hospice care    HISTORY OF PRESENT ILLNESS    Trang Jones is a 64 y.o. woman here for follow up. Here with sister and . Since being enrolled into hospice, she's made significant improvements. Less pain. More energy.           Review of Systems   Constitutional:  Positive for fatigue. Negative for appetite change, chills, diaphoresis and fever.   HENT:  Negative.  Negative for hearing loss and lump/mass.    Eyes: Negative.  Negative for eye problems and icterus.   Respiratory: Negative.  Negative for chest tightness, cough, hemoptysis, shortness of breath and wheezing.    Cardiovascular: Negative.  Negative for chest pain, leg swelling and palpitations.   Gastrointestinal:  Positive for nausea. Negative for abdominal distention, abdominal pain, blood in stool, constipation and diarrhea.   Endocrine: Negative for hot  flashes.   Genitourinary:  Negative for bladder incontinence, difficulty urinating, dyspareunia, dysuria, frequency and hematuria.    Musculoskeletal:  Positive for back pain. Negative for arthralgias, gait problem and myalgias.        ++hip pain   Neurological:  Negative for dizziness, extremity weakness, gait problem, headaches, numbness and seizures.   Hematological:  Negative for adenopathy. Does not bruise/bleed easily.   Psychiatric/Behavioral:  Negative for confusion, depression and sleep disturbance. The patient is not nervous/anxious.    All other systems reviewed and are negative.        Past Medical History:  has a past medical history of Breast cancer (Multi), Hypercholesteremia, Metastatic cancer (Multi), and Personal history of irradiation.  Surgical History:   has a past surgical history that includes CT guided percutaneous biopsy bone deep (2023); Mastectomy (Left, ); and Breast surgery.  Social History:   reports that she quit smoking about 22 years ago. Her smoking use included cigarettes. She has been exposed to tobacco smoke. She has never used smokeless tobacco. She reports that she does not currently use alcohol. She reports current drug use. Drug: Marijuana.  Family History:    Family History   Problem Relation Name Age of Onset    Leukemia Father       Family Oncology History:  Cancer-related family history is not on file.      OBJECTIVE    VS / Pain:  /76 (BP Location: Right arm, Patient Position: Sitting, BP Cuff Size: Small adult)   Pulse 93   Temp 36.3 °C (97.3 °F) (Temporal)   LMP  (LMP Unknown)   SpO2 100%   BSA: There is no height or weight on file to calculate BSA.  0-10 (Numeric) Pain Score: 5 - Moderate pain  Pain Scale: 3    Performance Status:  The ECOG performance scale today is ECO- Restricted in physically strenuous activity.  Carries out light duty.    Physical Exam  Constitutional:       General: She is not in acute distress.     Appearance: She is not  ill-appearing, toxic-appearing or diaphoretic.   HENT:      Nose: No congestion or rhinorrhea.      Mouth/Throat:      Pharynx: No posterior oropharyngeal erythema.   Cardiovascular:      Rate and Rhythm: Normal rate and regular rhythm.      Pulses: Normal pulses.      Heart sounds: Normal heart sounds. No murmur heard.     No gallop.   Pulmonary:      Breath sounds: Normal breath sounds.   Abdominal:      General: There is no distension.      Palpations: There is no mass.      Tenderness: There is no abdominal tenderness.   Musculoskeletal:         General: No swelling.      Cervical back: No rigidity.      Right lower leg: No edema.      Left lower leg: No edema.   Lymphadenopathy:      Cervical: No cervical adenopathy.   Skin:     General: Skin is warm.      Coloration: Skin is not cyanotic.      Findings: No bruising, ecchymosis or erythema.   Neurological:      General: No focal deficit present.      Mental Status: She is oriented to person, place, and time.      Cranial Nerves: Cranial nerves 2-12 are intact.      Motor: Motor function is intact.   Psychiatric:         Attention and Perception: Attention and perception normal.         Mood and Affect: Mood and affect normal.         Behavior: Behavior normal.         Thought Content: Thought content normal.         Judgment: Judgment normal.             Diagnostic Results   === 12/20/24 ===    CT CHEST ABDOMEN PELVIS W IV CONTRAST    - Impression -  CHEST:  1.  Interval appearance of 3 tiny pulmonary nodules measured up to 4  mm. These will be re-evaluated on the follow-up scans.  2. No pleural effusion. No thoracic lymphadenopathy.  3. Stable compression deformity of T5 and T10 vertebral bodies.  Osteoblastic metastasis with no significant change.    ABDOMEN-PELVIS:  1.  Progression of metastatic liver disease as detailed above.  2. No abdominal or pelvic lymphadenopathy. No ascites.  3. Stable dextroscoliosis centered at L2. Osteoblastic metastasis  with no  significant change.  4. Stable small dystrophic calcifications in the uncinate process of  the pancreas.      MACRO:  None    Signed by: Norah Shelby 12/22/2024 2:54 PM  Dictation workstation:   [id number]     LABORATORY/PATHOLOGY DATA    Infusion on 02/10/2025   Component Date Value Ref Range Status    CA 27.29 02/10/2025 1,755.9 (H)  0.0 - 38.6 U/mL Final    WBC 02/10/2025 7.9  4.4 - 11.3 x10*3/uL Final    nRBC 02/10/2025    Final    RBC 02/10/2025 3.24 (L)  4.00 - 5.20 x10*6/uL Final    Hemoglobin 02/10/2025 9.1 (L)  12.0 - 16.0 g/dL Final    Hematocrit 02/10/2025 29.7 (L)  36.0 - 46.0 % Final    MCV 02/10/2025 92  80 - 100 fL Final    MCH 02/10/2025 28.1  26.0 - 34.0 pg Final    MCHC 02/10/2025 30.6 (L)  32.0 - 36.0 g/dL Final    RDW 02/10/2025 20.7 (H)  11.5 - 14.5 % Final    Platelets 02/10/2025 104 (L)  150 - 450 x10*3/uL Final    Immature Granulocytes %, Automated 02/10/2025 3.6 (H)  0.0 - 0.9 % Final    Immature Granulocytes Absolute, Au* 02/10/2025 0.28  0.00 - 0.70 x10*3/uL Final    Glucose 02/10/2025 113 (H)  74 - 99 mg/dL Final    Sodium 02/10/2025 139  136 - 145 mmol/L Final    Potassium 02/10/2025 3.9  3.5 - 5.3 mmol/L Final    Chloride 02/10/2025 103  98 - 107 mmol/L Final    Bicarbonate 02/10/2025 28  21 - 32 mmol/L Final    Anion Gap 02/10/2025 12  10 - 20 mmol/L Final    Urea Nitrogen 02/10/2025 29 (H)  6 - 23 mg/dL Final    Creatinine 02/10/2025 0.64  0.50 - 1.05 mg/dL Final    eGFR 02/10/2025 >90  >60 mL/min/1.73m*2 Final    Calcium 02/10/2025 8.6  8.6 - 10.3 mg/dL Final    Albumin 02/10/2025 3.4  3.4 - 5.0 g/dL Final    Alkaline Phosphatase 02/10/2025 405 (H)  33 - 136 U/L Final    Total Protein 02/10/2025 5.3 (L)  6.4 - 8.2 g/dL Final    AST 02/10/2025 286 (H)  9 - 39 U/L Final    Bilirubin, Total 02/10/2025 0.7  0.0 - 1.2 mg/dL Final    ALT 02/10/2025 102 (H)  7 - 45 U/L Final    Neutrophils %, Manual 02/10/2025 72.0  40.0 - 80.0 % Final    Bands %, Manual 02/10/2025 5.0  0.0 - 5.0 % Final     Lymphocytes %, Manual 02/10/2025 15.0  13.0 - 44.0 % Final    Monocytes %, Manual 02/10/2025 4.0  2.0 - 10.0 % Final    Eosinophils %, Manual 02/10/2025 0.0  0.0 - 6.0 % Final    Basophils %, Manual 02/10/2025 0.0  0.0 - 2.0 % Final    Metamyelocytes %, Manual 02/10/2025 3.0  0.0 - 0.0 % Final    Myelocytes %, Manual 02/10/2025 1.0  0.0 - 0.0 % Final    Seg Neutrophils Absolute, Manual 02/10/2025 5.69  1.20 - 7.00 x10*3/uL Final    Bands Absolute, Manual 02/10/2025 0.40  0.00 - 0.70 x10*3/uL Final    Lymphocytes Absolute, Manual 02/10/2025 1.19 (L)  1.20 - 4.80 x10*3/uL Final    Monocytes Absolute, Manual 02/10/2025 0.32  0.10 - 1.00 x10*3/uL Final    Eosinophils Absolute, Manual 02/10/2025 0.00  0.00 - 0.70 x10*3/uL Final    Basophils Absolute, Manual 02/10/2025 0.00  0.00 - 0.10 x10*3/uL Final    Metamyelocytes Absolute, Manual 02/10/2025 0.24  0.00 - 0.00 x10*3/uL Final    Myelocytes Absolute, Manual 02/10/2025 0.08  0.00 - 0.00 x10*3/uL Final    Total Cells Counted 02/10/2025 100   Final    Neutrophils Absolute, Manual 02/10/2025 6.09  1.20 - 7.70 x10*3/uL Final    RBC Morphology 02/10/2025 See Below   Final    Polychromasia 02/10/2025 Mild   Final    Teardrop Cells 02/10/2025 Few   Final   Infusion on 01/27/2025   Component Date Value Ref Range Status    Glucose 01/27/2025 104 (H)  74 - 99 mg/dL Final    Sodium 01/27/2025 141  136 - 145 mmol/L Final    Potassium 01/27/2025 4.0  3.5 - 5.3 mmol/L Final    Chloride 01/27/2025 103  98 - 107 mmol/L Final    Bicarbonate 01/27/2025 29  21 - 32 mmol/L Final    Anion Gap 01/27/2025 13  10 - 20 mmol/L Final    Urea Nitrogen 01/27/2025 14  6 - 23 mg/dL Final    Creatinine 01/27/2025 0.61  0.50 - 1.05 mg/dL Final    eGFR 01/27/2025 >90  >60 mL/min/1.73m*2 Final    Calcium 01/27/2025 8.9  8.6 - 10.3 mg/dL Final    Albumin 01/27/2025 3.4  3.4 - 5.0 g/dL Final    Alkaline Phosphatase 01/27/2025 289 (H)  33 - 136 U/L Final    Total Protein 01/27/2025 5.7 (L)  6.4 - 8.2 g/dL  Final    AST 01/27/2025 73 (H)  9 - 39 U/L Final    Bilirubin, Total 01/27/2025 0.6  0.0 - 1.2 mg/dL Final    ALT 01/27/2025 36  7 - 45 U/L Final    WBC 01/27/2025 2.1 (L)  4.4 - 11.3 x10*3/uL Final    nRBC 01/27/2025    Final    RBC 01/27/2025 3.21 (L)  4.00 - 5.20 x10*6/uL Final    Hemoglobin 01/27/2025 9.0 (L)  12.0 - 16.0 g/dL Final    Hematocrit 01/27/2025 27.8 (L)  36.0 - 46.0 % Final    MCV 01/27/2025 87  80 - 100 fL Final    MCH 01/27/2025 28.0  26.0 - 34.0 pg Final    MCHC 01/27/2025 32.4  32.0 - 36.0 g/dL Final    RDW 01/27/2025 17.1 (H)  11.5 - 14.5 % Final    Platelets 01/27/2025 148 (L)  150 - 450 x10*3/uL Final    Neutrophils % 01/27/2025 29.7  40.0 - 80.0 % Final    Immature Granulocytes %, Automated 01/27/2025 0.9  0.0 - 0.9 % Final    Lymphocytes % 01/27/2025 46.7  13.0 - 44.0 % Final    Monocytes % 01/27/2025 21.7  2.0 - 10.0 % Final    Eosinophils % 01/27/2025 0.5  0.0 - 6.0 % Final    Basophils % 01/27/2025 0.5  0.0 - 2.0 % Final    Neutrophils Absolute 01/27/2025 0.63 (L)  1.20 - 7.70 x10*3/uL Final    Immature Granulocytes Absolute, Au* 01/27/2025 0.02  0.00 - 0.70 x10*3/uL Final    Lymphocytes Absolute 01/27/2025 0.99 (L)  1.20 - 4.80 x10*3/uL Final    Monocytes Absolute 01/27/2025 0.46  0.10 - 1.00 x10*3/uL Final    Eosinophils Absolute 01/27/2025 0.01  0.00 - 0.70 x10*3/uL Final    Basophils Absolute 01/27/2025 0.01  0.00 - 0.10 x10*3/uL Final    CA 27.29 01/27/2025 1,506.1 (H)  0.0 - 38.6 U/mL Final    RBC Morphology 01/27/2025 See Below   Final    Polychromasia 01/27/2025 Mild   Final    Teardrop Cells 01/27/2025 Few   Final   Infusion on 01/20/2025   Component Date Value Ref Range Status    Glucose 01/20/2025 160 (H)  74 - 99 mg/dL Final    Sodium 01/20/2025 137  136 - 145 mmol/L Final    Potassium 01/20/2025 3.7  3.5 - 5.3 mmol/L Final    Chloride 01/20/2025 100  98 - 107 mmol/L Final    Bicarbonate 01/20/2025 29  21 - 32 mmol/L Final    Anion Gap 01/20/2025 12  10 - 20 mmol/L Final     Urea Nitrogen 01/20/2025 16  6 - 23 mg/dL Final    Creatinine 01/20/2025 0.61  0.50 - 1.05 mg/dL Final    eGFR 01/20/2025 >90  >60 mL/min/1.73m*2 Final    Calcium 01/20/2025 8.9  8.6 - 10.3 mg/dL Final    Albumin 01/20/2025 3.4  3.4 - 5.0 g/dL Final    Alkaline Phosphatase 01/20/2025 418 (H)  33 - 136 U/L Final    Total Protein 01/20/2025 5.8 (L)  6.4 - 8.2 g/dL Final    AST 01/20/2025 134 (H)  9 - 39 U/L Final    Bilirubin, Total 01/20/2025 0.7  0.0 - 1.2 mg/dL Final    ALT 01/20/2025 57 (H)  7 - 45 U/L Final    WBC 01/20/2025 4.5  4.4 - 11.3 x10*3/uL Final    RBC 01/20/2025 3.49 (L)  4.00 - 5.20 x10*6/uL Final    Hemoglobin 01/20/2025 9.7 (L)  12.0 - 16.0 g/dL Final    Hematocrit 01/20/2025 30.3 (L)  36.0 - 46.0 % Final    MCV 01/20/2025 87  80 - 100 fL Final    MCH 01/20/2025 27.8  26.0 - 34.0 pg Final    MCHC 01/20/2025 32.0  32.0 - 36.0 g/dL Final    RDW 01/20/2025 17.0 (H)  11.5 - 14.5 % Final    Platelets 01/20/2025 148 (L)  150 - 450 x10*3/uL Final    Neutrophils % 01/20/2025 68.1  40.0 - 80.0 % Final    Immature Granulocytes %, Automated 01/20/2025 2.7 (H)  0.0 - 0.9 % Final    Lymphocytes % 01/20/2025 18.7  13.0 - 44.0 % Final    Monocytes % 01/20/2025 8.3  2.0 - 10.0 % Final    Eosinophils % 01/20/2025 1.8  0.0 - 6.0 % Final    Basophils % 01/20/2025 0.4  0.0 - 2.0 % Final    Neutrophils Absolute 01/20/2025 3.03  1.20 - 7.70 x10*3/uL Final    Immature Granulocytes Absolute, Au* 01/20/2025 0.12  0.00 - 0.70 x10*3/uL Final    Lymphocytes Absolute 01/20/2025 0.83 (L)  1.20 - 4.80 x10*3/uL Final    Monocytes Absolute 01/20/2025 0.37  0.10 - 1.00 x10*3/uL Final    Eosinophils Absolute 01/20/2025 0.08  0.00 - 0.70 x10*3/uL Final    Basophils Absolute 01/20/2025 0.02  0.00 - 0.10 x10*3/uL Final    CA 27.29 01/20/2025 1,542.9 (H)  0.0 - 38.6 U/mL Final      Lab Results   Component Value Date    LABCA2 1,755.9 (H) 02/10/2025    LABCA2 1,506.1 (H) 01/27/2025    LABCA2 1,542.9 (H) 01/20/2025    LABCA2 829.5 (H)  12/09/2024    LABCA2 892.8 (H) 11/18/2024             IMPRESSION/PLAN      1. 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 Patient would like to stay enrolled in hospice for now. I think this is, under the circumstances, the best option at this time. I asked her to please reach out to us if any questions/or anything that we can help with or if something changes with her desire to stay in hospice care.    2. Pain neoplasm related. Enrolled in home hospice and pain is well controlled currently.      We discussed the clinical significance of diagnosis, goals of care and treatment plan in detail.  I personally spent over half of a total 45 minutes face to face with the patient in counseling and discussion and/or coordination of care as described above.         -------------------------------------------------------------------------------------------------------------------------------  Bebeto Tolbert MD  Director of Breast Cancer Medical Oncology Research Program   [facility]  Professor of Medicine  [address], [address]  [place] [id number]  Phone: [phone]  [email or web address]

## 2025-02-26 ENCOUNTER — APPOINTMENT (OUTPATIENT)
Dept: INTEGRATIVE MEDICINE | Facility: CLINIC | Age: 65
End: 2025-02-26
Payer: COMMERCIAL

## 2025-02-26 ENCOUNTER — APPOINTMENT (OUTPATIENT)
Dept: INTEGRATIVE MEDICINE | Facility: CLINIC | Age: 65
End: 2025-02-26

## 2025-03-04 ENCOUNTER — APPOINTMENT (OUTPATIENT)
Dept: PALLIATIVE MEDICINE | Facility: CLINIC | Age: 65
End: 2025-03-04
Payer: COMMERCIAL

## 2025-03-05 ENCOUNTER — APPOINTMENT (OUTPATIENT)
Dept: INTEGRATIVE MEDICINE | Facility: CLINIC | Age: 65
End: 2025-03-05
Payer: COMMERCIAL

## 2025-03-12 ENCOUNTER — APPOINTMENT (OUTPATIENT)
Dept: INTEGRATIVE MEDICINE | Facility: CLINIC | Age: 65
End: 2025-03-12
Payer: COMMERCIAL